# Patient Record
Sex: FEMALE | Race: WHITE | NOT HISPANIC OR LATINO | Employment: OTHER | URBAN - METROPOLITAN AREA
[De-identification: names, ages, dates, MRNs, and addresses within clinical notes are randomized per-mention and may not be internally consistent; named-entity substitution may affect disease eponyms.]

---

## 2017-01-31 ENCOUNTER — OFFICE VISIT (OUTPATIENT)
Dept: RHEUMATOLOGY | Facility: CLINIC | Age: 77
End: 2017-01-31
Payer: COMMERCIAL

## 2017-01-31 VITALS
SYSTOLIC BLOOD PRESSURE: 120 MMHG | HEIGHT: 62 IN | WEIGHT: 157.63 LBS | DIASTOLIC BLOOD PRESSURE: 80 MMHG | HEART RATE: 72 BPM | BODY MASS INDEX: 29.01 KG/M2

## 2017-01-31 DIAGNOSIS — M05.79 RHEUMATOID ARTHRITIS INVOLVING MULTIPLE SITES WITH POSITIVE RHEUMATOID FACTOR: Primary | ICD-10-CM

## 2017-01-31 DIAGNOSIS — J06.9 UPPER RESPIRATORY TRACT INFECTION, UNSPECIFIED TYPE: ICD-10-CM

## 2017-01-31 DIAGNOSIS — I42.9 CARDIOMYOPATHY, UNSPECIFIED TYPE: ICD-10-CM

## 2017-01-31 PROCEDURE — 1159F MED LIST DOCD IN RCRD: CPT | Mod: S$GLB,,, | Performed by: INTERNAL MEDICINE

## 2017-01-31 PROCEDURE — 99999 PR PBB SHADOW E&M-EST. PATIENT-LVL III: CPT | Mod: PBBFAC,,, | Performed by: INTERNAL MEDICINE

## 2017-01-31 PROCEDURE — 99214 OFFICE O/P EST MOD 30 MIN: CPT | Mod: S$GLB,,, | Performed by: INTERNAL MEDICINE

## 2017-01-31 PROCEDURE — 3074F SYST BP LT 130 MM HG: CPT | Mod: S$GLB,,, | Performed by: INTERNAL MEDICINE

## 2017-01-31 PROCEDURE — 3079F DIAST BP 80-89 MM HG: CPT | Mod: S$GLB,,, | Performed by: INTERNAL MEDICINE

## 2017-01-31 PROCEDURE — 1157F ADVNC CARE PLAN IN RCRD: CPT | Mod: S$GLB,,, | Performed by: INTERNAL MEDICINE

## 2017-01-31 PROCEDURE — 1126F AMNT PAIN NOTED NONE PRSNT: CPT | Mod: S$GLB,,, | Performed by: INTERNAL MEDICINE

## 2017-01-31 PROCEDURE — 1160F RVW MEDS BY RX/DR IN RCRD: CPT | Mod: S$GLB,,, | Performed by: INTERNAL MEDICINE

## 2017-01-31 RX ORDER — DICLOFENAC SODIUM 10 MG/G
2 GEL TOPICAL 2 TIMES DAILY PRN
Qty: 200 G | Refills: 3 | Status: SHIPPED | OUTPATIENT
Start: 2017-01-31 | End: 2022-04-11

## 2017-01-31 RX ORDER — GABAPENTIN 100 MG/1
CAPSULE ORAL
Refills: 1 | COMMUNITY
Start: 2016-12-27 | End: 2018-06-29

## 2017-01-31 RX ORDER — AZITHROMYCIN 250 MG/1
TABLET, FILM COATED ORAL
Qty: 6 TABLET | Refills: 0 | Status: SHIPPED | OUTPATIENT
Start: 2017-01-31 | End: 2017-02-05

## 2017-01-31 ASSESSMENT — ROUTINE ASSESSMENT OF PATIENT INDEX DATA (RAPID3)
MDHAQ FUNCTION SCORE: 1.1
TOTAL RAPID3 SCORE: 2.22
PATIENT GLOBAL ASSESSMENT SCORE: 1
PSYCHOLOGICAL DISTRESS SCORE: 1.1
PAIN SCORE: 2

## 2017-01-31 NOTE — MR AVS SNAPSHOT
[facility]  [address] [facility] [address] [place] [address]  Phone: [phone]  Fax: [phone]                  Juanis Mclaughlin   2017 2:00 PM   Office Visit    Description:  Female : 1940   Provider:  Christina Cruz DO   Department:  [facility]           Reason for Visit     Disease Management           Diagnoses this Visit        Comments    Rheumatoid arthritis involving multiple sites with positive rheumatoid factor    -  Primary     Upper respiratory tract infection, unspecified type         Cardiomyopathy, unspecified type                To Do List           Future Appointments        Provider Department Dept Phone    2017 1:00 PM TELEPHONE CHECK, [facility] [address] - Arrhythmia [phone]    2017 10:20 AM Mark Ribeiro MD [facility] - [phone]    3/3/2017 10:00 AM LAB, [place] [facility] [phone]    2017 10:00 AM LAB, [place] [facility] [phone]    2017 2:30 PM Christina Cruz DO [facility] [phone]      Goals (5 Years of Data)     None      Follow-Up and Disposition     Return in about 6 months (around 2017).       These Medications        Disp Refills Start End    azithromycin (Z-PABLO) 250 MG tablet 6 tablet 0 2017    Take 2 tablets by mouth on day 1; Take 1 tablet by mouth on days 2-5    Pharmacy: [facility]/pharmacy #[id number] - [place], [place] - [address] [facility]. AT [facility] Ph #: [phone]       diclofenac sodium (VOLTAREN) 1 % Gel 200 g 3 2017    Apply 2 g topically 2 (two) times daily as needed. - Topical    Pharmacy: [facility] - [place] - [address] AT Portal to [facility] Ph #: [phone]       etanercept (ENBREL SURECLICK) 50 mg/mL (0.98 mL) PnIj 4 Syringe 11 2017     INJECT THE CONTENTS OF ONE SURECLICK (50 MG) SUBCUTANEOUSLY Once per  week AS DIRECTED BY YOUR PHYSICIAN. SINGLE - USE DEVICE. KEEP REF    Pharmacy: [facility] - [place], [place] - [address] AT Portal to Reg[facility] #: [phone]         [facility]     [facility] Nurse Care Line - 24/7 Assistance  Registered nurses in the [facility] On Call Center provide clinical advisement, health education, appointment booking, and other advisory services.  Call for this free service at [phone].             Medications           Message regarding Medications     Verify the changes and/or additions to your medication regime listed below are the same as discussed with your clinician today.  If any of these changes or additions are incorrect, please notify your healthcare provider.        START taking these NEW medications        Refills    azithromycin (Z-PABLO) 250 MG tablet 0    Sig: Take 2 tablets by mouth on day 1; Take 1 tablet by mouth on days 2-5    Class: Normal    diclofenac sodium (VOLTAREN) 1 % Gel 3    Sig: Apply 2 g topically 2 (two) times daily as needed.    Class: Normal    Route: Topical           Verify that the below list of medications is an accurate representation of the medications you are currently taking.  If none reported, the list may be blank. If incorrect, please contact your healthcare provider. Carry this list with you in case of emergency.           Current Medications     aspirin (ECOTRIN) 81 MG EC tablet Take 81 mg by mouth once daily.    atorvastatin (LIPITOR) 20 MG tablet Take 1 tablet (20 mg total) by mouth every evening.    B-complex with vitamin C (Z-BEC OR EQUIV) tablet Take 1 tablet by mouth once daily.    calcium-vitamin D3 500 mg(1,250mg) -200 unit per tablet Take 1 tablet by mouth once daily.    cholecalciferol, vitamin D3, (VITAMIN D3) 5,000 unit Tab Take 5,000 Units by mouth once daily.    cycloSPORINE (RESTASIS) 0.05 % ophthalmic emulsion Place 1 drop into both eyes 2 (two) times daily.     "dorzolamide-timolol 2-0.5% (COSOPT) 2-0.5 % ophthalmic solution Place 1 drop into both eyes 2 (two) times daily.     DYMISTA 137-50 mcg/spray Spry nassal spray INHALE 1 SPRAY BY NASAL ROUTE 2 TIMES A DAY FOR 1 DAY    esomeprazole (NEXIUM) 20 MG capsule Take 20 mg by mouth before breakfast.     ESTRACE 0.01 % (0.1 mg/gram) vaginal cream PLACE 1 GRAM VAGINALLY ONCEDAILY    etanercept (ENBREL SURECLICK) 50 mg/mL (0.98 mL) PnIj INJECT THE CONTENTS OF ONE SURECLICK (50 MG) SUBCUTANEOUSLY Once per week AS DIRECTED BY YOUR PHYSICIAN. SINGLE - USE DEVICE. KEEP REF    fluticasone-vilanterol (BREO ELLIPTA) 100-25 mcg/dose diskus inhaler Inhale 1 puff into the lungs 2 (two) times daily. Use AM of pacemaker insertion    folic acid (FOLVITE) 1 MG tablet Take 1 tablet (1 mg total) by mouth once daily.    gabapentin (NEURONTIN) 100 MG capsule TAKE 1 CAPSULE BY ORAL ROUTE ONCE A DAY (AT BEDTIME)    hydrocodone-acetaminophen 7.5-325mg (NORCO) 7.5-325 mg per tablet Take 1 tablet by mouth every 6 (six) hours as needed for Pain.    magnesium 200 mg Tab Take 400 mg by mouth once daily. Magnesium 400mg with Chelated Zinc 15mg.    methotrexate 2.5 MG Tab Take 20 mg by mouth every 7 days.    potassium gluconate 595 (99) mg Tab Take 1 capsule by mouth once daily.    travoprost, benzalkonium, (TRAVATAN) 0.004 % ophthalmic solution Place 1 drop into both eyes every evening.    valsartan-hydrochlorothiazide (DIOVAN-HCT) 80-12.5 mg per tablet Take 1 tablet by mouth once daily.    azithromycin (Z-PABLO) 250 MG tablet Take 2 tablets by mouth on day 1; Take 1 tablet by mouth on days 2-5    diclofenac sodium (VOLTAREN) 1 % Gel Apply 2 g topically 2 (two) times daily as needed.           Clinical Reference Information           Vital Signs - Last Recorded  Most recent update: 1/31/2017  1:41 PM by Adelaide Titus LPN    BP Pulse Ht Wt BMI    120/80 72 5' 1.5" (1.562 m) 71.5 kg (157 lb 10.1 oz) 29.3 kg/m2      Blood Pressure          Most Recent " Value    BP  120/80      Allergies as of 1/31/2017     Ace Inhibitors      Immunizations Administered on Date of Encounter - 1/31/2017     None

## 2017-01-31 NOTE — PROGRESS NOTES
Subjective:          Chief Complaint: Juanis Mclaughlin is a 76 y.o. female who had concerns including Disease Management.    HPI:  Rheumatoid Arthritis  HPI 76y/o  female with h/o HTN, HLD was diagnosed with sero +  Rheumatoid arthritis in 1998. Off prednisone now. Has also received HCQ in the past no improvement.     Presently on MTX 5 weekly and Enbrel 50mg weekly with good control of her arthritis. No flare with reduction in MTX. No change in the MCV recent folate and B12 WNL, found to have severe CHERIE. 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    No ASE of stomatitis, LAE elevations, infections or injection site reaction. She was also diagnosed with secondary Sjogren's. Currently on restasis. Today, she is complaining of stiffness of hands and persistent cough when drinking and eating foods.   Early morning stiffness in the hands last for 1 hr.   She denies fever, weight loss, photosensitivity, rash, ulcer, raynaud's phenomenon, alopecia, dysphagia, diarrhea or blood in the stools.      Patient is noting cough for the past few months it's exacerbated with laughing following eating and with laughing.  She's had no fevers no antecedent upper respiratory infection no chills typically nonproductive. + hx of GERD. Did start Nexium no change. Hx of mild MR. No pedal edema. Was seen with Pulm now on Gabapentin 100mg see if this would slow the cough.  Doing well with Dr. Madison with Breo and Gabapentin to relax cough impulse.     REVIEW OF SYSTEMS:    Review of Systems   Constitutional: Negative for fever, malaise/fatigue and weight loss.   HENT: Negative for sore throat.    Eyes: Negative for double vision, photophobia and redness.   Respiratory: Positive for cough and sputum production. Negative for shortness of breath and wheezing.    Cardiovascular: Negative for chest pain, palpitations and orthopnea.   Gastrointestinal: Negative for abdominal pain, constipation and diarrhea.   Genitourinary: Negative for dysuria,  hematuria and urgency.   Musculoskeletal: Positive for joint pain. Negative for back pain and myalgias.   Skin: Negative for rash.   Neurological: Negative for dizziness, tingling, focal weakness and headaches.   Endo/Heme/Allergies: Does not bruise/bleed easily.   Psychiatric/Behavioral: Negative for depression, hallucinations and suicidal ideas.                Objective:            Past Medical History   Diagnosis Date    Arthritis      rheumatoid arthritis    Cardiomyopathy     DVT (deep venous thrombosis)      one time 5 years ago    GERD (gastroesophageal reflux disease)     Glaucoma      sees Dr. Gillette    Hyperlipidemia     Hypertension     Iron deficiency     Osteoporosis 7/9/2014    PONV (postoperative nausea and vomiting)     Rheumatoid arthritis involving multiple sites with positive rheumatoid factor 11/24/2013    Vitamin B12 deficiency 2/28/2015     Family History   Problem Relation Age of Onset    Heart disease Mother      CHF    Hypertension Mother     Heart disease Father     Stroke Father     Heart disease Brother     Stroke Brother     Heart disease Brother     Scleroderma Brother     Ovarian cancer Maternal Aunt     Breast cancer Neg Hx      Social History   Substance Use Topics    Smoking status: Never Smoker    Smokeless tobacco: None    Alcohol use 0.5 oz/week     1 Standard drinks or equivalent per week      Comment: occ social         Current Outpatient Prescriptions on File Prior to Visit   Medication Sig Dispense Refill    aspirin (ECOTRIN) 81 MG EC tablet Take 81 mg by mouth once daily.      atorvastatin (LIPITOR) 20 MG tablet Take 1 tablet (20 mg total) by mouth every evening. 90 tablet 3    B-complex with vitamin C (Z-BEC OR EQUIV) tablet Take 1 tablet by mouth once daily.      calcium-vitamin D3 500 mg(1,250mg) -200 unit per tablet Take 1 tablet by mouth once daily.      cholecalciferol, vitamin D3, (VITAMIN D3) 5,000 unit Tab Take 5,000 Units by mouth  once daily.      cycloSPORINE (RESTASIS) 0.05 % ophthalmic emulsion Place 1 drop into both eyes 2 (two) times daily.      dorzolamide-timolol 2-0.5% (COSOPT) 2-0.5 % ophthalmic solution Place 1 drop into both eyes 2 (two) times daily.       DYMISTA 137-50 mcg/spray Spry nassal spray INHALE 1 SPRAY BY NASAL ROUTE 2 TIMES A DAY FOR 1 DAY  4    esomeprazole (NEXIUM) 20 MG capsule Take 20 mg by mouth before breakfast.       ESTRACE 0.01 % (0.1 mg/gram) vaginal cream PLACE 1 GRAM VAGINALLY ONCEDAILY (Patient taking differently: PLACE 1 GRAM VAGINALLY ONCE WEEK) 127.5 g 2    etanercept (ENBREL SURECLICK) 50 mg/mL (0.98 mL) PnIj INJECT THE CONTENTS OF ONE SURECLICK (50 MG) SUBCUTANEOUSLY Once per week AS DIRECTED BY YOUR PHYSICIAN. SINGLE - USE DEVICE. KEEP REF 2 Syringe 11    fluticasone-vilanterol (BREO ELLIPTA) 100-25 mcg/dose diskus inhaler Inhale 1 puff into the lungs 2 (two) times daily. Use AM of pacemaker insertion      folic acid (FOLVITE) 1 MG tablet Take 1 tablet (1 mg total) by mouth once daily. 90 tablet 3    hydrocodone-acetaminophen 7.5-325mg (NORCO) 7.5-325 mg per tablet Take 1 tablet by mouth every 6 (six) hours as needed for Pain. 20 tablet 0    magnesium 200 mg Tab Take 400 mg by mouth once daily. Magnesium 400mg with Chelated Zinc 15mg.      methotrexate 2.5 MG Tab Take 20 mg by mouth every 7 days.      potassium gluconate 595 (99) mg Tab Take 1 capsule by mouth once daily.      travoprost, benzalkonium, (TRAVATAN) 0.004 % ophthalmic solution Place 1 drop into both eyes every evening.      valsartan-hydrochlorothiazide (DIOVAN-HCT) 80-12.5 mg per tablet Take 1 tablet by mouth once daily. (Patient taking differently: Take 0.5 tablets by mouth once daily. Per Dr. Valencia only take 1/2 tablet Wed, Thurs and Fri of this week) 90 tablet 3     No current facility-administered medications on file prior to visit.        Vitals:    01/31/17 1336   BP: 120/80   Pulse: 72       Physical Exam:    Physical  Exam   Constitutional: She appears well-developed and well-nourished.   HENT:   Nose: No septal deviation.   Mouth/Throat: Mucous membranes are normal. No oral lesions.   Eyes: Pupils are equal, round, and reactive to light. Right conjunctiva is not injected. Left conjunctiva is not injected.   Neck: No JVD present. No thyroid mass and no thyromegaly present.   Cardiovascular: Normal rate, regular rhythm and normal pulses.    No edema   Pulmonary/Chest: Effort normal. She has wheezes in the right middle field and the left middle field.   Abdominal: Soft. Normal appearance. There is no hepatosplenomegaly.   Musculoskeletal:        Right shoulder: She exhibits normal range of motion, no tenderness and no swelling.        Left shoulder: She exhibits normal range of motion, no tenderness and no swelling.        Right elbow: She exhibits normal range of motion and no swelling. No tenderness found.        Left elbow: She exhibits normal range of motion and no swelling. No tenderness found.        Right wrist: She exhibits decreased range of motion and tenderness. She exhibits no swelling.        Left wrist: She exhibits normal range of motion, no tenderness and no swelling.        Right hip: She exhibits normal range of motion, normal strength and no swelling.        Left hip: She exhibits normal range of motion, no tenderness and no swelling.        Right knee: She exhibits normal range of motion and no swelling. No tenderness found.        Left knee: She exhibits normal range of motion and no swelling. No tenderness found.        Right ankle: She exhibits normal range of motion and no swelling. No tenderness.        Left ankle: She exhibits normal range of motion and no swelling. No tenderness.        Cervical back: She exhibits decreased range of motion.        Right hand: She exhibits decreased range of motion and tenderness.        Left hand: She exhibits tenderness.   1+ synovitis MCP and PIP with heberden nodes all  DIP 2-5. Left wrist with tenderness and swelling about the TFCC. No nodules. No flexion contractures.     Lymphadenopathy:     She has no cervical adenopathy.     She has no axillary adenopathy.   Neurological: She has normal strength and normal reflexes.   Skin: Skin is dry and intact.   Psychiatric: She has a normal mood and affect.             Assessment:       Encounter Diagnoses   Name Primary?    Rheumatoid arthritis involving multiple sites with positive rheumatoid factor Yes    Upper respiratory tract infection, unspecified type     Cardiomyopathy, unspecified type           Plan:        Rheumatoid arthritis involving multiple sites with positive rheumatoid factor    Upper respiratory tract infection, unspecified type  -     azithromycin (Z-PABLO) 250 MG tablet; Take 2 tablets by mouth on day 1; Take 1 tablet by mouth on days 2-5  Dispense: 6 tablet; Refill: 0    Cardiomyopathy, unspecified type    Other orders  -     diclofenac sodium (VOLTAREN) 1 % Gel; Apply 2 g topically 2 (two) times daily as needed.  Dispense: 200 g; Refill: 3  -     etanercept (ENBREL SURECLICK) 50 mg/mL (0.98 mL) PnIj; INJECT THE CONTENTS OF ONE SURECLICK (50 MG) SUBCUTANEOUSLY Once per week AS DIRECTED BY YOUR PHYSICIAN. SINGLE - USE DEVICE. KEEP REF  Dispense: 4 Syringe; Refill: 11    URI start Z-pack  Continue Enbrel she is getting 1 sureclick at a time not sure why this is happening  voltaren gel PRN fingers/hands  Hold Enbrel for 2 weeks, skip MTX x 1 week    Return in about 6 months (around 7/31/2017).          30min consultation with greater than 50% spent in counseling, chart review and coordination of care. All questions answered.

## 2017-02-01 ENCOUNTER — CLINICAL SUPPORT (OUTPATIENT)
Dept: ELECTROPHYSIOLOGY | Facility: CLINIC | Age: 77
End: 2017-02-01
Payer: COMMERCIAL

## 2017-02-01 DIAGNOSIS — I42.9 CARDIOMYOPATHY: ICD-10-CM

## 2017-02-01 DIAGNOSIS — Z95.0 CARDIAC PACEMAKER IN SITU: Primary | ICD-10-CM

## 2017-02-01 DIAGNOSIS — I49.5 SSS (SICK SINUS SYNDROME): ICD-10-CM

## 2017-02-01 DIAGNOSIS — I34.0 MITRAL REGURGITATION: ICD-10-CM

## 2017-02-01 PROCEDURE — 93293 PM PHONE R-STRIP DEVICE EVAL: CPT | Mod: S$GLB,,, | Performed by: INTERNAL MEDICINE

## 2017-03-20 ENCOUNTER — TELEPHONE (OUTPATIENT)
Dept: RHEUMATOLOGY | Facility: CLINIC | Age: 77
End: 2017-03-20

## 2017-03-20 NOTE — TELEPHONE ENCOUNTER
Refill request.    Risedronate Sodium Tabs  35 mg  90 day supply     [facility] pharmacy [place].    Thank you.

## 2017-03-20 NOTE — TELEPHONE ENCOUNTER
----- Message from Misty Lon sent at 3/20/2017  9:38 AM CDT -----  Contact: patient  Patient calling in regards to requesting a refill for Risedronate Sodium Tab 35 mg, 90 day supply. Please advise.  Call back .  Thanks!  [facility] [place] - [address] AT Portal to [address]  [facility] [address]  Phone: [phone] Fax: [phone]

## 2017-03-21 RX ORDER — RISEDRONATE SODIUM 35 MG/1
35 TABLET, FILM COATED ORAL
Qty: 12 TABLET | Refills: 3 | Status: SHIPPED | OUTPATIENT
Start: 2017-03-21 | End: 2018-02-23 | Stop reason: SDUPTHER

## 2017-03-28 ENCOUNTER — LAB VISIT (OUTPATIENT)
Dept: LAB | Facility: HOSPITAL | Age: 77
End: 2017-03-28
Attending: INTERNAL MEDICINE
Payer: COMMERCIAL

## 2017-03-28 DIAGNOSIS — M06.9 RHEUMATOID ARTHRITIS OF HAND, UNSPECIFIED LATERALITY, UNSPECIFIED RHEUMATOID FACTOR PRESENCE: ICD-10-CM

## 2017-03-28 LAB
ALBUMIN SERPL BCP-MCNC: 3.7 G/DL
ALP SERPL-CCNC: 51 U/L
ALT SERPL W/O P-5'-P-CCNC: 14 U/L
ANION GAP SERPL CALC-SCNC: 7 MMOL/L
AST SERPL-CCNC: 33 U/L
BASOPHILS # BLD AUTO: 0.03 K/UL
BASOPHILS NFR BLD: 0.5 %
BILIRUB SERPL-MCNC: 0.8 MG/DL
BUN SERPL-MCNC: 21 MG/DL
CALCIUM SERPL-MCNC: 9.7 MG/DL
CHLORIDE SERPL-SCNC: 105 MMOL/L
CO2 SERPL-SCNC: 29 MMOL/L
CREAT SERPL-MCNC: 1 MG/DL
CRP SERPL-MCNC: 1.2 MG/L
DIFFERENTIAL METHOD: ABNORMAL
EOSINOPHIL # BLD AUTO: 0.2 K/UL
EOSINOPHIL NFR BLD: 2.6 %
ERYTHROCYTE [DISTWIDTH] IN BLOOD BY AUTOMATED COUNT: 14.2 %
ERYTHROCYTE [SEDIMENTATION RATE] IN BLOOD BY WESTERGREN METHOD: 6 MM/HR
EST. GFR  (AFRICAN AMERICAN): >60 ML/MIN/1.73 M^2
EST. GFR  (NON AFRICAN AMERICAN): 54.5 ML/MIN/1.73 M^2
GLUCOSE SERPL-MCNC: 130 MG/DL
HCT VFR BLD AUTO: 41.9 %
HGB BLD-MCNC: 14 G/DL
LYMPHOCYTES # BLD AUTO: 1.5 K/UL
LYMPHOCYTES NFR BLD: 25 %
MCH RBC QN AUTO: 34.3 PG
MCHC RBC AUTO-ENTMCNC: 33.4 %
MCV RBC AUTO: 103 FL
MONOCYTES # BLD AUTO: 0.6 K/UL
MONOCYTES NFR BLD: 10.4 %
NEUTROPHILS # BLD AUTO: 3.8 K/UL
NEUTROPHILS NFR BLD: 61.3 %
PLATELET # BLD AUTO: 216 K/UL
PMV BLD AUTO: 12.4 FL
POTASSIUM SERPL-SCNC: 4.4 MMOL/L
PROT SERPL-MCNC: 7.3 G/DL
RBC # BLD AUTO: 4.08 M/UL
SODIUM SERPL-SCNC: 141 MMOL/L
WBC # BLD AUTO: 6.16 K/UL

## 2017-03-28 PROCEDURE — 86140 C-REACTIVE PROTEIN: CPT

## 2017-03-28 PROCEDURE — 36415 COLL VENOUS BLD VENIPUNCTURE: CPT | Mod: PO

## 2017-03-28 PROCEDURE — 85651 RBC SED RATE NONAUTOMATED: CPT | Mod: PO

## 2017-03-28 PROCEDURE — 85025 COMPLETE CBC W/AUTO DIFF WBC: CPT

## 2017-03-28 PROCEDURE — 80053 COMPREHEN METABOLIC PANEL: CPT

## 2017-03-29 ENCOUNTER — OFFICE VISIT (OUTPATIENT)
Dept: OBSTETRICS AND GYNECOLOGY | Facility: CLINIC | Age: 77
End: 2017-03-29
Payer: COMMERCIAL

## 2017-03-29 VITALS
SYSTOLIC BLOOD PRESSURE: 142 MMHG | HEIGHT: 62 IN | DIASTOLIC BLOOD PRESSURE: 80 MMHG | BODY MASS INDEX: 29.13 KG/M2 | WEIGHT: 158.31 LBS

## 2017-03-29 DIAGNOSIS — Z01.419 ROUTINE GYNECOLOGICAL EXAMINATION: Primary | ICD-10-CM

## 2017-03-29 PROCEDURE — 3077F SYST BP >= 140 MM HG: CPT | Mod: S$GLB,,, | Performed by: SPECIALIST

## 2017-03-29 PROCEDURE — 99999 PR PBB SHADOW E&M-EST. PATIENT-LVL IV: CPT | Mod: PBBFAC,,, | Performed by: SPECIALIST

## 2017-03-29 PROCEDURE — 1160F RVW MEDS BY RX/DR IN RCRD: CPT | Mod: S$GLB,,, | Performed by: SPECIALIST

## 2017-03-29 PROCEDURE — 3079F DIAST BP 80-89 MM HG: CPT | Mod: S$GLB,,, | Performed by: SPECIALIST

## 2017-03-29 PROCEDURE — 1159F MED LIST DOCD IN RCRD: CPT | Mod: S$GLB,,, | Performed by: SPECIALIST

## 2017-03-29 PROCEDURE — 1157F ADVNC CARE PLAN IN RCRD: CPT | Mod: S$GLB,,, | Performed by: SPECIALIST

## 2017-03-29 PROCEDURE — 1126F AMNT PAIN NOTED NONE PRSNT: CPT | Mod: S$GLB,,, | Performed by: SPECIALIST

## 2017-03-29 PROCEDURE — 99397 PER PM REEVAL EST PAT 65+ YR: CPT | Mod: S$GLB,,, | Performed by: SPECIALIST

## 2017-03-29 NOTE — PROGRESS NOTES
75 yo WF presents for annual gyn evaluation. No overt gyn complaints   Mammo screening completed  Past Medical History:   Diagnosis Date    Arthritis     rheumatoid arthritis    Cardiomyopathy     DVT (deep venous thrombosis)     one time 5 years ago    GERD (gastroesophageal reflux disease)     Glaucoma     sees Dr. Gillette    Hyperlipidemia     Hypertension     Iron deficiency     Osteoporosis 7/9/2014    PONV (postoperative nausea and vomiting)     Rheumatoid arthritis involving multiple sites with positive rheumatoid factor 11/24/2013    Vitamin B12 deficiency 2/28/2015       Past Surgical History:   Procedure Laterality Date    BREAST SURGERY      6 biopsies (benign)    FOOT SURGERY      had neuorma and also required hardware    HYSTERECTOMY      SUJATA with BSO    INCONTINENCE SURGERY      KNEE ARTHROSCOPY W/ DEBRIDEMENT      pace maker  09/2016       Family History   Problem Relation Age of Onset    Heart disease Mother      CHF    Hypertension Mother     Heart disease Father     Stroke Father     Heart disease Brother     Stroke Brother     Heart disease Brother     Scleroderma Brother     Ovarian cancer Maternal Aunt     Breast cancer Neg Hx        Social History     Social History    Marital status:      Spouse name: N/A    Number of children: 3    Years of education: N/A     Social History Main Topics    Smoking status: Never Smoker    Smokeless tobacco: None    Alcohol use 0.5 oz/week     1 Standard drinks or equivalent per week      Comment: occ social    Drug use: No    Sexual activity: No     Other Topics Concern    None     Social History Narrative       Current Outpatient Prescriptions   Medication Sig Dispense Refill    aspirin (ECOTRIN) 81 MG EC tablet Take 81 mg by mouth once daily.      atorvastatin (LIPITOR) 20 MG tablet Take 1 tablet (20 mg total) by mouth every evening. 90 tablet 3    B-complex with vitamin C (Z-BEC OR EQUIV) tablet Take 1 tablet  by mouth once daily.      calcium-vitamin D3 500 mg(1,250mg) -200 unit per tablet Take 1 tablet by mouth once daily.      cycloSPORINE (RESTASIS) 0.05 % ophthalmic emulsion Place 1 drop into both eyes 2 (two) times daily.      diclofenac sodium (VOLTAREN) 1 % Gel Apply 2 g topically 2 (two) times daily as needed. 200 g 3    dorzolamide-timolol 2-0.5% (COSOPT) 2-0.5 % ophthalmic solution Place 1 drop into both eyes 2 (two) times daily.       DYMISTA 137-50 mcg/spray Spry nassal spray INHALE 1 SPRAY BY NASAL ROUTE 2 TIMES A DAY FOR 1 DAY  4    esomeprazole (NEXIUM) 20 MG capsule Take 20 mg by mouth before breakfast.       ESTRACE 0.01 % (0.1 mg/gram) vaginal cream PLACE 1 GRAM VAGINALLY ONCEDAILY (Patient taking differently: PLACE 1 GRAM VAGINALLY ONCE WEEK) 127.5 g 2    etanercept (ENBREL SURECLICK) 50 mg/mL (0.98 mL) PnIj INJECT THE CONTENTS OF ONE SURECLICK (50 MG) SUBCUTANEOUSLY Once per week AS DIRECTED BY YOUR PHYSICIAN. SINGLE - USE DEVICE. KEEP REF 4 Syringe 11    fluticasone-vilanterol (BREO ELLIPTA) 100-25 mcg/dose diskus inhaler Inhale 1 puff into the lungs 2 (two) times daily. Use AM of pacemaker insertion      folic acid (FOLVITE) 1 MG tablet Take 1 tablet (1 mg total) by mouth once daily. 90 tablet 3    gabapentin (NEURONTIN) 100 MG capsule TAKE 1 CAPSULE BY ORAL ROUTE ONCE A DAY (AT BEDTIME)  1    magnesium 200 mg Tab Take 400 mg by mouth once daily. Magnesium 400mg with Chelated Zinc 15mg.      methotrexate 2.5 MG Tab Take 20 mg by mouth every 7 days.      potassium gluconate 595 (99) mg Tab Take 1 capsule by mouth once daily.      risedronate (ACTONEL) 35 MG tablet Take 1 tablet (35 mg total) by mouth every 7 days. 12 tablet 3    travoprost, benzalkonium, (TRAVATAN) 0.004 % ophthalmic solution Place 1 drop into both eyes every evening.      valsartan-hydrochlorothiazide (DIOVAN-HCT) 80-12.5 mg per tablet Take 1 tablet by mouth once daily. (Patient taking differently: Take 0.5 tablets  by mouth once daily. Per Dr. Valencia only take 1/2 tablet Wed, Thurs and Fri of this week) 90 tablet 3    hydrocodone-acetaminophen 7.5-325mg (NORCO) 7.5-325 mg per tablet Take 1 tablet by mouth every 6 (six) hours as needed for Pain. 20 tablet 0     No current facility-administered medications for this visit.        Review of patient's allergies indicates:   Allergen Reactions    Ace inhibitors      cough       Review of System:   General: no chills, fever, night sweats, weight gain or weight loss  Psychological: no depression or suicidal ideation  Breasts: no new or changing breast lumps, nipple discharge or masses.  Respiratory: no cough, shortness of breath, or wheezing  Cardiovascular: no chest pain or dyspnea on exertion  Gastrointestinal: no abdominal pain, change in bowel habits, or black or bloody stools  Genito-Urinary: no incontinence, urinary frequency/urgency or vulvar/vaginal symptoms, pelvic pain or abnormal vaginal bleeding.  Musculoskeletal: no gait disturbance or muscular weakness    PE:   APPEARANCE: Well nourished, well developed, in no acute distress.  NECK: Neck symmetric without masses or thyromegaly.  NODES: No inguinal lymph node enlargement.  ABDOMEN: Soft. No tenderness or masses. No hepatosplenomegaly. No hernias.  BREASTS: Symmetrical, no skin changes or visible lesions. No palpable masses, nipple discharge or adenopathy bilaterally.  PELVIC: Normal external female genitalia without lesions. Normal hair distribution. Adequate perineal body, normal urethral meatus. Vagina moist and well rugated without lesions or discharge. No significant cystocele or rectocele. Uterus and cervix surgically absent. Bimanual exam revealed no masses, tenderness or abnormality.      Plan BSE monthly          Discussed daily calcium intake and weight bearing exercise   RTO 2 years/prn

## 2017-03-29 NOTE — MR AVS SNAPSHOT
[facility] - OB/GYN  [address]  [place] [address]  Phone: [phone]                  Juanis Mclaughlin   3/29/2017 8:40 AM   Office Visit    Description:  Female : 1940   Provider:  Mark Ribeiro MD   Department:  [facility] - [facility]N           Reason for Visit     Well Woman           Diagnoses this Visit        Comments    Routine gynecological examination    -  Primary            To Do List           Future Appointments        Provider Department Dept Phone    2017 1:00 PM TELEPHONE CHECK, [address] - Arrhythmia [phone]    2017 10:00 AM LAB, [place] [facility] [phone]    2017 2:30 PM Christina Cruz,  [place] - Rheumatology [phone]      Goals (5 Years of Data)     None      [facility]     [facility] Nurse Care Line -  Assistance  Registered nurses in the [facility] provide clinical advisement, health education, appointment booking, and other advisory services.  Call for this free service at [phone].             Medications           Message regarding Medications     Verify the changes and/or additions to your medication regime listed below are the same as discussed with your clinician today.  If any of these changes or additions are incorrect, please notify your healthcare provider.        STOP taking these medications     cholecalciferol, vitamin D3, (VITAMIN D3) 5,000 unit Tab Take 5,000 Units by mouth once daily.           Verify that the below list of medications is an accurate representation of the medications you are currently taking.  If none reported, the list may be blank. If incorrect, please contact your healthcare provider. Carry this list with you in case of emergency.           Current Medications     aspirin (ECOTRIN) 81 MG EC tablet Take 81 mg by mouth once daily.    atorvastatin (LIPITOR) 20 MG tablet Take 1 tablet (20 mg total) by mouth every evening.    B-complex  with vitamin C (Z-BEC OR EQUIV) tablet Take 1 tablet by mouth once daily.    calcium-vitamin D3 500 mg(1,250mg) -200 unit per tablet Take 1 tablet by mouth once daily.    cycloSPORINE (RESTASIS) 0.05 % ophthalmic emulsion Place 1 drop into both eyes 2 (two) times daily.    diclofenac sodium (VOLTAREN) 1 % Gel Apply 2 g topically 2 (two) times daily as needed.    dorzolamide-timolol 2-0.5% (COSOPT) 2-0.5 % ophthalmic solution Place 1 drop into both eyes 2 (two) times daily.     DYMISTA 137-50 mcg/spray Spry nassal spray INHALE 1 SPRAY BY NASAL ROUTE 2 TIMES A DAY FOR 1 DAY    esomeprazole (NEXIUM) 20 MG capsule Take 20 mg by mouth before breakfast.     ESTRACE 0.01 % (0.1 mg/gram) vaginal cream PLACE 1 GRAM VAGINALLY ONCEDAILY    etanercept (ENBREL SURECLICK) 50 mg/mL (0.98 mL) PnIj INJECT THE CONTENTS OF ONE SURECLICK (50 MG) SUBCUTANEOUSLY Once per week AS DIRECTED BY YOUR PHYSICIAN. SINGLE - USE DEVICE. KEEP REF    fluticasone-vilanterol (BREO ELLIPTA) 100-25 mcg/dose diskus inhaler Inhale 1 puff into the lungs 2 (two) times daily. Use AM of pacemaker insertion    folic acid (FOLVITE) 1 MG tablet Take 1 tablet (1 mg total) by mouth once daily.    gabapentin (NEURONTIN) 100 MG capsule TAKE 1 CAPSULE BY ORAL ROUTE ONCE A DAY (AT BEDTIME)    magnesium 200 mg Tab Take 400 mg by mouth once daily. Magnesium 400mg with Chelated Zinc 15mg.    methotrexate 2.5 MG Tab Take 20 mg by mouth every 7 days.    potassium gluconate 595 (99) mg Tab Take 1 capsule by mouth once daily.    risedronate (ACTONEL) 35 MG tablet Take 1 tablet (35 mg total) by mouth every 7 days.    travoprost, benzalkonium, (TRAVATAN) 0.004 % ophthalmic solution Place 1 drop into both eyes every evening.    valsartan-hydrochlorothiazide (DIOVAN-HCT) 80-12.5 mg per tablet Take 1 tablet by mouth once daily.    hydrocodone-acetaminophen 7.5-325mg (NORCO) 7.5-325 mg per tablet Take 1 tablet by mouth every 6 (six) hours as needed for Pain.           Clinical  "Reference Information           Your Vitals Were     BP Height Weight BMI       142/80 5' 1.5" (1.562 m) 71.8 kg (158 lb 4.6 oz) 29.42 kg/m2       Blood Pressure          Most Recent Value    BP  (!)  142/80      Allergies as of 3/29/2017     Ace Inhibitors      Immunizations Administered on Date of Encounter - 3/29/2017     None      Orders Placed During Today's Visit      Normal Orders This Visit    Liquid-based pap smear, screening       Language Assistance Services     ATTENTION: Language assistance services are available, free of charge. Please call [phone].      ATENCIÓN: Si habla español, tiene a graves disposición servicios gratuitos de asistencia lingüística. Llame al [phone].     BERNADETTE Ý: N?u b?n nói Ti?ng Vi?t, có các d?ch v? h? tr? ngôn ng? mi?n phí dành cho b?n. G?i s? [phone].         [facility] - OB/[facility] complies with applicable Federal civil rights laws and does not discriminate on the basis of race, color, national origin, age, disability, or sex.        "

## 2017-04-10 DIAGNOSIS — I10 ESSENTIAL HYPERTENSION: ICD-10-CM

## 2017-04-10 NOTE — TELEPHONE ENCOUNTER
Pt req refill on med. Advised will need an appt. Req morning appt. No appt avail until July. Declined to see np. Please advise if pt can be worked in for appt.

## 2017-04-10 NOTE — TELEPHONE ENCOUNTER
----- Message from Arben Bardales sent at 4/10/2017 11:05 AM CDT -----  Contact: Patient  Patient states that she needs a refill for the valsartan-hydrochlorothiazide (DIOVAN-HCT) 80-12.5 mg per tablets for the 3 month supply.  Can you please look in to this matter.  Please call [phone].  Thank you      [facility] - [place] - [address] AT [address] to [place] [address]  [facility] [place] [address]  Phone: [phone] Fax: [phone]

## 2017-04-11 NOTE — TELEPHONE ENCOUNTER
Please see if we can put pt. In on a Thursday at 2:30 ot at Friday at 11:30.  Notify me on which day, so I am prepared.  Please check with pt. And let us know what the correct directions are, as Dr. Valencia appeared to have changed this med for a week?

## 2017-04-13 RX ORDER — VALSARTAN AND HYDROCHLOROTHIAZIDE 80; 12.5 MG/1; MG/1
1 TABLET, FILM COATED ORAL DAILY
Qty: 90 TABLET | Refills: 0 | Status: SHIPPED | OUTPATIENT
Start: 2017-04-13 | End: 2017-07-27 | Stop reason: SDUPTHER

## 2017-04-17 NOTE — TELEPHONE ENCOUNTER
----- Message from Za Wild sent at 4/17/2017 11:20 AM CDT -----  Contact: Patient  Patient called regarding new Rx for (methoprexape) send to [facility] mandi mail order 90 days supply. Please call back at [phone] when script has been sent. Thanks,

## 2017-04-17 NOTE — TELEPHONE ENCOUNTER
----- Message from Za Wild sent at 4/17/2017 11:20 AM CDT -----  Contact: Patient  Patient called regarding new Rx for (methoprexape) send to [facility]i mail order 90 days supply. Please call back at [phone] when script has been sent. Thanks,

## 2017-04-17 NOTE — TELEPHONE ENCOUNTER
----- Message from John Rivas sent at 4/13/2017  2:22 PM CDT -----  Contact: [facility] Graham/ Lyubov  Need instructions for Rx. It is conflicting. Rx Diovan 80/12.5. Reference [id number] Call [phone] thank you!

## 2017-04-18 RX ORDER — METHOTREXATE 2.5 MG/1
20 TABLET ORAL
Qty: 32 TABLET | Refills: 3 | Status: SHIPPED | OUTPATIENT
Start: 2017-04-18 | End: 2017-07-27 | Stop reason: SDUPTHER

## 2017-04-21 ENCOUNTER — OFFICE VISIT (OUTPATIENT)
Dept: INTERNAL MEDICINE | Facility: CLINIC | Age: 77
End: 2017-04-21
Payer: COMMERCIAL

## 2017-04-21 VITALS
SYSTOLIC BLOOD PRESSURE: 129 MMHG | HEIGHT: 62 IN | HEART RATE: 59 BPM | BODY MASS INDEX: 29.13 KG/M2 | DIASTOLIC BLOOD PRESSURE: 84 MMHG | WEIGHT: 158.31 LBS

## 2017-04-21 DIAGNOSIS — R04.0 EPISTAXIS, RECURRENT: ICD-10-CM

## 2017-04-21 DIAGNOSIS — E78.5 HYPERLIPIDEMIA, UNSPECIFIED HYPERLIPIDEMIA TYPE: ICD-10-CM

## 2017-04-21 DIAGNOSIS — K21.9 GASTROESOPHAGEAL REFLUX DISEASE, ESOPHAGITIS PRESENCE NOT SPECIFIED: Primary | ICD-10-CM

## 2017-04-21 DIAGNOSIS — J34.89 LESION OF NOSE: ICD-10-CM

## 2017-04-21 PROCEDURE — 1160F RVW MEDS BY RX/DR IN RCRD: CPT | Mod: S$GLB,,, | Performed by: INTERNAL MEDICINE

## 2017-04-21 PROCEDURE — 99214 OFFICE O/P EST MOD 30 MIN: CPT | Mod: S$GLB,,, | Performed by: INTERNAL MEDICINE

## 2017-04-21 PROCEDURE — 1126F AMNT PAIN NOTED NONE PRSNT: CPT | Mod: S$GLB,,, | Performed by: INTERNAL MEDICINE

## 2017-04-21 PROCEDURE — 99999 PR PBB SHADOW E&M-EST. PATIENT-LVL IV: CPT | Mod: PBBFAC,,, | Performed by: INTERNAL MEDICINE

## 2017-04-21 PROCEDURE — 3079F DIAST BP 80-89 MM HG: CPT | Mod: S$GLB,,, | Performed by: INTERNAL MEDICINE

## 2017-04-21 PROCEDURE — 3074F SYST BP LT 130 MM HG: CPT | Mod: S$GLB,,, | Performed by: INTERNAL MEDICINE

## 2017-04-21 PROCEDURE — 1157F ADVNC CARE PLAN IN RCRD: CPT | Mod: 8P,S$GLB,, | Performed by: INTERNAL MEDICINE

## 2017-04-21 PROCEDURE — 1159F MED LIST DOCD IN RCRD: CPT | Mod: S$GLB,,, | Performed by: INTERNAL MEDICINE

## 2017-04-21 RX ORDER — PANTOPRAZOLE SODIUM 40 MG/1
40 TABLET, DELAYED RELEASE ORAL DAILY
Qty: 90 TABLET | Refills: 1 | Status: SHIPPED | OUTPATIENT
Start: 2017-04-21 | End: 2017-07-24 | Stop reason: SDUPTHER

## 2017-04-21 NOTE — MR AVS SNAPSHOT
[facility]  [address]  [place] [address]  Phone: [phone]  Fax: [phone]                  Juanis Mclaughlin   2017 11:20 AM   Office Visit    Description:  Female : 1940   Provider:  Bee Gutierrez MD   Department:  [facility] Medicine           Reason for Visit     Medication Refill           Diagnoses this Visit        Comments    Gastroesophageal reflux disease, esophagitis presence not specified    -  Primary     Hyperlipidemia, unspecified hyperlipidemia type         Lesion of nose         Epistaxis, recurrent                To Do List           Future Appointments        Provider Department Dept Phone    2017 8:35 AM LAB, [place] [facility] [phone]    2017 1:00 PM TELEPHONE CHECK, [address]EMAK[address] - Arrhythmia [phone]    2017 10:00 AM LAB, [place] [facility] [phone]    2017 2:30 PM Christina Cruz,  [facility] Rheumatology [phone]      Goals (5 Years of Data)     None       These Medications        Disp Refills Start End    pantoprazole (PROTONIX) 40 MG tablet 90 tablet 1 2017    Take 1 tablet (40 mg total) by mouth once daily. - Oral    [facility] [place] [place] - [address] AT Portal to [facility] #: [phone]         [facility]     [facility] Nurse Care [facility] -  Assistance  Unless otherwise directed by your provider, please contact [facility], our nurse care line that is available for  assistance.     Registered nurses in the [facility] provide: appointment scheduling, clinical advisement, health education, and other advisory services.  Call: [phone] (toll free)               Medications           Message regarding Medications     Verify the changes and/or additions to your medication regime listed below are the same as  discussed with your clinician today.  If any of these changes or additions are incorrect, please notify your healthcare provider.        START taking these NEW medications        Refills    pantoprazole (PROTONIX) 40 MG tablet 1    Sig: Take 1 tablet (40 mg total) by mouth once daily.    Class: Normal    Route: Oral      STOP taking these medications     esomeprazole (NEXIUM) 20 MG capsule Take 20 mg by mouth before breakfast.            Verify that the below list of medications is an accurate representation of the medications you are currently taking.  If none reported, the list may be blank. If incorrect, please contact your healthcare provider. Carry this list with you in case of emergency.           Current Medications     aspirin (ECOTRIN) 81 MG EC tablet Take 81 mg by mouth once daily.    atorvastatin (LIPITOR) 20 MG tablet Take 1 tablet (20 mg total) by mouth every evening.    B-complex with vitamin C (Z-BEC OR EQUIV) tablet Take 1 tablet by mouth once daily.    calcium-vitamin D3 500 mg(1,250mg) -200 unit per tablet Take 1 tablet by mouth once daily.    cycloSPORINE (RESTASIS) 0.05 % ophthalmic emulsion Place 1 drop into both eyes 2 (two) times daily.    diclofenac sodium (VOLTAREN) 1 % Gel Apply 2 g topically 2 (two) times daily as needed.    dorzolamide-timolol 2-0.5% (COSOPT) 2-0.5 % ophthalmic solution Place 1 drop into both eyes 2 (two) times daily.     DYMISTA 137-50 mcg/spray Spry nassal spray INHALE 1 SPRAY BY NASAL ROUTE 2 TIMES A DAY FOR 1 DAY    ESTRACE 0.01 % (0.1 mg/gram) vaginal cream PLACE 1 GRAM VAGINALLY ONCEDAILY    etanercept (ENBREL SURECLICK) 50 mg/mL (0.98 mL) PnIj INJECT THE CONTENTS OF ONE SURECLICK (50 MG) SUBCUTANEOUSLY Once per week AS DIRECTED BY YOUR PHYSICIAN. SINGLE - USE DEVICE. KEEP REF    fluticasone-vilanterol (BREO ELLIPTA) 100-25 mcg/dose diskus inhaler Inhale 1 puff into the lungs 2 (two) times daily. Use AM of pacemaker insertion    folic acid (FOLVITE) 1 MG tablet Take  "1 tablet (1 mg total) by mouth once daily.    gabapentin (NEURONTIN) 100 MG capsule TAKE 1 CAPSULE BY ORAL ROUTE ONCE A DAY (AT BEDTIME)    hydrocodone-acetaminophen 7.5-325mg (NORCO) 7.5-325 mg per tablet Take 1 tablet by mouth every 6 (six) hours as needed for Pain.    magnesium 200 mg Tab Take 400 mg by mouth once daily. Magnesium 400mg with Chelated Zinc 15mg.    methotrexate 2.5 MG Tab Take 8 tablets (20 mg total) by mouth every 7 days.    potassium gluconate 595 (99) mg Tab Take 1 capsule by mouth once daily.    risedronate (ACTONEL) 35 MG tablet Take 1 tablet (35 mg total) by mouth every 7 days.    travoprost, benzalkonium, (TRAVATAN) 0.004 % ophthalmic solution Place 1 drop into both eyes every evening.    valsartan-hydrochlorothiazide (DIOVAN-HCT) 80-12.5 mg per tablet Take 1 tablet by mouth once daily. Per Dr. Valencia only take 1/2 tablet Wed, Thurs and Fri of this week    pantoprazole (PROTONIX) 40 MG tablet Take 1 tablet (40 mg total) by mouth once daily.           Clinical Reference Information           Your Vitals Were     BP Pulse Height Weight BMI    129/84 59 5' 1.5" (1.562 m) 71.8 kg (158 lb 4.6 oz) 29.42 kg/m2      Blood Pressure          Most Recent Value    BP  129/84      Allergies as of 4/21/2017     Ace Inhibitors      Immunizations Administered on Date of Encounter - 4/21/2017     None      Orders Placed During Today's Visit      Normal Orders This Visit    Ambulatory referral to ENT     Ambulatory referral to Gastroenterology     Future Labs/Procedures Expected by Expires    Lipid panel  4/21/2017 4/22/2018      Language Assistance Services     ATTENTION: Language assistance services are available, free of charge. Please call [phone].      ATENCIÓN: Si habla español, tiene a graves disposición servicios gratuitos de asistencia lingüística. Llame al [phone].     CHÚ Ý: N?u b?n nói Ti?ng Vi?t, có các d?ch v? h? tr? ngôn ng? mi?n phí dành cho b?n. G?i s? [phone].         " [facility] complies with applicable Federal civil rights laws and does not discriminate on the basis of race, color, national origin, age, disability, or sex.

## 2017-04-21 NOTE — PROGRESS NOTES
"HISTORY OF PRESENT ILLNESS:  PtLittle is a 76 y.o. female presents for annual and monitoring of her HTN, hyperlipidemia, rheumatoid arthritis, diastolic dysfunction. She has seen Dr. Ribeiro of OB GYN.  She saw Dr. Cruz of rheumatology,    "Presently on MTX 5 weekly and Enbrel 50mg weekly with good control of her arthritis."    Should be seeing Dr. Valencia in about 3 months.  HTN is in good control.  She has been seeing Dr. Madison for a cough, thinks it is a lot of GERD.          ROS:  GENERAL: No fever, chills, fatigability or weight loss.  SKIN: No rashes, itching or changes in color or texture of skin.  HEAD: No headaches or recent head trauma.  EARS: Denies ear pain, discharge or vertigo.  NOSE: No loss of smell, no epistaxis or postnasal drip; has a sore in her nose;  MOUTH & THROAT: No hoarseness or change in voice. No excessive gum bleeding.  NODES: Denies swollen glands.  CHEST: Denies JOINER, cyanosis, wheezing, positive cough and no sputum production.  CARDIOVASCULAR: Denies chest pain, PND, orthopnea or reduced exercise tolerance.  ABDOMEN: Appetite fine. No weight loss. Denies constipation, diarrhea, abdominal pain, hematemesis or blood in stool; positive GERD/history of lap band;  URINARY: No flank pain, dysuria or hematuria.  PERIPHERAL VASCULAR: No claudication or cyanosis. No edema.  MUSCULOSKELETAL: No joint stiffness or swelling. Occ back pain.  NEUROLOGIC: Denies numbness    PE:   Vitals:   Vitals:    04/21/17 1123   BP: 129/84   Pulse: (!) 59     GENERAL: no acute distress, A&Ox3, comfortable.  Female with BMI of 29   HEENT: tympanic membranes clear, nasal mucosa pink, no pharyngeal erythema or exudate; nasal passage occluded;  NECK: supple, no cervical lymphadenopathy, no thyromegaly; no supraclavicular nodes;   CHEST:  clear to auscultation bilaterally, no crackles or wheeze; no increased work of breathing;  CARDIOVASCULAR: regular rate and rhythm, no rubs, murmurs or gallops.  ABDOMEN: normal bowel " sounds, soft non-tender, non-distended; no palpable organomegaly;   EXT: no clubbing, cyanosis or edema.     ASSESSMENT/PLAN:    Gastroesophageal reflux disease, esophagitis presence not specified  -     pantoprazole (PROTONIX) 40 MG tablet; Take 1 tablet (40 mg total) by mouth once daily.  Dispense: 90 tablet; Refill: 1  -     Ambulatory referral to Gastroenterology    Hyperlipidemia, unspecified hyperlipidemia type  -     Lipid panel; Future; Expected date: 4/21/17    Lesion of nose  -     Cancel: Ambulatory referral to ENT  -     Ambulatory referral to ENT    Epistaxis, recurrent  -     Cancel: Ambulatory referral to ENT  -     Ambulatory referral to ENT      Call if condition changes or worsens.

## 2017-04-22 PROBLEM — R04.0 EPISTAXIS, RECURRENT: Status: ACTIVE | Noted: 2017-04-22

## 2017-04-22 PROBLEM — J34.89 LESION OF NOSE: Status: ACTIVE | Noted: 2017-04-22

## 2017-04-22 PROBLEM — K21.9 GASTROESOPHAGEAL REFLUX DISEASE: Status: ACTIVE | Noted: 2017-04-22

## 2017-04-24 ENCOUNTER — LAB VISIT (OUTPATIENT)
Dept: LAB | Facility: HOSPITAL | Age: 77
End: 2017-04-24
Attending: INTERNAL MEDICINE
Payer: COMMERCIAL

## 2017-04-24 DIAGNOSIS — E78.5 HYPERLIPIDEMIA, UNSPECIFIED HYPERLIPIDEMIA TYPE: ICD-10-CM

## 2017-04-24 LAB
CHOLEST/HDLC SERPL: 3.1 {RATIO}
HDL/CHOLESTEROL RATIO: 32.6 %
HDLC SERPL-MCNC: 172 MG/DL
HDLC SERPL-MCNC: 56 MG/DL
LDLC SERPL CALC-MCNC: 96.4 MG/DL
NONHDLC SERPL-MCNC: 116 MG/DL
TRIGL SERPL-MCNC: 98 MG/DL

## 2017-04-24 PROCEDURE — 36415 COLL VENOUS BLD VENIPUNCTURE: CPT | Mod: PO

## 2017-04-24 PROCEDURE — 80061 LIPID PANEL: CPT

## 2017-05-04 ENCOUNTER — CLINICAL SUPPORT (OUTPATIENT)
Dept: ELECTROPHYSIOLOGY | Facility: CLINIC | Age: 77
End: 2017-05-04
Payer: COMMERCIAL

## 2017-05-04 DIAGNOSIS — I34.0 MITRAL REGURGITATION: ICD-10-CM

## 2017-05-04 DIAGNOSIS — I42.9 CARDIOMYOPATHY: ICD-10-CM

## 2017-05-04 DIAGNOSIS — I49.5 SSS (SICK SINUS SYNDROME): ICD-10-CM

## 2017-05-04 DIAGNOSIS — Z95.0 CARDIAC PACEMAKER IN SITU: ICD-10-CM

## 2017-05-04 PROCEDURE — 93293 PM PHONE R-STRIP DEVICE EVAL: CPT | Mod: S$GLB,,, | Performed by: INTERNAL MEDICINE

## 2017-05-16 RX ORDER — ESTRADIOL 0.1 MG/G
CREAM VAGINAL
Qty: 127.5 G | Refills: 2 | Status: SHIPPED | OUTPATIENT
Start: 2017-05-16 | End: 2019-01-03 | Stop reason: SDUPTHER

## 2017-05-17 NOTE — TELEPHONE ENCOUNTER
S/W pt & advised her that Dr Ribeiro sent her refill to [facility]; pt verbalizes understanding.

## 2017-05-17 NOTE — TELEPHONE ENCOUNTER
----- Message from Daniela Krishnamurthy sent at 5/16/2017  2:08 PM CDT -----  Patient requested refill on  Estrace Cream, call into [facility] [place]. Please call patient at  [phone] if you have any questions. Thank you.         [facility] [place] [place] - [address] AT [address] to [place] [address]  [place] [address]  Phone: [phone] Fax: [phone]

## 2017-05-30 DIAGNOSIS — E78.5 HYPERLIPIDEMIA, UNSPECIFIED HYPERLIPIDEMIA TYPE: ICD-10-CM

## 2017-05-30 RX ORDER — ATORVASTATIN CALCIUM 20 MG/1
20 TABLET, FILM COATED ORAL NIGHTLY
Qty: 90 TABLET | Refills: 3 | Status: SHIPPED | OUTPATIENT
Start: 2017-05-30 | End: 2018-07-27 | Stop reason: SDUPTHER

## 2017-05-30 NOTE — TELEPHONE ENCOUNTER
----- Message from Josie Barillas sent at 5/30/2017 10:26 AM CDT -----  Contact: pt [phone]  Patient [facility] at[facility]vastatin  [facility]

## 2017-06-02 ENCOUNTER — LAB VISIT (OUTPATIENT)
Dept: LAB | Facility: HOSPITAL | Age: 77
End: 2017-06-02
Attending: INTERNAL MEDICINE
Payer: COMMERCIAL

## 2017-06-02 DIAGNOSIS — M06.9 RHEUMATOID ARTHRITIS OF HAND, UNSPECIFIED LATERALITY, UNSPECIFIED RHEUMATOID FACTOR PRESENCE: ICD-10-CM

## 2017-06-02 LAB
ALBUMIN SERPL BCP-MCNC: 3.8 G/DL
ALP SERPL-CCNC: 50 U/L
ALT SERPL W/O P-5'-P-CCNC: 12 U/L
ANION GAP SERPL CALC-SCNC: 9 MMOL/L
AST SERPL-CCNC: 27 U/L
BASOPHILS # BLD AUTO: 0.03 K/UL
BASOPHILS NFR BLD: 0.4 %
BILIRUB SERPL-MCNC: 0.9 MG/DL
BUN SERPL-MCNC: 17 MG/DL
CALCIUM SERPL-MCNC: 10.1 MG/DL
CHLORIDE SERPL-SCNC: 105 MMOL/L
CO2 SERPL-SCNC: 26 MMOL/L
CREAT SERPL-MCNC: 1.1 MG/DL
CRP SERPL-MCNC: 1.2 MG/L
DIFFERENTIAL METHOD: ABNORMAL
EOSINOPHIL # BLD AUTO: 0.3 K/UL
EOSINOPHIL NFR BLD: 3.3 %
ERYTHROCYTE [DISTWIDTH] IN BLOOD BY AUTOMATED COUNT: 14.4 %
ERYTHROCYTE [SEDIMENTATION RATE] IN BLOOD BY WESTERGREN METHOD: 5 MM/HR
EST. GFR  (AFRICAN AMERICAN): 56 ML/MIN/1.73 M^2
EST. GFR  (NON AFRICAN AMERICAN): 48.5 ML/MIN/1.73 M^2
GLUCOSE SERPL-MCNC: 129 MG/DL
HCT VFR BLD AUTO: 42.9 %
HGB BLD-MCNC: 14.2 G/DL
LYMPHOCYTES # BLD AUTO: 1.1 K/UL
LYMPHOCYTES NFR BLD: 14.6 %
MCH RBC QN AUTO: 34.4 PG
MCHC RBC AUTO-ENTMCNC: 33.1 %
MCV RBC AUTO: 104 FL
MONOCYTES # BLD AUTO: 1.2 K/UL
MONOCYTES NFR BLD: 15.2 %
NEUTROPHILS # BLD AUTO: 5.1 K/UL
NEUTROPHILS NFR BLD: 66.4 %
PLATELET # BLD AUTO: 180 K/UL
PMV BLD AUTO: 12.1 FL
POTASSIUM SERPL-SCNC: 4 MMOL/L
PROT SERPL-MCNC: 7.2 G/DL
RBC # BLD AUTO: 4.13 M/UL
SODIUM SERPL-SCNC: 140 MMOL/L
WBC # BLD AUTO: 7.69 K/UL

## 2017-06-02 PROCEDURE — 80053 COMPREHEN METABOLIC PANEL: CPT

## 2017-06-02 PROCEDURE — 36415 COLL VENOUS BLD VENIPUNCTURE: CPT | Mod: PO

## 2017-06-02 PROCEDURE — 85025 COMPLETE CBC W/AUTO DIFF WBC: CPT

## 2017-06-02 PROCEDURE — 85651 RBC SED RATE NONAUTOMATED: CPT | Mod: PO

## 2017-06-02 PROCEDURE — 86140 C-REACTIVE PROTEIN: CPT

## 2017-07-24 DIAGNOSIS — I10 ESSENTIAL HYPERTENSION: ICD-10-CM

## 2017-07-24 DIAGNOSIS — K21.9 GASTROESOPHAGEAL REFLUX DISEASE, ESOPHAGITIS PRESENCE NOT SPECIFIED: ICD-10-CM

## 2017-07-24 NOTE — TELEPHONE ENCOUNTER
----- Message from Arben Bardales sent at 7/24/2017  2:42 PM CDT -----  Contact: [facility]  States that they need a prior-approval for the pantoprazole (PROTONIX) 40 MG tablets and to please call the prior-approval number at [phone].  Thank you            [facility] - [place], [place] - [address] [place] [address]  [address] [facility]  [address]  [facility] [address]  Phone: [phone] Fax: [phone]

## 2017-07-27 ENCOUNTER — OFFICE VISIT (OUTPATIENT)
Dept: RHEUMATOLOGY | Facility: CLINIC | Age: 77
End: 2017-07-27
Payer: COMMERCIAL

## 2017-07-27 VITALS
HEIGHT: 62 IN | BODY MASS INDEX: 30.2 KG/M2 | DIASTOLIC BLOOD PRESSURE: 90 MMHG | SYSTOLIC BLOOD PRESSURE: 128 MMHG | WEIGHT: 164.13 LBS | HEART RATE: 64 BPM

## 2017-07-27 DIAGNOSIS — E53.8 VITAMIN B12 DEFICIENCY: ICD-10-CM

## 2017-07-27 DIAGNOSIS — M05.79 RHEUMATOID ARTHRITIS INVOLVING MULTIPLE SITES WITH POSITIVE RHEUMATOID FACTOR: Primary | ICD-10-CM

## 2017-07-27 DIAGNOSIS — M06.9: ICD-10-CM

## 2017-07-27 DIAGNOSIS — Z79.60 LONG-TERM USE OF IMMUNOSUPPRESSANT MEDICATION: ICD-10-CM

## 2017-07-27 DIAGNOSIS — M81.0 AGE-RELATED OSTEOPOROSIS WITHOUT CURRENT PATHOLOGICAL FRACTURE: ICD-10-CM

## 2017-07-27 DIAGNOSIS — M19.041 DEGENERATIVE ARTHRITIS OF FINGER, RIGHT: ICD-10-CM

## 2017-07-27 DIAGNOSIS — K21.00 GASTROESOPHAGEAL REFLUX DISEASE WITH ESOPHAGITIS: ICD-10-CM

## 2017-07-27 PROCEDURE — 1125F AMNT PAIN NOTED PAIN PRSNT: CPT | Mod: S$GLB,,, | Performed by: INTERNAL MEDICINE

## 2017-07-27 PROCEDURE — 20600 DRAIN/INJ JOINT/BURSA W/O US: CPT | Mod: S$GLB,,, | Performed by: INTERNAL MEDICINE

## 2017-07-27 PROCEDURE — 99999 PR PBB SHADOW E&M-EST. PATIENT-LVL III: CPT | Mod: PBBFAC,,, | Performed by: INTERNAL MEDICINE

## 2017-07-27 PROCEDURE — 1159F MED LIST DOCD IN RCRD: CPT | Mod: S$GLB,,, | Performed by: INTERNAL MEDICINE

## 2017-07-27 PROCEDURE — 99214 OFFICE O/P EST MOD 30 MIN: CPT | Mod: 25,S$GLB,, | Performed by: INTERNAL MEDICINE

## 2017-07-27 RX ORDER — VALSARTAN AND HYDROCHLOROTHIAZIDE 80; 12.5 MG/1; MG/1
1 TABLET, FILM COATED ORAL DAILY
Qty: 90 TABLET | Refills: 0 | Status: SHIPPED | OUTPATIENT
Start: 2017-07-27 | End: 2018-01-12 | Stop reason: SDUPTHER

## 2017-07-27 RX ORDER — METHOTREXATE 2.5 MG/1
12.5 TABLET ORAL
Qty: 60 TABLET | Refills: 3 | Status: SHIPPED | OUTPATIENT
Start: 2017-07-27 | End: 2018-08-23 | Stop reason: SDUPTHER

## 2017-07-27 RX ORDER — PANTOPRAZOLE SODIUM 40 MG/1
40 TABLET, DELAYED RELEASE ORAL DAILY
Qty: 90 TABLET | Refills: 0 | Status: SHIPPED | OUTPATIENT
Start: 2017-07-27 | End: 2018-01-12 | Stop reason: SDUPTHER

## 2017-07-27 RX ORDER — FOLIC ACID 1 MG/1
2 TABLET ORAL DAILY
Qty: 180 TABLET | Refills: 3 | Status: SHIPPED | OUTPATIENT
Start: 2017-07-27 | End: 2018-08-06 | Stop reason: SDUPTHER

## 2017-07-27 ASSESSMENT — ROUTINE ASSESSMENT OF PATIENT INDEX DATA (RAPID3)
TOTAL RAPID3 SCORE: 5.44
PATIENT GLOBAL ASSESSMENT SCORE: 5
PAIN SCORE: 8
MDHAQ FUNCTION SCORE: 1
PSYCHOLOGICAL DISTRESS SCORE: 1.1

## 2017-07-27 NOTE — TELEPHONE ENCOUNTER
----- Message from Christine Salmeron sent at 7/27/2017  9:37 AM CDT -----  Contact: orlando munsonrtan-hydrochlorothiazide (DIOVAN-HCT) 80-12.5 mg per tablet  [facility]   Call back

## 2017-07-27 NOTE — PROCEDURES
Small Joint Aspiration/Injection  Date/Time: 7/27/2017 3:28 PM  Performed by: REBECCA CAVAZOS  Authorized by: REBECCA CAVAZOS     Consent Done?:  Yes (Verbal)  Indications:  Pain  Site marked: The procedure site was marked    Timeout: Prior to procedure the correct patient, procedure, and site was verified      Location:  Ring finger  Site:  R ring PIP  Prep: Patient was prepped and draped in usual sterile fashion    Needle gauge: 30ga 1/2 inch.  Approach:  Lateral  Patient tolerance:  Patient tolerated the procedure well with no immediate complications     Additional Comments: Discussed risks, benefits and side effects to CS injection of the shoulder including but not limited to infection, muscle or skin atrophy and ineffectiveness. Patient agreed to proceed with Procedure.

## 2017-07-27 NOTE — PROGRESS NOTES
Subjective:          Chief Complaint: Juanis Mclaughlin is a 76 y.o. female who had concerns including Disease Management.    HPI:  Rheumatoid Arthritis    HPI: 76y/o  female with h/o HTN, HLD was diagnosed with sero +  Rheumatoid arthritis in 1998. Off prednisone now. Has also received HCQ in the past no improvement.     Presently on MTX 5 weekly and Enbrel 50mg weekly with good control of her arthritis. No flare with reduction in MTX. No change in the MCV recent folate and B12 WNL, found to have severe CHERIE. 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      No ASE of stomatitis, LAE elevations, infections or injection site reaction. She was also diagnosed with secondary Sjogren's. Currently on restasis. Today, she is complaining of stiffness of hands and persistent cough when drinking and eating foods.   Early morning stiffness in the hands last for 1 hr.   She denies fever, weight loss, photosensitivity, rash, ulcer, raynaud's phenomenon, alopecia, dysphagia, diarrhea or blood in the stools.      Patient is noting cough for the past few months it's exacerbated with laughing following eating and with laughing.  She's had no fevers no antecedent upper respiratory infection no chills typically nonproductive. + hx of GERD. Did start Nexium no change. Hx of mild MR. No pedal edema. Was seen with Pulm now on Gabapentin 100mg see if this would slow the cough.  Doing well with Dr. Madison with Breo and Gabapentin to relax cough impulse.     REVIEW OF SYSTEMS:    Review of Systems   Constitutional: Negative for fever, malaise/fatigue and weight loss.   HENT: Negative for sore throat.    Eyes: Negative for double vision, photophobia and redness.   Respiratory: Positive for cough and sputum production. Negative for hemoptysis, shortness of breath and wheezing.    Cardiovascular: Negative for chest pain, palpitations and orthopnea.   Gastrointestinal: Negative for abdominal pain, constipation and diarrhea.   Genitourinary:  Negative for dysuria, hematuria and urgency.   Musculoskeletal: Positive for joint pain. Negative for back pain and myalgias.   Skin: Negative for rash.   Neurological: Negative for dizziness, tingling, focal weakness and headaches.   Endo/Heme/Allergies: Does not bruise/bleed easily.   Psychiatric/Behavioral: Negative for depression, hallucinations and suicidal ideas.                Objective:            Past Medical History:   Diagnosis Date    Arthritis     rheumatoid arthritis    Cardiomyopathy     DVT (deep venous thrombosis)     one time 5 years ago    GERD (gastroesophageal reflux disease)     Glaucoma     sees Dr. Gillette    Hyperlipidemia     Hypertension     Iron deficiency     Osteoporosis 7/9/2014    PONV (postoperative nausea and vomiting)     Rheumatoid arthritis involving multiple sites with positive rheumatoid factor 11/24/2013    Vitamin B12 deficiency 2/28/2015     Family History   Problem Relation Age of Onset    Heart disease Mother      CHF    Hypertension Mother     Heart disease Father     Stroke Father     Heart disease Brother     Stroke Brother     Heart disease Brother     Scleroderma Brother     Ovarian cancer Maternal Aunt     Breast cancer Neg Hx      Social History   Substance Use Topics    Smoking status: Never Smoker    Smokeless tobacco: Not on file    Alcohol use 0.5 oz/week     1 Standard drinks or equivalent per week      Comment: occ social         Current Outpatient Prescriptions on File Prior to Visit   Medication Sig Dispense Refill    aspirin (ECOTRIN) 81 MG EC tablet Take 81 mg by mouth once daily.      atorvastatin (LIPITOR) 20 MG tablet Take 1 tablet (20 mg total) by mouth every evening. 90 tablet 3    B-complex with vitamin C (Z-BEC OR EQUIV) tablet Take 1 tablet by mouth once daily.      calcium-vitamin D3 500 mg(1,250mg) -200 unit per tablet Take 1 tablet by mouth once daily.      cycloSPORINE (RESTASIS) 0.05 % ophthalmic emulsion Place  1 drop into both eyes 2 (two) times daily.      diclofenac sodium (VOLTAREN) 1 % Gel Apply 2 g topically 2 (two) times daily as needed. 200 g 3    dorzolamide-timolol 2-0.5% (COSOPT) 2-0.5 % ophthalmic solution Place 1 drop into both eyes 2 (two) times daily.       DYMISTA 137-50 mcg/spray Spry nassal spray INHALE 1 SPRAY BY NASAL ROUTE 2 TIMES A DAY FOR 1 DAY  4    estradiol (ESTRACE) 0.01 % (0.1 mg/gram) vaginal cream PLACE 1 GRAM VAGINALLY ONCEDAILY 127.5 g 2    etanercept (ENBREL SURECLICK) 50 mg/mL (0.98 mL) PnIj INJECT THE CONTENTS OF ONE SURECLICK (50 MG) SUBCUTANEOUSLY Once per week AS DIRECTED BY YOUR PHYSICIAN. SINGLE - USE DEVICE. KEEP REF 4 Syringe 11    fluticasone-vilanterol (BREO ELLIPTA) 100-25 mcg/dose diskus inhaler Inhale 1 puff into the lungs 2 (two) times daily. Use AM of pacemaker insertion      folic acid (FOLVITE) 1 MG tablet Take 1 tablet (1 mg total) by mouth once daily. 90 tablet 3    gabapentin (NEURONTIN) 100 MG capsule TAKE 1 CAPSULE BY ORAL ROUTE ONCE A DAY (AT BEDTIME)  1    hydrocodone-acetaminophen 7.5-325mg (NORCO) 7.5-325 mg per tablet Take 1 tablet by mouth every 6 (six) hours as needed for Pain. 20 tablet 0    magnesium 200 mg Tab Take 400 mg by mouth once daily. Magnesium 400mg with Chelated Zinc 15mg.      methotrexate 2.5 MG Tab Take 8 tablets (20 mg total) by mouth every 7 days. 32 tablet 3    pantoprazole (PROTONIX) 40 MG tablet Take 1 tablet (40 mg total) by mouth once daily. 90 tablet 1    potassium gluconate 595 (99) mg Tab Take 1 capsule by mouth once daily.      risedronate (ACTONEL) 35 MG tablet Take 1 tablet (35 mg total) by mouth every 7 days. 12 tablet 3    travoprost, benzalkonium, (TRAVATAN) 0.004 % ophthalmic solution Place 1 drop into both eyes every evening.      valsartan-hydrochlorothiazide (DIOVAN-HCT) 80-12.5 mg per tablet Take 1 tablet by mouth once daily. Per Dr. Valencia only take 1/2 tablet Wed, Thurs and Fri of this week 90 tablet 0     No  current facility-administered medications on file prior to visit.        Vitals:    07/27/17 1438   BP: (!) 128/90   Pulse: 64       Physical Exam:    Physical Exam   Constitutional: She appears well-developed and well-nourished.   HENT:   Nose: No septal deviation.   Mouth/Throat: Mucous membranes are normal. No oral lesions.   Eyes: Pupils are equal, round, and reactive to light. Right conjunctiva is not injected. Left conjunctiva is not injected.   Neck: No JVD present. No thyroid mass and no thyromegaly present.   Cardiovascular: Normal rate, regular rhythm and normal pulses.    No edema   Pulmonary/Chest: Effort normal. She has wheezes in the right middle field and the left middle field.   Abdominal: Soft. Normal appearance. There is no hepatosplenomegaly.   Musculoskeletal:        Right shoulder: She exhibits normal range of motion, no tenderness and no swelling.        Left shoulder: She exhibits normal range of motion, no tenderness and no swelling.        Right elbow: She exhibits normal range of motion and no swelling. No tenderness found.        Left elbow: She exhibits normal range of motion and no swelling. No tenderness found.        Right wrist: She exhibits decreased range of motion and tenderness. She exhibits no swelling.        Left wrist: She exhibits normal range of motion, no tenderness and no swelling.        Right hip: She exhibits normal range of motion, normal strength and no swelling.        Left hip: She exhibits normal range of motion, no tenderness and no swelling.        Right knee: She exhibits normal range of motion and no swelling. No tenderness found.        Left knee: She exhibits normal range of motion and no swelling. No tenderness found.        Right ankle: She exhibits normal range of motion and no swelling. No tenderness.        Left ankle: She exhibits normal range of motion and no swelling. No tenderness.        Cervical back: She exhibits decreased range of motion.         Right hand: She exhibits decreased range of motion and tenderness.        Left hand: She exhibits tenderness.   1+ synovitis MCP and PIP with heberden nodes all DIP 2-5. Left wrist with tenderness and swelling about the TFCC. No nodules. No flexion contractures.    Right 4th PIP ++TTP     Lymphadenopathy:     She has no cervical adenopathy.     She has no axillary adenopathy.   Neurological: She has normal strength and normal reflexes.   Skin: Skin is dry and intact.   Psychiatric: She has a normal mood and affect.             Assessment:       Encounter Diagnoses   Name Primary?    Vitamin B12 deficiency     Rheumatoid arthritis involving multiple sites with positive rheumatoid factor Yes    Age-related osteoporosis without current pathological fracture     Long-term use of immunosuppressant medication     Gastroesophageal reflux disease with esophagitis     Degenerative arthritis of finger, right     Rheumatoid arthritis of proximal interphalangeal (PIP) joint of finger           Plan:        Rheumatoid arthritis involving multiple sites with positive rheumatoid factor  -     CBC auto differential; Standing; Expected date: 07/27/2017  -     Comprehensive metabolic panel; Standing; Expected date: 07/27/2017  -     Sedimentation rate, manual; Standing; Expected date: 07/27/2017  -     C-reactive protein; Standing; Expected date: 07/27/2017    Vitamin B12 deficiency  -     folic acid (FOLVITE) 1 MG tablet; Take 2 tablets (2 mg total) by mouth once daily.  Dispense: 180 tablet; Refill: 3    Age-related osteoporosis without current pathological fracture    Long-term use of immunosuppressant medication  -     CBC auto differential; Standing; Expected date: 07/27/2017  -     Comprehensive metabolic panel; Standing; Expected date: 07/27/2017  -     Sedimentation rate, manual; Standing; Expected date: 07/27/2017  -     C-reactive protein; Standing; Expected date: 07/27/2017    Gastroesophageal reflux disease with  esophagitis    Degenerative arthritis of finger, right    Rheumatoid arthritis of proximal interphalangeal (PIP) joint of finger  -     Small Joint Aspiration/Injection    Other orders  -     etanercept (ENBREL SURECLICK) 50 mg/mL (0.98 mL) PnIj; INJECT THE CONTENTS OF ONE SURECLICK (50 MG) SUBCUTANEOUSLY Once per week AS DIRECTED BY YOUR PHYSICIAN. SINGLE - USE DEVICE. KEEP REF  Dispense: 12 Syringe; Refill: 4  -     methotrexate 2.5 MG Tab; Take 5 tablets (12.5 mg total) by mouth every 7 days.  Dispense: 60 tablet; Refill: 3      MTX is 5 tabs weekly 90 day supply  Increase folic acid 2mg dialy  Continue Enbrel needs 90 day  voltaren gel PRN fingers/hands    Injected right 4th PIP today.  Labs in 3 months.     Return in about 4 months (around 11/27/2017).          30min consultation with greater than 50% spent in counseling, chart review and coordination of care. All questions answered.

## 2017-07-31 ENCOUNTER — LAB VISIT (OUTPATIENT)
Dept: LAB | Facility: HOSPITAL | Age: 77
End: 2017-07-31
Attending: INTERNAL MEDICINE
Payer: COMMERCIAL

## 2017-07-31 DIAGNOSIS — I51.9 DIASTOLIC DYSFUNCTION, LEFT VENTRICLE: ICD-10-CM

## 2017-07-31 DIAGNOSIS — I10 ESSENTIAL HYPERTENSION: ICD-10-CM

## 2017-07-31 DIAGNOSIS — Z95.0 PACEMAKER: ICD-10-CM

## 2017-07-31 DIAGNOSIS — E78.2 MIXED HYPERLIPIDEMIA: ICD-10-CM

## 2017-07-31 DIAGNOSIS — I49.5 SSS (SICK SINUS SYNDROME): ICD-10-CM

## 2017-07-31 DIAGNOSIS — I34.0 NON-RHEUMATIC MITRAL REGURGITATION: ICD-10-CM

## 2017-07-31 DIAGNOSIS — R00.1 BRADYCARDIA: ICD-10-CM

## 2017-07-31 LAB
ALBUMIN SERPL BCP-MCNC: 3.7 G/DL
ALP SERPL-CCNC: 47 U/L
ALT SERPL W/O P-5'-P-CCNC: 11 U/L
ANION GAP SERPL CALC-SCNC: 9 MMOL/L
AST SERPL-CCNC: 25 U/L
BASOPHILS # BLD AUTO: 0.03 K/UL
BASOPHILS NFR BLD: 0.5 %
BILIRUB SERPL-MCNC: 1 MG/DL
BUN SERPL-MCNC: 15 MG/DL
CALCIUM SERPL-MCNC: 9.9 MG/DL
CHLORIDE SERPL-SCNC: 105 MMOL/L
CHOLEST/HDLC SERPL: 2.9 {RATIO}
CO2 SERPL-SCNC: 27 MMOL/L
CREAT SERPL-MCNC: 1.2 MG/DL
DIFFERENTIAL METHOD: ABNORMAL
EOSINOPHIL # BLD AUTO: 0.2 K/UL
EOSINOPHIL NFR BLD: 2.8 %
ERYTHROCYTE [DISTWIDTH] IN BLOOD BY AUTOMATED COUNT: 14.3 %
EST. GFR  (AFRICAN AMERICAN): 50 ML/MIN/1.73 M^2
EST. GFR  (NON AFRICAN AMERICAN): 43.4 ML/MIN/1.73 M^2
GLUCOSE SERPL-MCNC: 132 MG/DL
HCT VFR BLD AUTO: 41.7 %
HDL/CHOLESTEROL RATIO: 34.5 %
HDLC SERPL-MCNC: 165 MG/DL
HDLC SERPL-MCNC: 57 MG/DL
HGB BLD-MCNC: 13.8 G/DL
LDLC SERPL CALC-MCNC: 89.8 MG/DL
LYMPHOCYTES # BLD AUTO: 1.2 K/UL
LYMPHOCYTES NFR BLD: 18.8 %
MCH RBC QN AUTO: 34.3 PG
MCHC RBC AUTO-ENTMCNC: 33.1 G/DL
MCV RBC AUTO: 104 FL
MONOCYTES # BLD AUTO: 0.5 K/UL
MONOCYTES NFR BLD: 8.2 %
NEUTROPHILS # BLD AUTO: 4.5 K/UL
NEUTROPHILS NFR BLD: 69.5 %
NONHDLC SERPL-MCNC: 108 MG/DL
PLATELET # BLD AUTO: 212 K/UL
PMV BLD AUTO: 12.9 FL
POTASSIUM SERPL-SCNC: 4.4 MMOL/L
PROT SERPL-MCNC: 7.2 G/DL
RBC # BLD AUTO: 4.02 M/UL
SODIUM SERPL-SCNC: 141 MMOL/L
TRIGL SERPL-MCNC: 91 MG/DL
WBC # BLD AUTO: 6.45 K/UL

## 2017-07-31 PROCEDURE — 80061 LIPID PANEL: CPT

## 2017-07-31 PROCEDURE — 85025 COMPLETE CBC W/AUTO DIFF WBC: CPT

## 2017-07-31 PROCEDURE — 36415 COLL VENOUS BLD VENIPUNCTURE: CPT | Mod: PO

## 2017-07-31 PROCEDURE — 80053 COMPREHEN METABOLIC PANEL: CPT

## 2017-08-07 ENCOUNTER — CLINICAL SUPPORT (OUTPATIENT)
Dept: ELECTROPHYSIOLOGY | Facility: CLINIC | Age: 77
End: 2017-08-07
Payer: COMMERCIAL

## 2017-08-07 DIAGNOSIS — I49.5 SSS (SICK SINUS SYNDROME): ICD-10-CM

## 2017-08-07 DIAGNOSIS — I42.9 CARDIOMYOPATHY: ICD-10-CM

## 2017-08-07 DIAGNOSIS — Z95.0 CARDIAC PACEMAKER IN SITU: ICD-10-CM

## 2017-08-07 DIAGNOSIS — I34.0 MITRAL REGURGITATION: ICD-10-CM

## 2017-08-07 PROCEDURE — 93293 PM PHONE R-STRIP DEVICE EVAL: CPT | Mod: S$GLB,,, | Performed by: INTERNAL MEDICINE

## 2017-08-10 ENCOUNTER — OFFICE VISIT (OUTPATIENT)
Dept: CARDIOLOGY | Facility: CLINIC | Age: 77
End: 2017-08-10
Payer: COMMERCIAL

## 2017-08-10 VITALS
SYSTOLIC BLOOD PRESSURE: 155 MMHG | DIASTOLIC BLOOD PRESSURE: 86 MMHG | HEIGHT: 61 IN | HEART RATE: 60 BPM | BODY MASS INDEX: 30.51 KG/M2 | WEIGHT: 161.63 LBS

## 2017-08-10 DIAGNOSIS — Z95.0 PACEMAKER: Primary | ICD-10-CM

## 2017-08-10 DIAGNOSIS — Z82.49 FAMILY HISTORY OF CARDIOVASCULAR DISEASE: Chronic | ICD-10-CM

## 2017-08-10 DIAGNOSIS — I10 ESSENTIAL HYPERTENSION: ICD-10-CM

## 2017-08-10 DIAGNOSIS — I51.9 DIASTOLIC DYSFUNCTION, LEFT VENTRICLE: ICD-10-CM

## 2017-08-10 DIAGNOSIS — I25.5 ISCHEMIC CARDIOMYOPATHY: ICD-10-CM

## 2017-08-10 DIAGNOSIS — I34.0 NON-RHEUMATIC MITRAL REGURGITATION: ICD-10-CM

## 2017-08-10 DIAGNOSIS — E78.2 MIXED HYPERLIPIDEMIA: ICD-10-CM

## 2017-08-10 PROCEDURE — 99999 PR PBB SHADOW E&M-EST. PATIENT-LVL II: CPT | Mod: PBBFAC,,, | Performed by: INTERNAL MEDICINE

## 2017-08-10 PROCEDURE — 3077F SYST BP >= 140 MM HG: CPT | Mod: S$GLB,,, | Performed by: INTERNAL MEDICINE

## 2017-08-10 PROCEDURE — 3008F BODY MASS INDEX DOCD: CPT | Mod: S$GLB,,, | Performed by: INTERNAL MEDICINE

## 2017-08-10 PROCEDURE — 3079F DIAST BP 80-89 MM HG: CPT | Mod: S$GLB,,, | Performed by: INTERNAL MEDICINE

## 2017-08-10 PROCEDURE — 1126F AMNT PAIN NOTED NONE PRSNT: CPT | Mod: S$GLB,,, | Performed by: INTERNAL MEDICINE

## 2017-08-10 PROCEDURE — 1159F MED LIST DOCD IN RCRD: CPT | Mod: S$GLB,,, | Performed by: INTERNAL MEDICINE

## 2017-08-10 PROCEDURE — 99214 OFFICE O/P EST MOD 30 MIN: CPT | Mod: S$GLB,,, | Performed by: INTERNAL MEDICINE

## 2017-08-10 NOTE — PROGRESS NOTES
Subjective:    Patient ID:  Juanis Mclaughlin is a 77 y.o. female who presents for follow-up of Hypertension (10 month f/u - review labs ); Hyperlipidemia; and Mitral Regurgitation      HPI  Here for f/u diastolic dysfxn, MR/bradycardia/recent PPM implant. Very active no CP or SOB. Patient denies palpitations, syncope, presyncope, lightheadedness or dizziness.      Review of Systems   Constitution: Negative for malaise/fatigue.   Eyes: Negative for blurred vision.   Cardiovascular: Negative for chest pain, claudication, cyanosis, dyspnea on exertion, irregular heartbeat, leg swelling, near-syncope, orthopnea, palpitations, paroxysmal nocturnal dyspnea and syncope.   Respiratory: Negative for cough and shortness of breath.    Hematologic/Lymphatic: Does not bruise/bleed easily.   Musculoskeletal: Negative for back pain, falls, joint pain, muscle cramps, muscle weakness and myalgias.   Gastrointestinal: Negative for abdominal pain, change in bowel habit, nausea and vomiting.   Genitourinary: Negative for urgency.   Neurological: Negative for dizziness, focal weakness and light-headedness.        Objective:    Physical Exam   Constitutional: She is oriented to person, place, and time. She appears well-developed and well-nourished.   Neck: Normal range of motion. No JVD present.   Cardiovascular: Normal rate, regular rhythm, normal heart sounds and intact distal pulses.    Pulmonary/Chest: Effort normal and breath sounds normal.   Neurological: She is alert and oriented to person, place, and time.   Skin: Skin is warm and dry.   Psychiatric: She has a normal mood and affect.   Nursing note and vitals reviewed.            ..    Chemistry        Component Value Date/Time     07/31/2017 0815    K 4.4 07/31/2017 0815     07/31/2017 0815    CO2 27 07/31/2017 0815    BUN 15 07/31/2017 0815    CREATININE 1.2 07/31/2017 0815     (H) 07/31/2017 0815        Component Value Date/Time    CALCIUM 9.9 07/31/2017 0815     ALKPHOS 47 (L) 07/31/2017 0815    AST 25 07/31/2017 0815    ALT 11 07/31/2017 0815    BILITOT 1.0 07/31/2017 0815    ESTGFRAFRICA 50.0 (A) 07/31/2017 0815    EGFRNONAA 43.4 (A) 07/31/2017 0815            ..  Lab Results   Component Value Date    CHOL 165 07/31/2017    CHOL 172 04/24/2017    CHOL 167 02/26/2016     Lab Results   Component Value Date    HDL 57 07/31/2017    HDL 56 04/24/2017    HDL 53 02/26/2016     Lab Results   Component Value Date    LDLCALC 89.8 07/31/2017    LDLCALC 96.4 04/24/2017    LDLCALC 91.0 02/26/2016     Lab Results   Component Value Date    TRIG 91 07/31/2017    TRIG 98 04/24/2017    TRIG 115 02/26/2016     Lab Results   Component Value Date    CHOLHDL 34.5 07/31/2017    CHOLHDL 32.6 04/24/2017    CHOLHDL 31.7 02/26/2016     ..  Lab Results   Component Value Date    WBC 6.45 07/31/2017    HGB 13.8 07/31/2017    HCT 41.7 07/31/2017     (H) 07/31/2017     07/31/2017       Test(s) Reviewed  I have reviewed the following in detail:  [] Stress test   [] Angiography   [x] Echocardiogram   [x] Labs   [] Other:       Assessment:         ICD-10-CM ICD-9-CM   1. Pacemaker Z95.0 V45.01   2. Family history of cardiovascular disease Z82.49 V17.49   3. Non-rheumatic mitral regurgitation I34.0 424.0   4. Diastolic dysfunction, left ventricle I51.9 429.9   5. Ischemic cardiomyopathy I25.5 414.8   6. Mixed hyperlipidemia E78.2 272.2   7. Essential hypertension I10 401.9     Problem List Items Addressed This Visit     Diastolic dysfunction, left ventricle    Essential hypertension    Family history of cardiovascular disease (Chronic)    Hyperlipidemia    Mitral regurgitation    Overview     12/13 mild         Pacemaker - Primary    Overview     left chest PACEMAKER  [employer] [address] [id number]  Bonifacio Fuller  9/9/2016 - current     right atrium LEAD  [employer] [address] [id number]  Bonifacio Fuller  9/9/2016 - current     right  ventricle LEAD  [employer] [facility] [address] [facility] [address] [id number]  Bonifacio Fuller  9/9/2016 - current              Other Visit Diagnoses     Ischemic cardiomyopathy               Plan:           Return to clinic 1 year   Low level/low impact aerobic exercise 5x's/wk. Heart healthy diet and risk factor modification.    See labs and med orders.

## 2017-10-16 DIAGNOSIS — Z95.0 PACEMAKER: Primary | ICD-10-CM

## 2017-10-16 DIAGNOSIS — I49.5 SSS (SICK SINUS SYNDROME): ICD-10-CM

## 2017-10-27 ENCOUNTER — IMMUNIZATION (OUTPATIENT)
Dept: FAMILY MEDICINE | Facility: CLINIC | Age: 77
End: 2017-10-27
Payer: COMMERCIAL

## 2017-10-27 ENCOUNTER — LAB VISIT (OUTPATIENT)
Dept: LAB | Facility: HOSPITAL | Age: 77
End: 2017-10-27
Attending: INTERNAL MEDICINE
Payer: COMMERCIAL

## 2017-10-27 DIAGNOSIS — Z79.60 LONG-TERM USE OF IMMUNOSUPPRESSANT MEDICATION: ICD-10-CM

## 2017-10-27 DIAGNOSIS — M05.79 RHEUMATOID ARTHRITIS INVOLVING MULTIPLE SITES WITH POSITIVE RHEUMATOID FACTOR: ICD-10-CM

## 2017-10-27 LAB
ALBUMIN SERPL BCP-MCNC: 3.8 G/DL
ALP SERPL-CCNC: 57 U/L
ALT SERPL W/O P-5'-P-CCNC: 10 U/L
ANION GAP SERPL CALC-SCNC: 9 MMOL/L
AST SERPL-CCNC: 28 U/L
BASOPHILS # BLD AUTO: 0.06 K/UL
BASOPHILS NFR BLD: 0.8 %
BILIRUB SERPL-MCNC: 0.9 MG/DL
BUN SERPL-MCNC: 21 MG/DL
CALCIUM SERPL-MCNC: 10.2 MG/DL
CHLORIDE SERPL-SCNC: 104 MMOL/L
CO2 SERPL-SCNC: 27 MMOL/L
CREAT SERPL-MCNC: 1.2 MG/DL
CRP SERPL-MCNC: 1 MG/L
DIFFERENTIAL METHOD: ABNORMAL
EOSINOPHIL # BLD AUTO: 0.2 K/UL
EOSINOPHIL NFR BLD: 2.5 %
ERYTHROCYTE [DISTWIDTH] IN BLOOD BY AUTOMATED COUNT: 14.2 %
ERYTHROCYTE [SEDIMENTATION RATE] IN BLOOD BY WESTERGREN METHOD: 8 MM/HR
EST. GFR  (AFRICAN AMERICAN): 50 ML/MIN/1.73 M^2
EST. GFR  (NON AFRICAN AMERICAN): 43.4 ML/MIN/1.73 M^2
GLUCOSE SERPL-MCNC: 109 MG/DL
HCT VFR BLD AUTO: 42.2 %
HGB BLD-MCNC: 14 G/DL
IMM GRANULOCYTES # BLD AUTO: 0.01 K/UL
IMM GRANULOCYTES NFR BLD AUTO: 0.1 %
LYMPHOCYTES # BLD AUTO: 1.4 K/UL
LYMPHOCYTES NFR BLD: 18.5 %
MCH RBC QN AUTO: 33.7 PG
MCHC RBC AUTO-ENTMCNC: 33.2 G/DL
MCV RBC AUTO: 102 FL
MONOCYTES # BLD AUTO: 1.2 K/UL
MONOCYTES NFR BLD: 15.4 %
NEUTROPHILS # BLD AUTO: 4.7 K/UL
NEUTROPHILS NFR BLD: 62.7 %
NRBC BLD-RTO: 0 /100 WBC
PLATELET # BLD AUTO: 174 K/UL
PMV BLD AUTO: 12 FL
POTASSIUM SERPL-SCNC: 4.2 MMOL/L
PROT SERPL-MCNC: 7.5 G/DL
RBC # BLD AUTO: 4.15 M/UL
SODIUM SERPL-SCNC: 140 MMOL/L
WBC # BLD AUTO: 7.55 K/UL

## 2017-10-27 PROCEDURE — 90471 IMMUNIZATION ADMIN: CPT | Mod: S$GLB,,, | Performed by: INTERNAL MEDICINE

## 2017-10-27 PROCEDURE — 90662 IIV NO PRSV INCREASED AG IM: CPT | Mod: S$GLB,,, | Performed by: INTERNAL MEDICINE

## 2017-10-27 PROCEDURE — 36415 COLL VENOUS BLD VENIPUNCTURE: CPT | Mod: PO

## 2017-10-27 PROCEDURE — 80053 COMPREHEN METABOLIC PANEL: CPT

## 2017-10-27 PROCEDURE — 85651 RBC SED RATE NONAUTOMATED: CPT | Mod: PO

## 2017-10-27 PROCEDURE — 86140 C-REACTIVE PROTEIN: CPT

## 2017-10-27 PROCEDURE — 85025 COMPLETE CBC W/AUTO DIFF WBC: CPT

## 2017-11-08 ENCOUNTER — CLINICAL SUPPORT (OUTPATIENT)
Dept: CARDIOLOGY | Facility: CLINIC | Age: 77
End: 2017-11-08
Payer: COMMERCIAL

## 2017-11-08 DIAGNOSIS — Z95.0 PACEMAKER: ICD-10-CM

## 2017-11-08 DIAGNOSIS — I49.5 SSS (SICK SINUS SYNDROME): ICD-10-CM

## 2017-11-08 PROCEDURE — 93280 PM DEVICE PROGR EVAL DUAL: CPT | Mod: S$GLB,,, | Performed by: INTERNAL MEDICINE

## 2017-11-27 ENCOUNTER — OFFICE VISIT (OUTPATIENT)
Dept: RHEUMATOLOGY | Facility: CLINIC | Age: 77
End: 2017-11-27
Payer: COMMERCIAL

## 2017-11-27 VITALS
HEART RATE: 60 BPM | BODY MASS INDEX: 30.07 KG/M2 | WEIGHT: 163.38 LBS | DIASTOLIC BLOOD PRESSURE: 80 MMHG | HEIGHT: 62 IN | SYSTOLIC BLOOD PRESSURE: 130 MMHG

## 2017-11-27 DIAGNOSIS — Z79.60 LONG-TERM USE OF IMMUNOSUPPRESSANT MEDICATION: ICD-10-CM

## 2017-11-27 DIAGNOSIS — M05.79 RHEUMATOID ARTHRITIS INVOLVING MULTIPLE SITES WITH POSITIVE RHEUMATOID FACTOR: Primary | ICD-10-CM

## 2017-11-27 PROCEDURE — 99999 PR PBB SHADOW E&M-EST. PATIENT-LVL III: CPT | Mod: PBBFAC,,, | Performed by: INTERNAL MEDICINE

## 2017-11-27 PROCEDURE — 99214 OFFICE O/P EST MOD 30 MIN: CPT | Mod: S$GLB,,, | Performed by: INTERNAL MEDICINE

## 2017-11-27 NOTE — PROGRESS NOTES
Subjective:          Chief Complaint: Juanis Mclaughlin is a 77 y.o. female who had concerns including Disease Management.    HPI:  Rheumatoid Arthritis    HPI: 74y/o  female with h/o HTN, HLD was diagnosed with sero +  Rheumatoid arthritis in 1998. Off prednisone now. Has also received HCQ in the past no improvement.     Presently on MTX 5 weekly and Enbrel 50mg weekly with good control of her arthritis.   No flare with reduction in MTX. No change in the MCV recent folate and B12 WNL, found to have severe CHERIE. 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      No ASE of stomatitis, LAE elevations, infections or injection site reaction. She was also diagnosed with secondary Sjogren's. Currently on restasis. Today, she is complaining of stiffness of hands and persistent cough when drinking and eating foods.   Early morning stiffness in the hands last for 1 hr.   She denies fever, weight loss, photosensitivity, rash, ulcer, raynaud's phenomenon, alopecia, dysphagia, diarrhea or blood in the stools.      Patient is noting cough for the past few months it's exacerbated with laughing following eating and with laughing.  She's had no fevers no antecedent upper respiratory infection no chills typically nonproductive. + hx of GERD. Did start Nexium no change. Hx of mild MR. No pedal edema. Was seen with Pulm now on Gabapentin 100mg see if this would slow the cough.  Doing well with Dr. Madison with Breo and Gabapentin to relax cough impulse.     REVIEW OF SYSTEMS:    Review of Systems   Constitutional: Negative for fever, malaise/fatigue and weight loss.   HENT: Negative for sore throat.    Eyes: Negative for double vision, photophobia and redness.   Respiratory: Positive for cough and sputum production. Negative for hemoptysis, shortness of breath and wheezing.    Cardiovascular: Negative for chest pain, palpitations and orthopnea.   Gastrointestinal: Negative for abdominal pain, constipation and diarrhea.   Genitourinary:  Negative for dysuria, hematuria and urgency.   Musculoskeletal: Positive for joint pain. Negative for back pain and myalgias.   Skin: Negative for rash.   Neurological: Negative for dizziness, tingling, focal weakness and headaches.   Endo/Heme/Allergies: Does not bruise/bleed easily.   Psychiatric/Behavioral: Negative for depression, hallucinations and suicidal ideas.                Objective:            Past Medical History:   Diagnosis Date    Arthritis     rheumatoid arthritis    Cardiomyopathy     DVT (deep venous thrombosis)     one time 5 years ago    GERD (gastroesophageal reflux disease)     Glaucoma     sees Dr. Gillette    Hyperlipidemia     Hypertension     Iron deficiency     Osteoporosis 7/9/2014    PONV (postoperative nausea and vomiting)     Rheumatoid arthritis involving multiple sites with positive rheumatoid factor 11/24/2013    Vitamin B12 deficiency 2/28/2015     Family History   Problem Relation Age of Onset    Heart disease Mother      CHF    Hypertension Mother     Heart disease Father     Stroke Father     Heart disease Brother     Stroke Brother     Heart disease Brother     Scleroderma Brother     Ovarian cancer Maternal Aunt     Breast cancer Neg Hx      Social History   Substance Use Topics    Smoking status: Never Smoker    Smokeless tobacco: Never Used    Alcohol use 0.5 oz/week     1 Standard drinks or equivalent per week      Comment: occ social         Current Outpatient Prescriptions on File Prior to Visit   Medication Sig Dispense Refill    aspirin (ECOTRIN) 81 MG EC tablet Take 81 mg by mouth once daily.      atorvastatin (LIPITOR) 20 MG tablet Take 1 tablet (20 mg total) by mouth every evening. 90 tablet 3    B-complex with vitamin C (Z-BEC OR EQUIV) tablet Take 1 tablet by mouth once daily.      calcium-vitamin D3 500 mg(1,250mg) -200 unit per tablet Take 1 tablet by mouth once daily.      cycloSPORINE (RESTASIS) 0.05 % ophthalmic emulsion Place 1  drop into both eyes 2 (two) times daily.      diclofenac sodium (VOLTAREN) 1 % Gel Apply 2 g topically 2 (two) times daily as needed. 200 g 3    dorzolamide-timolol 2-0.5% (COSOPT) 2-0.5 % ophthalmic solution Place 1 drop into both eyes 2 (two) times daily.       DYMISTA 137-50 mcg/spray Spry nassal spray INHALE 1 SPRAY BY NASAL ROUTE 2 TIMES A DAY FOR 1 DAY  4    estradiol (ESTRACE) 0.01 % (0.1 mg/gram) vaginal cream PLACE 1 GRAM VAGINALLY ONCEDAILY 127.5 g 2    etanercept (ENBREL SURECLICK) 50 mg/mL (0.98 mL) PnIj INJECT THE CONTENTS OF ONE SURECLICK (50 MG) SUBCUTANEOUSLY Once per week AS DIRECTED BY YOUR PHYSICIAN. SINGLE - USE DEVICE. KEEP REF 12 Syringe 4    fluticasone-vilanterol (BREO ELLIPTA) 100-25 mcg/dose diskus inhaler Inhale 1 puff into the lungs 2 (two) times daily. Use AM of pacemaker insertion      folic acid (FOLVITE) 1 MG tablet Take 2 tablets (2 mg total) by mouth once daily. 180 tablet 3    gabapentin (NEURONTIN) 100 MG capsule TAKE 1 CAPSULE BY ORAL ROUTE ONCE A DAY (AT BEDTIME)  1    hydrocodone-acetaminophen 7.5-325mg (NORCO) 7.5-325 mg per tablet Take 1 tablet by mouth every 6 (six) hours as needed for Pain. 20 tablet 0    magnesium 200 mg Tab Take 400 mg by mouth once daily. Magnesium 400mg with Chelated Zinc 15mg.      methotrexate 2.5 MG Tab Take 5 tablets (12.5 mg total) by mouth every 7 days. 60 tablet 3    pantoprazole (PROTONIX) 40 MG tablet Take 1 tablet (40 mg total) by mouth once daily. 90 tablet 0    potassium gluconate 595 (99) mg Tab Take 1 capsule by mouth once daily.      ranitidine (ZANTAC) 150 MG tablet Take 150 mg by mouth nightly.      risedronate (ACTONEL) 35 MG tablet Take 1 tablet (35 mg total) by mouth every 7 days. 12 tablet 3    travoprost, benzalkonium, (TRAVATAN) 0.004 % ophthalmic solution Place 1 drop into both eyes every evening.      valsartan-hydrochlorothiazide (DIOVAN-HCT) 80-12.5 mg per tablet Take 1 tablet by mouth once daily. Per Dr. Valencia  only take 1/2 tablet Wed, Thurs and Fri of this week 90 tablet 0     No current facility-administered medications on file prior to visit.        Vitals:    11/27/17 0759   BP: 130/80   Pulse: 60       Physical Exam:    Physical Exam   Constitutional: She appears well-developed and well-nourished.   HENT:   Nose: No septal deviation.   Mouth/Throat: Mucous membranes are normal. No oral lesions.   Eyes: Pupils are equal, round, and reactive to light. Right conjunctiva is not injected. Left conjunctiva is not injected.   Neck: No JVD present. No thyroid mass and no thyromegaly present.   Cardiovascular: Normal rate, regular rhythm and normal pulses.    No edema   Pulmonary/Chest: Effort normal. She has wheezes in the right middle field and the left middle field.   Abdominal: Soft. Normal appearance. There is no hepatosplenomegaly.   Musculoskeletal:        Right shoulder: She exhibits normal range of motion, no tenderness and no swelling.        Left shoulder: She exhibits normal range of motion, no tenderness and no swelling.        Right elbow: She exhibits normal range of motion and no swelling. No tenderness found.        Left elbow: She exhibits normal range of motion and no swelling. No tenderness found.        Right wrist: She exhibits decreased range of motion and tenderness. She exhibits no swelling.        Left wrist: She exhibits normal range of motion, no tenderness and no swelling.        Right hip: She exhibits normal range of motion, normal strength and no swelling.        Left hip: She exhibits normal range of motion, no tenderness and no swelling.        Right knee: She exhibits normal range of motion and no swelling. No tenderness found.        Left knee: She exhibits normal range of motion and no swelling. No tenderness found.        Right ankle: She exhibits normal range of motion and no swelling. No tenderness.        Left ankle: She exhibits normal range of motion and no swelling. No tenderness.         Cervical back: She exhibits decreased range of motion.        Right hand: She exhibits decreased range of motion and tenderness.        Left hand: She exhibits tenderness.   1+ synovitis MCP and PIP with heberden nodes all DIP 2-5. Left wrist with tenderness and swelling about the TFCC. No nodules. No flexion contractures.    Right 4th PIP ++TTP     Lymphadenopathy:     She has no cervical adenopathy.     She has no axillary adenopathy.   Neurological: She has normal strength and normal reflexes.   Skin: Skin is dry and intact.   Psychiatric: She has a normal mood and affect.             Assessment:       Encounter Diagnoses   Name Primary?    Rheumatoid arthritis involving multiple sites with positive rheumatoid factor Yes    Long-term use of immunosuppressant medication           Plan:        Rheumatoid arthritis involving multiple sites with positive rheumatoid factor    Long-term use of immunosuppressant medication      MTX is 5 tabs weekly 90 day supply  Increase folic acid 2mg dialy  Continue Enbrel needs 90 day  voltaren gel PRN fingers/hands-using sparingly      Labs q 3 months.     Return in about 4 months (around 3/27/2018).          30min consultation with greater than 50% spent in counseling, chart review and coordination of care. All questions answered.

## 2017-11-28 ENCOUNTER — TELEPHONE (OUTPATIENT)
Dept: RHEUMATOLOGY | Facility: CLINIC | Age: 77
End: 2017-11-28

## 2018-01-12 ENCOUNTER — TELEPHONE (OUTPATIENT)
Dept: OBSTETRICS AND GYNECOLOGY | Facility: CLINIC | Age: 78
End: 2018-01-12

## 2018-01-12 ENCOUNTER — TELEPHONE (OUTPATIENT)
Dept: INTERNAL MEDICINE | Facility: CLINIC | Age: 78
End: 2018-01-12

## 2018-01-12 DIAGNOSIS — I10 ESSENTIAL HYPERTENSION: ICD-10-CM

## 2018-01-12 DIAGNOSIS — Z12.31 VISIT FOR SCREENING MAMMOGRAM: Primary | ICD-10-CM

## 2018-01-12 DIAGNOSIS — K21.9 GASTROESOPHAGEAL REFLUX DISEASE, ESOPHAGITIS PRESENCE NOT SPECIFIED: ICD-10-CM

## 2018-01-12 NOTE — TELEPHONE ENCOUNTER
----- Message from Ewelina Marcos sent at 1/12/2018 10:54 AM CST -----  Contact: pt  Pt calling states that she is getting message that  She needs to schedule and come in for her mammogram so needs that scheduled ,please.[phone] (home)

## 2018-01-12 NOTE — TELEPHONE ENCOUNTER
----- Message from Ewelina Marcos sent at 1/12/2018 10:51 AM CST -----  Contact: pt  Pt calling states refill on valsartan-hydrochlorothiazide (DIOVAN-HCT) 80-12.5 mg per tablet and pantoprazole (PROTONIX) 40 MG tablet.90 day supply..[phone] (home)       .  [facility] - [place] - [address] AT [address] [place] [address]  [facility] [address]  Phone: [phone] Fax: [phone]

## 2018-01-13 RX ORDER — VALSARTAN AND HYDROCHLOROTHIAZIDE 80; 12.5 MG/1; MG/1
1 TABLET, FILM COATED ORAL DAILY
Qty: 90 TABLET | Refills: 0 | Status: SHIPPED | OUTPATIENT
Start: 2018-01-13 | End: 2018-04-23 | Stop reason: SDUPTHER

## 2018-01-13 RX ORDER — PANTOPRAZOLE SODIUM 40 MG/1
40 TABLET, DELAYED RELEASE ORAL DAILY
Qty: 90 TABLET | Refills: 0 | Status: SHIPPED | OUTPATIENT
Start: 2018-01-13 | End: 2018-04-16 | Stop reason: SDUPTHER

## 2018-01-29 ENCOUNTER — LAB VISIT (OUTPATIENT)
Dept: LAB | Facility: HOSPITAL | Age: 78
End: 2018-01-29
Attending: INTERNAL MEDICINE
Payer: COMMERCIAL

## 2018-01-29 DIAGNOSIS — M05.79 RHEUMATOID ARTHRITIS INVOLVING MULTIPLE SITES WITH POSITIVE RHEUMATOID FACTOR: ICD-10-CM

## 2018-01-29 DIAGNOSIS — Z79.60 LONG-TERM USE OF IMMUNOSUPPRESSANT MEDICATION: ICD-10-CM

## 2018-01-29 LAB
ALBUMIN SERPL BCP-MCNC: 3.7 G/DL
ALP SERPL-CCNC: 52 U/L
ALT SERPL W/O P-5'-P-CCNC: 21 U/L
ANION GAP SERPL CALC-SCNC: 7 MMOL/L
AST SERPL-CCNC: 54 U/L
BASOPHILS # BLD AUTO: 0.04 K/UL
BASOPHILS NFR BLD: 0.6 %
BILIRUB SERPL-MCNC: 0.9 MG/DL
BUN SERPL-MCNC: 20 MG/DL
CALCIUM SERPL-MCNC: 9.8 MG/DL
CHLORIDE SERPL-SCNC: 104 MMOL/L
CO2 SERPL-SCNC: 29 MMOL/L
CREAT SERPL-MCNC: 1.2 MG/DL
CRP SERPL-MCNC: 1.5 MG/L
DIFFERENTIAL METHOD: ABNORMAL
EOSINOPHIL # BLD AUTO: 0.2 K/UL
EOSINOPHIL NFR BLD: 2.2 %
ERYTHROCYTE [DISTWIDTH] IN BLOOD BY AUTOMATED COUNT: 14.2 %
ERYTHROCYTE [SEDIMENTATION RATE] IN BLOOD BY WESTERGREN METHOD: 7 MM/HR
EST. GFR  (AFRICAN AMERICAN): 50 ML/MIN/1.73 M^2
EST. GFR  (NON AFRICAN AMERICAN): 43.4 ML/MIN/1.73 M^2
GLUCOSE SERPL-MCNC: 130 MG/DL
HCT VFR BLD AUTO: 42.9 %
HGB BLD-MCNC: 13.9 G/DL
IMM GRANULOCYTES # BLD AUTO: 0.02 K/UL
IMM GRANULOCYTES NFR BLD AUTO: 0.3 %
LYMPHOCYTES # BLD AUTO: 1.5 K/UL
LYMPHOCYTES NFR BLD: 21.1 %
MCH RBC QN AUTO: 34.2 PG
MCHC RBC AUTO-ENTMCNC: 32.4 G/DL
MCV RBC AUTO: 105 FL
MONOCYTES # BLD AUTO: 0.6 K/UL
MONOCYTES NFR BLD: 8 %
NEUTROPHILS # BLD AUTO: 4.7 K/UL
NEUTROPHILS NFR BLD: 67.8 %
NRBC BLD-RTO: 0 /100 WBC
PLATELET # BLD AUTO: 189 K/UL
PMV BLD AUTO: 13.3 FL
POTASSIUM SERPL-SCNC: 4 MMOL/L
PROT SERPL-MCNC: 7.3 G/DL
RBC # BLD AUTO: 4.07 M/UL
SODIUM SERPL-SCNC: 140 MMOL/L
WBC # BLD AUTO: 6.91 K/UL

## 2018-01-29 PROCEDURE — 36415 COLL VENOUS BLD VENIPUNCTURE: CPT | Mod: PO

## 2018-01-29 PROCEDURE — 85025 COMPLETE CBC W/AUTO DIFF WBC: CPT

## 2018-01-29 PROCEDURE — 86140 C-REACTIVE PROTEIN: CPT

## 2018-01-29 PROCEDURE — 80053 COMPREHEN METABOLIC PANEL: CPT

## 2018-01-29 PROCEDURE — 85651 RBC SED RATE NONAUTOMATED: CPT | Mod: PO

## 2018-02-23 NOTE — TELEPHONE ENCOUNTER
----- Message from John Rivas sent at 2/23/2018 10:33 AM CST -----  Requesting refill on Rx risedronate (ACTONEL) 35 MG tablet 90 days supply    [facility] - [place] - [address] AT [address] [place] [id number]  [facility] [address]  Phone: [phone] Fax: [phone]

## 2018-02-26 RX ORDER — RISEDRONATE SODIUM 35 MG/1
35 TABLET, FILM COATED ORAL
Qty: 12 TABLET | Refills: 3 | Status: SHIPPED | OUTPATIENT
Start: 2018-02-26 | End: 2019-01-09

## 2018-03-26 ENCOUNTER — CLINICAL SUPPORT (OUTPATIENT)
Dept: CARDIOLOGY | Facility: CLINIC | Age: 78
End: 2018-03-26
Attending: INTERNAL MEDICINE
Payer: COMMERCIAL

## 2018-03-26 DIAGNOSIS — I49.5 SSS (SICK SINUS SYNDROME): ICD-10-CM

## 2018-03-26 DIAGNOSIS — Z95.0 PACEMAKER: Primary | ICD-10-CM

## 2018-03-26 DIAGNOSIS — Z95.0 PACEMAKER: ICD-10-CM

## 2018-03-26 PROCEDURE — 93296 REM INTERROG EVL PM/IDS: CPT | Mod: S$GLB,,, | Performed by: INTERNAL MEDICINE

## 2018-03-26 PROCEDURE — 93294 REM INTERROG EVL PM/LDLS PM: CPT | Mod: S$GLB,,, | Performed by: INTERNAL MEDICINE

## 2018-03-28 PROBLEM — H40.9 GLAUCOMA: Status: ACTIVE | Noted: 2018-03-28

## 2018-03-29 ENCOUNTER — HOSPITAL ENCOUNTER (OUTPATIENT)
Dept: RADIOLOGY | Facility: HOSPITAL | Age: 78
Discharge: HOME | End: 2018-03-29
Attending: SPECIALIST
Payer: COMMERCIAL

## 2018-03-29 ENCOUNTER — OFFICE VISIT (OUTPATIENT)
Dept: OBSTETRICS AND GYNECOLOGY | Facility: CLINIC | Age: 78
End: 2018-03-29
Payer: COMMERCIAL

## 2018-03-29 VITALS
HEIGHT: 62 IN | SYSTOLIC BLOOD PRESSURE: 144 MMHG | BODY MASS INDEX: 30.34 KG/M2 | DIASTOLIC BLOOD PRESSURE: 80 MMHG | WEIGHT: 164.88 LBS

## 2018-03-29 VITALS — BODY MASS INDEX: 30.18 KG/M2 | HEIGHT: 62 IN | WEIGHT: 164 LBS

## 2018-03-29 DIAGNOSIS — Z12.31 VISIT FOR SCREENING MAMMOGRAM: ICD-10-CM

## 2018-03-29 DIAGNOSIS — Z12.31 VISIT FOR SCREENING MAMMOGRAM: Primary | ICD-10-CM

## 2018-03-29 DIAGNOSIS — Z91.89 GYN EXAM FOR HIGH-RISK MEDICARE PATIENT: ICD-10-CM

## 2018-03-29 PROCEDURE — 99999 PR PBB SHADOW E&M-EST. PATIENT-LVL IV: CPT | Mod: PBBFAC,,, | Performed by: SPECIALIST

## 2018-03-29 PROCEDURE — 77067 SCR MAMMO BI INCL CAD: CPT | Mod: TC,PN

## 2018-03-29 PROCEDURE — 99397 PER PM REEVAL EST PAT 65+ YR: CPT | Mod: S$GLB,,, | Performed by: SPECIALIST

## 2018-03-29 PROCEDURE — 77067 SCR MAMMO BI INCL CAD: CPT | Mod: 26,,, | Performed by: RADIOLOGY

## 2018-03-29 PROCEDURE — 77063 BREAST TOMOSYNTHESIS BI: CPT | Mod: 26,,, | Performed by: RADIOLOGY

## 2018-03-29 NOTE — PROGRESS NOTES
78 yo WF presents for annual gyn evaluation. No overt gyn complaints.  Past Medical History:   Diagnosis Date    Arthritis     rheumatoid arthritis    Cardiomyopathy     Diverticulosis     DVT (deep venous thrombosis)     one time 5 years ago    GERD (gastroesophageal reflux disease)     Glaucoma     sees Dr. Gillette    Hyperlipidemia     Hypertension     Iron deficiency     Osteoporosis 7/9/2014    PONV (postoperative nausea and vomiting)     Rheumatoid arthritis involving multiple sites with positive rheumatoid factor 11/24/2013    Vitamin B12 deficiency 2/28/2015       Past Surgical History:   Procedure Laterality Date    BREAST BIOPSY      COLONOSCOPY  2012    FOOT SURGERY      had neuorma and also required hardware    HYSTERECTOMY      SUJATA with BSO    INCONTINENCE SURGERY      KNEE ARTHROSCOPY W/ DEBRIDEMENT      pace maker  09/2016       Family History   Problem Relation Age of Onset    Heart disease Mother      CHF    Hypertension Mother     Heart disease Father     Stroke Father     Heart disease Brother     Stroke Brother     Heart disease Brother     Scleroderma Brother     Ovarian cancer Maternal Aunt     Breast cancer Neg Hx        Social History     Social History    Marital status:      Spouse name: N/A    Number of children: 3    Years of education: N/A     Social History Main Topics    Smoking status: Never Smoker    Smokeless tobacco: Never Used    Alcohol use 0.5 oz/week     1 Standard drinks or equivalent per week      Comment: occ social    Drug use: No    Sexual activity: No     Other Topics Concern    None     Social History Narrative    None       Current Outpatient Prescriptions   Medication Sig Dispense Refill    aspirin (ECOTRIN) 81 MG EC tablet Take 81 mg by mouth once daily.      atorvastatin (LIPITOR) 20 MG tablet Take 1 tablet (20 mg total) by mouth every evening. 90 tablet 3    B-complex with vitamin C (Z-BEC OR EQUIV) tablet Take 1  tablet by mouth once daily.      calcium-vitamin D3 500 mg(1,250mg) -200 unit per tablet Take 1 tablet by mouth once daily.      cycloSPORINE (RESTASIS) 0.05 % ophthalmic emulsion Place 1 drop into both eyes 2 (two) times daily.      dorzolamide-timolol 2-0.5% (COSOPT) 2-0.5 % ophthalmic solution Place 1 drop into both eyes 2 (two) times daily.       DYMISTA 137-50 mcg/spray Spry nassal spray INHALE 1 SPRAY BY NASAL ROUTE 2 TIMES A DAY FOR 1 DAY  4    estradiol (ESTRACE) 0.01 % (0.1 mg/gram) vaginal cream PLACE 1 GRAM VAGINALLY ONCEDAILY 127.5 g 2    etanercept (ENBREL SURECLICK) 50 mg/mL (0.98 mL) PnIj INJECT THE CONTENTS OF ONE SURECLICK (50 MG) SUBCUTANEOUSLY Once per week AS DIRECTED BY YOUR PHYSICIAN. SINGLE - USE DEVICE. KEEP REF 12 Syringe 4    fluticasone-vilanterol (BREO ELLIPTA) 100-25 mcg/dose diskus inhaler Inhale 1 puff into the lungs 2 (two) times daily. Use AM of pacemaker insertion      folic acid (FOLVITE) 1 MG tablet Take 2 tablets (2 mg total) by mouth once daily. 180 tablet 3    gabapentin (NEURONTIN) 100 MG capsule TAKE 1 CAPSULE BY ORAL ROUTE ONCE A DAY (AT BEDTIME)  1    hydrocodone-acetaminophen 7.5-325mg (NORCO) 7.5-325 mg per tablet Take 1 tablet by mouth every 6 (six) hours as needed for Pain. 20 tablet 0    hydrocortisone 2.5 % ointment MIX WITH AQUAPHOR OR VASELINE AND APPLY TO AFFECTED AREA as needed  1    magnesium 200 mg Tab Take 400 mg by mouth once daily. Magnesium 400mg with Chelated Zinc 15mg.      methotrexate 2.5 MG Tab Take 5 tablets (12.5 mg total) by mouth every 7 days. 60 tablet 3    pantoprazole (PROTONIX) 40 MG tablet Take 1 tablet (40 mg total) by mouth once daily. 90 tablet 0    potassium gluconate 595 (99) mg Tab Take 1 capsule by mouth once daily.      ranitidine (ZANTAC) 150 MG tablet Take 150 mg by mouth nightly.      risedronate (ACTONEL) 35 MG tablet Take 1 tablet (35 mg total) by mouth every 7 days. 12 tablet 3    travoprost, benzalkonium,  (TRAVATAN) 0.004 % ophthalmic solution Place 1 drop into both eyes every evening.      valsartan-hydrochlorothiazide (DIOVAN-HCT) 80-12.5 mg per tablet Take 1 tablet by mouth once daily. Per Dr. Valencia only take 1/2 tablet Wed, Thurs and Fri of this week 90 tablet 0    diclofenac sodium (VOLTAREN) 1 % Gel Apply 2 g topically 2 (two) times daily as needed. 200 g 3     No current facility-administered medications for this visit.        Review of patient's allergies indicates:   Allergen Reactions    Ace inhibitors      cough       Review of System:   General: no chills, fever, night sweats, weight gain or weight loss  Psychological: no depression or suicidal ideation  Breasts: no new or changing breast lumps, nipple discharge or masses.  Respiratory: no cough, shortness of breath, or wheezing  Cardiovascular: no chest pain or dyspnea on exertion  Gastrointestinal: no abdominal pain, change in bowel habits, or black or bloody stools  Genito-Urinary: no incontinence, urinary frequency/urgency or vulvar/vaginal symptoms, pelvic pain or abnormal vaginal bleeding.  Musculoskeletal: no gait disturbance or muscular weakness    PE:   APPEARANCE: Well nourished, well developed, in no acute distress.  NECK: Neck symmetric without masses or thyromegaly.  NODES: No inguinal lymph node enlargement.  ABDOMEN: Soft. No tenderness or masses. No hepatosplenomegaly. No hernias.  BREASTS: Symmetrical, no skin changes or visible lesions. No palpable masses, nipple discharge or adenopathy bilaterally.  PELVIC: Normal external female genitalia without lesions. Normal hair distribution. Adequate perineal body, normal urethral meatus. Vagina moist and well rugated without lesions or discharge. No significant cystocele or rectocele. Uterus and cervix surgically absent. Bimanual exam revealed no masses, tenderness or abnormality.    COUNSELING:  The patient was counseled today on osteoporosis prevention, calcium supplementation, and regular  weight bearing exercise. The patient was also counseled today on ACS PAP guidelines, with recommendations for yearly pelvic exams unless their uterus, cervix, and ovaries were removed for benign reasons; in that case, examinations every 3-5 years are recommended. The patient was also counseled regarding monthly breast self-examination, routine STD screening for at-risk populations, prophylactic immunizations for transmitted infections such as HPV, Pertussis, or Influenza as appropriate, and yearly mammograms when indicated by ACS guidelines. She was advised to see her primary care physician for all other health maintenance.   FOLLOW-UP with me for next routine visit.

## 2018-04-10 ENCOUNTER — LAB VISIT (OUTPATIENT)
Dept: LAB | Facility: HOSPITAL | Age: 78
End: 2018-04-10
Attending: INTERNAL MEDICINE
Payer: COMMERCIAL

## 2018-04-10 ENCOUNTER — OFFICE VISIT (OUTPATIENT)
Dept: RHEUMATOLOGY | Facility: CLINIC | Age: 78
End: 2018-04-10
Payer: COMMERCIAL

## 2018-04-10 VITALS
HEART RATE: 64 BPM | HEIGHT: 62 IN | WEIGHT: 164.56 LBS | BODY MASS INDEX: 30.28 KG/M2 | DIASTOLIC BLOOD PRESSURE: 80 MMHG | SYSTOLIC BLOOD PRESSURE: 110 MMHG

## 2018-04-10 DIAGNOSIS — M05.79 RHEUMATOID ARTHRITIS INVOLVING MULTIPLE SITES WITH POSITIVE RHEUMATOID FACTOR: Primary | ICD-10-CM

## 2018-04-10 DIAGNOSIS — M81.0 AGE-RELATED OSTEOPOROSIS WITHOUT CURRENT PATHOLOGICAL FRACTURE: ICD-10-CM

## 2018-04-10 DIAGNOSIS — Z95.0 PACEMAKER: ICD-10-CM

## 2018-04-10 DIAGNOSIS — Z79.60 LONG-TERM USE OF IMMUNOSUPPRESSANT MEDICATION: ICD-10-CM

## 2018-04-10 DIAGNOSIS — M17.12 PRIMARY OSTEOARTHRITIS OF LEFT KNEE: ICD-10-CM

## 2018-04-10 DIAGNOSIS — M05.79 RHEUMATOID ARTHRITIS INVOLVING MULTIPLE SITES WITH POSITIVE RHEUMATOID FACTOR: ICD-10-CM

## 2018-04-10 LAB
ALBUMIN SERPL BCP-MCNC: 4 G/DL
ALP SERPL-CCNC: 46 U/L
ALT SERPL W/O P-5'-P-CCNC: 15 U/L
ANION GAP SERPL CALC-SCNC: 9 MMOL/L
AST SERPL-CCNC: 35 U/L
BASOPHILS # BLD AUTO: 0.05 K/UL
BASOPHILS NFR BLD: 0.8 %
BILIRUB SERPL-MCNC: 1.3 MG/DL
BUN SERPL-MCNC: 21 MG/DL
CALCIUM SERPL-MCNC: 9.9 MG/DL
CHLORIDE SERPL-SCNC: 102 MMOL/L
CO2 SERPL-SCNC: 29 MMOL/L
CREAT SERPL-MCNC: 1.2 MG/DL
CRP SERPL-MCNC: 1.1 MG/L
DIFFERENTIAL METHOD: ABNORMAL
EOSINOPHIL # BLD AUTO: 0.2 K/UL
EOSINOPHIL NFR BLD: 2.5 %
ERYTHROCYTE [DISTWIDTH] IN BLOOD BY AUTOMATED COUNT: 14.1 %
ERYTHROCYTE [SEDIMENTATION RATE] IN BLOOD BY WESTERGREN METHOD: 9 MM/HR
EST. GFR  (AFRICAN AMERICAN): 50 ML/MIN/1.73 M^2
EST. GFR  (NON AFRICAN AMERICAN): 43.4 ML/MIN/1.73 M^2
GLUCOSE SERPL-MCNC: 132 MG/DL
HCT VFR BLD AUTO: 42.6 %
HGB BLD-MCNC: 13.4 G/DL
IMM GRANULOCYTES # BLD AUTO: 0.01 K/UL
IMM GRANULOCYTES NFR BLD AUTO: 0.2 %
LYMPHOCYTES # BLD AUTO: 1.3 K/UL
LYMPHOCYTES NFR BLD: 21.6 %
MCH RBC QN AUTO: 33.3 PG
MCHC RBC AUTO-ENTMCNC: 31.5 G/DL
MCV RBC AUTO: 106 FL
MONOCYTES # BLD AUTO: 0.6 K/UL
MONOCYTES NFR BLD: 9.5 %
NEUTROPHILS # BLD AUTO: 4 K/UL
NEUTROPHILS NFR BLD: 65.4 %
NRBC BLD-RTO: 0 /100 WBC
PLATELET # BLD AUTO: 190 K/UL
PMV BLD AUTO: 12.9 FL
POTASSIUM SERPL-SCNC: 4.7 MMOL/L
PROT SERPL-MCNC: 7.2 G/DL
RBC # BLD AUTO: 4.03 M/UL
SODIUM SERPL-SCNC: 140 MMOL/L
WBC # BLD AUTO: 6.11 K/UL

## 2018-04-10 PROCEDURE — 36415 COLL VENOUS BLD VENIPUNCTURE: CPT | Mod: PO

## 2018-04-10 PROCEDURE — 85025 COMPLETE CBC W/AUTO DIFF WBC: CPT

## 2018-04-10 PROCEDURE — 80053 COMPREHEN METABOLIC PANEL: CPT

## 2018-04-10 PROCEDURE — 3079F DIAST BP 80-89 MM HG: CPT | Mod: CPTII,S$GLB,, | Performed by: INTERNAL MEDICINE

## 2018-04-10 PROCEDURE — 99999 PR PBB SHADOW E&M-EST. PATIENT-LVL III: CPT | Mod: PBBFAC,,, | Performed by: INTERNAL MEDICINE

## 2018-04-10 PROCEDURE — 86140 C-REACTIVE PROTEIN: CPT

## 2018-04-10 PROCEDURE — 3074F SYST BP LT 130 MM HG: CPT | Mod: CPTII,S$GLB,, | Performed by: INTERNAL MEDICINE

## 2018-04-10 PROCEDURE — 20610 DRAIN/INJ JOINT/BURSA W/O US: CPT | Mod: LT,S$GLB,, | Performed by: INTERNAL MEDICINE

## 2018-04-10 PROCEDURE — 99214 OFFICE O/P EST MOD 30 MIN: CPT | Mod: 25,S$GLB,, | Performed by: INTERNAL MEDICINE

## 2018-04-10 PROCEDURE — 85651 RBC SED RATE NONAUTOMATED: CPT | Mod: PO

## 2018-04-10 RX ORDER — METHYLPREDNISOLONE ACETATE 40 MG/ML
40 INJECTION, SUSPENSION INTRA-ARTICULAR; INTRALESIONAL; INTRAMUSCULAR; SOFT TISSUE
Status: DISCONTINUED | OUTPATIENT
Start: 2018-04-10 | End: 2018-04-10 | Stop reason: HOSPADM

## 2018-04-10 RX ADMIN — METHYLPREDNISOLONE ACETATE 40 MG: 40 INJECTION, SUSPENSION INTRA-ARTICULAR; INTRALESIONAL; INTRAMUSCULAR; SOFT TISSUE at 09:04

## 2018-04-10 ASSESSMENT — ROUTINE ASSESSMENT OF PATIENT INDEX DATA (RAPID3)
MDHAQ FUNCTION SCORE: 1.1
PAIN SCORE: 4
PATIENT GLOBAL ASSESSMENT SCORE: 1.5
PSYCHOLOGICAL DISTRESS SCORE: 1.1
TOTAL RAPID3 SCORE: 3.05

## 2018-04-10 NOTE — PROGRESS NOTES
Subjective:          Chief Complaint: Juanis Mclaughlin is a 77 y.o. female who had concerns including Disease Management.    HPI:  Rheumatoid Arthritis    HPI: 76y/o  female with h/o HTN, HLD was diagnosed with sero +  Rheumatoid arthritis in 1998. Off prednisone now. Has also received HCQ in the past no improvement.     Presently on MTX 5 weekly and Enbrel 50mg weekly with good control of her arthritis.   1/2018 wanted to decrease MTX to 4 tabs, with slight transaminitis.    No flare with reduction in MTX. No change in the MCV recent folate and B12 WNL, found to have severe CHERIE. 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      No ASE of stomatitis,  infections or injection site reaction. She was also diagnosed with secondary Sjogren's. Currently on restasis. Today, she is complaining of stiffness of hands and persistent cough when drinking and eating foods.   Early morning stiffness in the hands last for 1 hr.   She denies fever, weight loss, photosensitivity, rash, ulcer, raynaud's phenomenon, alopecia, dysphagia, diarrhea or blood in the stools.      Patient is noting cough for the past few months it's exacerbated with laughing following eating and with laughing.  She's had no fevers no antecedent upper respiratory infection no chills typically nonproductive. + hx of GERD. Did start Nexium no change. Hx of mild MR. No pedal edema. Was seen with Pulm now on Gabapentin 100mg see if this would slow the cough.  Doing well with Dr. Madison with Breo and Gabapentin to relax cough impulse.     REVIEW OF SYSTEMS:    Review of Systems   Constitutional: Negative for fever, malaise/fatigue and weight loss.   HENT: Negative for sore throat.    Eyes: Negative for double vision, photophobia and redness.   Respiratory: Positive for cough and sputum production. Negative for hemoptysis, shortness of breath and wheezing.    Cardiovascular: Negative for chest pain, palpitations and orthopnea.   Gastrointestinal: Negative for abdominal  pain, constipation and diarrhea.   Genitourinary: Negative for dysuria, hematuria and urgency.   Musculoskeletal: Positive for joint pain. Negative for back pain and myalgias.   Skin: Negative for rash.   Neurological: Negative for dizziness, tingling, focal weakness and headaches.   Endo/Heme/Allergies: Does not bruise/bleed easily.   Psychiatric/Behavioral: Negative for depression, hallucinations and suicidal ideas.                Objective:            Past Medical History:   Diagnosis Date    Arthritis     rheumatoid arthritis    Cardiomyopathy     Diverticulosis     DVT (deep venous thrombosis)     one time 5 years ago    GERD (gastroesophageal reflux disease)     Glaucoma     sees Dr. Gillette    Hyperlipidemia     Hypertension     Iron deficiency     Osteoporosis 7/9/2014    PONV (postoperative nausea and vomiting)     Rheumatoid arthritis involving multiple sites with positive rheumatoid factor 11/24/2013    Vitamin B12 deficiency 2/28/2015     Family History   Problem Relation Age of Onset    Heart disease Mother      CHF    Hypertension Mother     Heart disease Father     Stroke Father     Heart disease Brother     Stroke Brother     Heart disease Brother     Scleroderma Brother     Ovarian cancer Maternal Aunt     Breast cancer Neg Hx      Social History   Substance Use Topics    Smoking status: Never Smoker    Smokeless tobacco: Never Used    Alcohol use 0.5 oz/week     1 Standard drinks or equivalent per week      Comment: occ social         Current Outpatient Prescriptions on File Prior to Visit   Medication Sig Dispense Refill    aspirin (ECOTRIN) 81 MG EC tablet Take 81 mg by mouth once daily.      atorvastatin (LIPITOR) 20 MG tablet Take 1 tablet (20 mg total) by mouth every evening. 90 tablet 3    B-complex with vitamin C (Z-BEC OR EQUIV) tablet Take 1 tablet by mouth once daily.      calcium-vitamin D3 500 mg(1,250mg) -200 unit per tablet Take 1 tablet by mouth once  daily.      cycloSPORINE (RESTASIS) 0.05 % ophthalmic emulsion Place 1 drop into both eyes 2 (two) times daily.      dorzolamide-timolol 2-0.5% (COSOPT) 2-0.5 % ophthalmic solution Place 1 drop into both eyes 2 (two) times daily.       DYMISTA 137-50 mcg/spray Spry nassal spray INHALE 1 SPRAY BY NASAL ROUTE 2 TIMES A DAY FOR 1 DAY  4    estradiol (ESTRACE) 0.01 % (0.1 mg/gram) vaginal cream PLACE 1 GRAM VAGINALLY ONCEDAILY 127.5 g 2    etanercept (ENBREL SURECLICK) 50 mg/mL (0.98 mL) PnIj INJECT THE CONTENTS OF ONE SURECLICK (50 MG) SUBCUTANEOUSLY Once per week AS DIRECTED BY YOUR PHYSICIAN. SINGLE - USE DEVICE. KEEP REF 12 Syringe 4    fluticasone-vilanterol (BREO ELLIPTA) 100-25 mcg/dose diskus inhaler Inhale 1 puff into the lungs 2 (two) times daily. Use AM of pacemaker insertion      folic acid (FOLVITE) 1 MG tablet Take 2 tablets (2 mg total) by mouth once daily. 180 tablet 3    gabapentin (NEURONTIN) 100 MG capsule TAKE 1 CAPSULE BY ORAL ROUTE ONCE A DAY (AT BEDTIME)  1    hydrocodone-acetaminophen 7.5-325mg (NORCO) 7.5-325 mg per tablet Take 1 tablet by mouth every 6 (six) hours as needed for Pain. 20 tablet 0    hydrocortisone 2.5 % ointment MIX WITH AQUAPHOR OR VASELINE AND APPLY TO AFFECTED AREA as needed  1    magnesium 200 mg Tab Take 400 mg by mouth once daily. Magnesium 400mg with Chelated Zinc 15mg.      methotrexate 2.5 MG Tab Take 5 tablets (12.5 mg total) by mouth every 7 days. 60 tablet 3    pantoprazole (PROTONIX) 40 MG tablet Take 1 tablet (40 mg total) by mouth once daily. 90 tablet 0    potassium gluconate 595 (99) mg Tab Take 1 capsule by mouth once daily.      ranitidine (ZANTAC) 150 MG tablet Take 150 mg by mouth nightly.      risedronate (ACTONEL) 35 MG tablet Take 1 tablet (35 mg total) by mouth every 7 days. 12 tablet 3    travoprost, benzalkonium, (TRAVATAN) 0.004 % ophthalmic solution Place 1 drop into both eyes every evening.      valsartan-hydrochlorothiazide  (DIOVAN-HCT) 80-12.5 mg per tablet Take 1 tablet by mouth once daily. Per Dr. Valencia only take 1/2 tablet Wed, Thurs and Fri of this week 90 tablet 0    diclofenac sodium (VOLTAREN) 1 % Gel Apply 2 g topically 2 (two) times daily as needed. 200 g 3     No current facility-administered medications on file prior to visit.        Vitals:    04/10/18 0830   BP: 110/80   Pulse: 64       Physical Exam:    Physical Exam   Constitutional: She appears well-developed and well-nourished.   HENT:   Nose: No septal deviation.   Mouth/Throat: Mucous membranes are normal. No oral lesions.   Eyes: Pupils are equal, round, and reactive to light. Right conjunctiva is not injected. Left conjunctiva is not injected.   Neck: No JVD present. No thyroid mass and no thyromegaly present.   Cardiovascular: Normal rate, regular rhythm and normal pulses.    No edema   Pulmonary/Chest: Effort normal. She has wheezes in the right middle field and the left middle field.   Abdominal: Soft. Normal appearance. There is no hepatosplenomegaly.   Musculoskeletal:        Right shoulder: She exhibits normal range of motion, no tenderness and no swelling.        Left shoulder: She exhibits normal range of motion, no tenderness and no swelling.        Right elbow: She exhibits normal range of motion and no swelling. No tenderness found.        Left elbow: She exhibits normal range of motion and no swelling. No tenderness found.        Right wrist: She exhibits decreased range of motion and tenderness. She exhibits no swelling.        Left wrist: She exhibits normal range of motion, no tenderness and no swelling.        Right hip: She exhibits normal range of motion, normal strength and no swelling.        Left hip: She exhibits normal range of motion, no tenderness and no swelling.        Right knee: She exhibits normal range of motion and no swelling. No tenderness found.        Left knee: She exhibits normal range of motion and no swelling. No tenderness  found.        Right ankle: She exhibits normal range of motion and no swelling. No tenderness.        Left ankle: She exhibits normal range of motion and no swelling. No tenderness.        Cervical back: She exhibits decreased range of motion.        Right hand: She exhibits decreased range of motion and tenderness.        Left hand: She exhibits tenderness.   1+ synovitis MCP and PIP with heberden nodes all DIP 2-5. Left wrist with tenderness and swelling about the TFCC. No nodules. No flexion contractures.    Right 4th PIP ++TTP     Lymphadenopathy:     She has no cervical adenopathy.     She has no axillary adenopathy.   Neurological: She has normal strength and normal reflexes.   Skin: Skin is dry and intact.   Psychiatric: She has a normal mood and affect.             Assessment:       Encounter Diagnoses   Name Primary?    Rheumatoid arthritis involving multiple sites with positive rheumatoid factor Yes    Pacemaker     Long-term use of immunosuppressant medication     Age-related osteoporosis without current pathological fracture     Primary osteoarthritis of left knee           Plan:        Rheumatoid arthritis involving multiple sites with positive rheumatoid factor  -     Comprehensive metabolic panel; Standing; Expected date: 04/10/2018  -     CBC auto differential; Standing; Expected date: 04/10/2018  -     Sedimentation rate, manual; Standing; Expected date: 04/10/2018  -     C-reactive protein; Standing; Expected date: 04/10/2018    Primary osteoarthritis of left knee  -     Comprehensive metabolic panel; Standing; Expected date: 04/10/2018  -     CBC auto differential; Standing; Expected date: 04/10/2018  -     Sedimentation rate, manual; Standing; Expected date: 04/10/2018  -     C-reactive protein; Standing; Expected date: 04/10/2018  -     Large Joint Aspiration/Injection    Long-term use of immunosuppressant medication    Age-related osteoporosis without current pathological  fracture    Pacemaker      MTX is 5 tabs weekly 90 day supply  continue folic acid 2mg dialy  Continue Enbrel needs 90 day  voltaren gel PRN fingers/hands-using sparingly  Injected left knee todaty discussed alternative non-surgical options available with PM and R.     Labs q 3 months. Labs today.     No Follow-up on file.          30min consultation with greater than 50% spent in counseling, chart review and coordination of care. All questions answered.

## 2018-04-10 NOTE — PROCEDURES
Large Joint Aspiration/Injection  Date/Time: 4/10/2018 9:45 AM  Performed by: REBECCA CAVAZOS  Authorized by: REBECCA CAVAZOS     Consent Done?:  Yes (Verbal)  Indications:  Pain  Procedure site marked: Yes    Timeout: Prior to procedure the correct patient, procedure, and site was verified      Location:  Knee  Site:  L knee  Prep: Patient was prepped and draped in usual sterile fashion    Ultrasonic Guidance for needle placement: No  Needle size:  25 G  Approach:  Anterolateral  Medications:  40 mg methylPREDNISolone acetate 40 mg/mL  Lab: Fluid sent for laboratory analysis    Patient tolerance:  Patient tolerated the procedure well with no immediate complications    Additional Comments: Patient informed of the risks, benefits, side effects of corticosteroid injections including but not limited to skin hypopigmentation, atrophy, ineffectiveness and infection.

## 2018-04-16 ENCOUNTER — TELEPHONE (OUTPATIENT)
Dept: RHEUMATOLOGY | Facility: CLINIC | Age: 78
End: 2018-04-16

## 2018-04-16 NOTE — TELEPHONE ENCOUNTER
These call patient after reviewing her labs that do think we need to drop the methotrexate to 4 tablets weekly.  Call if this is not working to control her arthritis   thank you   Dr. Cruz

## 2018-06-25 ENCOUNTER — CLINICAL SUPPORT (OUTPATIENT)
Dept: CARDIOLOGY | Facility: CLINIC | Age: 78
End: 2018-06-25
Attending: INTERNAL MEDICINE
Payer: COMMERCIAL

## 2018-06-25 DIAGNOSIS — I49.5 SSS (SICK SINUS SYNDROME): ICD-10-CM

## 2018-06-25 DIAGNOSIS — Z95.0 PACEMAKER: ICD-10-CM

## 2018-06-25 PROCEDURE — 93294 REM INTERROG EVL PM/LDLS PM: CPT | Mod: S$GLB,,, | Performed by: INTERNAL MEDICINE

## 2018-06-25 PROCEDURE — 93296 REM INTERROG EVL PM/IDS: CPT | Mod: S$GLB,,, | Performed by: INTERNAL MEDICINE

## 2018-06-27 DIAGNOSIS — Z95.0 PACEMAKER: Primary | ICD-10-CM

## 2018-06-27 DIAGNOSIS — I49.5 SSS (SICK SINUS SYNDROME): ICD-10-CM

## 2018-07-19 ENCOUNTER — LAB VISIT (OUTPATIENT)
Dept: LAB | Facility: HOSPITAL | Age: 78
End: 2018-07-19
Attending: INTERNAL MEDICINE
Payer: COMMERCIAL

## 2018-07-19 DIAGNOSIS — M05.79 RHEUMATOID ARTHRITIS INVOLVING MULTIPLE SITES WITH POSITIVE RHEUMATOID FACTOR: ICD-10-CM

## 2018-07-19 DIAGNOSIS — Z79.60 LONG-TERM USE OF IMMUNOSUPPRESSANT MEDICATION: ICD-10-CM

## 2018-07-19 LAB
ALBUMIN SERPL BCP-MCNC: 3.5 G/DL
ALP SERPL-CCNC: 41 U/L
ALT SERPL W/O P-5'-P-CCNC: 8 U/L
ANION GAP SERPL CALC-SCNC: 6 MMOL/L
AST SERPL-CCNC: 23 U/L
BASOPHILS # BLD AUTO: 0.06 K/UL
BASOPHILS NFR BLD: 1 %
BILIRUB SERPL-MCNC: 0.8 MG/DL
BUN SERPL-MCNC: 13 MG/DL
CALCIUM SERPL-MCNC: 10 MG/DL
CHLORIDE SERPL-SCNC: 104 MMOL/L
CO2 SERPL-SCNC: 31 MMOL/L
CREAT SERPL-MCNC: 1.2 MG/DL
CRP SERPL-MCNC: 2.9 MG/L
DIFFERENTIAL METHOD: ABNORMAL
EOSINOPHIL # BLD AUTO: 0.2 K/UL
EOSINOPHIL NFR BLD: 3.7 %
ERYTHROCYTE [DISTWIDTH] IN BLOOD BY AUTOMATED COUNT: 13.9 %
ERYTHROCYTE [SEDIMENTATION RATE] IN BLOOD BY WESTERGREN METHOD: 13 MM/HR
EST. GFR  (AFRICAN AMERICAN): 50 ML/MIN/1.73 M^2
EST. GFR  (NON AFRICAN AMERICAN): 43.4 ML/MIN/1.73 M^2
GLUCOSE SERPL-MCNC: 106 MG/DL
HCT VFR BLD AUTO: 41 %
HGB BLD-MCNC: 13.5 G/DL
IMM GRANULOCYTES # BLD AUTO: 0.02 K/UL
IMM GRANULOCYTES NFR BLD AUTO: 0.3 %
LYMPHOCYTES # BLD AUTO: 1.1 K/UL
LYMPHOCYTES NFR BLD: 19 %
MCH RBC QN AUTO: 34.4 PG
MCHC RBC AUTO-ENTMCNC: 32.9 G/DL
MCV RBC AUTO: 105 FL
MONOCYTES # BLD AUTO: 1.1 K/UL
MONOCYTES NFR BLD: 18.5 %
NEUTROPHILS # BLD AUTO: 3.4 K/UL
NEUTROPHILS NFR BLD: 57.5 %
NRBC BLD-RTO: 0 /100 WBC
PLATELET # BLD AUTO: 195 K/UL
PMV BLD AUTO: 11.7 FL
POTASSIUM SERPL-SCNC: 5 MMOL/L
PROT SERPL-MCNC: 6.7 G/DL
RBC # BLD AUTO: 3.92 M/UL
SODIUM SERPL-SCNC: 141 MMOL/L
WBC # BLD AUTO: 5.88 K/UL

## 2018-07-19 PROCEDURE — 85651 RBC SED RATE NONAUTOMATED: CPT | Mod: PO

## 2018-07-19 PROCEDURE — 85025 COMPLETE CBC W/AUTO DIFF WBC: CPT

## 2018-07-19 PROCEDURE — 36415 COLL VENOUS BLD VENIPUNCTURE: CPT | Mod: PO

## 2018-07-19 PROCEDURE — 80053 COMPREHEN METABOLIC PANEL: CPT

## 2018-07-19 PROCEDURE — 86140 C-REACTIVE PROTEIN: CPT

## 2018-08-06 DIAGNOSIS — E53.8 VITAMIN B12 DEFICIENCY: ICD-10-CM

## 2018-08-06 RX ORDER — FOLIC ACID 1 MG/1
2 TABLET ORAL DAILY
Qty: 180 TABLET | Refills: 3 | Status: SHIPPED | OUTPATIENT
Start: 2018-08-06 | End: 2018-08-23 | Stop reason: SDUPTHER

## 2018-08-06 NOTE — TELEPHONE ENCOUNTER
----- Message from Nicholas Chandler sent at 8/6/2018  9:22 AM CDT -----  Type:  RX Refill Request    Who Called:  Patient  Refill or New Rx:  Refill  RX Name and Strength:  folic acid (FOLVITE) 1 MG tablet   Preferred Pharmacy with phone number:    [facility] - [place], [place] - [address] AT [address] [place] [address]  [place] [address]  Phone: [phone] Fax: [phone]  Local or Mail Order:  Mail  Ordering Provider:  Same  Best Call Back Number:  [phone]

## 2018-08-23 ENCOUNTER — OFFICE VISIT (OUTPATIENT)
Dept: CARDIOLOGY | Facility: CLINIC | Age: 78
End: 2018-08-23
Payer: COMMERCIAL

## 2018-08-23 ENCOUNTER — OFFICE VISIT (OUTPATIENT)
Dept: RHEUMATOLOGY | Facility: CLINIC | Age: 78
End: 2018-08-23
Payer: COMMERCIAL

## 2018-08-23 VITALS
DIASTOLIC BLOOD PRESSURE: 80 MMHG | HEART RATE: 68 BPM | SYSTOLIC BLOOD PRESSURE: 137 MMHG | WEIGHT: 156.5 LBS | HEIGHT: 62 IN | BODY MASS INDEX: 28.8 KG/M2

## 2018-08-23 VITALS
SYSTOLIC BLOOD PRESSURE: 131 MMHG | BODY MASS INDEX: 28.8 KG/M2 | HEART RATE: 59 BPM | DIASTOLIC BLOOD PRESSURE: 77 MMHG | WEIGHT: 156.5 LBS | HEIGHT: 62 IN

## 2018-08-23 DIAGNOSIS — Z95.0 PACEMAKER: ICD-10-CM

## 2018-08-23 DIAGNOSIS — E53.8 VITAMIN B12 DEFICIENCY: ICD-10-CM

## 2018-08-23 DIAGNOSIS — M81.0 AGE-RELATED OSTEOPOROSIS WITHOUT CURRENT PATHOLOGICAL FRACTURE: ICD-10-CM

## 2018-08-23 DIAGNOSIS — I34.0 NON-RHEUMATIC MITRAL REGURGITATION: ICD-10-CM

## 2018-08-23 DIAGNOSIS — E78.2 MIXED HYPERLIPIDEMIA: ICD-10-CM

## 2018-08-23 DIAGNOSIS — M05.79 RHEUMATOID ARTHRITIS INVOLVING MULTIPLE SITES WITH POSITIVE RHEUMATOID FACTOR: Primary | ICD-10-CM

## 2018-08-23 DIAGNOSIS — I51.9 DIASTOLIC DYSFUNCTION, LEFT VENTRICLE: Primary | ICD-10-CM

## 2018-08-23 PROCEDURE — 99214 OFFICE O/P EST MOD 30 MIN: CPT | Mod: S$GLB,,, | Performed by: INTERNAL MEDICINE

## 2018-08-23 PROCEDURE — 3075F SYST BP GE 130 - 139MM HG: CPT | Mod: CPTII,S$GLB,, | Performed by: INTERNAL MEDICINE

## 2018-08-23 PROCEDURE — 99999 PR PBB SHADOW E&M-EST. PATIENT-LVL III: CPT | Mod: PBBFAC,,, | Performed by: INTERNAL MEDICINE

## 2018-08-23 PROCEDURE — 3079F DIAST BP 80-89 MM HG: CPT | Mod: CPTII,S$GLB,, | Performed by: INTERNAL MEDICINE

## 2018-08-23 PROCEDURE — 3078F DIAST BP <80 MM HG: CPT | Mod: CPTII,S$GLB,, | Performed by: INTERNAL MEDICINE

## 2018-08-23 RX ORDER — FOLIC ACID 1 MG/1
2 TABLET ORAL DAILY
Qty: 180 TABLET | Refills: 3 | Status: SHIPPED | OUTPATIENT
Start: 2018-08-23 | End: 2019-01-09 | Stop reason: SDUPTHER

## 2018-08-23 RX ORDER — METHOTREXATE 2.5 MG/1
10 TABLET ORAL
Qty: 48 TABLET | Refills: 3 | Status: SHIPPED | OUTPATIENT
Start: 2018-08-23 | End: 2019-01-09 | Stop reason: SDUPTHER

## 2018-08-23 ASSESSMENT — ROUTINE ASSESSMENT OF PATIENT INDEX DATA (RAPID3)
PAIN SCORE: 2
PATIENT GLOBAL ASSESSMENT SCORE: .5
TOTAL RAPID3 SCORE: 1.94
MDHAQ FUNCTION SCORE: 1
PSYCHOLOGICAL DISTRESS SCORE: 1.1

## 2018-08-23 NOTE — PROGRESS NOTES
AS Subjective:          Chief Complaint: Juanis Mclaughlin is a 78 y.o. female who had concerns including Disease Management.    HPI:  Rheumatoid Arthritis    HPI: 74y/o  female with h/o HTN, HLD was diagnosed with sero +  Rheumatoid arthritis in 1998. Off prednisone now. Has also received HCQ in the past no improvement.     Presently on MTX 5 weekly and Enbrel 50mg weekly with good control of her arthritis.   1/2018 wanted to decrease MTX to 4 tabs, with slight transaminitis.       No flare with reduction in MTX. No change in the MCV recent folate and B12 WNL, found to have severe CHERIE. 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      No ASE of stomatitis,  infections or injection site reaction. She was also diagnosed with secondary Sjogren's. Currently on restasis. Today, she is complaining of stiffness of hands and persistent cough when drinking and eating foods.   Early morning stiffness in the hands last for 1 hr.   She denies fever, weight loss, photosensitivity, rash, ulcer, raynaud's phenomenon, alopecia, dysphagia, diarrhea or blood in the stools.    + hx of GERD. Did start Nexium no change. Hx of mild MR. No pedal edema. Was seen with Pulm now on Gabapentin 100mg see if this would slow the cough.  Doing well with Dr. Madison with Breo and Gabapentin to relax cough impulse.     REVIEW OF SYSTEMS:    Review of Systems   Constitutional: Negative for fever, malaise/fatigue and weight loss.   HENT: Negative for sore throat.    Eyes: Negative for double vision, photophobia and redness.   Respiratory: Positive for cough and sputum production. Negative for hemoptysis, shortness of breath and wheezing.    Cardiovascular: Negative for chest pain, palpitations and orthopnea.   Gastrointestinal: Negative for abdominal pain, constipation and diarrhea.   Genitourinary: Negative for dysuria, hematuria and urgency.   Musculoskeletal: Positive for joint pain. Negative for back pain and myalgias.   Skin: Negative for rash.    Neurological: Negative for dizziness, tingling, focal weakness and headaches.   Endo/Heme/Allergies: Does not bruise/bleed easily.   Psychiatric/Behavioral: Negative for depression, hallucinations and suicidal ideas.                Objective:            Past Medical History:   Diagnosis Date    Arthritis     rheumatoid arthritis    Cardiomyopathy     Diverticulosis     DVT (deep venous thrombosis)     one time 5 years ago    GERD (gastroesophageal reflux disease)     Glaucoma     sees Dr. Gillette    Hyperlipidemia     Hypertension     Iron deficiency     Osteoporosis 7/9/2014    PONV (postoperative nausea and vomiting)     Rheumatoid arthritis involving multiple sites with positive rheumatoid factor 11/24/2013    Vitamin B12 deficiency 2/28/2015     Family History   Problem Relation Age of Onset    Heart disease Mother         CHF    Hypertension Mother     Heart disease Father     Stroke Father     Heart disease Brother     Stroke Brother     Heart disease Brother     Scleroderma Brother     Ovarian cancer Maternal Aunt     Breast cancer Neg Hx      Social History     Tobacco Use    Smoking status: Never Smoker    Smokeless tobacco: Never Used   Substance Use Topics    Alcohol use: Yes     Alcohol/week: 0.5 oz     Types: 1 Standard drinks or equivalent per week     Comment: occ social    Drug use: No         Current Outpatient Medications on File Prior to Visit   Medication Sig Dispense Refill    aspirin (ECOTRIN) 81 MG EC tablet Take 81 mg by mouth once daily.      atorvastatin (LIPITOR) 20 MG tablet Take 1 tablet (20 mg total) by mouth every evening. 90 tablet 1    B-complex with vitamin C (Z-BEC OR EQUIV) tablet Take 1 tablet by mouth once daily.      brimonidine-timolol (COMBIGAN) 0.2-0.5 % Drop Place 1 drop into both eyes 2 (two) times daily.      calcium-vitamin D3 500 mg(1,250mg) -200 unit per tablet Take 1 tablet by mouth once daily.      cycloSPORINE (RESTASIS) 0.05 %  ophthalmic emulsion Place 1 drop into both eyes 2 (two) times daily.      DYMISTA 137-50 mcg/spray Spry nassal spray INHALE 1 SPRAY BY NASAL ROUTE 2 TIMES A DAY as needed  4    estradiol (ESTRACE) 0.01 % (0.1 mg/gram) vaginal cream PLACE 1 GRAM VAGINALLY ONCEDAILY 127.5 g 2    etanercept (ENBREL SURECLICK) 50 mg/mL (0.98 mL) PnIj INJECT THE CONTENTS OF ONE SURECLICK (50 MG) SUBCUTANEOUSLY Once per week AS DIRECTED BY YOUR PHYSICIAN. SINGLE - USE DEVICE. KEEP REF 12 Syringe 4    fluticasone-vilanterol (BREO ELLIPTA) 100-25 mcg/dose diskus inhaler Inhale 1 puff into the lungs 2 (two) times daily. Use AM of pacemaker insertion      folic acid (FOLVITE) 1 MG tablet Take 2 tablets (2 mg total) by mouth once daily. 180 tablet 3    hydrocodone-acetaminophen 7.5-325mg (NORCO) 7.5-325 mg per tablet Take 1 tablet by mouth every 6 (six) hours as needed for Pain. 20 tablet 0    hydrocortisone 2.5 % ointment MIX WITH AQUAPHOR OR VASELINE AND APPLY TO AFFECTED AREA as needed  1    losartan-hydrochlorothiazide 50-12.5 mg (HYZAAR) 50-12.5 mg per tablet Take 1 tablet by mouth once daily. 90 tablet 1    magnesium 200 mg Tab Take 400 mg by mouth once daily. Magnesium 400mg with Chelated Zinc 15mg.      pantoprazole (PROTONIX) 40 MG tablet Take 1 tablet (40 mg total) by mouth once daily. 90 tablet 1    potassium gluconate 595 (99) mg Tab Take 1 capsule by mouth once daily.      ranitidine (ZANTAC) 150 MG tablet Take 150 mg by mouth nightly.      risedronate (ACTONEL) 35 MG tablet Take 1 tablet (35 mg total) by mouth every 7 days. 12 tablet 3    diclofenac sodium (VOLTAREN) 1 % Gel Apply 2 g topically 2 (two) times daily as needed. 200 g 3    methotrexate 2.5 MG Tab Take 5 tablets (12.5 mg total) by mouth every 7 days. (Patient taking differently: Take 12.5 mg by mouth every 7 days. Pt takes 4 tab every 7 days.) 60 tablet 3     No current facility-administered medications on file prior to visit.        Vitals:    08/23/18  0805   BP: 137/80   Pulse: 68       Physical Exam:    Physical Exam   Constitutional: She appears well-developed and well-nourished.   HENT:   Nose: No septal deviation.   Mouth/Throat: Mucous membranes are normal. No oral lesions.   Eyes: Pupils are equal, round, and reactive to light. Right conjunctiva is not injected. Left conjunctiva is not injected.   Neck: No JVD present. No thyroid mass and no thyromegaly present.   Cardiovascular: Normal rate, regular rhythm and normal pulses.   No edema   Pulmonary/Chest: Effort normal. She has wheezes in the right middle field and the left middle field.   Abdominal: Soft. Normal appearance. There is no hepatosplenomegaly.   Musculoskeletal:        Right shoulder: She exhibits normal range of motion, no tenderness and no swelling.        Left shoulder: She exhibits normal range of motion, no tenderness and no swelling.        Right elbow: She exhibits normal range of motion and no swelling. No tenderness found.        Left elbow: She exhibits normal range of motion and no swelling. No tenderness found.        Right wrist: She exhibits decreased range of motion and tenderness. She exhibits no swelling.        Left wrist: She exhibits normal range of motion, no tenderness and no swelling.        Right hip: She exhibits normal range of motion, normal strength and no swelling.        Left hip: She exhibits normal range of motion, no tenderness and no swelling.        Right knee: She exhibits normal range of motion and no swelling. No tenderness found.        Left knee: She exhibits normal range of motion and no swelling. No tenderness found.        Right ankle: She exhibits normal range of motion and no swelling. No tenderness.        Left ankle: She exhibits normal range of motion and no swelling. No tenderness.        Cervical back: She exhibits decreased range of motion.        Right hand: She exhibits decreased range of motion and tenderness.        Left hand: She exhibits  tenderness.   1+ synovitis MCP and PIP with heberden nodes all DIP 2-5. Left wrist with tenderness and swelling about the TFCC. No nodules. No flexion contractures.    Right 4th PIP ++TTP     Lymphadenopathy:     She has no cervical adenopathy.     She has no axillary adenopathy.   Neurological: She has normal strength and normal reflexes.   Skin: Skin is dry and intact.   Psychiatric: She has a normal mood and affect.             Assessment:       Encounter Diagnoses   Name Primary?    Rheumatoid arthritis involving multiple sites with positive rheumatoid factor Yes    Age-related osteoporosis without current pathological fracture     Vitamin B12 deficiency           Plan:        Rheumatoid arthritis involving multiple sites with positive rheumatoid factor    Age-related osteoporosis without current pathological fracture    Vitamin B12 deficiency  -     folic acid (FOLVITE) 1 MG tablet; Take 2 tablets (2 mg total) by mouth once daily.  Dispense: 180 tablet; Refill: 3    Other orders  -     methotrexate 2.5 MG Tab; Take 4 tablets (10 mg total) by mouth every 7 days.  Dispense: 48 tablet; Refill: 3      MTX is 4 tabs weekly 90 day supply  continue folic acid 2mg dialy  Continue Enbrel needs 90 day  voltaren gel PRN fingers/hands-using sparingly    Labs q 3 months. Labs today.     No Follow-up on file.          30min consultation with greater than 50% spent in counseling, chart review and coordination of care. All questions answered.

## 2018-08-23 NOTE — PROGRESS NOTES
Subjective:    Patient ID:  Juanis Mclaughlin is a 78 y.o. female who presents for follow-up of Hypertension (Annual f/u ); Hyperlipidemia; and Mitral Regurgitation      HPI  Here for f/u diastolic dysfxn, MR/bradycardia/recent PPM implant. Patients states is doing well no chest pain, SOB or change in exertional tolerence. Patient does not exercise but remains very active with out change in exertional tolerance or chest pain. Patient denies palpitations, syncope, presyncope, lightheadedness or dizziness.          Review of Systems   Constitution: Negative for malaise/fatigue.   Eyes: Negative for blurred vision.   Cardiovascular: Negative for chest pain, claudication, cyanosis, dyspnea on exertion, irregular heartbeat, leg swelling, near-syncope, orthopnea, palpitations, paroxysmal nocturnal dyspnea and syncope.   Respiratory: Negative for cough and shortness of breath.    Hematologic/Lymphatic: Does not bruise/bleed easily.   Musculoskeletal: Negative for back pain, falls, joint pain, muscle cramps, muscle weakness and myalgias.   Gastrointestinal: Negative for abdominal pain, change in bowel habit, nausea and vomiting.   Genitourinary: Negative for urgency.   Neurological: Negative for dizziness, focal weakness and light-headedness.        Objective:            Physical Exam   Constitutional: She is oriented to person, place, and time. She appears well-developed and well-nourished.   Neck: Normal range of motion. No JVD present.   Cardiovascular: Normal rate, regular rhythm, normal heart sounds and intact distal pulses.   Pulmonary/Chest: Effort normal and breath sounds normal.   Neurological: She is alert and oriented to person, place, and time.   Skin: Skin is warm and dry.   Psychiatric: She has a normal mood and affect.   Nursing note and vitals reviewed.      ..    Chemistry        Component Value Date/Time     07/19/2018 0920    K 5.0 07/19/2018 0920     07/19/2018 0920    CO2 31 (H) 07/19/2018  0920    BUN 13 07/19/2018 0920    CREATININE 1.2 07/19/2018 0920     07/19/2018 0920        Component Value Date/Time    CALCIUM 10.0 07/19/2018 0920    ALKPHOS 41 (L) 07/19/2018 0920    AST 23 07/19/2018 0920    ALT 8 (L) 07/19/2018 0920    BILITOT 0.8 07/19/2018 0920    ESTGFRAFRICA 50.0 (A) 07/19/2018 0920    EGFRNONAA 43.4 (A) 07/19/2018 0920            ..  Lab Results   Component Value Date    CHOL 164 06/29/2018    CHOL 165 07/31/2017    CHOL 172 04/24/2017     Lab Results   Component Value Date    HDL 58 06/29/2018    HDL 57 07/31/2017    HDL 56 04/24/2017     Lab Results   Component Value Date    LDLCALC 77.6 06/29/2018    LDLCALC 89.8 07/31/2017    LDLCALC 96.4 04/24/2017     Lab Results   Component Value Date    TRIG 142 06/29/2018    TRIG 91 07/31/2017    TRIG 98 04/24/2017     Lab Results   Component Value Date    CHOLHDL 35.4 06/29/2018    CHOLHDL 34.5 07/31/2017    CHOLHDL 32.6 04/24/2017     ..  Lab Results   Component Value Date    WBC 5.88 07/19/2018    HGB 13.5 07/19/2018    HCT 41.0 07/19/2018     (H) 07/19/2018     07/19/2018       Test(s) Reviewed  I have reviewed the following in detail:  [] Stress test   [] Angiography   [x] Echocardiogram   [x] Labs   [] Other:       Assessment:         ICD-10-CM ICD-9-CM   1. Diastolic dysfunction, left ventricle I51.9 429.9   2. Mixed hyperlipidemia E78.2 272.2   3. Non-rheumatic mitral regurgitation I34.0 424.0   4. Pacemaker Z95.0 V45.01     Problem List Items Addressed This Visit     Diastolic dysfunction, left ventricle - Primary    Hyperlipidemia    Mitral regurgitation    Overview     12/13 mild         Pacemaker    Overview     left chest PACEMAKER  [employer] [address] [id number]  Bonifacio Fuller  9/9/2016 - current     right atrium LEAD  [employer] [address] [id number]  Bonifacio Fuller  9/9/2016 - current     right ventricle LEAD  [employer] [address] [facility]  [id number]  Bonifacio Fuller  9/9/2016 - current                   Plan:           Return to clinic 1 year   Low level/low impact aerobic exercise 5x's/wk. Heart healthy diet and risk factor modification.    See labs and med orders.  Carotid US next year

## 2018-09-28 ENCOUNTER — TELEPHONE (OUTPATIENT)
Dept: RHEUMATOLOGY | Facility: CLINIC | Age: 78
End: 2018-09-28

## 2018-09-28 NOTE — TELEPHONE ENCOUNTER
----- Message from Nelly Bach sent at 9/28/2018  4:11 PM CDT -----  Contact:  Rosa/ [facility]  Rosa is requesting to speak with a nurse tp get a verbal for a quantity renewal on pt's medication(etanercept (ENBREL SURECLICK) 50 mg/mL (0.98 mL) PnIj)etanercept (ENBREL SURECLICK) 50 mg/mL (0.98 mL) PnIj).  Please call to advise  Call  Back   Thanks

## 2018-10-01 ENCOUNTER — CLINICAL SUPPORT (OUTPATIENT)
Dept: CARDIOLOGY | Facility: CLINIC | Age: 78
End: 2018-10-01
Attending: INTERNAL MEDICINE
Payer: COMMERCIAL

## 2018-10-01 DIAGNOSIS — Z95.0 PACEMAKER: ICD-10-CM

## 2018-10-01 DIAGNOSIS — I49.5 SSS (SICK SINUS SYNDROME): ICD-10-CM

## 2018-10-01 PROCEDURE — 93294 REM INTERROG EVL PM/LDLS PM: CPT | Mod: S$GLB,,, | Performed by: INTERNAL MEDICINE

## 2018-10-01 PROCEDURE — 93296 REM INTERROG EVL PM/IDS: CPT | Mod: S$GLB,,, | Performed by: INTERNAL MEDICINE

## 2018-10-04 LAB
AV DELAY - LONGEST: 340 MSEC
AV DELAY - SHORTEST: 250 MSEC
IMPEDANCE RA LEAD (NATIVE): 619 OHMS
IMPEDANCE RA LEAD: 604 OHMS
OHS CV DC PP MS1: 0.4 MS
OHS CV DC PP MS2: 0.4 MS
OHS CV DC PP V1: 2 V
OHS CV DC PP V2: 2 V
P/R-WAVE RA LEAD (NATIVE): 4.8 MV
PV DELAY - LONGEST: 340 MSEC
PV DELAY - SHORTEST: 250 MSEC

## 2018-10-10 LAB
AV DELAY - LONGEST: 340 MSEC
AV DELAY - SHORTEST: 250 MSEC
IMPEDANCE RA LEAD (NATIVE): 615 OHMS
IMPEDANCE RA LEAD: 604 OHMS
OHS CV DC PP MS1: 0.4 MS
OHS CV DC PP MS2: 0.4 MS
OHS CV DC PP V1: 2 V
OHS CV DC PP V2: 2 V
P/R-WAVE RA LEAD (NATIVE): 5.5 MV
P/R-WAVE RA LEAD: 2.2 MV
PV DELAY - LONGEST: 340 MSEC
PV DELAY - SHORTEST: 250 MSEC

## 2018-10-19 ENCOUNTER — LAB VISIT (OUTPATIENT)
Dept: LAB | Facility: HOSPITAL | Age: 78
End: 2018-10-19
Attending: INTERNAL MEDICINE
Payer: COMMERCIAL

## 2018-10-19 DIAGNOSIS — Z79.60 LONG-TERM USE OF IMMUNOSUPPRESSANT MEDICATION: ICD-10-CM

## 2018-10-19 DIAGNOSIS — M05.79 RHEUMATOID ARTHRITIS INVOLVING MULTIPLE SITES WITH POSITIVE RHEUMATOID FACTOR: ICD-10-CM

## 2018-10-19 LAB
ALBUMIN SERPL BCP-MCNC: 3.7 G/DL
ALP SERPL-CCNC: 32 U/L
ALT SERPL W/O P-5'-P-CCNC: 9 U/L
ANION GAP SERPL CALC-SCNC: 4 MMOL/L
AST SERPL-CCNC: 26 U/L
BASOPHILS # BLD AUTO: 0.05 K/UL
BASOPHILS NFR BLD: 0.8 %
BILIRUB SERPL-MCNC: 0.8 MG/DL
BUN SERPL-MCNC: 24 MG/DL
CALCIUM SERPL-MCNC: 10.1 MG/DL
CHLORIDE SERPL-SCNC: 105 MMOL/L
CO2 SERPL-SCNC: 30 MMOL/L
CREAT SERPL-MCNC: 1.2 MG/DL
CRP SERPL-MCNC: 3.4 MG/L
DIFFERENTIAL METHOD: ABNORMAL
EOSINOPHIL # BLD AUTO: 0.2 K/UL
EOSINOPHIL NFR BLD: 2.9 %
ERYTHROCYTE [DISTWIDTH] IN BLOOD BY AUTOMATED COUNT: 14 %
ERYTHROCYTE [SEDIMENTATION RATE] IN BLOOD BY WESTERGREN METHOD: 5 MM/HR
EST. GFR  (AFRICAN AMERICAN): 49.7 ML/MIN/1.73 M^2
EST. GFR  (NON AFRICAN AMERICAN): 43.1 ML/MIN/1.73 M^2
GLUCOSE SERPL-MCNC: 76 MG/DL
HCT VFR BLD AUTO: 40.8 %
HGB BLD-MCNC: 13.3 G/DL
IMM GRANULOCYTES # BLD AUTO: 0.02 K/UL
IMM GRANULOCYTES NFR BLD AUTO: 0.3 %
LYMPHOCYTES # BLD AUTO: 1.2 K/UL
LYMPHOCYTES NFR BLD: 18.8 %
MCH RBC QN AUTO: 34.5 PG
MCHC RBC AUTO-ENTMCNC: 32.6 G/DL
MCV RBC AUTO: 106 FL
MONOCYTES # BLD AUTO: 1.1 K/UL
MONOCYTES NFR BLD: 16.3 %
NEUTROPHILS # BLD AUTO: 4 K/UL
NEUTROPHILS NFR BLD: 60.9 %
NRBC BLD-RTO: 0 /100 WBC
PLATELET # BLD AUTO: 200 K/UL
PMV BLD AUTO: 14 FL
POTASSIUM SERPL-SCNC: 4.5 MMOL/L
PROT SERPL-MCNC: 7 G/DL
RBC # BLD AUTO: 3.85 M/UL
SODIUM SERPL-SCNC: 139 MMOL/L
WBC # BLD AUTO: 6.49 K/UL

## 2018-10-19 PROCEDURE — 36415 COLL VENOUS BLD VENIPUNCTURE: CPT | Mod: PO

## 2018-10-19 PROCEDURE — 80053 COMPREHEN METABOLIC PANEL: CPT

## 2018-10-19 PROCEDURE — 86140 C-REACTIVE PROTEIN: CPT

## 2018-10-19 PROCEDURE — 85025 COMPLETE CBC W/AUTO DIFF WBC: CPT

## 2018-10-19 PROCEDURE — 85651 RBC SED RATE NONAUTOMATED: CPT | Mod: PO

## 2019-01-03 ENCOUNTER — TELEPHONE (OUTPATIENT)
Dept: OBSTETRICS AND GYNECOLOGY | Facility: CLINIC | Age: 79
End: 2019-01-03

## 2019-01-03 RX ORDER — ESTRADIOL 0.1 MG/G
CREAM VAGINAL
Qty: 127.5 G | Refills: 2 | Status: SHIPPED | OUTPATIENT
Start: 2019-01-03 | End: 2020-07-15 | Stop reason: SDUPTHER

## 2019-01-03 NOTE — TELEPHONE ENCOUNTER
----- Message from Myrna Pierson sent at 1/3/2019 10:11 AM CST -----  Contact: Patient  Type:  RX Refill Request    Who Called:  Patient  Refill or New Rx:  Refill  RX Name and Strength:  estradiol (ESTRACE) 0.01 % (0.1 mg/gram) vaginal cream  How is the patient currently taking it? (ex. 1XDay):    Is this a 30 day or 90 day RX:  3 month supply  Preferred Pharmacy with phone number:    [facility] - [place], [place] - [address] AT [address] [facility]  [address]  [facility] [address]  Phone: [phone] Fax: [phone]  Local or Mail Order:  Local  Ordering Provider:  Gema Darling Call Back Number: [phone]  Additional Information:

## 2019-01-09 ENCOUNTER — HOSPITAL ENCOUNTER (OUTPATIENT)
Dept: RADIOLOGY | Facility: HOSPITAL | Age: 79
Discharge: HOME | End: 2019-01-09
Attending: INTERNAL MEDICINE
Payer: COMMERCIAL

## 2019-01-09 ENCOUNTER — OFFICE VISIT (OUTPATIENT)
Dept: RHEUMATOLOGY | Facility: CLINIC | Age: 79
End: 2019-01-09
Payer: COMMERCIAL

## 2019-01-09 ENCOUNTER — TELEPHONE (OUTPATIENT)
Dept: RHEUMATOLOGY | Facility: CLINIC | Age: 79
End: 2019-01-09

## 2019-01-09 ENCOUNTER — CLINICAL SUPPORT (OUTPATIENT)
Dept: CARDIOLOGY | Facility: CLINIC | Age: 79
End: 2019-01-09
Attending: INTERNAL MEDICINE
Payer: COMMERCIAL

## 2019-01-09 VITALS
HEART RATE: 60 BPM | WEIGHT: 158.5 LBS | SYSTOLIC BLOOD PRESSURE: 142 MMHG | HEIGHT: 61 IN | BODY MASS INDEX: 29.92 KG/M2 | DIASTOLIC BLOOD PRESSURE: 76 MMHG

## 2019-01-09 DIAGNOSIS — M81.0 AGE-RELATED OSTEOPOROSIS WITHOUT CURRENT PATHOLOGICAL FRACTURE: ICD-10-CM

## 2019-01-09 DIAGNOSIS — N18.30 CKD (CHRONIC KIDNEY DISEASE), STAGE III: ICD-10-CM

## 2019-01-09 DIAGNOSIS — Z95.0 PACEMAKER: ICD-10-CM

## 2019-01-09 DIAGNOSIS — I49.5 SSS (SICK SINUS SYNDROME): ICD-10-CM

## 2019-01-09 DIAGNOSIS — M47.812 SPONDYLOSIS OF CERVICAL REGION WITHOUT MYELOPATHY OR RADICULOPATHY: ICD-10-CM

## 2019-01-09 DIAGNOSIS — M05.79 RHEUMATOID ARTHRITIS INVOLVING MULTIPLE SITES WITH POSITIVE RHEUMATOID FACTOR: Primary | ICD-10-CM

## 2019-01-09 DIAGNOSIS — E53.8 VITAMIN B12 DEFICIENCY: ICD-10-CM

## 2019-01-09 DIAGNOSIS — K21.00 GASTROESOPHAGEAL REFLUX DISEASE WITH ESOPHAGITIS: ICD-10-CM

## 2019-01-09 DIAGNOSIS — N18.30 CKD (CHRONIC KIDNEY DISEASE) STAGE 3, GFR 30-59 ML/MIN: ICD-10-CM

## 2019-01-09 DIAGNOSIS — M54.81 OCCIPITAL NEURALGIA OF LEFT SIDE: ICD-10-CM

## 2019-01-09 PROCEDURE — 72052 XR CERVICAL SPINE 5 VIEW WITH FLEX AND EXT: ICD-10-PCS | Mod: 26,,, | Performed by: RADIOLOGY

## 2019-01-09 PROCEDURE — 3078F PR MOST RECENT DIASTOLIC BLOOD PRESSURE < 80 MM HG: ICD-10-PCS | Mod: CPTII,S$GLB,, | Performed by: INTERNAL MEDICINE

## 2019-01-09 PROCEDURE — 1101F PR PT FALLS ASSESS DOC 0-1 FALLS W/OUT INJ PAST YR: ICD-10-PCS | Mod: CPTII,S$GLB,, | Performed by: INTERNAL MEDICINE

## 2019-01-09 PROCEDURE — 3077F SYST BP >= 140 MM HG: CPT | Mod: CPTII,S$GLB,, | Performed by: INTERNAL MEDICINE

## 2019-01-09 PROCEDURE — 99214 OFFICE O/P EST MOD 30 MIN: CPT | Mod: S$GLB,,, | Performed by: INTERNAL MEDICINE

## 2019-01-09 PROCEDURE — 1101F PT FALLS ASSESS-DOCD LE1/YR: CPT | Mod: CPTII,S$GLB,, | Performed by: INTERNAL MEDICINE

## 2019-01-09 PROCEDURE — 3077F PR MOST RECENT SYSTOLIC BLOOD PRESSURE >= 140 MM HG: ICD-10-PCS | Mod: CPTII,S$GLB,, | Performed by: INTERNAL MEDICINE

## 2019-01-09 PROCEDURE — 99214 PR OFFICE/OUTPT VISIT, EST, LEVL IV, 30-39 MIN: ICD-10-PCS | Mod: S$GLB,,, | Performed by: INTERNAL MEDICINE

## 2019-01-09 PROCEDURE — 72052 X-RAY EXAM NECK SPINE 6/>VWS: CPT | Mod: TC,FY,PO

## 2019-01-09 PROCEDURE — 99999 PR PBB SHADOW E&M-EST. PATIENT-LVL III: CPT | Mod: PBBFAC,,, | Performed by: INTERNAL MEDICINE

## 2019-01-09 PROCEDURE — 99999 PR PBB SHADOW E&M-EST. PATIENT-LVL III: ICD-10-PCS | Mod: PBBFAC,,, | Performed by: INTERNAL MEDICINE

## 2019-01-09 PROCEDURE — 72052 X-RAY EXAM NECK SPINE 6/>VWS: CPT | Mod: 26,,, | Performed by: RADIOLOGY

## 2019-01-09 PROCEDURE — 93280 PM DEVICE PROGR EVAL DUAL: CPT | Mod: S$GLB,,, | Performed by: INTERNAL MEDICINE

## 2019-01-09 PROCEDURE — 3078F DIAST BP <80 MM HG: CPT | Mod: CPTII,S$GLB,, | Performed by: INTERNAL MEDICINE

## 2019-01-09 PROCEDURE — 93280 CARDIAC DEVICE CHECK - IN CLINIC: ICD-10-PCS | Mod: S$GLB,,, | Performed by: INTERNAL MEDICINE

## 2019-01-09 RX ORDER — FOLIC ACID 1 MG/1
2 TABLET ORAL DAILY
Qty: 180 TABLET | Refills: 3 | Status: SHIPPED | OUTPATIENT
Start: 2019-01-09 | End: 2020-01-31 | Stop reason: SDUPTHER

## 2019-01-09 RX ORDER — METHOTREXATE 2.5 MG/1
12.5 TABLET ORAL
Qty: 60 TABLET | Refills: 3 | Status: SHIPPED | OUTPATIENT
Start: 2019-01-09 | End: 2020-01-17 | Stop reason: SDUPTHER

## 2019-01-09 NOTE — TELEPHONE ENCOUNTER
----- Message from Yareli Latif sent at 1/9/2019 11:05 AM CST -----  Contact: [phone]  Patient is requesting a call back from the nurse in regards to getting neck injections.   Please call the patient upon request at phone number [phone].

## 2019-01-09 NOTE — PROGRESS NOTES
AS Subjective:          Chief Complaint: Juanis Mclaughlin is a 78 y.o. female who had concerns including Follow-up.    HPI:      Rheumatoid Arthritis   79y/o  female with h/o HTN, HLD was diagnosed with sero +  Rheumatoid arthritis in 1998. Off prednisone now. Has also received HCQ in the past no improvement.     Presently on MTX 4 weekly and Enbrel 50mg weekly    decreased MTX to 4 tabs (due to transaminitis)  But having flare in hands of arthritis. + stiffness > 1 hr.   Pain in the DIP and PIP joints.     PPM for bradycardia.     Past 12 months slow decline in renal function from prior year- on PO bisphos and ACEI/HCTZ can switch this to Prolia.     Cervical spasm left sided with headache present >3 months nearly constants. No numbness or tingling in hands/arms no weakness. Tried IR, stretching, heat little benefit.      No ASE of stomatitis,  infections or injection site reaction.   She was also diagnosed with secondary Sjogren's. Currently on restasis.    She denies fever, weight loss, photosensitivity, rash, ulcer, raynaud's phenomenon, alopecia, dysphagia, diarrhea or blood in the stools.    + hx of GERD. On PPI. Hx of mild MR. No pedal edema.     Doing well with Dr. Madison with Breo and Gabapentin to relax cough impulse.     REVIEW OF SYSTEMS:    Review of Systems   Constitutional: Negative for fever, malaise/fatigue and weight loss.   HENT: Negative for sore throat.    Eyes: Negative for double vision, photophobia and redness.   Respiratory: Positive for cough and sputum production. Negative for hemoptysis, shortness of breath and wheezing.    Cardiovascular: Negative for chest pain, palpitations and orthopnea.   Gastrointestinal: Negative for abdominal pain, constipation and diarrhea.   Genitourinary: Negative for dysuria, hematuria and urgency.   Musculoskeletal: Positive for joint pain. Negative for back pain and myalgias.   Skin: Negative for rash.   Neurological: Negative for dizziness,  tingling, focal weakness and headaches.   Endo/Heme/Allergies: Does not bruise/bleed easily.   Psychiatric/Behavioral: Negative for depression, hallucinations and suicidal ideas.                Objective:            Past Medical History:   Diagnosis Date    Arthritis     rheumatoid arthritis    Cardiomyopathy     Diverticulosis     DVT (deep venous thrombosis)     one time 5 years ago    GERD (gastroesophageal reflux disease)     Glaucoma     sees Dr. Gillette    Hyperlipidemia     Hypertension     Iron deficiency     Osteoporosis 7/9/2014    Pacemaker 10/26/2016    left chest PACEMAKER [employer] [address] [id number] Bonifacio Fuller 9/9/2016 - current   right atrium LEAD [employer] [address] [id number] Bonifacio Fuller. 9/9/2016 - current   right ventricle LEAD [employer] [address] [id number] Bonifacio Fuller 9/9/2016 - current      PONV (postoperative nausea and vomiting)     Rheumatoid arthritis involving multiple sites with positive rheumatoid factor 11/24/2013    Vitamin B12 deficiency 2/28/2015     Family History   Problem Relation Age of Onset    Heart disease Mother         CHF    Hypertension Mother     Heart disease Father     Stroke Father     Heart disease Brother     Stroke Brother     Heart disease Brother     Scleroderma Brother     Ovarian cancer Maternal Aunt     Breast cancer Neg Hx      Social History     Tobacco Use    Smoking status: Never Smoker    Smokeless tobacco: Never Used   Substance Use Topics    Alcohol use: Yes     Alcohol/week: 0.5 oz     Types: 1 Standard drinks or equivalent per week     Comment: occ social    Drug use: No         Current Outpatient Medications on File Prior to Visit   Medication Sig Dispense Refill    aspirin (ECOTRIN) 81 MG EC tablet Take 81 mg by mouth once daily.      atorvastatin (LIPITOR) 20 MG tablet Take 1 tablet (20 mg total) by mouth every evening. 90 tablet 1     B-complex with vitamin C (Z-BEC OR EQUIV) tablet Take 1 tablet by mouth once daily.      brimonidine-timolol (COMBIGAN) 0.2-0.5 % Drop Place 1 drop into both eyes 2 (two) times daily.      calcium-vitamin D3 500 mg(1,250mg) -200 unit per tablet Take 1 tablet by mouth once daily.      DIETARY SUPPLEMENT ORAL Take by mouth.      DYMISTA 137-50 mcg/spray Spry nassal spray INHALE 1 SPRAY BY NASAL ROUTE 2 TIMES A DAY as needed  4    estradiol (ESTRACE) 0.01 % (0.1 mg/gram) vaginal cream PLACE 1 GRAM VAGINALLY ONCEDAILY 127.5 g 2    etanercept (ENBREL SURECLICK) 50 mg/mL (0.98 mL) PnIj INJECT THE CONTENTS OF ONE SURECLICK (50 MG) SUBCUTANEOUSLY Once per week AS DIRECTED BY YOUR PHYSICIAN. SINGLE - USE DEVICE. KEEP REF 12 Syringe 4    folic acid (FOLVITE) 1 MG tablet Take 2 tablets (2 mg total) by mouth once daily. 180 tablet 3    losartan-hydrochlorothiazide 50-12.5 mg (HYZAAR) 50-12.5 mg per tablet Take 1 tablet by mouth once daily. 90 tablet 1    magnesium 200 mg Tab Take 400 mg by mouth once daily. Magnesium 400mg with Chelated Zinc 15mg.      methotrexate 2.5 MG Tab Take 4 tablets (10 mg total) by mouth every 7 days. 48 tablet 3    pantoprazole (PROTONIX) 40 MG tablet Take 1 tablet (40 mg total) by mouth once daily. 90 tablet 2    potassium gluconate 595 (99) mg Tab Take 1 capsule by mouth once daily.      ranitidine (ZANTAC) 150 MG tablet Take 150 mg by mouth nightly.      risedronate (ACTONEL) 35 MG tablet Take 1 tablet (35 mg total) by mouth every 7 days. 12 tablet 3    cycloSPORINE (RESTASIS) 0.05 % ophthalmic emulsion Place 1 drop into both eyes 2 (two) times daily.      diclofenac sodium (VOLTAREN) 1 % Gel Apply 2 g topically 2 (two) times daily as needed. 200 g 3    hydrocodone-acetaminophen 7.5-325mg (NORCO) 7.5-325 mg per tablet Take 1 tablet by mouth every 6 (six) hours as needed for Pain. 20 tablet 0    hydrocortisone 2.5 % ointment MIX WITH AQUAPHOR OR VASELINE AND APPLY TO AFFECTED  AREA as needed  1     No current facility-administered medications on file prior to visit.        Vitals:    01/09/19 0759   BP: (!) 142/76   Pulse: 60       Physical Exam:    Physical Exam   Constitutional: She appears well-developed and well-nourished.   HENT:   Head:       Nose: No septal deviation.   Mouth/Throat: Mucous membranes are normal. No oral lesions.   Eyes: Pupils are equal, round, and reactive to light. Right conjunctiva is not injected. Left conjunctiva is not injected.   Neck: No JVD present. No thyroid mass and no thyromegaly present.   Cardiovascular: Normal rate, regular rhythm and normal pulses.   No edema   Pulmonary/Chest: Effort normal. She has wheezes in the right middle field and the left middle field.   Abdominal: Soft. Normal appearance. There is no hepatosplenomegaly.   Musculoskeletal:        Right shoulder: She exhibits normal range of motion, no tenderness and no swelling.        Left shoulder: She exhibits normal range of motion, no tenderness and no swelling.        Right elbow: She exhibits normal range of motion and no swelling. No tenderness found.        Left elbow: She exhibits normal range of motion and no swelling. No tenderness found.        Right wrist: She exhibits decreased range of motion and tenderness. She exhibits no swelling.        Left wrist: She exhibits normal range of motion, no tenderness and no swelling.        Right hip: She exhibits normal range of motion, normal strength and no swelling.        Left hip: She exhibits normal range of motion, no tenderness and no swelling.        Right knee: She exhibits normal range of motion and no swelling. No tenderness found.        Left knee: She exhibits normal range of motion and no swelling. No tenderness found.        Right ankle: She exhibits normal range of motion and no swelling. No tenderness.        Left ankle: She exhibits normal range of motion and no swelling. No tenderness.        Cervical back: She  exhibits decreased range of motion.        Right hand: She exhibits decreased range of motion and tenderness.        Left hand: She exhibits tenderness.   1+ synovitis MCP and PIP with heberden nodes all DIP 2-5. Left wrist with tenderness and swelling about the TFCC. No nodules. No flexion contractures.      Cervical with left sided point tenderness and the occiput (nuchal ridge) lesser tenderness at the cervical paraspinals. Decreased ROM in cervical spine   Lymphadenopathy:     She has no cervical adenopathy.     She has no axillary adenopathy.   Neurological: She has normal strength and normal reflexes.   Skin: Skin is dry and intact.   Psychiatric: She has a normal mood and affect.             Assessment:       Encounter Diagnosis   Name Primary?    Vitamin B12 deficiency           Plan:        Rheumatoid arthritis involving multiple sites with positive rheumatoid factor    Vitamin B12 deficiency  -     folic acid (FOLVITE) 1 MG tablet; Take 2 tablets (2 mg total) by mouth once daily.  Dispense: 180 tablet; Refill: 3    Spondylosis of cervical region without myelopathy or radiculopathy  -     folic acid (FOLVITE) 1 MG tablet; Take 2 tablets (2 mg total) by mouth once daily.  Dispense: 180 tablet; Refill: 3  -     X-Ray Cervical Spine 5 View W Flex Extxt; Future; Expected date: 01/09/2019    Occipital neuralgia of left side  -     Ambulatory consult to Pain Clinic    Age-related osteoporosis without current pathological fracture  -     denosumab (PROLIA) injection 60 mg    Gastroesophageal reflux disease with esophagitis  -     denosumab (PROLIA) injection 60 mg    CKD (chronic kidney disease), stage III    CKD (chronic kidney disease) stage 3, GFR 30-59 ml/min  -     denosumab (PROLIA) injection 60 mg    Other orders  -     methotrexate 2.5 MG Tab; Take 5 tablets (12.5 mg total) by mouth every 7 days.  Dispense: 60 tablet; Refill: 3  -     Cancel: denosumab (PROLIA) injection 60 mg    RA:    -MTX iincrease  back to 5 tabs weekly- 90 day supply   -continue folic acid 2mg dialy   -Continue Enbrel needs 90 day   -voltaren gel PRN fingers/hands-using sparingly   -Labs q 3 months.   -DC Risedronate and start Prolia.     Neck Pain:    -update imaging today   -may benefit with TPI vs occipital block bulk of pain at the left nuchal ridge. Spoke with Dr. Blanco who was able to see her this Friday. (much appreciated)    Osteoporosis   DC actonel   -send auth to start Prolia.       Follow-up in about 4 months (around 5/9/2019).          30min consultation with greater than 50% spent in counseling, chart review and coordination of care. All questions answered.

## 2019-01-09 NOTE — PATIENT INSTRUCTIONS
Salon Pas lidocaine cream and patches  Voltaren  And if not better we can have Neurology inject it for Occipital block

## 2019-01-09 NOTE — TELEPHONE ENCOUNTER
Patient had to go to another appt this AM   Please set up f/u in 4 months   Labs for MTX every 3 months    Thanks   Dr. KILGOER

## 2019-01-11 ENCOUNTER — OFFICE VISIT (OUTPATIENT)
Dept: PAIN MEDICINE | Facility: CLINIC | Age: 79
End: 2019-01-11
Payer: COMMERCIAL

## 2019-01-11 VITALS
BODY MASS INDEX: 29.97 KG/M2 | SYSTOLIC BLOOD PRESSURE: 161 MMHG | HEART RATE: 60 BPM | HEIGHT: 61 IN | DIASTOLIC BLOOD PRESSURE: 79 MMHG | WEIGHT: 158.75 LBS

## 2019-01-11 DIAGNOSIS — M54.81 OCCIPITAL NEURALGIA OF LEFT SIDE: ICD-10-CM

## 2019-01-11 DIAGNOSIS — M47.812 SPONDYLOSIS OF CERVICAL REGION WITHOUT MYELOPATHY OR RADICULOPATHY: Primary | ICD-10-CM

## 2019-01-11 LAB
AV DELAY - LONGEST: 340 MSEC
AV DELAY - SHORTEST: 250 MSEC
IMPEDANCE RA LEAD (NATIVE): 600 OHMS
IMPEDANCE RA LEAD: 627 OHMS
P/R-WAVE RA LEAD (NATIVE): 5 MV
P/R-WAVE RA LEAD: 3.2 MV
PV DELAY - LONGEST: 340 MSEC
PV DELAY - SHORTEST: 250 MSEC
THRESHOLD MS RA LEAD (NATIVE): 0.4 MS
THRESHOLD MS RA LEAD: 0.4 MS
THRESHOLD V RA LEAD (NATIVE): 1 V
THRESHOLD V RA LEAD: 1 V

## 2019-01-11 PROCEDURE — 3077F SYST BP >= 140 MM HG: CPT | Mod: CPTII,S$GLB,, | Performed by: ANESTHESIOLOGY

## 2019-01-11 PROCEDURE — 3077F PR MOST RECENT SYSTOLIC BLOOD PRESSURE >= 140 MM HG: ICD-10-PCS | Mod: CPTII,S$GLB,, | Performed by: ANESTHESIOLOGY

## 2019-01-11 PROCEDURE — 64405 NJX AA&/STRD GR OCPL NRV: CPT | Mod: LT,S$GLB,, | Performed by: ANESTHESIOLOGY

## 2019-01-11 PROCEDURE — 99204 OFFICE O/P NEW MOD 45 MIN: CPT | Mod: 25,S$GLB,, | Performed by: ANESTHESIOLOGY

## 2019-01-11 PROCEDURE — 99999 PR PBB SHADOW E&M-EST. PATIENT-LVL III: CPT | Mod: PBBFAC,,, | Performed by: ANESTHESIOLOGY

## 2019-01-11 PROCEDURE — 1101F PR PT FALLS ASSESS DOC 0-1 FALLS W/OUT INJ PAST YR: ICD-10-PCS | Mod: CPTII,S$GLB,, | Performed by: ANESTHESIOLOGY

## 2019-01-11 PROCEDURE — 99204 PR OFFICE/OUTPT VISIT, NEW, LEVL IV, 45-59 MIN: ICD-10-PCS | Mod: 25,S$GLB,, | Performed by: ANESTHESIOLOGY

## 2019-01-11 PROCEDURE — 3078F PR MOST RECENT DIASTOLIC BLOOD PRESSURE < 80 MM HG: ICD-10-PCS | Mod: CPTII,S$GLB,, | Performed by: ANESTHESIOLOGY

## 2019-01-11 PROCEDURE — 3078F DIAST BP <80 MM HG: CPT | Mod: CPTII,S$GLB,, | Performed by: ANESTHESIOLOGY

## 2019-01-11 PROCEDURE — 1101F PT FALLS ASSESS-DOCD LE1/YR: CPT | Mod: CPTII,S$GLB,, | Performed by: ANESTHESIOLOGY

## 2019-01-11 PROCEDURE — 64405 PR NERVE BLOCK INJ, ANES/STEROID, OCCIPITAL: ICD-10-PCS | Mod: LT,S$GLB,, | Performed by: ANESTHESIOLOGY

## 2019-01-11 PROCEDURE — 99999 PR PBB SHADOW E&M-EST. PATIENT-LVL III: ICD-10-PCS | Mod: PBBFAC,,, | Performed by: ANESTHESIOLOGY

## 2019-01-11 RX ORDER — METHYLPREDNISOLONE ACETATE 40 MG/ML
40 INJECTION, SUSPENSION INTRA-ARTICULAR; INTRALESIONAL; INTRAMUSCULAR; SOFT TISSUE
Status: COMPLETED | OUTPATIENT
Start: 2019-01-11 | End: 2019-01-11

## 2019-01-11 RX ADMIN — METHYLPREDNISOLONE ACETATE 40 MG: 40 INJECTION, SUSPENSION INTRA-ARTICULAR; INTRALESIONAL; INTRAMUSCULAR; SOFT TISSUE at 01:01

## 2019-01-11 NOTE — LETTER
January 11, 2019      Christina Cruz, DO  [address] [facility] [address] [place] [address]           [facility]  [address] [facility] [address] [place] [address]  Phone: [phone]  Fax: [phone]          Patient: Juanis Mclaughlin   MR Number: [id number]   YOB: 1940   Date of Visit: 1/11/2019       Dear Dr. Christina Cruz:    Thank you for referring Juanis Mclaughlin to me for evaluation. Attached you will find relevant portions of my assessment and plan of care.    If you have questions, please do not hesitate to call me. I look forward to following Juanis Mclaughlin along with you.    Sincerely,    Wes Blanco MD    Enclosure  CC:  No Recipients    If you would like to receive this communication electronically, please contact [email or web address] or [phone] to request more information on [employer] access.    For providers and/or their staff who would like to refer a patient to [facility], please contact us through our one-stop-shop provider referral line, [facility], at [phone].    If you feel you have received this communication in error or would no longer like to receive these types of communications, please e-mail [email or web address]

## 2019-01-11 NOTE — PROGRESS NOTES
[facility] New Patient Evaluation    Referred by: Dr. Cruz  Reason for referral: neck pain    CC:   Chief Complaint   Patient presents with    Neck Pain     left side    Headache      Last 3 PDI Scores 1/11/2019   Pain Disability Index (PDI) 41       HPI:   Juanis Mclaughlin is a 78 y.o. female who complains of neck pain     Onset: about 3 months  Progression: since onset, pain is gradually worsening  Current Pain Score: 8/10  Typical Range: 5-8/10  Timing: constant  Quality: Sharp  Radiation: no  Associated numbness or weakness: no numbness, no weakness   Exacerbated by: neck extension  Allievated by: nothing  Is Pain Level Acceptable?: No    History:    Current Outpatient Medications:     aspirin (ECOTRIN) 81 MG EC tablet, Take 81 mg by mouth once daily., Disp: , Rfl:     atorvastatin (LIPITOR) 20 MG tablet, Take 1 tablet (20 mg total) by mouth every evening., Disp: 90 tablet, Rfl: 1    B-complex with vitamin C (Z-BEC OR EQUIV) tablet, Take 1 tablet by mouth once daily., Disp: , Rfl:     brimonidine-timolol (COMBIGAN) 0.2-0.5 % Drop, Place 1 drop into both eyes 2 (two) times daily., Disp: , Rfl:     calcium-vitamin D3 500 mg(1,250mg) -200 unit per tablet, Take 1 tablet by mouth once daily., Disp: , Rfl:     cycloSPORINE (RESTASIS) 0.05 % ophthalmic emulsion, Place 1 drop into both eyes 2 (two) times daily., Disp: , Rfl:     DIETARY SUPPLEMENT ORAL, Take by mouth., Disp: , Rfl:     DYMISTA 137-50 mcg/spray [place] nassal spray, INHALE 1 SPRAY BY NASAL ROUTE 2 TIMES A DAY as needed, Disp: , Rfl: 4    estradiol (ESTRACE) 0.01 % (0.1 mg/gram) vaginal cream, PLACE 1 GRAM VAGINALLY ONCEDAILY, Disp: 127.5 g, Rfl: 2    etanercept (ENBREL SURECLICK) 50 mg/mL (0.98 mL) PnIj, INJECT THE CONTENTS OF ONE SURECLICK (50 MG) SUBCUTANEOUSLY Once per week AS DIRECTED BY YOUR PHYSICIAN. SINGLE - USE DEVICE. KEEP REF, Disp: 12 Syringe, Rfl: 4    folic acid (FOLVITE) 1 MG tablet, Take 2 tablets (2 mg total) by  mouth once daily., Disp: 180 tablet, Rfl: 3    hydrocodone-acetaminophen 7.5-325mg (NORCO) 7.5-325 mg per tablet, Take 1 tablet by mouth every 6 (six) hours as needed for Pain., Disp: 20 tablet, Rfl: 0    hydrocortisone 2.5 % ointment, MIX WITH AQUAPHOR OR VASELINE AND APPLY TO AFFECTED AREA as needed, Disp: , Rfl: 1    losartan-hydrochlorothiazide 50-12.5 mg (HYZAAR) 50-12.5 mg per tablet, Take 1 tablet by mouth once daily., Disp: 90 tablet, Rfl: 1    magnesium 200 mg Tab, Take 400 mg by mouth once daily. Magnesium 400mg with Chelated Zinc 15mg., Disp: , Rfl:     methotrexate 2.5 MG Tab, Take 5 tablets (12.5 mg total) by mouth every 7 days., Disp: 60 tablet, Rfl: 3    pantoprazole (PROTONIX) 40 MG tablet, Take 1 tablet (40 mg total) by mouth once daily., Disp: 90 tablet, Rfl: 2    potassium gluconate 595 (99) mg Tab, Take 1 capsule by mouth once daily., Disp: , Rfl:     ranitidine (ZANTAC) 150 MG tablet, Take 150 mg by mouth nightly., Disp: , Rfl:     diclofenac sodium (VOLTAREN) 1 % Gel, Apply 2 g topically 2 (two) times daily as needed., Disp: 200 g, Rfl: 3    Past Medical History:   Diagnosis Date    Arthritis     rheumatoid arthritis    Cardiomyopathy     Diverticulosis     DVT (deep venous thrombosis)     one time 5 years ago    GERD (gastroesophageal reflux disease)     Glaucoma     sees Dr. Gillette    Hyperlipidemia     Hypertension     Iron deficiency     Osteoporosis 7/9/2014    Pacemaker 10/26/2016    left chest PACEMAKER [employer] [address] [employer] [facility]  [id number] Bonifacio Fuller 9/9/2016 - current   right atrium LEAD [employer] [address] [employer] [id number] Bonifacio Fuller 9/9/2016 - current   right ventricle LEAD [employer] [address] [employer] [id number] Bonifacio Fuller 9/9/2016 - current      PONV (postoperative nausea and vomiting)     Rheumatoid arthritis involving multiple sites with positive rheumatoid factor 11/24/2013    Vitamin B12  deficiency 2/28/2015       Past Surgical History:   Procedure Laterality Date    BREAST BIOPSY      COLONOSCOPY  2012    FOOT SURGERY      had neuorma and also required hardware    HYSTERECTOMY      SUJATA with BSO    INCONTINENCE SURGERY      INSERTION-PACEMAKER N/A 9/9/2016    Performed [facility]    KNEE ARTHROSCOPY W/ DEBRIDEMENT      pace maker  09/2016       Family History   Problem Relation Age of Onset    Heart disease Mother         CHF    Hypertension Mother     Heart disease Father     Stroke Father     Heart disease Brother     Stroke Brother     Heart disease Brother     Scleroderma Brother     Ovarian cancer Maternal Aunt     Breast cancer Neg Hx        Social History     Socioeconomic History    Marital status:      Spouse name: None    Number of children: 3    Years of education: None    Highest education level: None   Social Needs    Financial resource strain: None    Food insecurity - worry: None    Food insecurity - inability: None    Transportation needs - medical: None    Transportation needs - non-medical: None   Occupational History    None   Tobacco Use    Smoking status: Never Smoker    Smokeless tobacco: Never Used   Substance and Sexual Activity    Alcohol use: Yes     Alcohol/week: 0.5 oz     Types: 1 Standard drinks or equivalent per week     Comment: occ social    Drug use: No    Sexual activity: No     Birth control/protection: See Surgical Hx   Other Topics Concern    None   Social History Narrative    None       Review of patient's allergies indicates:   Allergen Reactions    Ace inhibitors      cough       Review of Systems:  General ROS: negative for - fever  Psychological ROS: negative for - irritability  Hematological and Lymphatic ROS: negative for - bleeding problems  Endocrine ROS: negative for - unexpected weight changes  Respiratory ROS: no cough, shortness of breath, or wheezing  Cardiovascular ROS: no  "chest pain or dyspnea on exertion  Gastrointestinal ROS: no abdominal pain, change in bowel habits, or black or bloody stools  Musculoskeletal ROS: negative for - gait disturbance or muscular weakness  Neurological ROS: negative for - bowel and bladder control changes or numbness/tingling  Dermatological ROS: negative for rash    Physical Exam:  Vitals:    01/11/19 1023   BP: (!) 161/79   Pulse: 60   Weight: 72 kg (158 lb 11.7 oz)   Height: 5' 1" (1.549 m)   PainSc:   8   PainLoc: Neck     Body mass index is 29.99 kg/m².     Gen: NAD  Psych: mood appropriate for given condition  CV: RRR  HEENT: anicteric   Respiratory: non labored  Abd: soft nt, nd  Skin: intact  Sensation: intact to lt touch bilaterally in c4-t1   Reflexes: 2+ b/l Bicep, tricep, BR and patella Topete negative  ROM: Cervical ROM full, shoulder, elbow and wrist ROM full, no scapular dysmotility   Tone:  Normal at elbow, wrist and shoulder   Inspection: no atrophy of bicep, FDI or APB noted, no scapular winging  Special tests: + axial facet loading on the left, Spurling's negative  Palpation: tender cervical paraspinals, levator scapula and trapezius non tender bicipital tendon    Motor:    Right Left   C4 Shoulder Abduction  5  5   C5 Elbow Flexion    5  5   C6 Wrist Extension  5  5   C7 Elbow Extension   5  5   C8/T1 Hand Intrinsics   5  5   C8 First Dorsal Interosseus  5  5   C8 Abductor Pollicus Brevis  5  5       Imaging:  Xray cervical spine 1/9/19  FINDINGS:  There is multilevel degenerative disc and facet disease.  There is no fracture.  There is trace retrolisthesis of C3 on C4.  There is trace anterolisthesis of C4 on C5, C5 on C6, C7 on T1.  This is unchanged with flexion or extension.  There is multilevel facet joint arthropathy.  There is mild disc space narrowing at the C5-6 level, moderate disc space narrowing at the C3-4 and C4-5 levels with marked disc space narrowing at the C6-7 level.  There is at least mild multilevel foraminal " stenosis.  The prevertebral soft tissues are normal.  The airway is patent.    Labs:  BMP  Lab Results   Component Value Date     10/19/2018    K 4.5 10/19/2018     10/19/2018    CO2 30 (H) 10/19/2018    BUN 24 (H) 10/19/2018    CREATININE 1.2 10/19/2018    CALCIUM 10.1 10/19/2018    ANIONGAP 4 (L) 10/19/2018    ESTGFRAFRICA 49.7 (A) 10/19/2018    EGFRNONAA 43.1 (A) 10/19/2018     Lab Results   Component Value Date    ALT 9 (L) 10/19/2018    AST 26 10/19/2018    ALKPHOS 32 (L) 10/19/2018    BILITOT 0.8 10/19/2018       Assessment:  Problem List Items Addressed This Visit        Neuro    Spondylosis of cervical region without myelopathy or radiculopathy - Primary       Orthopedic    Occipital neuralgia of left side          Treatment Plan:  78 y.o. year old female with PMH HTN, CKD, GERD, h/o bradycardia s/p pacemaker presents to the office today with left sided axial neck pain.  Today her pain is 8/10, constant, sharp, radiating to her left occiput.  Her pain is worse with neck extension and not relieved by anything.  She does not have any radicular pain or radiculopathy.  She does not currently take NSAIDs and recommend to continue to avoid them given Cr 1.2 and decreased GFR on labs 10/19/18.  Cervical xray 1/9/19 consistent with multilevel facet joint arthropathy.  Her left sided neck and occipital pain is likely a combination of pain from her cervical spondylosis and occipital neuralgia.  Will perform left greater and lesser occipital nerve block in the office today.  Procedure explained using an anatomical model.  Risks, benefits, alternatives explained to patient who verbalized understanding, including increased risk of infection, bleeding, and nerve damage.  Questions regarding the procedure, risks, expected outcome, and possible side effects were solicited and answered to the patient's satisfaction.  Juanis wishes to proceed with the injection.  Verbal and written consent were obtained in clinic  today.  I would like Mrs. Mclaughlin to see how well the occipital nerve block alleviates her pain and she should follow up with me in the office in 4-6 weeks.  In the future we can consider left cervical medial branch blocks if her pain is incompletely resolved by the occipital nerve block.    Procedures: left greater and lesser occipital nerve block  PT/OT/HEP: none at this time  Medications: avoid NSAIDs 2/2 CKD  Labs: Reviewed and medications are appropriately dosed for current hepatorenal function.  Imaging: No additional recommended at this time.    : Not applicable    Wes Blanco M.D.  Interventional Pain Medicine / Anesthesiology    Procedure Note    This is a pain clinic procedure note.    Procedure: Left Occipital Nerve Block with Steroid  Procedure Date: 01/11/2019  Subjective: Left Occipital Tenderness is present  Preoperative Diagnosis: Occipital Neuralgia  Post Procedure Diagnosis: Occipital Neuralgia  Complications: None  Anesthetic: Bupivacaine 0.5% 1 mL + lidocaine 1% 1 mL + Kenalog 40 mg 1 mL (divided evenly between greater and lessor occipital nerve)    Procedure details: The inion and left mastoid process were palpated.  Two points 1/3 and 2/3 of the way along the occipital ridge between these points were identified.  These points were associated with tenderness.  A 27 G needle connected the a syringe containing the injectate was advanced through the skin after cleaning with chloroprep. Contact with os was made.  Following heme-negative aspiration, 1.5 mL of the injectate was administered at each location incrementally without difficulty or pain.    Total injections: 2    Dispo: no complications, pt tolerated the procedure well.

## 2019-01-23 ENCOUNTER — TELEPHONE (OUTPATIENT)
Dept: RHEUMATOLOGY | Facility: CLINIC | Age: 79
End: 2019-01-23

## 2019-01-23 NOTE — TELEPHONE ENCOUNTER
I want to get prolia approved for her.  I put orders in for Prolia- will need to check with infusion on this   She can  stop actonel    Dr. KILGORE

## 2019-01-23 NOTE — TELEPHONE ENCOUNTER
----- Message from Shyla Larios sent at 1/23/2019  9:11 AM CST -----   Type:  RX Refill Request    Who Called:  pt  Refill   RX Name and Strength:   Risedronate  Sodium tab 35 mg  How is the patient currently taking it? (ex. 1XDay): 1  Time a week  Is this a 30 day or 90 day RX:  90  day  Preferred Pharmacy with phone number:    [facility] - [place] - [address] AT [address] [place] [address]  [facility] [address]  Phone: [phone] Fax: [phone]  [facility] Call Back Number:  [phone]  Additional Information:    Pt   Would  Like to  Be  Called about  Taking  Injection  instead

## 2019-01-23 NOTE — TELEPHONE ENCOUNTER
Pt was prescribed Actonel, but it has been discontinued. Pt states that at last appt starting prolia for her osteoporosis was discussed. Pt is wondering if she is going to star that or continue with the Actonel. If she is to continue the oral medication , she is requesting a refill be sent to her preferred pharmacy.

## 2019-01-24 NOTE — TELEPHONE ENCOUNTER
Spoke to the patient and explained that the infusion department will get Prolia approved and contact her with an appointment. She is aware to stop Actonel. RENE

## 2019-02-12 ENCOUNTER — LAB VISIT (OUTPATIENT)
Dept: LAB | Facility: HOSPITAL | Age: 79
End: 2019-02-12
Attending: INTERNAL MEDICINE
Payer: COMMERCIAL

## 2019-02-12 DIAGNOSIS — M17.12 PRIMARY OSTEOARTHRITIS OF LEFT KNEE: ICD-10-CM

## 2019-02-12 DIAGNOSIS — M05.79 RHEUMATOID ARTHRITIS INVOLVING MULTIPLE SITES WITH POSITIVE RHEUMATOID FACTOR: ICD-10-CM

## 2019-02-12 LAB
ALBUMIN SERPL BCP-MCNC: 3.8 G/DL
ALP SERPL-CCNC: 46 U/L
ALT SERPL W/O P-5'-P-CCNC: 11 U/L
ANION GAP SERPL CALC-SCNC: 8 MMOL/L
AST SERPL-CCNC: 30 U/L
BASOPHILS # BLD AUTO: 0.03 K/UL
BASOPHILS NFR BLD: 0.3 %
BILIRUB SERPL-MCNC: 0.8 MG/DL
BUN SERPL-MCNC: 17 MG/DL
CALCIUM SERPL-MCNC: 10.2 MG/DL
CHLORIDE SERPL-SCNC: 102 MMOL/L
CO2 SERPL-SCNC: 28 MMOL/L
CREAT SERPL-MCNC: 1.1 MG/DL
CRP SERPL-MCNC: 2.6 MG/L
DIFFERENTIAL METHOD: ABNORMAL
EOSINOPHIL # BLD AUTO: 0.1 K/UL
EOSINOPHIL NFR BLD: 0.6 %
ERYTHROCYTE [DISTWIDTH] IN BLOOD BY AUTOMATED COUNT: 13.6 %
ERYTHROCYTE [SEDIMENTATION RATE] IN BLOOD BY WESTERGREN METHOD: 7 MM/HR
EST. GFR  (AFRICAN AMERICAN): 55.2 ML/MIN/1.73 M^2
EST. GFR  (NON AFRICAN AMERICAN): 47.9 ML/MIN/1.73 M^2
GLUCOSE SERPL-MCNC: 131 MG/DL
HCT VFR BLD AUTO: 41.6 %
HGB BLD-MCNC: 13.9 G/DL
IMM GRANULOCYTES # BLD AUTO: 0.03 K/UL
IMM GRANULOCYTES NFR BLD AUTO: 0.3 %
LYMPHOCYTES # BLD AUTO: 1.1 K/UL
LYMPHOCYTES NFR BLD: 11.4 %
MCH RBC QN AUTO: 34.1 PG
MCHC RBC AUTO-ENTMCNC: 33.4 G/DL
MCV RBC AUTO: 102 FL
MONOCYTES # BLD AUTO: 0.6 K/UL
MONOCYTES NFR BLD: 6.3 %
NEUTROPHILS # BLD AUTO: 8 K/UL
NEUTROPHILS NFR BLD: 81.1 %
NRBC BLD-RTO: 0 /100 WBC
PLATELET # BLD AUTO: 185 K/UL
PMV BLD AUTO: 12.3 FL
POTASSIUM SERPL-SCNC: 4.2 MMOL/L
PROT SERPL-MCNC: 7.2 G/DL
RBC # BLD AUTO: 4.08 M/UL
SODIUM SERPL-SCNC: 138 MMOL/L
WBC # BLD AUTO: 9.86 K/UL

## 2019-02-12 PROCEDURE — 36415 COLL VENOUS BLD VENIPUNCTURE: CPT | Mod: PO

## 2019-02-12 PROCEDURE — 85651 RBC SED RATE NONAUTOMATED: CPT | Mod: PO

## 2019-02-12 PROCEDURE — 86140 C-REACTIVE PROTEIN: CPT

## 2019-02-12 PROCEDURE — 80053 COMPREHEN METABOLIC PANEL: CPT

## 2019-02-12 PROCEDURE — 85025 COMPLETE CBC W/AUTO DIFF WBC: CPT

## 2019-02-13 ENCOUNTER — INFUSION (OUTPATIENT)
Dept: INFUSION THERAPY | Facility: HOSPITAL | Age: 79
End: 2019-02-13
Attending: INTERNAL MEDICINE
Payer: COMMERCIAL

## 2019-02-13 DIAGNOSIS — M81.0 AGE-RELATED OSTEOPOROSIS WITHOUT CURRENT PATHOLOGICAL FRACTURE: Primary | ICD-10-CM

## 2019-02-13 PROCEDURE — 63600175 PHARM REV CODE 636 W HCPCS: Mod: JG,PN | Performed by: INTERNAL MEDICINE

## 2019-02-13 PROCEDURE — 96372 THER/PROPH/DIAG INJ SC/IM: CPT | Mod: PN

## 2019-02-13 RX ADMIN — DENOSUMAB 60 MG: 60 INJECTION SUBCUTANEOUS at 09:02

## 2019-04-15 ENCOUNTER — CLINICAL SUPPORT (OUTPATIENT)
Dept: CARDIOLOGY | Facility: CLINIC | Age: 79
End: 2019-04-15
Attending: INTERNAL MEDICINE
Payer: COMMERCIAL

## 2019-04-15 DIAGNOSIS — Z95.0 PACEMAKER: ICD-10-CM

## 2019-04-15 DIAGNOSIS — I49.5 SSS (SICK SINUS SYNDROME): ICD-10-CM

## 2019-04-22 NOTE — TELEPHONE ENCOUNTER
----- Message from Cora Michel sent at 4/22/2019  7:59 AM CDT -----  Contact: Juanis  Type: Needs Medical Advice    Who Called:  patient  Best Call Back Number: [phone]  Additional Information:  Calling as to request an approval letter be sent to [facility] on Rx Enbrel. Thanks!

## 2019-04-25 ENCOUNTER — HOSPITAL ENCOUNTER (OUTPATIENT)
Dept: RADIOLOGY | Facility: HOSPITAL | Age: 79
Discharge: HOME | End: 2019-04-25
Attending: SPECIALIST
Payer: COMMERCIAL

## 2019-04-25 ENCOUNTER — TELEPHONE (OUTPATIENT)
Dept: PHARMACY | Facility: CLINIC | Age: 79
End: 2019-04-25

## 2019-04-25 VITALS — BODY MASS INDEX: 28.89 KG/M2 | WEIGHT: 153 LBS | HEIGHT: 61 IN

## 2019-04-25 DIAGNOSIS — Z12.39 ENCOUNTER FOR SPECIAL SCREENING EXAMINATION FOR NEOPLASM OF BREAST: ICD-10-CM

## 2019-04-25 PROCEDURE — 77067 SCR MAMMO BI INCL CAD: CPT | Mod: TC,PN

## 2019-04-25 PROCEDURE — 77067 MAMMO DIGITAL SCREENING BILAT WITH TOMOSYNTHESIS_CAD: ICD-10-PCS | Mod: 26,,, | Performed by: RADIOLOGY

## 2019-04-25 PROCEDURE — 77063 MAMMO DIGITAL SCREENING BILAT WITH TOMOSYNTHESIS_CAD: ICD-10-PCS | Mod: 26,,, | Performed by: RADIOLOGY

## 2019-04-25 PROCEDURE — 77063 BREAST TOMOSYNTHESIS BI: CPT | Mod: 26,,, | Performed by: RADIOLOGY

## 2019-04-25 PROCEDURE — 77067 SCR MAMMO BI INCL CAD: CPT | Mod: 26,,, | Performed by: RADIOLOGY

## 2019-04-30 NOTE — TELEPHONE ENCOUNTER
DOCUMENTATION ONLY:  Prior authorization for Enbrel Sureclick 50mg/mL approved from 04/30/2019 to 10/26/2020.  Case ID# None      [facility]  [phone] (Phone)  [phone] (Fax)

## 2019-05-03 NOTE — TELEPHONE ENCOUNTER
"----- Message from Ravin Lagunas sent at 5/2/2019  3:08 PM CDT -----  Regarding: Edvin Cruz and staff,    We received the prescription for Enbrel.    FYI: Enbrel Sureclick 50mg/mL prior authorization has been approved through 10/26/2020. Patients insurance requires the patient to fill through [facility].    Please send prescription to [facility].      To complete this in EPIC  The original order MUST be discontinued and re-typed as a new prescription with the updated pharmacy listed.     I have added [facility] to the patients preferred pharmacies.       Uncheck the box that says "send to [facility]" AND Check box with [facility].    #please do not  click reorder -- as it will continue to route the rx to [facility] even if the pharmacy is changed.     Please opt the patient out of [facility] when the BPA is fired.     Please inform us if there is anything further we can do to assist.     Thank you,  Ravin"

## 2019-05-30 PROBLEM — D75.89 MACROCYTOSIS: Status: ACTIVE | Noted: 2019-05-30

## 2019-06-03 ENCOUNTER — OFFICE VISIT (OUTPATIENT)
Dept: RHEUMATOLOGY | Facility: CLINIC | Age: 79
End: 2019-06-03
Payer: COMMERCIAL

## 2019-06-03 VITALS
BODY MASS INDEX: 28.8 KG/M2 | HEART RATE: 60 BPM | WEIGHT: 152.56 LBS | SYSTOLIC BLOOD PRESSURE: 132 MMHG | HEIGHT: 61 IN | DIASTOLIC BLOOD PRESSURE: 80 MMHG

## 2019-06-03 DIAGNOSIS — Z79.60 LONG-TERM USE OF IMMUNOSUPPRESSANT MEDICATION: Primary | ICD-10-CM

## 2019-06-03 DIAGNOSIS — M05.79 RHEUMATOID ARTHRITIS INVOLVING MULTIPLE SITES WITH POSITIVE RHEUMATOID FACTOR: ICD-10-CM

## 2019-06-03 PROCEDURE — 99214 PR OFFICE/OUTPT VISIT, EST, LEVL IV, 30-39 MIN: ICD-10-PCS | Mod: S$GLB,,, | Performed by: INTERNAL MEDICINE

## 2019-06-03 PROCEDURE — 3079F DIAST BP 80-89 MM HG: CPT | Mod: CPTII,S$GLB,, | Performed by: INTERNAL MEDICINE

## 2019-06-03 PROCEDURE — 99999 PR PBB SHADOW E&M-EST. PATIENT-LVL III: ICD-10-PCS | Mod: PBBFAC,,, | Performed by: INTERNAL MEDICINE

## 2019-06-03 PROCEDURE — 3075F SYST BP GE 130 - 139MM HG: CPT | Mod: CPTII,S$GLB,, | Performed by: INTERNAL MEDICINE

## 2019-06-03 PROCEDURE — 1101F PR PT FALLS ASSESS DOC 0-1 FALLS W/OUT INJ PAST YR: ICD-10-PCS | Mod: CPTII,S$GLB,, | Performed by: INTERNAL MEDICINE

## 2019-06-03 PROCEDURE — 1101F PT FALLS ASSESS-DOCD LE1/YR: CPT | Mod: CPTII,S$GLB,, | Performed by: INTERNAL MEDICINE

## 2019-06-03 PROCEDURE — 99999 PR PBB SHADOW E&M-EST. PATIENT-LVL III: CPT | Mod: PBBFAC,,, | Performed by: INTERNAL MEDICINE

## 2019-06-03 PROCEDURE — 3079F PR MOST RECENT DIASTOLIC BLOOD PRESSURE 80-89 MM HG: ICD-10-PCS | Mod: CPTII,S$GLB,, | Performed by: INTERNAL MEDICINE

## 2019-06-03 PROCEDURE — 3075F PR MOST RECENT SYSTOLIC BLOOD PRESS GE 130-139MM HG: ICD-10-PCS | Mod: CPTII,S$GLB,, | Performed by: INTERNAL MEDICINE

## 2019-06-03 PROCEDURE — 99214 OFFICE O/P EST MOD 30 MIN: CPT | Mod: S$GLB,,, | Performed by: INTERNAL MEDICINE

## 2019-06-03 NOTE — PROGRESS NOTES
AS Subjective:          Chief Complaint: Juanis Mclaughlin is a 78 y.o. female who had concerns including Rheumatoid Arthritis (4 month).    HPI:      Rheumatoid Arthritis   77y/o  female with h/o HTN, HLD was diagnosed with sero +  Rheumatoid arthritis in 1998. Off prednisone now. Has also received HCQ in the past no improvement.     Presently on MTX 4 weekly and Enbrel 50mg weekly    decreased MTX to 4 tabs (due to transaminitis) but + joint pain , so back on MTX 5 tabs weekly.     But having flare in hands of arthritis. + stiffness > 1 hr.   Pain in the DIP and PIP joints.     PPM for bradycardia.     Past 12 months slow decline in renal function from prior year- on PO bisphos and ACEI/HCTZ can switch this to Prolia.     Cervical spasm left sided with headache present >3 months nearly constants. No numbness or tingling in hands/arms no weakness. Tried IR, stretching, heat little benefit. \  Did have trigger point injections brief relief. Using infrared therapy now. Holding off on PT>      No ASE of stomatitis,  infections or injection site reaction.   She was also diagnosed with secondary Sjogren's. Currently on restasis.    She denies fever, weight loss, photosensitivity, rash, ulcer, raynaud's phenomenon, alopecia, dysphagia, diarrhea or blood in the stools.    + hx of GERD. On PPI. Hx of mild MR. No pedal edema.     Doing well with Dr. Madison with Breo and Gabapentin to relax cough impulse.     REVIEW OF SYSTEMS:    Review of Systems   Constitutional: Negative for fever, malaise/fatigue and weight loss.   HENT: Negative for sore throat.    Eyes: Negative for double vision, photophobia and redness.   Respiratory: Positive for cough and sputum production. Negative for hemoptysis, shortness of breath and wheezing.    Cardiovascular: Negative for chest pain, palpitations and orthopnea.   Gastrointestinal: Negative for abdominal pain, constipation and diarrhea.   Genitourinary: Negative for dysuria,  hematuria and urgency.   Musculoskeletal: Positive for joint pain. Negative for back pain and myalgias.   Skin: Negative for rash.   Neurological: Negative for dizziness, tingling, focal weakness and headaches.   Endo/Heme/Allergies: Does not bruise/bleed easily.   Psychiatric/Behavioral: Negative for depression, hallucinations and suicidal ideas.                Objective:            Past Medical History:   Diagnosis Date    Arthritis     rheumatoid arthritis    Cardiomyopathy     Diverticulosis     DVT (deep venous thrombosis)     one time 5 years ago    GERD (gastroesophageal reflux disease)     Glaucoma     sees Dr. Gillette    Hyperlipidemia     Hypertension     Iron deficiency     Osteoporosis 7/9/2014    Pacemaker 10/26/2016    left chest PACEMAKER [employer] [address] [facility]  S/N:[id number] Bonifacio Fuller 9/9/2016 - current   right atrium LEAD [employer] [address] [facility] S/N:[id number] Bonifacio Fuller. 9/9/2016 - current   right ventricle LEAD [employer] [address] [facility] S/[id number] Bonifacio Fuller 9/9/2016 - current      PONV (postoperative nausea and vomiting)     Rheumatoid arthritis involving multiple sites with positive rheumatoid factor 11/24/2013    Vitamin B12 deficiency 2/28/2015     Family History   Problem Relation Age of Onset    Heart disease Mother         CHF    Hypertension Mother     Heart disease Father     Stroke Father     Heart disease Brother     Stroke Brother     Heart disease Brother     Scleroderma Brother     Ovarian cancer Maternal Aunt 34    Breast cancer Neg Hx      Social History     Tobacco Use    Smoking status: Never Smoker    Smokeless tobacco: Never Used   Substance Use Topics    Alcohol use: Yes     Alcohol/week: 0.5 oz     Types: 1 Standard drinks or equivalent per week     Comment: occ social    Drug use: No         Current Outpatient Medications on File Prior to Visit   Medication Sig Dispense Refill     aspirin (ECOTRIN) 81 MG EC tablet Take 81 mg by mouth once daily.      atorvastatin (LIPITOR) 20 MG tablet Take 1 tablet (20 mg total) by mouth every evening. 90 tablet 1    B-complex with vitamin C (Z-BEC OR EQUIV) tablet Take 1 tablet by mouth once daily.      brimonidine-timolol (COMBIGAN) 0.2-0.5 % Drop Place 1 drop into both eyes 2 (two) times daily.      calcium-vitamin D3 500 mg(1,250mg) -200 unit per tablet Take 1 tablet by mouth once daily.      diclofenac sodium (VOLTAREN) 1 % Gel Apply 2 g topically 2 (two) times daily as needed. 200 g 3    DIETARY SUPPLEMENT ORAL Take by mouth.      DYMISTA 137-50 mcg/spray Spry nassal spray INHALE 1 SPRAY BY NASAL ROUTE 2 TIMES A DAY as needed  4    estradiol (ESTRACE) 0.01 % (0.1 mg/gram) vaginal cream PLACE 1 GRAM VAGINALLY ONCEDAILY 127.5 g 2    etanercept (ENBREL SURECLICK) 50 mg/mL (0.98 mL) PnIj Inject 50 mg into the skin once a week. 4 Syringe 11    folic acid (FOLVITE) 1 MG tablet Take 2 tablets (2 mg total) by mouth once daily. 180 tablet 3    hydrocortisone 2.5 % ointment MIX WITH AQUAPHOR OR VASELINE AND APPLY TO AFFECTED AREA as needed  1    losartan-hydrochlorothiazide 50-12.5 mg (HYZAAR) 50-12.5 mg per tablet Take 1 tablet by mouth once daily. 90 tablet 1    magnesium 200 mg Tab Take 400 mg by mouth once daily. Magnesium 400mg with Chelated Zinc 15mg.      methotrexate 2.5 MG Tab Take 5 tablets (12.5 mg total) by mouth every 7 days. 60 tablet 3    pantoprazole (PROTONIX) 40 MG tablet Take 1 tablet (40 mg total) by mouth once daily. 90 tablet 2    potassium gluconate 595 (99) mg Tab Take 1 capsule by mouth once daily.      ranitidine (ZANTAC) 150 MG tablet Take 150 mg by mouth nightly.      traMADol (ULTRAM) 50 mg tablet Take 50 mg by mouth every 6 (six) hours as needed for Pain.       No current facility-administered medications on file prior to visit.        Vitals:    06/03/19 1041   BP: 132/80   Pulse: 60       Physical  Exam:    Physical Exam   Constitutional: She appears well-developed and well-nourished.   HENT:   Head:       Nose: No septal deviation.   Mouth/Throat: Mucous membranes are normal. No oral lesions.   Eyes: Pupils are equal, round, and reactive to light. Right conjunctiva is not injected. Left conjunctiva is not injected.   Neck: No JVD present. No thyroid mass and no thyromegaly present.   Cardiovascular: Normal rate, regular rhythm and normal pulses.   No edema   Pulmonary/Chest: Effort normal. She has wheezes in the right middle field and the left middle field.   Abdominal: Soft. Normal appearance. There is no hepatosplenomegaly.   Musculoskeletal:        Right shoulder: She exhibits normal range of motion, no tenderness and no swelling.        Left shoulder: She exhibits normal range of motion, no tenderness and no swelling.        Right elbow: She exhibits normal range of motion and no swelling. No tenderness found.        Left elbow: She exhibits normal range of motion and no swelling. No tenderness found.        Right wrist: She exhibits decreased range of motion and tenderness. She exhibits no swelling.        Left wrist: She exhibits normal range of motion, no tenderness and no swelling.        Right hip: She exhibits normal range of motion, normal strength and no swelling.        Left hip: She exhibits normal range of motion, no tenderness and no swelling.        Right knee: She exhibits normal range of motion and no swelling. No tenderness found.        Left knee: She exhibits normal range of motion and no swelling. No tenderness found.        Right ankle: She exhibits normal range of motion and no swelling. No tenderness.        Left ankle: She exhibits normal range of motion and no swelling. No tenderness.        Cervical back: She exhibits decreased range of motion.        Right hand: She exhibits decreased range of motion and tenderness.        Left hand: She exhibits tenderness.   1+ synovitis MCP  and PIP with heberden nodes all DIP 2-5. Left wrist with tenderness and swelling about the TFCC. No nodules. No flexion contractures.      Cervical with left sided point tenderness and the occiput (nuchal ridge) lesser tenderness at the cervical paraspinals. Decreased ROM in cervical spine   Lymphadenopathy:     She has no cervical adenopathy.     She has no axillary adenopathy.   Neurological: She has normal strength and normal reflexes.   Skin: Skin is dry and intact.   Psychiatric: She has a normal mood and affect.             Assessment:       Encounter Diagnoses   Name Primary?    Long-term use of immunosuppressant medication Yes    Rheumatoid arthritis involving multiple sites with positive rheumatoid factor           Plan:        Long-term use of immunosuppressant medication  -     Hepatitis B surface antibody; Future; Expected date: 06/03/2019  -     Hepatitis B core antibody, total; Future; Expected date: 06/03/2019  -     Quantiferon Gold TB; Future; Expected date: 06/03/2019  -     CBC auto differential; Standing; Expected date: 06/03/2019  -     Comprehensive metabolic panel; Standing; Expected date: 06/03/2019  -     Sedimentation rate; Standing; Expected date: 06/03/2019  -     C-reactive protein; Standing; Expected date: 06/03/2019    Rheumatoid arthritis involving multiple sites with positive rheumatoid factor  -     Hepatitis B surface antibody; Future; Expected date: 06/03/2019  -     Hepatitis B core antibody, total; Future; Expected date: 06/03/2019  -     Quantiferon Gold TB; Future; Expected date: 06/03/2019  -     CBC auto differential; Standing; Expected date: 06/03/2019  -     Comprehensive metabolic panel; Standing; Expected date: 06/03/2019  -     Sedimentation rate; Standing; Expected date: 06/03/2019  -     C-reactive protein; Standing; Expected date: 06/03/2019    RA:    -MTX iincrease back to 5 tabs weekly- 90 day supply   -continue folic acid 2mg dialy   -Continue Enbrel needs 90  day   -voltaren gel PRN fingers/hands-using sparingly   -Labs q 3 months.    Neck Pain:    -update imaging today   -may benefit with TPI vs occipital block bulk of pain at the left nuchal ridge. Spoke with Dr. Blanco who was able to see her this Friday. (much appreciated)    Osteoporosis   DC actonel   -send auth to start Prolia.      -DC Risedronate and start Prolia. 2/2019 no ASE.    No follow-ups on file.          30min consultation with greater than 50% spent in counseling, chart review and coordination of care. All questions answered.

## 2019-06-07 ENCOUNTER — LAB VISIT (OUTPATIENT)
Dept: LAB | Facility: HOSPITAL | Age: 79
End: 2019-06-07
Attending: INTERNAL MEDICINE
Payer: COMMERCIAL

## 2019-06-07 DIAGNOSIS — M05.79 RHEUMATOID ARTHRITIS INVOLVING MULTIPLE SITES WITH POSITIVE RHEUMATOID FACTOR: ICD-10-CM

## 2019-06-07 DIAGNOSIS — Z79.60 LONG-TERM USE OF IMMUNOSUPPRESSANT MEDICATION: ICD-10-CM

## 2019-06-07 LAB
ALBUMIN SERPL BCP-MCNC: 4.2 G/DL (ref 3.5–5.2)
ALP SERPL-CCNC: 31 U/L (ref 55–135)
ALT SERPL W/O P-5'-P-CCNC: 9 U/L (ref 10–44)
ANION GAP SERPL CALC-SCNC: 8 MMOL/L (ref 8–16)
AST SERPL-CCNC: 31 U/L (ref 10–40)
BASOPHILS # BLD AUTO: 0.07 K/UL (ref 0–0.2)
BASOPHILS NFR BLD: 1 % (ref 0–1.9)
BILIRUB SERPL-MCNC: 0.8 MG/DL (ref 0.1–1)
BUN SERPL-MCNC: 16 MG/DL (ref 8–23)
CALCIUM SERPL-MCNC: 10.1 MG/DL (ref 8.7–10.5)
CHLORIDE SERPL-SCNC: 99 MMOL/L (ref 95–110)
CO2 SERPL-SCNC: 27 MMOL/L (ref 23–29)
CREAT SERPL-MCNC: 1.2 MG/DL (ref 0.5–1.4)
CRP SERPL-MCNC: 0.8 MG/L (ref 0–8.2)
DIFFERENTIAL METHOD: ABNORMAL
EOSINOPHIL # BLD AUTO: 0.2 K/UL (ref 0–0.5)
EOSINOPHIL NFR BLD: 3.3 % (ref 0–8)
ERYTHROCYTE [DISTWIDTH] IN BLOOD BY AUTOMATED COUNT: 13.5 % (ref 11.5–14.5)
ERYTHROCYTE [SEDIMENTATION RATE] IN BLOOD BY WESTERGREN METHOD: 5 MM/HR (ref 0–20)
EST. GFR  (AFRICAN AMERICAN): 49.7 ML/MIN/1.73 M^2
EST. GFR  (NON AFRICAN AMERICAN): 43.1 ML/MIN/1.73 M^2
GLUCOSE SERPL-MCNC: 129 MG/DL (ref 70–110)
HCT VFR BLD AUTO: 43.3 % (ref 37–48.5)
HGB BLD-MCNC: 13.9 G/DL (ref 12–16)
IMM GRANULOCYTES # BLD AUTO: 0.02 K/UL (ref 0–0.04)
IMM GRANULOCYTES NFR BLD AUTO: 0.3 % (ref 0–0.5)
LYMPHOCYTES # BLD AUTO: 1.7 K/UL (ref 1–4.8)
LYMPHOCYTES NFR BLD: 23 % (ref 18–48)
MCH RBC QN AUTO: 34 PG (ref 27–31)
MCHC RBC AUTO-ENTMCNC: 32.1 G/DL (ref 32–36)
MCV RBC AUTO: 106 FL (ref 82–98)
MONOCYTES # BLD AUTO: 0.9 K/UL (ref 0.3–1)
MONOCYTES NFR BLD: 12.9 % (ref 4–15)
NEUTROPHILS # BLD AUTO: 4.3 K/UL (ref 1.8–7.7)
NEUTROPHILS NFR BLD: 59.5 % (ref 38–73)
NRBC BLD-RTO: 0 /100 WBC
PLATELET # BLD AUTO: 203 K/UL (ref 150–350)
PMV BLD AUTO: 13.4 FL (ref 9.2–12.9)
POTASSIUM SERPL-SCNC: 4.1 MMOL/L (ref 3.5–5.1)
PROT SERPL-MCNC: 7.2 G/DL (ref 6–8.4)
RBC # BLD AUTO: 4.09 M/UL (ref 4–5.4)
SODIUM SERPL-SCNC: 134 MMOL/L (ref 136–145)
WBC # BLD AUTO: 7.27 K/UL (ref 3.9–12.7)

## 2019-06-07 PROCEDURE — 86704 HEP B CORE ANTIBODY TOTAL: CPT

## 2019-06-07 PROCEDURE — 86140 C-REACTIVE PROTEIN: CPT

## 2019-06-07 PROCEDURE — 85025 COMPLETE CBC W/AUTO DIFF WBC: CPT

## 2019-06-07 PROCEDURE — 80053 COMPREHEN METABOLIC PANEL: CPT

## 2019-06-07 PROCEDURE — 86706 HEP B SURFACE ANTIBODY: CPT

## 2019-06-07 PROCEDURE — 85651 RBC SED RATE NONAUTOMATED: CPT | Mod: PO

## 2019-06-10 LAB
HBV CORE AB SERPL QL IA: NEGATIVE
HBV SURFACE AB SER-ACNC: NEGATIVE M[IU]/ML

## 2019-07-15 ENCOUNTER — OFFICE VISIT (OUTPATIENT)
Dept: OBSTETRICS AND GYNECOLOGY | Facility: CLINIC | Age: 79
End: 2019-07-15
Payer: COMMERCIAL

## 2019-07-15 ENCOUNTER — CLINICAL SUPPORT (OUTPATIENT)
Dept: CARDIOLOGY | Facility: CLINIC | Age: 79
End: 2019-07-15
Attending: INTERNAL MEDICINE
Payer: COMMERCIAL

## 2019-07-15 VITALS
WEIGHT: 153.69 LBS | HEIGHT: 61 IN | BODY MASS INDEX: 29.02 KG/M2 | SYSTOLIC BLOOD PRESSURE: 124 MMHG | DIASTOLIC BLOOD PRESSURE: 80 MMHG

## 2019-07-15 DIAGNOSIS — Z12.31 VISIT FOR SCREENING MAMMOGRAM: ICD-10-CM

## 2019-07-15 DIAGNOSIS — I49.5 SSS (SICK SINUS SYNDROME): ICD-10-CM

## 2019-07-15 DIAGNOSIS — Z91.89 GYN EXAM FOR HIGH-RISK MEDICARE PATIENT: Primary | ICD-10-CM

## 2019-07-15 DIAGNOSIS — Z95.0 PACEMAKER: ICD-10-CM

## 2019-07-15 PROCEDURE — 99999 PR PBB SHADOW E&M-EST. PATIENT-LVL III: ICD-10-PCS | Mod: PBBFAC,,, | Performed by: SPECIALIST

## 2019-07-15 PROCEDURE — 99397 PER PM REEVAL EST PAT 65+ YR: CPT | Mod: S$GLB,,, | Performed by: SPECIALIST

## 2019-07-15 PROCEDURE — 99999 PR PBB SHADOW E&M-EST. PATIENT-LVL III: CPT | Mod: PBBFAC,,, | Performed by: SPECIALIST

## 2019-07-15 PROCEDURE — 99397 PR PREVENTIVE VISIT,EST,65 & OVER: ICD-10-PCS | Mod: S$GLB,,, | Performed by: SPECIALIST

## 2019-07-15 RX ORDER — ALPHA1-PROTEINASE INHIBITOR (HUMAN) 1000 MG/20ML
INJECTION, SOLUTION INTRAVENOUS
COMMUNITY
End: 2021-12-07

## 2019-07-15 NOTE — PROGRESS NOTES
79 yo WF presents for annual gyn evaluation. Completed screening mammogram. No overt gyn complaints.  Past Medical History:   Diagnosis Date    Arthritis     rheumatoid arthritis    Cardiomyopathy     Diverticulosis     DVT (deep venous thrombosis)     one time 5 years ago    GERD (gastroesophageal reflux disease)     Glaucoma     sees Dr. Gillette    Hyperlipidemia     Hypertension     Iron deficiency     Osteoporosis 7/9/2014    Pacemaker 10/26/2016    left chest PACEMAKER [employer] [address] [id number] Bonifacio Fuller 9/9/2016 - current   right atrium LEAD [employer] [address] [facility] S/[id number] Bonifacio Fuller 9/9/2016 - current   right ventricle LEAD [employer] [address] [facility] S/N:[id number] Bonifacio Fuller 9/9/2016 - current      PONV (postoperative nausea and vomiting)     Rheumatoid arthritis involving multiple sites with positive rheumatoid factor 11/24/2013    Vitamin B12 deficiency 2/28/2015       Past Surgical History:   Procedure Laterality Date    BREAST BIOPSY      COLONOSCOPY  2012    FOOT SURGERY      had neuorma and also required hardware    HYSTERECTOMY      SUJATA with BSO    INCONTINENCE SURGERY      INSERTION-PACEMAKER N/A 9/9/2016    Per[facility] CATH    KNEE ARTHROSCOPY W/ DEBRIDEMENT      pace maker  09/2016       Family History   Problem Relation Age of Onset    Heart disease Mother         CHF    Hypertension Mother     Heart disease Father     Stroke Father     Heart disease Brother     Stroke Brother     Heart disease Brother     Scleroderma Brother     Ovarian cancer Maternal Aunt 34    Breast cancer Neg Hx        Social History     Socioeconomic History    Marital status:      Spouse name: Not on file    Number of children: 3    Years of education: Not on file    Highest education level: Not on file   Occupational History    Not on file   Social Needs     Financial resource strain: Not on file    Food insecurity:     Worry: Not on file     Inability: Not on file    Transportation needs:     Medical: Not on file     Non-medical: Not on file   Tobacco Use    Smoking status: Never Smoker    Smokeless tobacco: Never Used   Substance and Sexual Activity    Alcohol use: Yes     Alcohol/week: 0.5 oz     Types: 1 Standard drinks or equivalent per week     Comment: occ social    Drug use: No    Sexual activity: Never     Birth control/protection: See Surgical Hx   Lifestyle    Physical activity:     Days per week: Not on file     Minutes per session: Not on file    Stress: Not on file   Relationships    Social connections:     Talks on phone: Not on file     Gets together: Not on file     Attends [employer] service: Not on file     Active member of club or organization: Not on file     Attends meetings of clubs or organizations: Not on file     Relationship status: Not on file   Other Topics Concern    Not on file   Social History Narrative    Not on file       Current Outpatient Medications   Medication Sig Dispense Refill    alpha-1 proteinase inhib.,hum, (PROLASTIN-C) 1,000 mg (+/-)/20 mL Soln Inject into the vein.      aspirin (ECOTRIN) 81 MG EC tablet Take 81 mg by mouth once daily.      atorvastatin (LIPITOR) 20 MG tablet Take 1 tablet (20 mg total) by mouth every evening. 90 tablet 1    B-complex with vitamin C (Z-BEC OR EQUIV) tablet Take 1 tablet by mouth once daily.      brimonidine-timolol (COMBIGAN) 0.2-0.5 % Drop Place 1 drop into both eyes 2 (two) times daily.      calcium-vitamin D3 500 mg(1,250mg) -200 unit per tablet Take 1 tablet by mouth once daily.      DIETARY SUPPLEMENT ORAL Take by mouth.      estradiol (ESTRACE) 0.01 % (0.1 mg/gram) vaginal cream PLACE 1 GRAM VAGINALLY ONCEDAILY 127.5 g 2    etanercept (ENBREL SURECLICK) 50 mg/mL (0.98 mL) PnIj Inject 50 mg into the skin once a week. 4 Syringe 11    folic acid (FOLVITE) 1 MG tablet  Take 2 tablets (2 mg total) by mouth once daily. 180 tablet 3    losartan-hydrochlorothiazide 50-12.5 mg (HYZAAR) 50-12.5 mg per tablet Take 1 tablet by mouth once daily. 90 tablet 1    magnesium 200 mg Tab Take 400 mg by mouth once daily. Magnesium 400mg with Chelated Zinc 15mg.      methotrexate 2.5 MG Tab Take 5 tablets (12.5 mg total) by mouth every 7 days. 60 tablet 3    pantoprazole (PROTONIX) 40 MG tablet Take 1 tablet (40 mg total) by mouth once daily. 90 tablet 2    potassium gluconate 595 (99) mg Tab Take 1 capsule by mouth once daily.      traMADol (ULTRAM) 50 mg tablet Take 50 mg by mouth every 6 (six) hours as needed for Pain.      diclofenac sodium (VOLTAREN) 1 % Gel Apply 2 g topically 2 (two) times daily as needed. 200 g 3    DYMISTA 137-50 mcg/spray Spry nassal spray INHALE 1 SPRAY BY NASAL ROUTE 2 TIMES A DAY as needed  4    hydrocortisone 2.5 % ointment MIX WITH AQUAPHOR OR VASELINE AND APPLY TO AFFECTED AREA as needed  1    ranitidine (ZANTAC) 150 MG tablet Take 150 mg by mouth nightly.       No current facility-administered medications for this visit.        Review of patient's allergies indicates:   Allergen Reactions    Ace inhibitors Other (See Comments)     cough       Review of System:   General: no chills, fever, night sweats, weight gain or weight loss  Psychological: no depression or suicidal ideation  Breasts: no new or changing breast lumps, nipple discharge or masses.  Respiratory: no cough, shortness of breath, or wheezing  Cardiovascular: no chest pain or dyspnea on exertion  Gastrointestinal: no abdominal pain, change in bowel habits, or black or bloody stools  Genito-Urinary: no incontinence, urinary frequency/urgency or vulvar/vaginal symptoms, pelvic pain or abnormal vaginal bleeding.  Musculoskeletal: no gait disturbance or muscular weakness    PE:   APPEARANCE: Well nourished, well developed, in no acute distress.  NECK: Neck symmetric without masses or  thyromegaly.  NODES: No inguinal lymph node enlargement.  ABDOMEN: Soft. No tenderness or masses. No hepatosplenomegaly. No hernias.  BREASTS: Symmetrical, no skin changes or visible lesions. No palpable masses, nipple discharge or adenopathy bilaterally.  PELVIC: Normal external female genitalia without lesions. Normal hair distribution. Adequate perineal body, normal urethral meatus. Vagina moist and well rugated without lesions or discharge. No significant cystocele or rectocele. Uterus and cervix surgically absent. Bimanual exam revealed no masses, tenderness or abnormality.     COUNSELING:  The patient was counseled today on osteoporosis prevention, calcium supplementation, and regular weight bearing exercise. The patient was also counseled today on ACS PAP guidelines, with recommendations for yearly pelvic exams unless their uterus, cervix, and ovaries were removed for benign reasons; in that case, examinations every 3-5 years are recommended. The patient was also counseled regarding monthly breast self-examination, routine STD screening for at-risk populations, prophylactic immunizations for transmitted infections such as HPV, Pertussis, or Influenza as appropriate, and yearly mammograms when indicated by ACS guidelines. She was advised to see her primary care physician for all other health maintenance.   FOLLOW-UP with me for next routine visit.

## 2019-08-05 DIAGNOSIS — Z95.0 PACEMAKER: Primary | ICD-10-CM

## 2019-08-05 DIAGNOSIS — I49.5 SSS (SICK SINUS SYNDROME): ICD-10-CM

## 2019-08-13 ENCOUNTER — INFUSION (OUTPATIENT)
Dept: INFUSION THERAPY | Facility: HOSPITAL | Age: 79
End: 2019-08-13
Attending: INTERNAL MEDICINE
Payer: COMMERCIAL

## 2019-08-13 DIAGNOSIS — M81.0 AGE-RELATED OSTEOPOROSIS WITHOUT CURRENT PATHOLOGICAL FRACTURE: Primary | ICD-10-CM

## 2019-08-13 PROCEDURE — 96372 THER/PROPH/DIAG INJ SC/IM: CPT | Mod: PN

## 2019-08-13 PROCEDURE — 63600175 PHARM REV CODE 636 W HCPCS: Mod: JG,PN | Performed by: INTERNAL MEDICINE

## 2019-08-13 RX ADMIN — DENOSUMAB 60 MG: 60 INJECTION SUBCUTANEOUS at 11:08

## 2019-08-16 ENCOUNTER — CLINICAL SUPPORT (OUTPATIENT)
Dept: CARDIOLOGY | Facility: CLINIC | Age: 79
End: 2019-08-16
Attending: INTERNAL MEDICINE
Payer: COMMERCIAL

## 2019-08-16 ENCOUNTER — LAB VISIT (OUTPATIENT)
Dept: LAB | Facility: HOSPITAL | Age: 79
End: 2019-08-16
Attending: INTERNAL MEDICINE
Payer: COMMERCIAL

## 2019-08-16 DIAGNOSIS — I34.0 NON-RHEUMATIC MITRAL REGURGITATION: ICD-10-CM

## 2019-08-16 DIAGNOSIS — Z95.0 PACEMAKER: ICD-10-CM

## 2019-08-16 DIAGNOSIS — E78.2 MIXED HYPERLIPIDEMIA: ICD-10-CM

## 2019-08-16 DIAGNOSIS — I51.9 DIASTOLIC DYSFUNCTION, LEFT VENTRICLE: ICD-10-CM

## 2019-08-16 LAB
ALBUMIN SERPL BCP-MCNC: 3.9 G/DL (ref 3.5–5.2)
ALP SERPL-CCNC: 43 U/L (ref 55–135)
ALT SERPL W/O P-5'-P-CCNC: 8 U/L (ref 10–44)
ANION GAP SERPL CALC-SCNC: 9 MMOL/L (ref 8–16)
AST SERPL-CCNC: 24 U/L (ref 10–40)
BASOPHILS # BLD AUTO: 0.08 K/UL (ref 0–0.2)
BASOPHILS NFR BLD: 0.9 % (ref 0–1.9)
BILIRUB SERPL-MCNC: 0.8 MG/DL (ref 0.1–1)
BUN SERPL-MCNC: 18 MG/DL (ref 8–23)
CALCIUM SERPL-MCNC: 10.1 MG/DL (ref 8.7–10.5)
CHLORIDE SERPL-SCNC: 101 MMOL/L (ref 95–110)
CHOLEST SERPL-MCNC: 158 MG/DL (ref 120–199)
CHOLEST/HDLC SERPL: 2.8 {RATIO} (ref 2–5)
CO2 SERPL-SCNC: 29 MMOL/L (ref 23–29)
CREAT SERPL-MCNC: 1.2 MG/DL (ref 0.5–1.4)
DIFFERENTIAL METHOD: ABNORMAL
EOSINOPHIL # BLD AUTO: 0.3 K/UL (ref 0–0.5)
EOSINOPHIL NFR BLD: 3.7 % (ref 0–8)
ERYTHROCYTE [DISTWIDTH] IN BLOOD BY AUTOMATED COUNT: 14.8 % (ref 11.5–14.5)
EST. GFR  (AFRICAN AMERICAN): 49.3 ML/MIN/1.73 M^2
EST. GFR  (NON AFRICAN AMERICAN): 42.8 ML/MIN/1.73 M^2
GLUCOSE SERPL-MCNC: 148 MG/DL (ref 70–110)
HCT VFR BLD AUTO: 42.8 % (ref 37–48.5)
HDLC SERPL-MCNC: 56 MG/DL (ref 40–75)
HDLC SERPL: 35.4 % (ref 20–50)
HGB BLD-MCNC: 13.8 G/DL (ref 12–16)
IMM GRANULOCYTES # BLD AUTO: 0.03 K/UL (ref 0–0.04)
IMM GRANULOCYTES NFR BLD AUTO: 0.3 % (ref 0–0.5)
LDLC SERPL CALC-MCNC: 80.4 MG/DL (ref 63–159)
LEFT ARM DIASTOLIC BLOOD PRESSURE: 70 MMHG
LEFT ARM SYSTOLIC BLOOD PRESSURE: 120 MMHG
LEFT CBA DIAS: 20 CM/S
LEFT CBA SYS: 65 CM/S
LEFT CCA DIST DIAS: 13 CM/S
LEFT CCA DIST SYS: 57 CM/S
LEFT CCA MID DIAS: 16 CM/S
LEFT CCA MID SYS: 68 CM/S
LEFT CCA PROX DIAS: 17 CM/S
LEFT CCA PROX SYS: 98 CM/S
LEFT ECA DIAS: 5 CM/S
LEFT ECA SYS: 40 CM/S
LEFT ICA DIST DIAS: 25 CM/S
LEFT ICA DIST SYS: 91 CM/S
LEFT ICA MID DIAS: 29 CM/S
LEFT ICA MID SYS: 85 CM/S
LEFT ICA PROX DIAS: 12 CM/S
LEFT ICA PROX SYS: 50 CM/S
LEFT VERTEBRAL DIAS: 11 CM/S
LEFT VERTEBRAL SYS: 36 CM/S
LYMPHOCYTES # BLD AUTO: 1.7 K/UL (ref 1–4.8)
LYMPHOCYTES NFR BLD: 19.2 % (ref 18–48)
MCH RBC QN AUTO: 33.7 PG (ref 27–31)
MCHC RBC AUTO-ENTMCNC: 32.2 G/DL (ref 32–36)
MCV RBC AUTO: 105 FL (ref 82–98)
MONOCYTES # BLD AUTO: 1.1 K/UL (ref 0.3–1)
MONOCYTES NFR BLD: 12.5 % (ref 4–15)
NEUTROPHILS # BLD AUTO: 5.7 K/UL (ref 1.8–7.7)
NEUTROPHILS NFR BLD: 63.4 % (ref 38–73)
NONHDLC SERPL-MCNC: 102 MG/DL
NRBC BLD-RTO: 0 /100 WBC
OHS CV CAROTID RIGHT ICA EDV HIGHEST: 33
OHS CV CAROTID ULTRASOUND LEFT ICA/CCA RATIO: 0.93
OHS CV CAROTID ULTRASOUND RIGHT ICA/CCA RATIO: 1.05
OHS CV PV CAROTID LEFT HIGHEST CCA: 98
OHS CV PV CAROTID LEFT HIGHEST ICA: 91
OHS CV PV CAROTID RIGHT HIGHEST CCA: 86
OHS CV PV CAROTID RIGHT HIGHEST ICA: 90
OHS CV US CAROTID LEFT HIGHEST EDV: 29
PLATELET # BLD AUTO: 200 K/UL (ref 150–350)
PMV BLD AUTO: 13 FL (ref 9.2–12.9)
POTASSIUM SERPL-SCNC: 4.3 MMOL/L (ref 3.5–5.1)
PROT SERPL-MCNC: 7.1 G/DL (ref 6–8.4)
RBC # BLD AUTO: 4.09 M/UL (ref 4–5.4)
RIGHT ARM DIASTOLIC BLOOD PRESSURE: 70 MMHG
RIGHT ARM SYSTOLIC BLOOD PRESSURE: 120 MMHG
RIGHT CBA DIAS: 17 CM/S
RIGHT CBA SYS: 66 CM/S
RIGHT CCA DIST DIAS: 21 CM/S
RIGHT CCA DIST SYS: 79 CM/S
RIGHT CCA MID DIAS: 22 CM/S
RIGHT CCA MID SYS: 86 CM/S
RIGHT CCA PROX DIAS: 20 CM/S
RIGHT CCA PROX SYS: 80 CM/S
RIGHT ECA DIAS: 4 CM/S
RIGHT ECA SYS: 61 CM/S
RIGHT ICA DIST DIAS: 29 CM/S
RIGHT ICA DIST SYS: 78 CM/S
RIGHT ICA MID DIAS: 33 CM/S
RIGHT ICA MID SYS: 90 CM/S
RIGHT ICA PROX DIAS: 15 CM/S
RIGHT ICA PROX SYS: 65 CM/S
RIGHT VERTEBRAL DIAS: 18 CM/S
RIGHT VERTEBRAL SYS: 52 CM/S
SODIUM SERPL-SCNC: 139 MMOL/L (ref 136–145)
TRIGL SERPL-MCNC: 108 MG/DL (ref 30–150)
WBC # BLD AUTO: 8.96 K/UL (ref 3.9–12.7)

## 2019-08-16 PROCEDURE — 85025 COMPLETE CBC W/AUTO DIFF WBC: CPT

## 2019-08-16 PROCEDURE — 93880 CV US DOPPLER CAROTID: ICD-10-PCS | Mod: S$GLB,,, | Performed by: INTERNAL MEDICINE

## 2019-08-16 PROCEDURE — 80053 COMPREHEN METABOLIC PANEL: CPT

## 2019-08-16 PROCEDURE — 36415 COLL VENOUS BLD VENIPUNCTURE: CPT | Mod: PO

## 2019-08-16 PROCEDURE — 80061 LIPID PANEL: CPT

## 2019-08-16 PROCEDURE — 93880 EXTRACRANIAL BILAT STUDY: CPT | Mod: S$GLB,,, | Performed by: INTERNAL MEDICINE

## 2019-08-23 ENCOUNTER — OFFICE VISIT (OUTPATIENT)
Dept: CARDIOLOGY | Facility: CLINIC | Age: 79
End: 2019-08-23
Payer: COMMERCIAL

## 2019-08-23 VITALS
HEIGHT: 61 IN | WEIGHT: 153 LBS | DIASTOLIC BLOOD PRESSURE: 83 MMHG | HEART RATE: 53 BPM | SYSTOLIC BLOOD PRESSURE: 134 MMHG | BODY MASS INDEX: 28.89 KG/M2

## 2019-08-23 DIAGNOSIS — I34.0 NON-RHEUMATIC MITRAL REGURGITATION: ICD-10-CM

## 2019-08-23 DIAGNOSIS — Z95.0 PACEMAKER: Primary | ICD-10-CM

## 2019-08-23 DIAGNOSIS — I51.9 DIASTOLIC DYSFUNCTION, LEFT VENTRICLE: ICD-10-CM

## 2019-08-23 DIAGNOSIS — E78.2 MIXED HYPERLIPIDEMIA: ICD-10-CM

## 2019-08-23 DIAGNOSIS — I10 ESSENTIAL HYPERTENSION: ICD-10-CM

## 2019-08-23 PROCEDURE — 3079F PR MOST RECENT DIASTOLIC BLOOD PRESSURE 80-89 MM HG: ICD-10-PCS | Mod: CPTII,S$GLB,, | Performed by: INTERNAL MEDICINE

## 2019-08-23 PROCEDURE — 99214 OFFICE O/P EST MOD 30 MIN: CPT | Mod: S$GLB,,, | Performed by: INTERNAL MEDICINE

## 2019-08-23 PROCEDURE — 3075F SYST BP GE 130 - 139MM HG: CPT | Mod: CPTII,S$GLB,, | Performed by: INTERNAL MEDICINE

## 2019-08-23 PROCEDURE — 99999 PR PBB SHADOW E&M-EST. PATIENT-LVL III: ICD-10-PCS | Mod: PBBFAC,,, | Performed by: INTERNAL MEDICINE

## 2019-08-23 PROCEDURE — 3075F PR MOST RECENT SYSTOLIC BLOOD PRESS GE 130-139MM HG: ICD-10-PCS | Mod: CPTII,S$GLB,, | Performed by: INTERNAL MEDICINE

## 2019-08-23 PROCEDURE — 1101F PT FALLS ASSESS-DOCD LE1/YR: CPT | Mod: CPTII,S$GLB,, | Performed by: INTERNAL MEDICINE

## 2019-08-23 PROCEDURE — 3079F DIAST BP 80-89 MM HG: CPT | Mod: CPTII,S$GLB,, | Performed by: INTERNAL MEDICINE

## 2019-08-23 PROCEDURE — 99214 PR OFFICE/OUTPT VISIT, EST, LEVL IV, 30-39 MIN: ICD-10-PCS | Mod: S$GLB,,, | Performed by: INTERNAL MEDICINE

## 2019-08-23 PROCEDURE — 1101F PR PT FALLS ASSESS DOC 0-1 FALLS W/OUT INJ PAST YR: ICD-10-PCS | Mod: CPTII,S$GLB,, | Performed by: INTERNAL MEDICINE

## 2019-08-23 PROCEDURE — 99999 PR PBB SHADOW E&M-EST. PATIENT-LVL III: CPT | Mod: PBBFAC,,, | Performed by: INTERNAL MEDICINE

## 2019-08-23 RX ORDER — LOSARTAN POTASSIUM AND HYDROCHLOROTHIAZIDE 12.5; 5 MG/1; MG/1
TABLET ORAL
Qty: 90 TABLET | Refills: 5 | Status: SHIPPED | OUTPATIENT
Start: 2019-08-23 | End: 2020-03-09 | Stop reason: RX

## 2019-08-23 RX ORDER — METOPROLOL SUCCINATE 50 MG/1
50 TABLET, EXTENDED RELEASE ORAL DAILY
Qty: 90 TABLET | Refills: 6 | Status: SHIPPED | OUTPATIENT
Start: 2019-08-23 | End: 2020-10-19 | Stop reason: SDUPTHER

## 2019-08-23 RX ORDER — METOPROLOL SUCCINATE 25 MG/1
25 TABLET, EXTENDED RELEASE ORAL DAILY
Qty: 90 TABLET | Refills: 6 | Status: SHIPPED | OUTPATIENT
Start: 2019-08-23 | End: 2019-08-23 | Stop reason: SDUPTHER

## 2019-08-23 NOTE — PROGRESS NOTES
Subjective:    Patient ID:  Juanis Mclaughlin is a 79 y.o. female who presents for follow-up of SSS f/u      HPI  Here for f/u diastolic dysfxn, MR/PPM implant. Patients states is doing well no chest pain, SOB or change in exertional tolerence    Review of Systems   Constitution: Negative for malaise/fatigue.   Eyes: Negative for blurred vision.   Cardiovascular: Negative for chest pain, claudication, cyanosis, dyspnea on exertion, irregular heartbeat, leg swelling, near-syncope, orthopnea, palpitations, paroxysmal nocturnal dyspnea and syncope.   Respiratory: Negative for cough and shortness of breath.    Hematologic/Lymphatic: Does not bruise/bleed easily.   Musculoskeletal: Negative for back pain, falls, joint pain, muscle cramps, muscle weakness and myalgias.   Gastrointestinal: Negative for abdominal pain, change in bowel habit, nausea and vomiting.   Genitourinary: Negative for urgency.   Neurological: Negative for dizziness, focal weakness and light-headedness.       Past Medical History:   Diagnosis Date    Arthritis     rheumatoid arthritis    Cardiomyopathy     CKD (chronic kidney disease) stage 3, GFR 30-59 ml/min 1/9/2019    Diverticulosis     DVT (deep venous thrombosis)     one time 5 years ago    Essential hypertension 11/24/2013    GERD (gastroesophageal reflux disease)     Glaucoma     sees Dr. Gillette    Hyperlipidemia     Hypertension     Iron deficiency     Iron deficiency anemia 10/21/2016     IMO LOAD Q4    Long-term use of immunosuppressant medication 7/27/2015    Mitral regurgitation 12/20/2013 12/13 mild     Osteoporosis 7/9/2014    Pacemaker 10/26/2016    left chest [employer]AKER [employer] [address] [facility]  S/N:[id number] Bonifacio Fuller 9/9/2016 - current   right atrium LEAD [employer] [address] [id number] Bonifacio Fuller 9/9/2016 - current   right ventricle LEAD [employer] [address] [id number] Bonifacio Fuller  9/9/2016 - current      PONV (postoperative nausea and vomiting)     Rheumatoid arthritis involving multiple sites with positive rheumatoid factor 11/24/2013    Vitamin B12 deficiency 2/28/2015          Objective:     Vitals:    08/23/19 1118   BP: 134/83   Pulse: (!) 53        Physical Exam   Constitutional: She is oriented to person, place, and time. She appears well-developed and well-nourished.   Neck: Normal range of motion. No JVD present.   Cardiovascular: Normal rate, regular rhythm, normal heart sounds and intact distal pulses.   Pulmonary/Chest: Effort normal and breath sounds normal.   Neurological: She is alert and oriented to person, place, and time.   Skin: Skin is warm and dry.   Psychiatric: She has a normal mood and affect.   Nursing note and vitals reviewed.            ..    Chemistry        Component Value Date/Time     08/16/2019 0800    K 4.3 08/16/2019 0800     08/16/2019 0800    CO2 29 08/16/2019 0800    BUN 18 08/16/2019 0800    CREATININE 1.2 08/16/2019 0800     (H) 08/16/2019 0800        Component Value Date/Time    CALCIUM 10.1 08/16/2019 0800    ALKPHOS 43 (L) 08/16/2019 0800    AST 24 08/16/2019 0800    ALT 8 (L) 08/16/2019 0800    BILITOT 0.8 08/16/2019 0800    ESTGFRAFRICA 49.3 (A) 08/16/2019 0800    EGFRNONAA 42.8 (A) 08/16/2019 0800            ..  Lab Results   Component Value Date    CHOL 158 08/16/2019    CHOL 164 06/29/2018    CHOL 165 07/31/2017     Lab Results   Component Value Date    HDL 56 08/16/2019    HDL 58 06/29/2018    HDL 57 07/31/2017     Lab Results   Component Value Date    LDLCALC 80.4 08/16/2019    LDLCALC 77.6 06/29/2018    LDLCALC 89.8 07/31/2017     Lab Results   Component Value Date    TRIG 108 08/16/2019    TRIG 142 06/29/2018    TRIG 91 07/31/2017     Lab Results   Component Value Date    CHOLHDL 35.4 08/16/2019    CHOLHDL 35.4 06/29/2018    CHOLHDL 34.5 07/31/2017     ..  Lab Results   Component Value Date    WBC 8.96 08/16/2019    HGB  13.8 08/16/2019    HCT 42.8 08/16/2019     (H) 08/16/2019     08/16/2019       Test(s) Reviewed  I have reviewed the following in detail:  [] Stress test   [] Angiography   [x] Echocardiogram   [x] Labs   [x] Other:       Assessment:         ICD-10-CM ICD-9-CM   1. Pacemaker Z95.0 V45.01   2. Non-rheumatic mitral regurgitation I34.0 424.0   3. Mixed hyperlipidemia E78.2 272.2   4. Essential hypertension I10 401.9   5. Diastolic dysfunction, left ventricle I51.9 429.9     Problem List Items Addressed This Visit     Pacemaker - Primary    Overview     left chest PACEMAKER  [employer] [address] S/N:[id number]  Bonifacio Fuller A.  9/9/2016 - current     right atrium LEAD  [employer] [address] [id number]/[id number]  lAina Bonifacio A.  9/9/2016 - current     right ventricle LEAD  [employer] [address] S/N:[id number]  Alina Bonifacio A.  9/9/2016 - current            Mitral regurgitation    Overview     12/13 mild         RESOLVED: Hyperlipidemia    Essential hypertension    Diastolic dysfunction, left ventricle           Plan:           Return to clinic 1 year   Low level/low impact aerobic exercise 5x's/wk. Heart healthy diet and risk factor modification.    See labs and med orders.  Decrease ARB add BB due to short runs of AF on PPM ( < 1 min)    Portions of this note may have been created with voice recognition software.  Grammatical, syntax and spelling errors may be inevitable.

## 2019-08-27 ENCOUNTER — TELEPHONE (OUTPATIENT)
Dept: CARDIOLOGY | Facility: CLINIC | Age: 79
End: 2019-08-27

## 2019-08-27 NOTE — TELEPHONE ENCOUNTER
----- Message from Candis Antoine sent at 8/27/2019  4:41 PM CDT -----  Contact: pt  Type: Needs Medical Advice    Who Called:  Pt   Best Call Back Number: [phone]  Additional Information: pt req a call back regarding medication metoprolol succinate (TOPROL-XL) 50 MG 24 hr tablet . Pt stated that she received a 25 mg and a 50 mg and wanted to know what one she should take Please Advise ---Thank you

## 2019-10-29 ENCOUNTER — OFFICE VISIT (OUTPATIENT)
Dept: RHEUMATOLOGY | Facility: CLINIC | Age: 79
End: 2019-10-29
Payer: COMMERCIAL

## 2019-10-29 VITALS
BODY MASS INDEX: 28.34 KG/M2 | SYSTOLIC BLOOD PRESSURE: 132 MMHG | HEART RATE: 59 BPM | DIASTOLIC BLOOD PRESSURE: 75 MMHG | HEIGHT: 62 IN | WEIGHT: 154 LBS

## 2019-10-29 DIAGNOSIS — R05.8 NOCTURNAL COUGH WITH WHEEZE: ICD-10-CM

## 2019-10-29 DIAGNOSIS — R06.2 NOCTURNAL COUGH WITH WHEEZE: ICD-10-CM

## 2019-10-29 DIAGNOSIS — Z79.60 LONG-TERM USE OF IMMUNOSUPPRESSANT MEDICATION: ICD-10-CM

## 2019-10-29 DIAGNOSIS — J40 BRONCHITIS: ICD-10-CM

## 2019-10-29 DIAGNOSIS — M05.79 RHEUMATOID ARTHRITIS INVOLVING MULTIPLE SITES WITH POSITIVE RHEUMATOID FACTOR: Primary | ICD-10-CM

## 2019-10-29 PROCEDURE — 99999 PR PBB SHADOW E&M-EST. PATIENT-LVL III: ICD-10-PCS | Mod: PBBFAC,,, | Performed by: INTERNAL MEDICINE

## 2019-10-29 PROCEDURE — 1101F PR PT FALLS ASSESS DOC 0-1 FALLS W/OUT INJ PAST YR: ICD-10-PCS | Mod: CPTII,S$GLB,, | Performed by: INTERNAL MEDICINE

## 2019-10-29 PROCEDURE — 99999 PR PBB SHADOW E&M-EST. PATIENT-LVL III: CPT | Mod: PBBFAC,,, | Performed by: INTERNAL MEDICINE

## 2019-10-29 PROCEDURE — 3075F SYST BP GE 130 - 139MM HG: CPT | Mod: CPTII,S$GLB,, | Performed by: INTERNAL MEDICINE

## 2019-10-29 PROCEDURE — 99214 PR OFFICE/OUTPT VISIT, EST, LEVL IV, 30-39 MIN: ICD-10-PCS | Mod: S$GLB,,, | Performed by: INTERNAL MEDICINE

## 2019-10-29 PROCEDURE — 3078F PR MOST RECENT DIASTOLIC BLOOD PRESSURE < 80 MM HG: ICD-10-PCS | Mod: CPTII,S$GLB,, | Performed by: INTERNAL MEDICINE

## 2019-10-29 PROCEDURE — 3075F PR MOST RECENT SYSTOLIC BLOOD PRESS GE 130-139MM HG: ICD-10-PCS | Mod: CPTII,S$GLB,, | Performed by: INTERNAL MEDICINE

## 2019-10-29 PROCEDURE — 99214 OFFICE O/P EST MOD 30 MIN: CPT | Mod: S$GLB,,, | Performed by: INTERNAL MEDICINE

## 2019-10-29 PROCEDURE — 1101F PT FALLS ASSESS-DOCD LE1/YR: CPT | Mod: CPTII,S$GLB,, | Performed by: INTERNAL MEDICINE

## 2019-10-29 PROCEDURE — 3078F DIAST BP <80 MM HG: CPT | Mod: CPTII,S$GLB,, | Performed by: INTERNAL MEDICINE

## 2019-10-29 RX ORDER — AZITHROMYCIN 250 MG/1
TABLET, FILM COATED ORAL
Qty: 6 TABLET | Refills: 0 | Status: SHIPPED | OUTPATIENT
Start: 2019-10-29 | End: 2019-10-29 | Stop reason: SDUPTHER

## 2019-10-29 RX ORDER — AZITHROMYCIN 250 MG/1
TABLET, FILM COATED ORAL
Qty: 6 TABLET | Refills: 0 | Status: SHIPPED | OUTPATIENT
Start: 2019-10-29 | End: 2020-01-03

## 2019-10-29 RX ORDER — ALBUTEROL SULFATE 90 UG/1
2 AEROSOL, METERED RESPIRATORY (INHALATION) EVERY 6 HOURS PRN
Qty: 1 INHALER | Refills: 0 | Status: SHIPPED | OUTPATIENT
Start: 2019-10-29 | End: 2020-05-04

## 2019-10-29 RX ORDER — ALBUTEROL SULFATE 90 UG/1
2 AEROSOL, METERED RESPIRATORY (INHALATION) EVERY 6 HOURS PRN
Qty: 1 INHALER | Refills: 0 | Status: SHIPPED | OUTPATIENT
Start: 2019-10-29 | End: 2019-10-29 | Stop reason: SDUPTHER

## 2019-10-29 ASSESSMENT — ROUTINE ASSESSMENT OF PATIENT INDEX DATA (RAPID3)
TOTAL RAPID3 SCORE: 3.78
PSYCHOLOGICAL DISTRESS SCORE: 2.2
MDHAQ FUNCTION SCORE: .7
PATIENT GLOBAL ASSESSMENT SCORE: 2
PAIN SCORE: 7

## 2019-10-29 NOTE — PROGRESS NOTES
AS Subjective:          Chief Complaint: Juanis Mclaughlin is a 79 y.o. female who had concerns including Follow-up (4 month).    HPI:      Rheumatoid Arthritis   79y/o  female with h/o HTN, HLD was diagnosed with sero +  Rheumatoid arthritis in 1998. Off prednisone now. Has also received HCQ in the past no improvement.     Presently on MTX 4 weekly and Enbrel 50mg weekly    decreased MTX to 4 tabs (due to transaminitis) but + joint pain , so back on MTX 5 tabs weekly.     But having flare in hands of arthritis. + stiffness > 1 hr.   Pain in the DIP and PIP joints.    No ASE of stomatitis,  infections or injection site reaction.   She was also diagnosed with secondary Sjogren's. Currently on restasis.  PPM for bradycardia.     Past 12 months slow decline in renal function from prior year- on PO bisphos and ACEI/HCTZ can switch this to Prolia.  Started Prolia 02/29/2019 she had another injection as of 07/20/2019 tolerated well no adverse side effects renal function stable.    Cervical spasm left sided with headache present >3 months nearly constants. No numbness or tingling in hands/arms no weakness. Tried IR, stretching, heat little benefit. \  Did have trigger point injections brief relief. Using infrared therapy now.  She is now going to PT    She denies fever, weight loss, photosensitivity, rash, ulcer, raynaud's phenomenon, alopecia, dysphagia, diarrhea or blood in the stools.    + hx of GERD. On PPI. Hx of mild MR. No pedal edema.     Doing well with Dr. Madison with Breo and Gabapentin to relax cough impulse.  As of this visit 02/29/2019 patient was having upper respiratory tract infection x3 weeks seem to be feeling better within 10 days ago returned with a more productive cough postnasal drip rhinitis    REVIEW OF SYSTEMS:    Review of Systems   Constitutional: Negative for fever, malaise/fatigue and weight loss.   HENT: Negative for sore throat.    Eyes: Negative for double vision, photophobia and  redness.   Respiratory: Positive for cough and sputum production. Negative for hemoptysis, shortness of breath and wheezing.    Cardiovascular: Negative for chest pain, palpitations and orthopnea.   Gastrointestinal: Negative for abdominal pain, constipation and diarrhea.   Genitourinary: Negative for dysuria, hematuria and urgency.   Musculoskeletal: Positive for joint pain. Negative for back pain and myalgias.   Skin: Negative for rash.   Neurological: Negative for dizziness, tingling, focal weakness and headaches.   Endo/Heme/Allergies: Does not bruise/bleed easily.   Psychiatric/Behavioral: Negative for depression, hallucinations and suicidal ideas.                Objective:            Past Medical History:   Diagnosis Date    Arthritis     rheumatoid arthritis    Cardiomyopathy     CKD (chronic kidney disease) stage 3, GFR 30-59 ml/min 1/9/2019    Diverticulosis     DVT (deep venous thrombosis)     one time 5 years ago    Essential hypertension 11/24/2013    GERD (gastroesophageal reflux disease)     Glaucoma     sees Dr. Gillette    Hyperlipidemia     Hypertension     Iron deficiency     Iron deficiency anemia 10/21/2016     IMO LOAD Q4    Long-term use of immunosuppressant medication 7/27/2015    Mitral regurgitation 12/20/2013 12/13 mild     Osteoporosis 7/9/2014    Pacemaker 10/26/2016    left chest [employer]AKER [employer] [address] [employer]  [id number] Bonifacio Fuller 9/9/2016 - current   right atrium LEAD [employer] [address] [employer] [id number] Bonifacio Fuller 9/9/2016 - current   right ventricle LEAD [employer] [address] [employer] [id number] Bonifacio Fuller 9/9/2016 - current      PONV (postoperative nausea and vomiting)     Rheumatoid arthritis involving multiple sites with positive rheumatoid factor 11/24/2013    Vitamin B12 deficiency 2/28/2015     Family History   Problem Relation Age of Onset    Heart disease Mother         CHF     Hypertension Mother     Heart disease Father     Stroke Father     Heart disease Brother     Stroke Brother     Heart disease Brother     Scleroderma Brother     Ovarian cancer Maternal Aunt 34    Breast cancer Neg Hx      Social History     Tobacco Use    Smoking status: Never Smoker    Smokeless tobacco: Never Used   Substance Use Topics    Alcohol use: Yes     Alcohol/week: 0.8 standard drinks     Types: 1 Standard drinks or equivalent per week     Comment: occ social    Drug use: No         Current Outpatient Medications on File Prior to Visit   Medication Sig Dispense Refill    alpha-1 proteinase inhib.,hum, (PROLASTIN-C) 1,000 mg (+/-)/20 mL Soln Inject into the vein.      aspirin (ECOTRIN) 81 MG EC tablet Take 81 mg by mouth once daily.      atorvastatin (LIPITOR) 20 MG tablet Take 1 tablet (20 mg total) by mouth every evening. 90 tablet 1    B-complex with vitamin C (Z-BEC OR EQUIV) tablet Take 1 tablet by mouth once daily.      brimonidine-timolol (COMBIGAN) 0.2-0.5 % Drop Place 1 drop into both eyes 2 (two) times daily.      calcium-vitamin D3 500 mg(1,250mg) -200 unit per tablet Take 1 tablet by mouth once daily.      diclofenac sodium (VOLTAREN) 1 % Gel Apply 2 g topically 2 (two) times daily as needed. 200 g 3    DIETARY SUPPLEMENT ORAL Take by mouth.      dorzolamide-timolol, PF, (COSOPT, PF,) 2-0.5 % Dpet ophthalmic solution Cosopt (PF) 2 %-0.5 % eye drops in a dropperette   INSTILL 1 DROP INTO AFFECTED EYE(S) BY OPHTHALMIC ROUTE 2 TIMES PER DAY      DYMISTA 137-50 mcg/spray Spry nassal spray INHALE 1 SPRAY BY NASAL ROUTE 2 TIMES A DAY as needed  4    estradiol (ESTRACE) 0.01 % (0.1 mg/gram) vaginal cream PLACE 1 GRAM VAGINALLY ONCEDAILY 127.5 g 2    etanercept (ENBREL SURECLICK) 50 mg/mL (0.98 mL) PnIj Inject 50 mg into the skin once a week. 4 Syringe 11    folic acid (FOLVITE) 1 MG tablet Take 2 tablets (2 mg total) by mouth once daily. 180 tablet 3    hydrocortisone 2.5 %  ointment MIX WITH AQUAPHOR OR VASELINE AND APPLY TO AFFECTED AREA as needed  1    losartan-hydrochlorothiazide 50-12.5 mg (HYZAAR) 50-12.5 mg per tablet 1/2 tab am 90 tablet 5    magnesium 200 mg Tab Take 400 mg by mouth once daily. Magnesium 400mg with Chelated Zinc 15mg.      methotrexate 2.5 MG Tab Take 5 tablets (12.5 mg total) by mouth every 7 days. 60 tablet 3    metoprolol succinate (TOPROL-XL) 50 MG 24 hr tablet Take 1 tablet (50 mg total) by mouth once daily. 90 tablet 6    pantoprazole (PROTONIX) 40 MG tablet Take 1 tablet (40 mg total) by mouth once daily. 90 tablet 2    potassium gluconate 595 (99) mg Tab Take 1 capsule by mouth once daily.      ranitidine (ZANTAC) 150 MG tablet Take 150 mg by mouth nightly.      traMADol (ULTRAM) 50 mg tablet Take 50 mg by mouth every 6 (six) hours as needed for Pain.      travoprost (TRAVATAN Z) 0.004 % ophthalmic solution Travatan Z 0.004 % eye drops   INSTILL 1 DROP INTO AFFECTED EYE(S) BY OPHTHALMIC ROUTE ONCE DAILY INTHE EVENING       No current facility-administered medications on file prior to visit.        Vitals:    10/29/19 1319   BP: 132/75   Pulse: (!) 59       Physical Exam:    Physical Exam   Constitutional: She appears well-developed and well-nourished.   HENT:   Head:       Nose: No septal deviation.   Mouth/Throat: Mucous membranes are normal. No oral lesions.   Eyes: Pupils are equal, round, and reactive to light. Right conjunctiva is not injected. Left conjunctiva is not injected.   Neck: No JVD present. No thyroid mass and no thyromegaly present.   Cardiovascular: Normal rate, regular rhythm and normal pulses.   No edema   Pulmonary/Chest: Effort normal. She has wheezes in the right middle field and the left middle field.   Abdominal: Soft. Normal appearance. There is no hepatosplenomegaly.   Musculoskeletal:        Right shoulder: She exhibits normal range of motion, no tenderness and no swelling.        Left shoulder: She exhibits normal  range of motion, no tenderness and no swelling.        Right elbow: She exhibits normal range of motion and no swelling. No tenderness found.        Left elbow: She exhibits normal range of motion and no swelling. No tenderness found.        Right wrist: She exhibits decreased range of motion and tenderness. She exhibits no swelling.        Left wrist: She exhibits normal range of motion, no tenderness and no swelling.        Right hip: She exhibits normal range of motion, normal strength and no swelling.        Left hip: She exhibits normal range of motion, no tenderness and no swelling.        Right knee: She exhibits normal range of motion and no swelling. No tenderness found.        Left knee: She exhibits normal range of motion and no swelling. No tenderness found.        Right ankle: She exhibits normal range of motion and no swelling. No tenderness.        Left ankle: She exhibits normal range of motion and no swelling. No tenderness.        Cervical back: She exhibits decreased range of motion.        Right hand: She exhibits decreased range of motion and tenderness.        Left hand: She exhibits tenderness.   1+ synovitis MCP and PIP with heberden nodes all DIP 2-5. Left wrist with tenderness and swelling about the TFCC. No nodules. No flexion contractures.      Cervical with left sided point tenderness and the occiput (nuchal ridge) lesser tenderness at the cervical paraspinals. Decreased ROM in cervical spine   Lymphadenopathy:     She has no cervical adenopathy.     She has no axillary adenopathy.   Neurological: She has normal strength and normal reflexes.   Skin: Skin is dry and intact.   Psychiatric: She has a normal mood and affect.             Assessment:       Encounter Diagnoses   Name Primary?    Rheumatoid arthritis involving multiple sites with positive rheumatoid factor Yes    Long-term use of immunosuppressant medication     Bronchitis     Nocturnal cough with wheeze           Plan:         Rheumatoid arthritis involving multiple sites with positive rheumatoid factor  -     CBC auto differential; Standing; Expected date: 10/29/2019  -     Comprehensive metabolic panel; Standing; Expected date: 10/29/2019  -     Sedimentation rate; Standing; Expected date: 10/29/2019  -     C-reactive protein; Standing; Expected date: 10/29/2019    Long-term use of immunosuppressant medication  -     CBC auto differential; Standing; Expected date: 10/29/2019  -     Comprehensive metabolic panel; Standing; Expected date: 10/29/2019  -     Sedimentation rate; Standing; Expected date: 10/29/2019  -     C-reactive protein; Standing; Expected date: 10/29/2019    Bronchitis  -     Discontinue: azithromycin (Z-PABLO) 250 MG tablet; Take 2 tablets by mouth on day 1; Take 1 tablet by mouth on days 2-5  Dispense: 6 tablet; Refill: 0  -     Discontinue: albuterol (PROVENTIL/VENTOLIN HFA) 90 mcg/actuation inhaler; Inhale 2 puffs into the lungs every 6 (six) hours as needed for Wheezing. Rescue  Dispense: 1 Inhaler; Refill: 0  -     albuterol (PROVENTIL/VENTOLIN HFA) 90 mcg/actuation inhaler; Inhale 2 puffs into the lungs every 6 (six) hours as needed for Wheezing. Rescue  Dispense: 1 Inhaler; Refill: 0  -     azithromycin (Z-PABLO) 250 MG tablet; Take 2 tablets by mouth on day 1; Take 1 tablet by mouth on days 2-5  Dispense: 6 tablet; Refill: 0    Nocturnal cough with wheeze  -     Discontinue: albuterol (PROVENTIL/VENTOLIN HFA) 90 mcg/actuation inhaler; Inhale 2 puffs into the lungs every 6 (six) hours as needed for Wheezing. Rescue  Dispense: 1 Inhaler; Refill: 0  -     albuterol (PROVENTIL/VENTOLIN HFA) 90 mcg/actuation inhaler; Inhale 2 puffs into the lungs every 6 (six) hours as needed for Wheezing. Rescue  Dispense: 1 Inhaler; Refill: 0           RA:    -MTX increase back to 5 tabs weekly- 90 day supply   -continue folic acid 2mg dialy   -Continue Enbrel needs 90 day   -voltaren gel PRN fingers/hands-using sparingly   -Labs q  3 months.    Neck Pain:    -perisistent   -still working with laser   -now with PT>     Osteoporosis   DC actonel   -send auth to start Prolia.      -DC Risedronate and start Prolia. 2/2019 no ASE.    Bronchitis 3 weeks immunosuppressed- I recommend the start of as SHAYY Timbo also gave her an albuterol inhaler on for some tightness in her chest very scant rhonchi.  Continue her methotrexate but hold the Enbrel next week  Try Dimetapp (hold sudafed. ) perhaps the guaifenesin is promoting cough.     No follow-ups on file.          30min consultation with greater than 50% spent in counseling, chart review and coordination of care. All questions answered.

## 2019-11-04 ENCOUNTER — LAB VISIT (OUTPATIENT)
Dept: LAB | Facility: HOSPITAL | Age: 79
End: 2019-11-04
Attending: INTERNAL MEDICINE
Payer: COMMERCIAL

## 2019-11-04 DIAGNOSIS — Z79.60 LONG-TERM USE OF IMMUNOSUPPRESSANT MEDICATION: ICD-10-CM

## 2019-11-04 DIAGNOSIS — M05.79 RHEUMATOID ARTHRITIS INVOLVING MULTIPLE SITES WITH POSITIVE RHEUMATOID FACTOR: ICD-10-CM

## 2019-11-04 LAB
ALBUMIN SERPL BCP-MCNC: 3.7 G/DL (ref 3.5–5.2)
ALP SERPL-CCNC: 45 U/L (ref 55–135)
ALT SERPL W/O P-5'-P-CCNC: 6 U/L (ref 10–44)
ANION GAP SERPL CALC-SCNC: 7 MMOL/L (ref 8–16)
AST SERPL-CCNC: 22 U/L (ref 10–40)
BASOPHILS # BLD AUTO: 0.07 K/UL (ref 0–0.2)
BASOPHILS NFR BLD: 0.6 % (ref 0–1.9)
BILIRUB SERPL-MCNC: 0.9 MG/DL (ref 0.1–1)
BUN SERPL-MCNC: 16 MG/DL (ref 8–23)
CALCIUM SERPL-MCNC: 9.5 MG/DL (ref 8.7–10.5)
CHLORIDE SERPL-SCNC: 102 MMOL/L (ref 95–110)
CO2 SERPL-SCNC: 28 MMOL/L (ref 23–29)
CREAT SERPL-MCNC: 1.1 MG/DL (ref 0.5–1.4)
CRP SERPL-MCNC: 13.1 MG/L (ref 0–8.2)
DIFFERENTIAL METHOD: ABNORMAL
EOSINOPHIL # BLD AUTO: 0.2 K/UL (ref 0–0.5)
EOSINOPHIL NFR BLD: 2.2 % (ref 0–8)
ERYTHROCYTE [DISTWIDTH] IN BLOOD BY AUTOMATED COUNT: 13.3 % (ref 11.5–14.5)
ERYTHROCYTE [SEDIMENTATION RATE] IN BLOOD BY WESTERGREN METHOD: 34 MM/HR (ref 0–20)
EST. GFR  (AFRICAN AMERICAN): 54.8 ML/MIN/1.73 M^2
EST. GFR  (NON AFRICAN AMERICAN): 47.5 ML/MIN/1.73 M^2
GLUCOSE SERPL-MCNC: 127 MG/DL (ref 70–110)
HCT VFR BLD AUTO: 42.7 % (ref 37–48.5)
HGB BLD-MCNC: 14 G/DL (ref 12–16)
IMM GRANULOCYTES # BLD AUTO: 0.03 K/UL (ref 0–0.04)
IMM GRANULOCYTES NFR BLD AUTO: 0.3 % (ref 0–0.5)
LYMPHOCYTES # BLD AUTO: 1.8 K/UL (ref 1–4.8)
LYMPHOCYTES NFR BLD: 16.6 % (ref 18–48)
MCH RBC QN AUTO: 35.2 PG (ref 27–31)
MCHC RBC AUTO-ENTMCNC: 32.8 G/DL (ref 32–36)
MCV RBC AUTO: 107 FL (ref 82–98)
MONOCYTES # BLD AUTO: 1.2 K/UL (ref 0.3–1)
MONOCYTES NFR BLD: 11.4 % (ref 4–15)
NEUTROPHILS # BLD AUTO: 7.5 K/UL (ref 1.8–7.7)
NEUTROPHILS NFR BLD: 68.9 % (ref 38–73)
NRBC BLD-RTO: 0 /100 WBC
PLATELET # BLD AUTO: 240 K/UL (ref 150–350)
PMV BLD AUTO: 12.9 FL (ref 9.2–12.9)
POTASSIUM SERPL-SCNC: 4.6 MMOL/L (ref 3.5–5.1)
PROT SERPL-MCNC: 7.4 G/DL (ref 6–8.4)
RBC # BLD AUTO: 3.98 M/UL (ref 4–5.4)
SODIUM SERPL-SCNC: 137 MMOL/L (ref 136–145)
WBC # BLD AUTO: 10.89 K/UL (ref 3.9–12.7)

## 2019-11-04 PROCEDURE — 86140 C-REACTIVE PROTEIN: CPT

## 2019-11-04 PROCEDURE — 85025 COMPLETE CBC W/AUTO DIFF WBC: CPT

## 2019-11-04 PROCEDURE — 80053 COMPREHEN METABOLIC PANEL: CPT

## 2019-11-04 PROCEDURE — 85651 RBC SED RATE NONAUTOMATED: CPT | Mod: PO

## 2019-11-04 PROCEDURE — 36415 COLL VENOUS BLD VENIPUNCTURE: CPT | Mod: PO

## 2019-12-10 ENCOUNTER — CLINICAL SUPPORT (OUTPATIENT)
Dept: CARDIOLOGY | Facility: CLINIC | Age: 79
End: 2019-12-10
Payer: COMMERCIAL

## 2019-12-10 PROCEDURE — 93294 CARDIAC DEVICE CHECK - REMOTE: ICD-10-PCS | Mod: ,,, | Performed by: INTERNAL MEDICINE

## 2019-12-10 PROCEDURE — 93296 REM INTERROG EVL PM/IDS: CPT | Mod: PBBFAC,PO | Performed by: INTERNAL MEDICINE

## 2019-12-10 PROCEDURE — 93294 REM INTERROG EVL PM/LDLS PM: CPT | Mod: ,,, | Performed by: INTERNAL MEDICINE

## 2019-12-23 ENCOUNTER — TELEPHONE (OUTPATIENT)
Dept: RHEUMATOLOGY | Facility: CLINIC | Age: 79
End: 2019-12-23

## 2019-12-23 NOTE — TELEPHONE ENCOUNTER
Spoke to Jarrett KELLY In the PA department and he states it is too early to initiate a PA. He stated the earliest we could initiate was 12/29/19. Stated he would fax over the forms and we are to fax them back once signed.    Contacted pt and notified her of this and she verbalized understanding.

## 2019-12-23 NOTE — TELEPHONE ENCOUNTER
----- Message from Digna Lara sent at 12/23/2019 11:14 AM CST -----  Contact: self   Patient need atif authorization on prolia please call back at [phone]    Case number [id number]

## 2020-01-03 ENCOUNTER — TELEPHONE (OUTPATIENT)
Dept: RHEUMATOLOGY | Facility: CLINIC | Age: 80
End: 2020-01-03

## 2020-01-03 NOTE — TELEPHONE ENCOUNTER
----- Message from Garrett Ellis sent at 1/3/2020  9:38 AM CST -----  Contact: Pt  Pt called and needs Dr. Cruz to called her insurance company for pre-authorization for Prolia    Pt can be reached at [phone]    Insurance company # [phone]

## 2020-01-17 NOTE — TELEPHONE ENCOUNTER
----- Message from Cora Green sent at 1/17/2020  8:29 AM CST -----  Type:  RX Refill Request    Who Called:  patient  Refill or New Rx:  Refill   RX Name and Strength:  Methotrexate   How is the patient currently taking it? (ex. 1XDay):  See record  Is this a 30 day or 90 day RX:  90 day  Preferred Pharmacy with phone number:    [facility] - [place], [place] - [address] AT [address] [facility]  [address]  [facility] [address]  Phone: [phone] Fax: [phone]  Local or Mail Order:  Mail order  Ordering Provider:  Dr Christina Darling Call Back Number:  [phone]  Additional Information:  Please call patient to advise when sent

## 2020-01-20 RX ORDER — METHOTREXATE 2.5 MG/1
12.5 TABLET ORAL
Qty: 60 TABLET | Refills: 3 | Status: SHIPPED | OUTPATIENT
Start: 2020-01-20 | End: 2020-03-04 | Stop reason: SDUPTHER

## 2020-01-29 ENCOUNTER — LAB VISIT (OUTPATIENT)
Dept: LAB | Facility: HOSPITAL | Age: 80
End: 2020-01-29
Attending: INTERNAL MEDICINE
Payer: COMMERCIAL

## 2020-01-29 DIAGNOSIS — Z79.60 LONG-TERM USE OF IMMUNOSUPPRESSANT MEDICATION: ICD-10-CM

## 2020-01-29 DIAGNOSIS — M05.79 RHEUMATOID ARTHRITIS INVOLVING MULTIPLE SITES WITH POSITIVE RHEUMATOID FACTOR: ICD-10-CM

## 2020-01-29 LAB
ALBUMIN SERPL BCP-MCNC: 3.9 G/DL (ref 3.5–5.2)
ALP SERPL-CCNC: 38 U/L (ref 55–135)
ALT SERPL W/O P-5'-P-CCNC: 10 U/L (ref 10–44)
ANION GAP SERPL CALC-SCNC: 7 MMOL/L (ref 8–16)
AST SERPL-CCNC: 28 U/L (ref 10–40)
BASOPHILS # BLD AUTO: 0.06 K/UL (ref 0–0.2)
BASOPHILS NFR BLD: 0.8 % (ref 0–1.9)
BILIRUB SERPL-MCNC: 0.7 MG/DL (ref 0.1–1)
BUN SERPL-MCNC: 18 MG/DL (ref 8–23)
CALCIUM SERPL-MCNC: 9.8 MG/DL (ref 8.7–10.5)
CHLORIDE SERPL-SCNC: 103 MMOL/L (ref 95–110)
CO2 SERPL-SCNC: 29 MMOL/L (ref 23–29)
CREAT SERPL-MCNC: 1.2 MG/DL (ref 0.5–1.4)
CRP SERPL-MCNC: 0.6 MG/L (ref 0–8.2)
DIFFERENTIAL METHOD: ABNORMAL
EOSINOPHIL # BLD AUTO: 0.3 K/UL (ref 0–0.5)
EOSINOPHIL NFR BLD: 3.5 % (ref 0–8)
ERYTHROCYTE [DISTWIDTH] IN BLOOD BY AUTOMATED COUNT: 13.7 % (ref 11.5–14.5)
ERYTHROCYTE [SEDIMENTATION RATE] IN BLOOD BY WESTERGREN METHOD: 6 MM/HR (ref 0–20)
EST. GFR  (AFRICAN AMERICAN): 49.3 ML/MIN/1.73 M^2
EST. GFR  (NON AFRICAN AMERICAN): 42.8 ML/MIN/1.73 M^2
GLUCOSE SERPL-MCNC: 123 MG/DL (ref 70–110)
HCT VFR BLD AUTO: 45.1 % (ref 37–48.5)
HGB BLD-MCNC: 14.1 G/DL (ref 12–16)
IMM GRANULOCYTES # BLD AUTO: 0.01 K/UL (ref 0–0.04)
IMM GRANULOCYTES NFR BLD AUTO: 0.1 % (ref 0–0.5)
LYMPHOCYTES # BLD AUTO: 1.5 K/UL (ref 1–4.8)
LYMPHOCYTES NFR BLD: 19.9 % (ref 18–48)
MCH RBC QN AUTO: 33.8 PG (ref 27–31)
MCHC RBC AUTO-ENTMCNC: 31.3 G/DL (ref 32–36)
MCV RBC AUTO: 108 FL (ref 82–98)
MONOCYTES # BLD AUTO: 1.1 K/UL (ref 0.3–1)
MONOCYTES NFR BLD: 14.1 % (ref 4–15)
NEUTROPHILS # BLD AUTO: 4.7 K/UL (ref 1.8–7.7)
NEUTROPHILS NFR BLD: 61.6 % (ref 38–73)
NRBC BLD-RTO: 0 /100 WBC
PLATELET # BLD AUTO: 185 K/UL (ref 150–350)
PMV BLD AUTO: 13.3 FL (ref 9.2–12.9)
POTASSIUM SERPL-SCNC: 4.7 MMOL/L (ref 3.5–5.1)
PROT SERPL-MCNC: 7.2 G/DL (ref 6–8.4)
RBC # BLD AUTO: 4.17 M/UL (ref 4–5.4)
SODIUM SERPL-SCNC: 139 MMOL/L (ref 136–145)
WBC # BLD AUTO: 7.65 K/UL (ref 3.9–12.7)

## 2020-01-29 PROCEDURE — 86140 C-REACTIVE PROTEIN: CPT

## 2020-01-29 PROCEDURE — 85025 COMPLETE CBC W/AUTO DIFF WBC: CPT

## 2020-01-29 PROCEDURE — 36415 COLL VENOUS BLD VENIPUNCTURE: CPT | Mod: PO

## 2020-01-29 PROCEDURE — 85651 RBC SED RATE NONAUTOMATED: CPT | Mod: PO

## 2020-01-29 PROCEDURE — 80053 COMPREHEN METABOLIC PANEL: CPT

## 2020-01-31 DIAGNOSIS — M47.812 SPONDYLOSIS OF CERVICAL REGION WITHOUT MYELOPATHY OR RADICULOPATHY: ICD-10-CM

## 2020-01-31 DIAGNOSIS — E53.8 VITAMIN B12 DEFICIENCY: ICD-10-CM

## 2020-02-04 RX ORDER — FOLIC ACID 1 MG/1
2 TABLET ORAL DAILY
Qty: 180 TABLET | Refills: 3 | Status: SHIPPED | OUTPATIENT
Start: 2020-02-04 | End: 2020-03-04 | Stop reason: SDUPTHER

## 2020-02-13 ENCOUNTER — DOCUMENTATION ONLY (OUTPATIENT)
Dept: INFUSION THERAPY | Facility: HOSPITAL | Age: 80
End: 2020-02-13

## 2020-02-13 ENCOUNTER — INFUSION (OUTPATIENT)
Dept: INFUSION THERAPY | Facility: HOSPITAL | Age: 80
End: 2020-02-13
Attending: INTERNAL MEDICINE
Payer: COMMERCIAL

## 2020-02-13 VITALS
DIASTOLIC BLOOD PRESSURE: 75 MMHG | HEART RATE: 62 BPM | TEMPERATURE: 98 F | SYSTOLIC BLOOD PRESSURE: 115 MMHG | RESPIRATION RATE: 16 BRPM

## 2020-02-13 DIAGNOSIS — M81.0 AGE-RELATED OSTEOPOROSIS WITHOUT CURRENT PATHOLOGICAL FRACTURE: Primary | ICD-10-CM

## 2020-02-13 PROCEDURE — 63600175 PHARM REV CODE 636 W HCPCS: Mod: JG,PN | Performed by: INTERNAL MEDICINE

## 2020-02-13 PROCEDURE — 96372 THER/PROPH/DIAG INJ SC/IM: CPT | Mod: PN

## 2020-02-13 RX ADMIN — DENOSUMAB 60 MG: 60 INJECTION SUBCUTANEOUS at 11:02

## 2020-02-13 NOTE — PROGRESS NOTES
[2020 11:44 AM]    Abhijeet Bryson,  we have Juanis Mclaughlin [id number] here for prolia, the signature for Dr. Cruz  this AM is she available to sign?   pt has current lab work and CA level is WNL thanks           [2020 11:47 AM]  Adelaide Titus:    I will give verbal and get Anthony to sign later please  she is with patients     [2020 11:47 AM]    thanks

## 2020-02-18 ENCOUNTER — CLINICAL SUPPORT (OUTPATIENT)
Dept: CARDIOLOGY | Facility: CLINIC | Age: 80
End: 2020-02-18
Attending: INTERNAL MEDICINE
Payer: COMMERCIAL

## 2020-02-18 DIAGNOSIS — Z95.0 PACEMAKER: ICD-10-CM

## 2020-02-18 DIAGNOSIS — I49.5 SSS (SICK SINUS SYNDROME): ICD-10-CM

## 2020-03-04 ENCOUNTER — OFFICE VISIT (OUTPATIENT)
Dept: RHEUMATOLOGY | Facility: CLINIC | Age: 80
End: 2020-03-04
Payer: COMMERCIAL

## 2020-03-04 VITALS
BODY MASS INDEX: 28.88 KG/M2 | WEIGHT: 156.94 LBS | RESPIRATION RATE: 20 BRPM | DIASTOLIC BLOOD PRESSURE: 63 MMHG | SYSTOLIC BLOOD PRESSURE: 126 MMHG | HEART RATE: 59 BPM | HEIGHT: 62 IN

## 2020-03-04 DIAGNOSIS — Z79.60 LONG-TERM USE OF IMMUNOSUPPRESSANT MEDICATION: ICD-10-CM

## 2020-03-04 DIAGNOSIS — M47.812 SPONDYLOSIS OF CERVICAL REGION WITHOUT MYELOPATHY OR RADICULOPATHY: ICD-10-CM

## 2020-03-04 DIAGNOSIS — M05.79 RHEUMATOID ARTHRITIS INVOLVING MULTIPLE SITES WITH POSITIVE RHEUMATOID FACTOR: Primary | ICD-10-CM

## 2020-03-04 DIAGNOSIS — E53.8 VITAMIN B12 DEFICIENCY: ICD-10-CM

## 2020-03-04 PROCEDURE — 3074F SYST BP LT 130 MM HG: CPT | Mod: CPTII,S$GLB,, | Performed by: INTERNAL MEDICINE

## 2020-03-04 PROCEDURE — 99999 PR PBB SHADOW E&M-EST. PATIENT-LVL III: ICD-10-PCS | Mod: PBBFAC,,, | Performed by: INTERNAL MEDICINE

## 2020-03-04 PROCEDURE — 99214 PR OFFICE/OUTPT VISIT, EST, LEVL IV, 30-39 MIN: ICD-10-PCS | Mod: S$GLB,,, | Performed by: INTERNAL MEDICINE

## 2020-03-04 PROCEDURE — 99214 OFFICE O/P EST MOD 30 MIN: CPT | Mod: S$GLB,,, | Performed by: INTERNAL MEDICINE

## 2020-03-04 PROCEDURE — 3078F DIAST BP <80 MM HG: CPT | Mod: CPTII,S$GLB,, | Performed by: INTERNAL MEDICINE

## 2020-03-04 PROCEDURE — 1125F AMNT PAIN NOTED PAIN PRSNT: CPT | Mod: S$GLB,,, | Performed by: INTERNAL MEDICINE

## 2020-03-04 PROCEDURE — 1125F PR PAIN SEVERITY QUANTIFIED, PAIN PRESENT: ICD-10-PCS | Mod: S$GLB,,, | Performed by: INTERNAL MEDICINE

## 2020-03-04 PROCEDURE — 1101F PR PT FALLS ASSESS DOC 0-1 FALLS W/OUT INJ PAST YR: ICD-10-PCS | Mod: CPTII,S$GLB,, | Performed by: INTERNAL MEDICINE

## 2020-03-04 PROCEDURE — 1159F PR MEDICATION LIST DOCUMENTED IN MEDICAL RECORD: ICD-10-PCS | Mod: S$GLB,,, | Performed by: INTERNAL MEDICINE

## 2020-03-04 PROCEDURE — 1159F MED LIST DOCD IN RCRD: CPT | Mod: S$GLB,,, | Performed by: INTERNAL MEDICINE

## 2020-03-04 PROCEDURE — 1101F PT FALLS ASSESS-DOCD LE1/YR: CPT | Mod: CPTII,S$GLB,, | Performed by: INTERNAL MEDICINE

## 2020-03-04 PROCEDURE — 99999 PR PBB SHADOW E&M-EST. PATIENT-LVL III: CPT | Mod: PBBFAC,,, | Performed by: INTERNAL MEDICINE

## 2020-03-04 PROCEDURE — 3074F PR MOST RECENT SYSTOLIC BLOOD PRESSURE < 130 MM HG: ICD-10-PCS | Mod: CPTII,S$GLB,, | Performed by: INTERNAL MEDICINE

## 2020-03-04 PROCEDURE — 3078F PR MOST RECENT DIASTOLIC BLOOD PRESSURE < 80 MM HG: ICD-10-PCS | Mod: CPTII,S$GLB,, | Performed by: INTERNAL MEDICINE

## 2020-03-04 RX ORDER — FOLIC ACID 1 MG/1
2 TABLET ORAL DAILY
Qty: 180 TABLET | Refills: 3 | Status: SHIPPED | OUTPATIENT
Start: 2020-03-04 | End: 2021-05-03 | Stop reason: SDUPTHER

## 2020-03-04 RX ORDER — METHOTREXATE 2.5 MG/1
12.5 TABLET ORAL
Qty: 60 TABLET | Refills: 3 | Status: SHIPPED | OUTPATIENT
Start: 2020-03-04 | End: 2020-08-04 | Stop reason: SDUPTHER

## 2020-03-04 ASSESSMENT — ROUTINE ASSESSMENT OF PATIENT INDEX DATA (RAPID3)
TOTAL RAPID3 SCORE: 3.22
PSYCHOLOGICAL DISTRESS SCORE: 1.1
PATIENT GLOBAL ASSESSMENT SCORE: 3
MDHAQ FUNCTION SCORE: .8
PAIN SCORE: 4

## 2020-03-04 NOTE — PROGRESS NOTES
AS Subjective:          Chief Complaint: Juanis Mclaughlin is a 79 y.o. female who had concerns including Disease Management.    HPI:      Rheumatoid Arthritis   79y/o  female with h/o HTN, HLD was diagnosed with sero +  Rheumatoid arthritis in 1998. Off prednisone now. Has also received HCQ in the past no improvement.     Presently on MTX 4 weekly and Enbrel 50mg weekly    decreased MTX to 4 tabs (due to transaminitis) but + joint pain , so back on MTX 5 tabs weekly.     But having flare in hands of arthritis. + stiffness > 1 hr.   Pain in the DIP and PIP joints.    No ASE of stomatitis,  infections or injection site reaction.   She was also diagnosed with secondary Sjogren's. Currently on restasis.  PPM for bradycardia.     Past 12 months slow decline in renal function from prior year- on PO bisphos and ACEI/HCTZ can switch this to Prolia.  Started Prolia 02/29/2019 she had another injection as of 07/20/2019 tolerated well no adverse side effects renal function stable.    Cervical spasm left sided with headache present >3 months nearly constants. No numbness or tingling in hands/arms no weakness. Tried IR, stretching, heat little benefit. \  Did have trigger point injections brief relief. Using infrared therapy now.  She is now going to PT    She denies fever, weight loss, photosensitivity, rash, ulcer, raynaud's phenomenon, alopecia, dysphagia, diarrhea or blood in the stools.    + hx of GERD. On PPI. Hx of mild MR. No pedal edema.     Doing well with Dr. Madison with Breo and Gabapentin to relax cough impulse.  As of this visit 02/29/2019 patient was having upper respiratory tract infection x3 weeks seem to be feeling better within 10 days ago returned with a more productive cough postnasal drip rhinitis    REVIEW OF SYSTEMS:    Review of Systems   Constitutional: Negative for fever, malaise/fatigue and weight loss.   HENT: Negative for sore throat.    Eyes: Negative for double vision, photophobia and  redness.   Respiratory: Positive for cough and sputum production. Negative for hemoptysis, shortness of breath and wheezing.    Cardiovascular: Negative for chest pain, palpitations and orthopnea.   Gastrointestinal: Negative for abdominal pain, constipation and diarrhea.   Genitourinary: Negative for dysuria, hematuria and urgency.   Musculoskeletal: Positive for joint pain. Negative for back pain and myalgias.   Skin: Negative for rash.   Neurological: Negative for dizziness, tingling, focal weakness and headaches.   Endo/Heme/Allergies: Does not bruise/bleed easily.   Psychiatric/Behavioral: Negative for depression, hallucinations and suicidal ideas.                Objective:            Past Medical History:   Diagnosis Date    Arthritis     rheumatoid arthritis    Cardiomyopathy     CKD (chronic kidney disease) stage 3, GFR 30-59 ml/min 1/9/2019    Diverticulosis     DVT (deep venous thrombosis)     one time 5 years ago    Essential hypertension 11/24/2013    GERD (gastroesophageal reflux disease)     Glaucoma     sees Dr. Gillette    Hyperlipidemia     Hypertension     Iron deficiency     Iron deficiency anemia 10/21/2016     IMO LOAD Q4    Long-term use of immunosuppressant medication 7/27/2015    Mitral regurgitation 12/20/2013 12/13 mild     Osteoporosis 7/9/2014    Pacemaker 10/26/2016    left chest [employer]AKER [employer] [address] [facility]  [id number] Bonifacio Fuller 9/9/2016 - current   right atrium LEAD [employer] [address] [employer] [facility] [id number] Bonifacio Fuller 9/9/2016 - current   right ventricle LEAD [employer] [address] [employer] [facility] [id number] Bonifacio Fuller 9/9/2016 - current      PONV (postoperative nausea and vomiting)     Rheumatoid arthritis involving multiple sites with positive rheumatoid factor 11/24/2013    Vitamin B12 deficiency 2/28/2015     Family History   Problem Relation Age of Onset    Heart disease Mother         CHF     Hypertension Mother     Heart disease Father     Stroke Father     Heart disease Brother     Stroke Brother     Heart disease Brother     Scleroderma Brother     Ovarian cancer Maternal Aunt 34    Breast cancer Neg Hx      Social History     Tobacco Use    Smoking status: Never Smoker    Smokeless tobacco: Never Used   Substance Use Topics    Alcohol use: Yes     Alcohol/week: 0.8 standard drinks     Types: 1 Standard drinks or equivalent per week     Comment: occ social    Drug use: No         Current Outpatient Medications on File Prior to Visit   Medication Sig Dispense Refill    albuterol (PROVENTIL/VENTOLIN HFA) 90 mcg/actuation inhaler Inhale 2 puffs into the lungs every 6 (six) hours as needed for Wheezing. Rescue 1 Inhaler 0    alpha-1 proteinase inhib.,hum, (PROLASTIN-C) 1,000 mg (+/-)/20 mL Soln Inject into the vein.      amoxicillin (AMOXIL) 500 MG Tab Take 1 tablet (500 mg total) by mouth every 12 (twelve) hours. for 10 days 20 tablet 0    aspirin (ECOTRIN) 81 MG EC tablet Take 81 mg by mouth once daily.      atorvastatin (LIPITOR) 20 MG tablet Take 1 tablet (20 mg total) by mouth every evening. 90 tablet 1    B-complex with vitamin C (Z-BEC OR EQUIV) tablet Take 1 tablet by mouth once daily.      brimonidine-timolol (COMBIGAN) 0.2-0.5 % Drop Place 1 drop into both eyes 2 (two) times daily.      calcium-vitamin D3 500 mg(1,250mg) -200 unit per tablet Take 1 tablet by mouth once daily.      diclofenac sodium (VOLTAREN) 1 % Gel Apply 2 g topically 2 (two) times daily as needed. 200 g 3    DIETARY SUPPLEMENT ORAL Take by mouth.      dorzolamide-timolol, PF, (COSOPT, PF,) 2-0.5 % Dpet ophthalmic solution Cosopt (PF) 2 %-0.5 % eye drops in a dropperette   INSTILL 1 DROP INTO AFFECTED EYE(S) BY OPHTHALMIC ROUTE 2 TIMES PER DAY      estradiol (ESTRACE) 0.01 % (0.1 mg/gram) vaginal cream PLACE 1 GRAM VAGINALLY ONCEDAILY 127.5 g 2    etanercept (ENBREL SURECLICK) 50 mg/mL (0.98 mL) PnIj  Inject 50 mg into the skin once a week. 4 Syringe 11    folic acid (FOLVITE) 1 MG tablet Take 2 tablets (2 mg total) by mouth once daily. 180 tablet 3    hydrocortisone 2.5 % ointment MIX WITH AQUAPHOR OR VASELINE AND APPLY TO AFFECTED AREA as needed  1    losartan-hydrochlorothiazide 50-12.5 mg (HYZAAR) 50-12.5 mg per tablet 1/2 tab am 90 tablet 5    magnesium 200 mg Tab Take 400 mg by mouth once daily. Magnesium 400mg with Chelated Zinc 15mg.      methotrexate 2.5 MG Tab Take 5 tablets (12.5 mg total) by mouth every 7 days. 60 tablet 3    metoprolol succinate (TOPROL-XL) 50 MG 24 hr tablet Take 1 tablet (50 mg total) by mouth once daily. 90 tablet 6    pantoprazole (PROTONIX) 40 MG tablet Take 1 tablet (40 mg total) by mouth once daily. 90 tablet 2    potassium gluconate 595 (99) mg Tab Take 1 capsule by mouth once daily.      traMADol (ULTRAM) 50 mg tablet Take 50 mg by mouth every 6 (six) hours as needed for Pain.      travoprost (TRAVATAN Z) 0.004 % ophthalmic solution Travatan Z 0.004 % eye drops   INSTILL 1 DROP INTO AFFECTED EYE(S) BY OPHTHALMIC ROUTE ONCE DAILY INTHE EVENING      XIIDRA 5 % Dpet Place 1 drop into both eyes 2 (two) times daily.       DYMISTA 137-50 mcg/spray Spry nassal spray INHALE 1 SPRAY BY NASAL ROUTE 2 TIMES A DAY as needed  4     No current facility-administered medications on file prior to visit.        Vitals:    03/04/20 1418   BP: 126/63   Pulse: (!) 59   Resp: 20       Physical Exam:    Physical Exam   Constitutional: She appears well-developed and well-nourished.   HENT:   Nose: No septal deviation.   Mouth/Throat: Mucous membranes are normal. No oral lesions.   Eyes: Pupils are equal, round, and reactive to light. Right conjunctiva is not injected. Left conjunctiva is not injected.   Neck: No JVD present. No thyroid mass and no thyromegaly present.   Cardiovascular: Normal rate, regular rhythm and normal pulses.   No edema   Pulmonary/Chest: Effort normal. She has  wheezes in the right middle field and the left middle field.   Abdominal: Soft. Normal appearance. There is no hepatosplenomegaly.   Musculoskeletal:        Right shoulder: She exhibits normal range of motion, no tenderness and no swelling.        Left shoulder: She exhibits normal range of motion, no tenderness and no swelling.        Right elbow: She exhibits normal range of motion and no swelling. No tenderness found.        Left elbow: She exhibits normal range of motion and no swelling. No tenderness found.        Right wrist: She exhibits decreased range of motion and tenderness. She exhibits no swelling.        Left wrist: She exhibits normal range of motion, no tenderness and no swelling.        Right hip: She exhibits normal range of motion, normal strength and no swelling.        Left hip: She exhibits normal range of motion, no tenderness and no swelling.        Right knee: She exhibits normal range of motion and no swelling. No tenderness found.        Left knee: She exhibits normal range of motion and no swelling. No tenderness found.        Right ankle: She exhibits normal range of motion and no swelling. No tenderness.        Left ankle: She exhibits normal range of motion and no swelling. No tenderness.        Cervical back: She exhibits decreased range of motion.        Right hand: She exhibits decreased range of motion and tenderness.        Left hand: She exhibits tenderness.   1+ synovitis MCP and PIP with heberden nodes all DIP 2-5. Left wrist with tenderness and swelling about the TFCC. No nodules. No flexion contractures.      Cervical with left sided point tenderness and the occiput (nuchal ridge) lesser tenderness at the cervical paraspinals. Decreased ROM in cervical spine   Lymphadenopathy:     She has no cervical adenopathy.     She has no axillary adenopathy.   Neurological: She has normal strength and normal reflexes.   Skin: Skin is dry and intact.   Psychiatric: She has a normal mood  and affect.             Assessment:       Encounter Diagnoses   Name Primary?    Rheumatoid arthritis involving multiple sites with positive rheumatoid factor Yes    Long-term use of immunosuppressant medication     Vitamin B12 deficiency     Spondylosis of cervical region without myelopathy or radiculopathy           Plan:        Rheumatoid arthritis involving multiple sites with positive rheumatoid factor  -     CBC auto differential; Standing; Expected date: 03/04/2020  -     Comprehensive metabolic panel; Standing; Expected date: 03/04/2020  -     Sedimentation rate; Standing; Expected date: 03/04/2020  -     C-reactive protein; Standing; Expected date: 03/04/2020    Long-term use of immunosuppressant medication  -     CBC auto differential; Standing; Expected date: 03/04/2020  -     Comprehensive metabolic panel; Standing; Expected date: 03/04/2020  -     Sedimentation rate; Standing; Expected date: 03/04/2020  -     C-reactive protein; Standing; Expected date: 03/04/2020    Vitamin B12 deficiency  -     folic acid (FOLVITE) 1 MG tablet; Take 2 tablets (2 mg total) by mouth once daily.  Dispense: 180 tablet; Refill: 3    Spondylosis of cervical region without myelopathy or radiculopathy  -     folic acid (FOLVITE) 1 MG tablet; Take 2 tablets (2 mg total) by mouth once daily.  Dispense: 180 tablet; Refill: 3    Other orders  -     methotrexate 2.5 MG Tab; Take 5 tablets (12.5 mg total) by mouth every 7 days.  Dispense: 60 tablet; Refill: 3     RA:    -MTX increase back to 5 tabs weekly- 90 day supply   -continue folic acid 2mg dialy   -Continue Enbrel needs 90 day   -voltaren gel PRN fingers/hands-using sparingly   -Labs q 3 months.    Neck Pain:    -perisistent   -still working with laser   -now with PT>     Osteoporosis   DC actonel   -doing great on Prolia.      -DC Risedronate and start Prolia. 2/2019 no ASE.      Follow up in about 4 months (around 7/4/2020).          30min consultation with ander  than 50% spent in counseling, chart review and coordination of care. All questions answered.

## 2020-03-09 ENCOUNTER — TELEPHONE (OUTPATIENT)
Dept: CARDIOLOGY | Facility: CLINIC | Age: 80
End: 2020-03-09

## 2020-03-09 DIAGNOSIS — I10 ESSENTIAL HYPERTENSION: Primary | ICD-10-CM

## 2020-03-10 NOTE — TELEPHONE ENCOUNTER
----- Message from Bethany Jennings sent at 3/10/2020 11:35 AM CDT -----  Contact: Katie with [facility] - Meds on BACKORDER###  Type: Needs Medical Advice  Who Called:  Katie with [facility]   Best Call Back Number: [phone] REF#: [id number]  Additional Information: Katie need advisement due to a patient's meds are on back order. Please call back and advise.

## 2020-03-11 RX ORDER — VALSARTAN AND HYDROCHLOROTHIAZIDE 160; 12.5 MG/1; MG/1
0.5 TABLET, FILM COATED ORAL DAILY
Qty: 45 TABLET | Refills: 4 | Status: SHIPPED | OUTPATIENT
Start: 2020-03-11 | End: 2020-03-24

## 2020-03-16 ENCOUNTER — TELEPHONE (OUTPATIENT)
Dept: CARDIOLOGY | Facility: CLINIC | Age: 80
End: 2020-03-16

## 2020-03-16 NOTE — TELEPHONE ENCOUNTER
----- Message from Lidia Malone sent at 3/16/2020 11:26 AM CDT -----  Contact: Melina with [facility]  Type:  Pharmacy Calling to Clarify an RX    Name of Caller:  Melina   Pharmacy Name:  [facility]  Prescription Name:  Valsartan 160-12.5 mg  What do they need to clarify?:  Allergy alert  Best Call Back Number:  [phone] ref #[id number]  Additional Information:  Melina needs to confirm ok to fill. Order will be on hold until a response is received. Please call Melina. Thanks!

## 2020-03-24 ENCOUNTER — TELEPHONE (OUTPATIENT)
Dept: CARDIOLOGY | Facility: CLINIC | Age: 80
End: 2020-03-24

## 2020-03-24 RX ORDER — CANDESARTAN 16 MG/1
16 TABLET ORAL DAILY
Qty: 90 TABLET | Refills: 3 | Status: SHIPPED | OUTPATIENT
Start: 2020-03-24 | End: 2020-08-18 | Stop reason: SDUPTHER

## 2020-03-24 NOTE — TELEPHONE ENCOUNTER
----- Message from Josie Barillas sent at 3/24/2020 11:50 AM CDT -----  Contact: pt [phone]  Patient called she received  A letter from [facility] pharmacy they will no have the Lorsartin 50 mg  they are asking if you will switch the medication for the patient.    Call back [phone]  Patient states the pharmacy  Has sent a request to your office as well.

## 2020-04-14 DIAGNOSIS — M05.79 RHEUMATOID ARTHRITIS INVOLVING MULTIPLE SITES WITH POSITIVE RHEUMATOID FACTOR: Primary | ICD-10-CM

## 2020-04-14 NOTE — TELEPHONE ENCOUNTER
Informed patient that [facility] received prescription for Enbrel and prior authorization is required.  [facility] will be back in touch once insurance determination is received.   - Resolved, continue to monitor.   - Given 500 cc NS x 1 today   - Landrum discontinued, will monitor urine output

## 2020-04-29 ENCOUNTER — LAB VISIT (OUTPATIENT)
Dept: LAB | Facility: HOSPITAL | Age: 80
End: 2020-04-29
Attending: INTERNAL MEDICINE
Payer: COMMERCIAL

## 2020-04-29 DIAGNOSIS — M05.79 RHEUMATOID ARTHRITIS INVOLVING MULTIPLE SITES WITH POSITIVE RHEUMATOID FACTOR: ICD-10-CM

## 2020-04-29 DIAGNOSIS — Z79.60 LONG-TERM USE OF IMMUNOSUPPRESSANT MEDICATION: ICD-10-CM

## 2020-04-29 LAB
ALBUMIN SERPL BCP-MCNC: 4 G/DL (ref 3.5–5.2)
ALP SERPL-CCNC: 40 U/L (ref 55–135)
ALT SERPL W/O P-5'-P-CCNC: 8 U/L (ref 10–44)
ANION GAP SERPL CALC-SCNC: 8 MMOL/L (ref 8–16)
AST SERPL-CCNC: 26 U/L (ref 10–40)
BASOPHILS # BLD AUTO: 0.07 K/UL (ref 0–0.2)
BASOPHILS NFR BLD: 0.8 % (ref 0–1.9)
BILIRUB SERPL-MCNC: 0.7 MG/DL (ref 0.1–1)
BUN SERPL-MCNC: 13 MG/DL (ref 8–23)
CALCIUM SERPL-MCNC: 10.5 MG/DL (ref 8.7–10.5)
CHLORIDE SERPL-SCNC: 102 MMOL/L (ref 95–110)
CO2 SERPL-SCNC: 30 MMOL/L (ref 23–29)
CREAT SERPL-MCNC: 1.2 MG/DL (ref 0.5–1.4)
CRP SERPL-MCNC: 0.7 MG/L (ref 0–8.2)
DIFFERENTIAL METHOD: ABNORMAL
EOSINOPHIL # BLD AUTO: 0.3 K/UL (ref 0–0.5)
EOSINOPHIL NFR BLD: 3.8 % (ref 0–8)
ERYTHROCYTE [DISTWIDTH] IN BLOOD BY AUTOMATED COUNT: 13.7 % (ref 11.5–14.5)
ERYTHROCYTE [SEDIMENTATION RATE] IN BLOOD BY WESTERGREN METHOD: 8 MM/HR (ref 0–20)
EST. GFR  (AFRICAN AMERICAN): 49.3 ML/MIN/1.73 M^2
EST. GFR  (NON AFRICAN AMERICAN): 42.8 ML/MIN/1.73 M^2
GLUCOSE SERPL-MCNC: 123 MG/DL (ref 70–110)
HCT VFR BLD AUTO: 44.1 % (ref 37–48.5)
HGB BLD-MCNC: 14.1 G/DL (ref 12–16)
IMM GRANULOCYTES # BLD AUTO: 0.02 K/UL (ref 0–0.04)
IMM GRANULOCYTES NFR BLD AUTO: 0.2 % (ref 0–0.5)
LYMPHOCYTES # BLD AUTO: 1.8 K/UL (ref 1–4.8)
LYMPHOCYTES NFR BLD: 20.5 % (ref 18–48)
MCH RBC QN AUTO: 33.7 PG (ref 27–31)
MCHC RBC AUTO-ENTMCNC: 32 G/DL (ref 32–36)
MCV RBC AUTO: 106 FL (ref 82–98)
MONOCYTES # BLD AUTO: 1 K/UL (ref 0.3–1)
MONOCYTES NFR BLD: 11.8 % (ref 4–15)
NEUTROPHILS # BLD AUTO: 5.4 K/UL (ref 1.8–7.7)
NEUTROPHILS NFR BLD: 62.9 % (ref 38–73)
NRBC BLD-RTO: 0 /100 WBC
PLATELET # BLD AUTO: 217 K/UL (ref 150–350)
PMV BLD AUTO: 13.1 FL (ref 9.2–12.9)
POTASSIUM SERPL-SCNC: 4.7 MMOL/L (ref 3.5–5.1)
PROT SERPL-MCNC: 7.5 G/DL (ref 6–8.4)
RBC # BLD AUTO: 4.18 M/UL (ref 4–5.4)
SODIUM SERPL-SCNC: 140 MMOL/L (ref 136–145)
WBC # BLD AUTO: 8.62 K/UL (ref 3.9–12.7)

## 2020-04-29 PROCEDURE — 36415 COLL VENOUS BLD VENIPUNCTURE: CPT | Mod: PO

## 2020-04-29 PROCEDURE — 85025 COMPLETE CBC W/AUTO DIFF WBC: CPT

## 2020-04-29 PROCEDURE — 86140 C-REACTIVE PROTEIN: CPT

## 2020-04-29 PROCEDURE — 85651 RBC SED RATE NONAUTOMATED: CPT | Mod: PO

## 2020-04-29 PROCEDURE — 80053 COMPREHEN METABOLIC PANEL: CPT

## 2020-06-07 ENCOUNTER — CLINICAL SUPPORT (OUTPATIENT)
Dept: CARDIOLOGY | Facility: CLINIC | Age: 80
End: 2020-06-07
Payer: COMMERCIAL

## 2020-06-07 DIAGNOSIS — Z95.0 PRESENCE OF CARDIAC PACEMAKER: ICD-10-CM

## 2020-06-07 PROCEDURE — 93296 REM INTERROG EVL PM/IDS: CPT | Mod: PBBFAC,PO | Performed by: INTERNAL MEDICINE

## 2020-06-07 PROCEDURE — 93294 CARDIAC DEVICE CHECK - REMOTE: ICD-10-PCS | Mod: ,,, | Performed by: INTERNAL MEDICINE

## 2020-06-07 PROCEDURE — 93294 REM INTERROG EVL PM/LDLS PM: CPT | Mod: ,,, | Performed by: INTERNAL MEDICINE

## 2020-07-15 ENCOUNTER — OFFICE VISIT (OUTPATIENT)
Dept: OBSTETRICS AND GYNECOLOGY | Facility: CLINIC | Age: 80
End: 2020-07-15
Payer: COMMERCIAL

## 2020-07-15 VITALS
DIASTOLIC BLOOD PRESSURE: 92 MMHG | HEIGHT: 62 IN | SYSTOLIC BLOOD PRESSURE: 158 MMHG | BODY MASS INDEX: 27.92 KG/M2 | WEIGHT: 151.69 LBS

## 2020-07-15 DIAGNOSIS — Z91.89 GYN EXAM FOR HIGH-RISK MEDICARE PATIENT: Primary | ICD-10-CM

## 2020-07-15 DIAGNOSIS — Z79.890 HORMONE REPLACEMENT THERAPY (HRT): ICD-10-CM

## 2020-07-15 PROCEDURE — 99999 PR PBB SHADOW E&M-EST. PATIENT-LVL IV: ICD-10-PCS | Mod: PBBFAC,,, | Performed by: SPECIALIST

## 2020-07-15 PROCEDURE — 99999 PR PBB SHADOW E&M-EST. PATIENT-LVL IV: CPT | Mod: PBBFAC,,, | Performed by: SPECIALIST

## 2020-07-15 PROCEDURE — 99397 PR PREVENTIVE VISIT,EST,65 & OVER: ICD-10-PCS | Mod: S$GLB,,, | Performed by: SPECIALIST

## 2020-07-15 PROCEDURE — 99397 PER PM REEVAL EST PAT 65+ YR: CPT | Mod: S$GLB,,, | Performed by: SPECIALIST

## 2020-07-15 RX ORDER — ESTRADIOL 0.1 MG/G
CREAM VAGINAL
Qty: 127.5 G | Refills: 2 | Status: SHIPPED | OUTPATIENT
Start: 2020-07-15 | End: 2022-12-19

## 2020-07-15 NOTE — PROGRESS NOTES
80 yo WF presents for annual gyn assessment and continued ERT management  Past Medical History:   Diagnosis Date    Arthritis     rheumatoid arthritis    Cardiomyopathy     CKD (chronic kidney disease) stage 3, GFR 30-59 ml/min 1/9/2019    Diverticulosis     DVT (deep venous thrombosis)     one time 5 years ago    Essential hypertension 11/24/2013    GERD (gastroesophageal reflux disease)     Glaucoma     sees Dr. Gillette    Hyperlipidemia     Hypertension     Iron deficiency     Iron deficiency anemia 10/21/2016     IMO LOAD Q4    Long-term use of immunosuppressant medication 7/27/2015    Mitral regurgitation 12/20/2013 12/13 mild     Osteoporosis 7/9/2014    Pacemaker 10/26/2016    left chest [employer] [address] [id number] Bonifacio Fuller 9/9/2016 - current   right atrium LEAD [employer] [address] [employer] [facility] [id number] Bonifacio Fuller 9/9/2016 - current   right ventricle LEAD [employer] [address] [employer] [facility] [id number] Bonifacio Fuller 9/9/2016 - current      PONV (postoperative nausea and vomiting)     Rheumatoid arthritis involving multiple sites with positive rheumatoid factor 11/24/2013    Vitamin B12 deficiency 2/28/2015       Past Surgical History:   Procedure Laterality Date    BREAST CYST EXCISION Bilateral     several cysts removed    COLONOSCOPY  2012    FOOT SURGERY      had neuorma and also required hardware    HYSTERECTOMY      SUJATA with BSO    INCONTINENCE SURGERY      KNEE ARTHROSCOPY W/ DEBRIDEMENT      OOPHORECTOMY  2012    pace maker  09/2016       Family History   Problem Relation Age of Onset    Heart disease Mother         CHF    Hypertension Mother     Heart disease Father     Stroke Father     Heart disease Brother     Stroke Brother     Heart disease Brother     Scleroderma Brother     Ovarian cancer Maternal Aunt 34    Breast cancer Neg Hx        Social History     Socioeconomic History     Marital status:      Spouse name: Not on file    Number of children: 3    Years of education: Not on file    Highest education level: Not on file   Occupational History    Not on file   Social Needs    Financial resource strain: Not on file    Food insecurity     Worry: Not on file     Inability: Not on file    Transportation needs     Medical: Not on file     Non-medical: Not on file   Tobacco Use    Smoking status: Never Smoker    Smokeless tobacco: Never Used   Substance and Sexual Activity    Alcohol use: Yes     Alcohol/week: 0.8 standard drinks     Types: 1 Standard drinks or equivalent per week     Comment: occ social    Drug use: No    Sexual activity: Never     Birth control/protection: See Surgical Hx   Lifestyle    Physical activity     Days per week: Not on file     Minutes per session: Not on file    Stress: Not on file   Relationships    Social connections     Talks on phone: Not on file     Gets together: Not on file     Attends Zoroastrianism service: Not on file     Active member of club or organization: Not on file     Attends meetings of clubs or organizations: Not on file     Relationship status: Not on file   Other Topics Concern    Not on file   Social History Narrative    Not on file       Current Outpatient Medications   Medication Sig Dispense Refill    alpha-1 proteinase inhib.,hum, (PROLASTIN-C) 1,000 mg (+/-)/20 mL Soln Inject into the vein.      aspirin (ECOTRIN) 81 MG EC tablet Take 81 mg by mouth once daily.      atorvastatin (LIPITOR) 20 MG tablet Take 1 tablet (20 mg total) by mouth every evening. 90 tablet 1    B-complex with vitamin C (Z-BEC OR EQUIV) tablet Take 1 tablet by mouth once daily.      brimonidine-timolol (COMBIGAN) 0.2-0.5 % Drop Place 1 drop into both eyes 2 (two) times daily.      calcium-vitamin D3 500 mg(1,250mg) -200 unit per tablet Take 1 tablet by mouth once daily.      candesartan (ATACAND) 16 MG tablet Take 1 tablet (16 mg total) by  mouth once daily. 90 tablet 3    diclofenac sodium (VOLTAREN) 1 % Gel Apply 2 g topically 2 (two) times daily as needed. 200 g 3    DIETARY SUPPLEMENT ORAL Take by mouth.      dorzolamide-timolol, PF, (COSOPT, PF,) 2-0.5 % Dpet ophthalmic solution Cosopt (PF) 2 %-0.5 % eye drops in a dropperette   INSTILL 1 DROP INTO AFFECTED EYE(S) BY OPHTHALMIC ROUTE 2 TIMES PER DAY      DYMISTA 137-50 mcg/spray Spry nassal spray INHALE 1 SPRAY BY NASAL ROUTE 2 TIMES A DAY as needed  4    entanercept (ENBREL) 50 mg/mL injection Inject 1 mL (50 mg total) into the skin once a week. 4 Syringe 11    estradiol (ESTRACE) 0.01 % (0.1 mg/gram) vaginal cream PLACE 1 GRAM VAGINALLY ONCEDAILY (Patient taking differently: every 7 days. PLACE 1 GRAM VAGINALLY ONCEDAILY) 127.5 g 2    folic acid (FOLVITE) 1 MG tablet Take 2 tablets (2 mg total) by mouth once daily. 180 tablet 3    hydrocortisone 2.5 % ointment MIX WITH AQUAPHOR OR VASELINE AND APPLY TO AFFECTED AREA as needed  1    magnesium 200 mg Tab Take 400 mg by mouth once daily. Magnesium 400mg with Chelated Zinc 15mg.      methotrexate 2.5 MG Tab Take 5 tablets (12.5 mg total) by mouth every 7 days. 60 tablet 3    metoprolol succinate (TOPROL-XL) 50 MG 24 hr tablet Take 1 tablet (50 mg total) by mouth once daily. 90 tablet 6    pantoprazole (PROTONIX) 40 MG tablet Take 1 tablet (40 mg total) by mouth once daily. (Patient taking differently: Take 40 mg by mouth 2 (two) times daily. ) 90 tablet 2    potassium gluconate 595 (99) mg Tab Take 1 capsule by mouth once daily.      travoprost (TRAVATAN Z) 0.004 % ophthalmic solution Travatan Z 0.004 % eye drops   INSTILL 1 DROP INTO AFFECTED EYE(S) BY OPHTHALMIC ROUTE ONCE DAILY INTHE EVENING      XIIDRA 5 % Dpet Place 1 drop into both eyes 2 (two) times daily.        No current facility-administered medications for this visit.        Review of patient's allergies indicates:   Allergen Reactions    Ace inhibitors Other (See  Comments)     cough       Review of System:   General: no chills, fever, night sweats, weight gain or weight loss  Psychological: no depression or suicidal ideation  Breasts: no new or changing breast lumps, nipple discharge or masses.  Respiratory: no cough, shortness of breath, or wheezing  Cardiovascular: no chest pain or dyspnea on exertion  Gastrointestinal: no abdominal pain, change in bowel habits, or black or bloody stools  Genito-Urinary: no incontinence, urinary frequency/urgency or vulvar/vaginal symptoms, pelvic pain or abnormal vaginal bleeding.  Musculoskeletal: no gait disturbance or muscular weakness    PE:   APPEARANCE: Well nourished, well developed, in no acute distress.  NECK: Neck symmetric without masses or thyromegaly.  NODES: No inguinal lymph node enlargement.  ABDOMEN: Soft. No tenderness or masses. No hepatosplenomegaly. No hernias.  BREASTS: Symmetrical, no skin changes or visible lesions. No palpable masses, nipple discharge or adenopathy bilaterally.  PELVIC: Normal external female genitalia without lesions. Normal hair distribution. Adequate perineal body, normal urethral meatus. Vagina moist and well rugated without lesions or discharge. No significant cystocele or rectocele. Uterus and cervix surgically absent. Bimanual exam revealed no masses, tenderness or abnormality.      COUNSELING:  Discussed risks and benefits of ERT.  Will refill and continue.  The patient was counseled today on osteoporosis prevention, calcium supplementation, and regular weight bearing exercise. The patient was also counseled today on ACS PAP guidelines, with recommendations for screening PAP smear age 21-65 every 3-5 yearsunless their uterus, cervix, and ovaries were removed for benign reasons. Screening with HPV testing ages 30-65 every 5 years as an alternative. The patient was also counseled regarding monthly breast self-examination, routine STD screening for at-risk populations, prophylactic  immunizations for transmitted infections such as HPV, Pertussis, or Influenza as appropriate, and yearly mammograms when indicated by ACS guidelines. She was advised to see her primary care physician for all other health maintenance.   FOLLOW-UP with me for next routine visit.

## 2020-07-21 PROBLEM — J98.01 BRONCHOSPASM: Status: ACTIVE | Noted: 2020-07-21

## 2020-07-21 PROBLEM — K22.2 ESOPHAGEAL STRICTURE: Status: ACTIVE | Noted: 2020-07-21

## 2020-08-04 ENCOUNTER — OFFICE VISIT (OUTPATIENT)
Dept: RHEUMATOLOGY | Facility: CLINIC | Age: 80
End: 2020-08-04
Payer: COMMERCIAL

## 2020-08-04 VITALS
BODY MASS INDEX: 28.79 KG/M2 | DIASTOLIC BLOOD PRESSURE: 82 MMHG | HEART RATE: 60 BPM | HEIGHT: 62 IN | SYSTOLIC BLOOD PRESSURE: 155 MMHG | WEIGHT: 156.44 LBS

## 2020-08-04 DIAGNOSIS — M05.79 RHEUMATOID ARTHRITIS INVOLVING MULTIPLE SITES WITH POSITIVE RHEUMATOID FACTOR: Primary | ICD-10-CM

## 2020-08-04 DIAGNOSIS — D84.9 IMMUNOCOMPROMISED: ICD-10-CM

## 2020-08-04 DIAGNOSIS — M47.812 CERVICAL SPONDYLOSIS: ICD-10-CM

## 2020-08-04 PROCEDURE — 1125F AMNT PAIN NOTED PAIN PRSNT: CPT | Mod: S$GLB,,, | Performed by: INTERNAL MEDICINE

## 2020-08-04 PROCEDURE — 99999 PR PBB SHADOW E&M-EST. PATIENT-LVL V: ICD-10-PCS | Mod: PBBFAC,,, | Performed by: INTERNAL MEDICINE

## 2020-08-04 PROCEDURE — 1159F PR MEDICATION LIST DOCUMENTED IN MEDICAL RECORD: ICD-10-PCS | Mod: S$GLB,,, | Performed by: INTERNAL MEDICINE

## 2020-08-04 PROCEDURE — 1159F MED LIST DOCD IN RCRD: CPT | Mod: S$GLB,,, | Performed by: INTERNAL MEDICINE

## 2020-08-04 PROCEDURE — 1101F PR PT FALLS ASSESS DOC 0-1 FALLS W/OUT INJ PAST YR: ICD-10-PCS | Mod: CPTII,S$GLB,, | Performed by: INTERNAL MEDICINE

## 2020-08-04 PROCEDURE — 1125F PR PAIN SEVERITY QUANTIFIED, PAIN PRESENT: ICD-10-PCS | Mod: S$GLB,,, | Performed by: INTERNAL MEDICINE

## 2020-08-04 PROCEDURE — 99214 PR OFFICE/OUTPT VISIT, EST, LEVL IV, 30-39 MIN: ICD-10-PCS | Mod: S$GLB,,, | Performed by: INTERNAL MEDICINE

## 2020-08-04 PROCEDURE — 1101F PT FALLS ASSESS-DOCD LE1/YR: CPT | Mod: CPTII,S$GLB,, | Performed by: INTERNAL MEDICINE

## 2020-08-04 PROCEDURE — 99999 PR PBB SHADOW E&M-EST. PATIENT-LVL V: CPT | Mod: PBBFAC,,, | Performed by: INTERNAL MEDICINE

## 2020-08-04 PROCEDURE — 3079F PR MOST RECENT DIASTOLIC BLOOD PRESSURE 80-89 MM HG: ICD-10-PCS | Mod: CPTII,S$GLB,, | Performed by: INTERNAL MEDICINE

## 2020-08-04 PROCEDURE — 3079F DIAST BP 80-89 MM HG: CPT | Mod: CPTII,S$GLB,, | Performed by: INTERNAL MEDICINE

## 2020-08-04 PROCEDURE — 3077F PR MOST RECENT SYSTOLIC BLOOD PRESSURE >= 140 MM HG: ICD-10-PCS | Mod: CPTII,S$GLB,, | Performed by: INTERNAL MEDICINE

## 2020-08-04 PROCEDURE — 3077F SYST BP >= 140 MM HG: CPT | Mod: CPTII,S$GLB,, | Performed by: INTERNAL MEDICINE

## 2020-08-04 PROCEDURE — 99214 OFFICE O/P EST MOD 30 MIN: CPT | Mod: S$GLB,,, | Performed by: INTERNAL MEDICINE

## 2020-08-04 RX ORDER — METHOTREXATE 2.5 MG/1
12.5 TABLET ORAL
Qty: 60 TABLET | Refills: 3 | Status: SHIPPED | OUTPATIENT
Start: 2020-08-04 | End: 2021-03-30 | Stop reason: SDUPTHER

## 2020-08-04 RX ORDER — HYDROXYCHLOROQUINE SULFATE 200 MG/1
200 TABLET, FILM COATED ORAL DAILY
Qty: 60 TABLET | Refills: 6 | Status: SHIPPED | OUTPATIENT
Start: 2020-08-04 | End: 2021-03-05 | Stop reason: SDUPTHER

## 2020-08-04 ASSESSMENT — ROUTINE ASSESSMENT OF PATIENT INDEX DATA (RAPID3)
PSYCHOLOGICAL DISTRESS SCORE: 0
PAIN SCORE: 5.5
TOTAL RAPID3 SCORE: 3.94
PATIENT GLOBAL ASSESSMENT SCORE: 3
MDHAQ FUNCTION SCORE: 1
FATIGUE SCORE: 1.1

## 2020-08-04 NOTE — PATIENT INSTRUCTIONS
Keep holding Enbrel if no flare no need to restart.   Filled Hydroxychloroquine 200mg once daily.  Keep Methotrexate at 5 tabs weekly.

## 2020-08-04 NOTE — PROGRESS NOTES
AS Subjective:          Chief Complaint: Juanis Mclaughlin is a 80 y.o. female who had concerns including Disease Management.    HPI:      Rheumatoid Arthritis   79y/o  female with h/o HTN, HLD was diagnosed with sero +  Rheumatoid arthritis in 1998. Off prednisone now.   Has also received HCQ in the past no improvement. -       Enbrel 50mg weekly on hold since March.    decreased MTX to 4 tabs (due to transaminitis) but + joint pain , so back on MTX 5 tabs weekly.   Started HCQ 200mg once daily and feels no worse. Joints are stable       Pain in the DIP and PIP joints.    No ASE of stomatitis,  infections or injection site reaction.   She was also diagnosed with secondary Sjogren's. Currently on restasis.  PPM for bradycardia.     Past 12 months slow decline in renal function from prior year- on PO bisphos and ACEI/HCTZ can switch this to Prolia.  Started Prolia 02/29/2019 she had another injection as of 07/20/2019 tolerated well no adverse side effects renal function stable.    Cervical spasm left sided with headache present >3 months nearly constants. No numbness or tingling in hands/arms no weakness. Tried IR, stretching, heat little benefit. \  Did have trigger point injections brief relief. Using infrared therapy now.  She is now going to PT    She denies fever, weight loss, photosensitivity, rash, ulcer, raynaud's phenomenon, alopecia, dysphagia, diarrhea or blood in the stools.    + hx of GERD. On PPI. Hx of mild MR. No pedal edema.     Doing well with Dr. Madison with Breo and Gabapentin to relax cough impulse.  As of this visit 02/29/2019 patient was having upper respiratory tract infection x3 weeks seem to be feeling better within 10 days ago returned with a more productive cough postnasal drip rhinitis    REVIEW OF SYSTEMS:    Review of Systems   Constitutional: Negative for fever, malaise/fatigue and weight loss.   HENT: Negative for sore throat.    Eyes: Negative for double vision, photophobia  and redness.   Respiratory: Positive for cough and sputum production. Negative for hemoptysis, shortness of breath and wheezing.    Cardiovascular: Negative for chest pain, palpitations and orthopnea.   Gastrointestinal: Negative for abdominal pain, constipation and diarrhea.   Genitourinary: Negative for dysuria, hematuria and urgency.   Musculoskeletal: Positive for joint pain. Negative for back pain and myalgias.   Skin: Negative for rash.   Neurological: Negative for dizziness, tingling, focal weakness and headaches.   Endo/Heme/Allergies: Does not bruise/bleed easily.   Psychiatric/Behavioral: Negative for depression, hallucinations and suicidal ideas.                Objective:            Past Medical History:   Diagnosis Date    Arthritis     rheumatoid arthritis    Cardiomyopathy     CKD (chronic kidney disease) stage 3, GFR 30-59 ml/min 1/9/2019    Diverticulosis     DVT (deep venous thrombosis)     one time 5 years ago    Essential hypertension 11/24/2013    GERD (gastroesophageal reflux disease)     Glaucoma     sees Dr. Gillette    Hyperlipidemia     Hypertension     Iron deficiency     Iron deficiency anemia 10/21/2016     IMO LOAD Q4    Long-term use of immunosuppressant medication 7/27/2015    Mitral regurgitation 12/20/2013 12/13 mild     Osteoporosis 7/9/2014    Pacemaker 10/26/2016    left chest PACEMAKER [employer] [address] [employer]  [id number] Bonifacio Fuller 9/9/2016 - current   right atrium LEAD [employer] [address] [employer] [id number] Bonifacio Fuller 9/9/2016 - current   right ventricle LEAD [employer] [address] [employer] [id number] Bonifacio Fuller 9/9/2016 - current      PONV (postoperative nausea and vomiting)     Rheumatoid arthritis involving multiple sites with positive rheumatoid factor 11/24/2013    Vitamin B12 deficiency 2/28/2015     Family History   Problem Relation Age of Onset    Heart disease Mother         CHF     Hypertension Mother     Heart disease Father     Stroke Father     Heart disease Brother     Stroke Brother     Heart disease Brother     Scleroderma Brother     Ovarian cancer Maternal Aunt 34    Breast cancer Neg Hx      Social History     Tobacco Use    Smoking status: Never Smoker    Smokeless tobacco: Never Used   Substance Use Topics    Alcohol use: Yes     Alcohol/week: 0.8 standard drinks     Types: 1 Standard drinks or equivalent per week     Comment: occ social    Drug use: No         Current Outpatient Medications on File Prior to Visit   Medication Sig Dispense Refill    alpha-1 proteinase inhib.,hum, (PROLASTIN-C) 1,000 mg (+/-)/20 mL Soln Inject into the vein.      aspirin (ECOTRIN) 81 MG EC tablet Take 81 mg by mouth once daily.      atorvastatin (LIPITOR) 20 MG tablet Take 1 tablet (20 mg total) by mouth every evening. 90 tablet 1    B-complex with vitamin C (Z-BEC OR EQUIV) tablet Take 1 tablet by mouth once daily.      brimonidine-timolol (COMBIGAN) 0.2-0.5 % Drop Place 1 drop into both eyes 2 (two) times daily.      calcium-vitamin D3 500 mg(1,250mg) -200 unit per tablet Take 1 tablet by mouth once daily.      candesartan (ATACAND) 16 MG tablet Take 1 tablet (16 mg total) by mouth once daily. 90 tablet 3    DIETARY SUPPLEMENT ORAL Take by mouth.      dorzolamide-timolol, PF, (COSOPT, PF,) 2-0.5 % Dpet ophthalmic solution Cosopt (PF) 2 %-0.5 % eye drops in a dropperette   INSTILL 1 DROP INTO AFFECTED EYE(S) BY OPHTHALMIC ROUTE 2 TIMES PER DAY      entanercept (ENBREL) 50 mg/mL injection Inject 1 mL (50 mg total) into the skin once a week. 4 Syringe 11    estradioL (ESTRACE) 0.01 % (0.1 mg/gram) vaginal cream PLACE 1 GRAM VAGINALLY ONCEDAILY 127.5 g 2    folic acid (FOLVITE) 1 MG tablet Take 2 tablets (2 mg total) by mouth once daily. 180 tablet 3    magnesium 200 mg Tab Take 400 mg by mouth once daily. Magnesium 400mg with Chelated Zinc 15mg.      methotrexate 2.5 MG Tab  Take 5 tablets (12.5 mg total) by mouth every 7 days. 60 tablet 3    metoprolol succinate (TOPROL-XL) 50 MG 24 hr tablet Take 1 tablet (50 mg total) by mouth once daily. 90 tablet 6    pantoprazole (PROTONIX) 40 MG tablet Take 1 tablet (40 mg total) by mouth once daily. (Patient taking differently: Take 40 mg by mouth 2 (two) times daily. ) 90 tablet 2    potassium gluconate 595 (99) mg Tab Take 1 capsule by mouth once daily.      travoprost (TRAVATAN Z) 0.004 % ophthalmic solution Travatan Z 0.004 % eye drops   INSTILL 1 DROP INTO AFFECTED EYE(S) BY OPHTHALMIC ROUTE ONCE DAILY INTHE EVENING      XIIDRA 5 % Dpet Place 1 drop into both eyes 2 (two) times daily.       diclofenac sodium (VOLTAREN) 1 % Gel Apply 2 g topically 2 (two) times daily as needed. (Patient not taking: Reported on 8/4/2020) 200 g 3    DYMISTA 137-50 mcg/spray Spry nassal spray INHALE 1 SPRAY BY NASAL ROUTE 2 TIMES A DAY as needed  4    hydrocortisone 2.5 % ointment MIX WITH AQUAPHOR OR VASELINE AND APPLY TO AFFECTED AREA as needed  1     No current facility-administered medications on file prior to visit.        Vitals:    08/04/20 0839   BP: (!) 155/82   Pulse: 60       Physical Exam:    Physical Exam  Constitutional:       Appearance: Normal appearance. She is well-developed.   HENT:      Nose: No septal deviation.      Mouth/Throat:      Mouth: No oral lesions.   Eyes:      Conjunctiva/sclera:      Right eye: Right conjunctiva is not injected.      Left eye: Left conjunctiva is not injected.      Pupils: Pupils are equal, round, and reactive to light.   Neck:      Thyroid: No thyroid mass or thyromegaly.      Vascular: No JVD.   Cardiovascular:      Rate and Rhythm: Normal rate and regular rhythm.      Pulses: Normal pulses.      Comments: No edema  Pulmonary:      Effort: Pulmonary effort is normal.      Breath sounds: Examination of the right-middle field reveals wheezing. Examination of the left-middle field reveals wheezing.  Wheezing present.   Abdominal:      Palpations: Abdomen is soft.   Musculoskeletal:      Right shoulder: She exhibits normal range of motion, no tenderness and no swelling.      Left shoulder: She exhibits normal range of motion, no tenderness and no swelling.      Right elbow: She exhibits normal range of motion and no swelling. No tenderness found.      Left elbow: She exhibits normal range of motion and no swelling. No tenderness found.      Right wrist: She exhibits decreased range of motion and tenderness. She exhibits no swelling.      Left wrist: She exhibits normal range of motion, no tenderness and no swelling.      Right hip: She exhibits normal range of motion, normal strength and no swelling.      Left hip: She exhibits normal range of motion, no tenderness and no swelling.      Right knee: She exhibits normal range of motion and no swelling. No tenderness found.      Left knee: She exhibits normal range of motion and no swelling. No tenderness found.      Right ankle: She exhibits normal range of motion and no swelling. No tenderness.      Left ankle: She exhibits normal range of motion and no swelling. No tenderness.      Cervical back: She exhibits decreased range of motion.      Right hand: She exhibits decreased range of motion and tenderness.      Left hand: She exhibits tenderness.      Comments: 1+ synovitis MCP and PIP with heberden nodes all DIP 2-5. Left wrist with tenderness and swelling about the TFCC. No nodules. No flexion contractures.      Cervical with left sided point tenderness and the occiput (nuchal ridge) lesser tenderness at the cervical paraspinals. Decreased ROM in cervical spine   Lymphadenopathy:      Cervical: No cervical adenopathy.   Skin:     General: Skin is dry.   Neurological:      Deep Tendon Reflexes: Reflexes are normal and symmetric.               Assessment:       Encounter Diagnoses   Name Primary?    Rheumatoid arthritis involving multiple sites with positive  rheumatoid factor Yes    Immunocompromised     Cervical spondylosis           Plan:        Rheumatoid arthritis involving multiple sites with positive rheumatoid factor  -     methotrexate 2.5 MG Tab; Take 5 tablets (12.5 mg total) by mouth every 7 days.  Dispense: 60 tablet; Refill: 3  -     hydrOXYchloroQUINE (PLAQUENIL) 200 mg tablet; Take 1 tablet (200 mg total) by mouth once daily.  Dispense: 60 tablet; Refill: 6    Immunocompromised    Cervical spondylosis  -     Ambulatory referral/consult to Pain Clinic; Future; Expected date: 08/11/2020     RA:    -MTX increase back to 5 tabs weekly- 90 day supply   -continue folic acid 2mg dialy   -Continue Enbrel needs 90 day   -voltaren gel PRN fingers/hands-using sparingly   -Labs q 3 months.    Neck Pain:    -perisistent will send for interventional pain.    -now with PT>     Osteoporosis   DC actonel   -doing great on Prolia.            Follow up in about 4 months (around 12/4/2020).          30min consultation with greater than 50% spent in counseling, chart review and coordination of care. All questions answered.

## 2020-08-10 ENCOUNTER — LAB VISIT (OUTPATIENT)
Dept: LAB | Facility: HOSPITAL | Age: 80
End: 2020-08-10
Attending: INTERNAL MEDICINE
Payer: COMMERCIAL

## 2020-08-10 ENCOUNTER — CLINICAL SUPPORT (OUTPATIENT)
Dept: CARDIOLOGY | Facility: CLINIC | Age: 80
End: 2020-08-10
Attending: INTERNAL MEDICINE
Payer: COMMERCIAL

## 2020-08-10 VITALS — HEIGHT: 62 IN | BODY MASS INDEX: 28.89 KG/M2 | WEIGHT: 157 LBS

## 2020-08-10 DIAGNOSIS — I34.0 NON-RHEUMATIC MITRAL REGURGITATION: ICD-10-CM

## 2020-08-10 DIAGNOSIS — E78.2 MIXED HYPERLIPIDEMIA: ICD-10-CM

## 2020-08-10 DIAGNOSIS — M05.79 RHEUMATOID ARTHRITIS INVOLVING MULTIPLE SITES WITH POSITIVE RHEUMATOID FACTOR: ICD-10-CM

## 2020-08-10 DIAGNOSIS — Z95.0 PACEMAKER: ICD-10-CM

## 2020-08-10 DIAGNOSIS — I10 ESSENTIAL HYPERTENSION: ICD-10-CM

## 2020-08-10 DIAGNOSIS — Z79.60 LONG-TERM USE OF IMMUNOSUPPRESSANT MEDICATION: ICD-10-CM

## 2020-08-10 LAB
ALBUMIN SERPL BCP-MCNC: 3.8 G/DL (ref 3.5–5.2)
ALP SERPL-CCNC: 37 U/L (ref 55–135)
ALT SERPL W/O P-5'-P-CCNC: 12 U/L (ref 10–44)
ANION GAP SERPL CALC-SCNC: 5 MMOL/L (ref 8–16)
AST SERPL-CCNC: 32 U/L (ref 10–40)
BASOPHILS # BLD AUTO: 0.05 K/UL (ref 0–0.2)
BASOPHILS NFR BLD: 0.8 % (ref 0–1.9)
BILIRUB SERPL-MCNC: 0.9 MG/DL (ref 0.1–1)
BUN SERPL-MCNC: 14 MG/DL (ref 8–23)
CALCIUM SERPL-MCNC: 9 MG/DL (ref 8.7–10.5)
CHLORIDE SERPL-SCNC: 106 MMOL/L (ref 95–110)
CHOLEST SERPL-MCNC: 140 MG/DL (ref 120–199)
CHOLEST/HDLC SERPL: 2.5 {RATIO} (ref 2–5)
CO2 SERPL-SCNC: 27 MMOL/L (ref 23–29)
CREAT SERPL-MCNC: 1.1 MG/DL (ref 0.5–1.4)
CRP SERPL-MCNC: 0.6 MG/L (ref 0–8.2)
DIFFERENTIAL METHOD: ABNORMAL
EOSINOPHIL # BLD AUTO: 0.2 K/UL (ref 0–0.5)
EOSINOPHIL NFR BLD: 3.5 % (ref 0–8)
ERYTHROCYTE [DISTWIDTH] IN BLOOD BY AUTOMATED COUNT: 13.8 % (ref 11.5–14.5)
ERYTHROCYTE [SEDIMENTATION RATE] IN BLOOD BY WESTERGREN METHOD: 9 MM/HR (ref 0–20)
EST. GFR  (AFRICAN AMERICAN): 54.4 ML/MIN/1.73 M^2
EST. GFR  (NON AFRICAN AMERICAN): 47.2 ML/MIN/1.73 M^2
GLUCOSE SERPL-MCNC: 148 MG/DL (ref 70–110)
HCT VFR BLD AUTO: 41.7 % (ref 37–48.5)
HDLC SERPL-MCNC: 56 MG/DL (ref 40–75)
HDLC SERPL: 40 % (ref 20–50)
HGB BLD-MCNC: 12.8 G/DL (ref 12–16)
IMM GRANULOCYTES # BLD AUTO: 0 K/UL (ref 0–0.04)
IMM GRANULOCYTES NFR BLD AUTO: 0 % (ref 0–0.5)
LDLC SERPL CALC-MCNC: 65.8 MG/DL (ref 63–159)
LYMPHOCYTES # BLD AUTO: 1.1 K/UL (ref 1–4.8)
LYMPHOCYTES NFR BLD: 18.3 % (ref 18–48)
MCH RBC QN AUTO: 33.1 PG (ref 27–31)
MCHC RBC AUTO-ENTMCNC: 30.7 G/DL (ref 32–36)
MCV RBC AUTO: 108 FL (ref 82–98)
MONOCYTES # BLD AUTO: 0.5 K/UL (ref 0.3–1)
MONOCYTES NFR BLD: 7.4 % (ref 4–15)
NEUTROPHILS # BLD AUTO: 4.4 K/UL (ref 1.8–7.7)
NEUTROPHILS NFR BLD: 70 % (ref 38–73)
NONHDLC SERPL-MCNC: 84 MG/DL
NRBC BLD-RTO: 0 /100 WBC
PLATELET # BLD AUTO: 115 K/UL (ref 150–350)
PMV BLD AUTO: 13.1 FL (ref 9.2–12.9)
POTASSIUM SERPL-SCNC: 4.6 MMOL/L (ref 3.5–5.1)
PROT SERPL-MCNC: 6.6 G/DL (ref 6–8.4)
RBC # BLD AUTO: 3.87 M/UL (ref 4–5.4)
SODIUM SERPL-SCNC: 138 MMOL/L (ref 136–145)
TRIGL SERPL-MCNC: 91 MG/DL (ref 30–150)
WBC # BLD AUTO: 6.23 K/UL (ref 3.9–12.7)

## 2020-08-10 PROCEDURE — 86140 C-REACTIVE PROTEIN: CPT

## 2020-08-10 PROCEDURE — 93306 TTE W/DOPPLER COMPLETE: CPT | Mod: S$GLB,,, | Performed by: INTERNAL MEDICINE

## 2020-08-10 PROCEDURE — 93306 TRANSTHORACIC ECHO (TTE) COMPLETE (CUPID ONLY): ICD-10-PCS | Mod: S$GLB,,, | Performed by: INTERNAL MEDICINE

## 2020-08-10 PROCEDURE — 36415 COLL VENOUS BLD VENIPUNCTURE: CPT | Mod: PO

## 2020-08-10 PROCEDURE — 85651 RBC SED RATE NONAUTOMATED: CPT | Mod: PO

## 2020-08-10 PROCEDURE — 80061 LIPID PANEL: CPT

## 2020-08-10 PROCEDURE — 99999 PR PBB SHADOW E&M-EST. PATIENT-LVL I: ICD-10-PCS | Mod: PBBFAC,,,

## 2020-08-10 PROCEDURE — 80053 COMPREHEN METABOLIC PANEL: CPT

## 2020-08-10 PROCEDURE — 99999 PR PBB SHADOW E&M-EST. PATIENT-LVL I: CPT | Mod: PBBFAC,,,

## 2020-08-10 PROCEDURE — 85025 COMPLETE CBC W/AUTO DIFF WBC: CPT

## 2020-08-11 LAB
ASCENDING AORTA: 3.89 CM
AV INDEX (PROSTH): 0.66
AV MEAN GRADIENT: 6 MMHG
AV PEAK GRADIENT: 11 MMHG
AV VALVE AREA: 2 CM2
AV VELOCITY RATIO: 0.69
BSA FOR ECHO PROCEDURE: 1.76 M2
CV ECHO LV RWT: 0.36 CM
DOP CALC AO PEAK VEL: 1.66 M/S
DOP CALC AO VTI: 41.28 CM
DOP CALC LVOT AREA: 3 CM2
DOP CALC LVOT DIAMETER: 1.97 CM
DOP CALC LVOT PEAK VEL: 1.14 M/S
DOP CALC LVOT STROKE VOLUME: 82.68 CM3
DOP CALCLVOT PEAK VEL VTI: 27.14 CM
E WAVE DECELERATION TIME: 145.73 MSEC
E/A RATIO: 0.89
E/E' RATIO: 12.4 M/S
ECHO LV POSTERIOR WALL: 0.95 CM (ref 0.6–1.1)
FRACTIONAL SHORTENING: 39 % (ref 28–44)
INTERVENTRICULAR SEPTUM: 0.9 CM (ref 0.6–1.1)
LA MAJOR: 4.59 CM
LA MINOR: 4.53 CM
LA WIDTH: 4.64 CM
LEFT ATRIUM SIZE: 4.11 CM
LEFT ATRIUM VOLUME INDEX: 43.1 ML/M2
LEFT ATRIUM VOLUME: 73.91 CM3
LEFT INTERNAL DIMENSION IN SYSTOLE: 3.27 CM (ref 2.1–4)
LEFT VENTRICLE DIASTOLIC VOLUME INDEX: 80.48 ML/M2
LEFT VENTRICLE DIASTOLIC VOLUME: 137.98 ML
LEFT VENTRICLE MASS INDEX: 107 G/M2
LEFT VENTRICLE SYSTOLIC VOLUME INDEX: 25.3 ML/M2
LEFT VENTRICLE SYSTOLIC VOLUME: 43.3 ML
LEFT VENTRICULAR INTERNAL DIMENSION IN DIASTOLE: 5.34 CM (ref 3.5–6)
LEFT VENTRICULAR MASS: 183.16 G
LV LATERAL E/E' RATIO: 9.3 M/S
LV SEPTAL E/E' RATIO: 18.6 M/S
MV PEAK A VEL: 1.04 M/S
MV PEAK E VEL: 0.93 M/S
MV STENOSIS PRESSURE HALF TIME: 42.26 MS
MV VALVE AREA P 1/2 METHOD: 5.21 CM2
PISA MRMAX VEL: 0.06 M/S
PISA TR MAX VEL: 2.72 M/S
PULM VEIN S/D RATIO: 1.04
PV PEAK D VEL: 0.53 M/S
PV PEAK S VEL: 0.55 M/S
RA MAJOR: 3.92 CM
RA PRESSURE: 3 MMHG
RA WIDTH: 3.09 CM
RIGHT VENTRICULAR END-DIASTOLIC DIMENSION: 2.81 CM
SINUS: 3.41 CM
STJ: 3.56 CM
TDI LATERAL: 0.1 M/S
TDI SEPTAL: 0.05 M/S
TDI: 0.08 M/S
TR MAX PG: 30 MMHG
TRICUSPID ANNULAR PLANE SYSTOLIC EXCURSION: 2.08 CM
TV REST PULMONARY ARTERY PRESSURE: 33 MMHG

## 2020-08-12 ENCOUNTER — INFUSION (OUTPATIENT)
Dept: INFUSION THERAPY | Facility: HOSPITAL | Age: 80
End: 2020-08-12
Attending: INTERNAL MEDICINE
Payer: COMMERCIAL

## 2020-08-12 VITALS
DIASTOLIC BLOOD PRESSURE: 80 MMHG | SYSTOLIC BLOOD PRESSURE: 178 MMHG | TEMPERATURE: 97 F | HEART RATE: 59 BPM | RESPIRATION RATE: 16 BRPM

## 2020-08-12 DIAGNOSIS — M81.0 AGE-RELATED OSTEOPOROSIS WITHOUT CURRENT PATHOLOGICAL FRACTURE: Primary | ICD-10-CM

## 2020-08-12 PROCEDURE — 96372 THER/PROPH/DIAG INJ SC/IM: CPT | Mod: PN

## 2020-08-12 PROCEDURE — 63600175 PHARM REV CODE 636 W HCPCS: Mod: JG,PN | Performed by: INTERNAL MEDICINE

## 2020-08-12 RX ADMIN — DENOSUMAB 60 MG: 60 INJECTION SUBCUTANEOUS at 12:08

## 2020-08-12 NOTE — PLAN OF CARE
Problem: Adult Inpatient Plan of Care  Goal: Plan of Care Review  Outcome: Ongoing, Progressing  Flowsheets (Taken 8/12/2020 1210)  Plan of Care Reviewed With: patient   Patient tolerated Prolia injection well, d/c in no acute distress.

## 2020-08-18 ENCOUNTER — OFFICE VISIT (OUTPATIENT)
Dept: CARDIOLOGY | Facility: CLINIC | Age: 80
End: 2020-08-18
Payer: COMMERCIAL

## 2020-08-18 VITALS
SYSTOLIC BLOOD PRESSURE: 135 MMHG | WEIGHT: 158.06 LBS | HEIGHT: 61 IN | HEART RATE: 59 BPM | BODY MASS INDEX: 29.84 KG/M2 | DIASTOLIC BLOOD PRESSURE: 80 MMHG

## 2020-08-18 DIAGNOSIS — I51.9 DIASTOLIC DYSFUNCTION, LEFT VENTRICLE: ICD-10-CM

## 2020-08-18 DIAGNOSIS — I10 ESSENTIAL HYPERTENSION: Primary | ICD-10-CM

## 2020-08-18 DIAGNOSIS — Z95.0 PACEMAKER: Primary | ICD-10-CM

## 2020-08-18 DIAGNOSIS — Z82.49 FAMILY HISTORY OF CARDIOVASCULAR DISEASE: Chronic | ICD-10-CM

## 2020-08-18 DIAGNOSIS — I34.0 MITRAL VALVE INSUFFICIENCY, UNSPECIFIED ETIOLOGY: ICD-10-CM

## 2020-08-18 DIAGNOSIS — I10 ESSENTIAL HYPERTENSION: ICD-10-CM

## 2020-08-18 DIAGNOSIS — N18.30 CKD (CHRONIC KIDNEY DISEASE) STAGE 3, GFR 30-59 ML/MIN: ICD-10-CM

## 2020-08-18 PROCEDURE — 3079F PR MOST RECENT DIASTOLIC BLOOD PRESSURE 80-89 MM HG: ICD-10-PCS | Mod: CPTII,S$GLB,, | Performed by: INTERNAL MEDICINE

## 2020-08-18 PROCEDURE — 1159F PR MEDICATION LIST DOCUMENTED IN MEDICAL RECORD: ICD-10-PCS | Mod: S$GLB,,, | Performed by: INTERNAL MEDICINE

## 2020-08-18 PROCEDURE — 1126F AMNT PAIN NOTED NONE PRSNT: CPT | Mod: S$GLB,,, | Performed by: INTERNAL MEDICINE

## 2020-08-18 PROCEDURE — 3075F SYST BP GE 130 - 139MM HG: CPT | Mod: CPTII,S$GLB,, | Performed by: INTERNAL MEDICINE

## 2020-08-18 PROCEDURE — 1126F PR PAIN SEVERITY QUANTIFIED, NO PAIN PRESENT: ICD-10-PCS | Mod: S$GLB,,, | Performed by: INTERNAL MEDICINE

## 2020-08-18 PROCEDURE — 99214 PR OFFICE/OUTPT VISIT, EST, LEVL IV, 30-39 MIN: ICD-10-PCS | Mod: S$GLB,,, | Performed by: INTERNAL MEDICINE

## 2020-08-18 PROCEDURE — 3079F DIAST BP 80-89 MM HG: CPT | Mod: CPTII,S$GLB,, | Performed by: INTERNAL MEDICINE

## 2020-08-18 PROCEDURE — 1101F PR PT FALLS ASSESS DOC 0-1 FALLS W/OUT INJ PAST YR: ICD-10-PCS | Mod: CPTII,S$GLB,, | Performed by: INTERNAL MEDICINE

## 2020-08-18 PROCEDURE — 99999 PR PBB SHADOW E&M-EST. PATIENT-LVL IV: CPT | Mod: PBBFAC,,, | Performed by: INTERNAL MEDICINE

## 2020-08-18 PROCEDURE — 3075F PR MOST RECENT SYSTOLIC BLOOD PRESS GE 130-139MM HG: ICD-10-PCS | Mod: CPTII,S$GLB,, | Performed by: INTERNAL MEDICINE

## 2020-08-18 PROCEDURE — 1159F MED LIST DOCD IN RCRD: CPT | Mod: S$GLB,,, | Performed by: INTERNAL MEDICINE

## 2020-08-18 PROCEDURE — 99214 OFFICE O/P EST MOD 30 MIN: CPT | Mod: S$GLB,,, | Performed by: INTERNAL MEDICINE

## 2020-08-18 PROCEDURE — 99999 PR PBB SHADOW E&M-EST. PATIENT-LVL IV: ICD-10-PCS | Mod: PBBFAC,,, | Performed by: INTERNAL MEDICINE

## 2020-08-18 PROCEDURE — 1101F PT FALLS ASSESS-DOCD LE1/YR: CPT | Mod: CPTII,S$GLB,, | Performed by: INTERNAL MEDICINE

## 2020-08-18 RX ORDER — CANDESARTAN 16 MG/1
16 TABLET ORAL DAILY
Qty: 90 TABLET | Refills: 3 | Status: SHIPPED | OUTPATIENT
Start: 2020-08-18 | End: 2021-09-08

## 2020-08-18 NOTE — PROGRESS NOTES
Subjective:    Patient ID:  Juanis Mclaughlin is a 80 y.o. female who presents for follow-up of Hypertension f/u, Results, and Medication Refill      HPI  Here for f/u diastolic dysfxn, MR/PPM implant. C/o increasing LE edema but has had increase salt intake.     Review of Systems   Constitution: Negative for malaise/fatigue.   Eyes: Negative for blurred vision.   Cardiovascular: Negative for chest pain, claudication, cyanosis, dyspnea on exertion, irregular heartbeat, leg swelling, near-syncope, orthopnea, palpitations, paroxysmal nocturnal dyspnea and syncope.   Respiratory: Negative for cough and shortness of breath.    Hematologic/Lymphatic: Does not bruise/bleed easily.   Musculoskeletal: Negative for back pain, falls, joint pain, muscle cramps, muscle weakness and myalgias.   Gastrointestinal: Negative for abdominal pain, change in bowel habit, nausea and vomiting.   Genitourinary: Negative for urgency.   Neurological: Negative for dizziness, focal weakness and light-headedness.       Past Medical History:   Diagnosis Date    Arthritis     rheumatoid arthritis    Cardiomyopathy     CKD (chronic kidney disease) stage 3, GFR 30-59 ml/min 1/9/2019    Diverticulosis     DVT (deep venous thrombosis)     one time 5 years ago    Essential hypertension 11/24/2013    GERD (gastroesophageal reflux disease)     Glaucoma     sees Dr. Gillette    Hyperlipidemia     Hypertension     Iron deficiency     Iron deficiency anemia 10/21/2016     IMO LOAD Q4    Long-term use of immunosuppressant medication 7/27/2015    Mitral regurgitation 12/20/2013 12/13 mild     Osteoporosis 7/9/2014    Pacemaker 10/26/2016    left chest [employer] [address] [employer]  [id number] Bonifacio Fuller 9/9/2016 - current   right atrium LEAD [employer] [address] [employer] [id number] Bonifacio Fuller 9/9/2016 - current   right ventricle LEAD [employer] [address] [employer] S/[id number] Alina  Bonifacio LEONARDOLittle 9/9/2016 - current      PONV (postoperative nausea and vomiting)     Rheumatoid arthritis involving multiple sites with positive rheumatoid factor 11/24/2013    Vitamin B12 deficiency 2/28/2015     Patient Active Problem List   Diagnosis    Rheumatoid arthritis involving multiple sites with positive rheumatoid factor    Essential hypertension    Diastolic dysfunction, left ventricle    Mitral regurgitation    Family history of cardiovascular disease    Age-related osteoporosis without current pathological fracture    DJD (degenerative joint disease)    Vitamin B12 deficiency    Long-term use of immunosuppressant medication    Gastroesophageal reflux disease with esophagitis    Osteopenia    Near syncope    Normocytic anemia    SSS (sick sinus syndrome)    Iron deficiency anemia    Pacemaker    Lesion of nose    Gastroesophageal reflux disease    Epistaxis, recurrent    Glaucoma    CKD (chronic kidney disease) stage 3, GFR 30-59 ml/min    Spondylosis of cervical region without myelopathy or radiculopathy    Occipital neuralgia of left side    Macrocytosis    Esophageal stricture    Bronchospasm        Objective:     Vitals:    08/18/20 1106   BP: (!) 159/80   Pulse: (!) 59        Physical Exam   Constitutional: She is oriented to person, place, and time. She appears well-developed and well-nourished.   HENT:   Head: Normocephalic.   Eyes: Conjunctivae are normal.   Neck: Normal range of motion. Neck supple. No JVD present.   Cardiovascular: Normal rate, regular rhythm, normal heart sounds and intact distal pulses.   Pulses:       Carotid pulses are 2+ on the right side and 2+ on the left side.       Radial pulses are 2+ on the right side and 2+ on the left side.        Dorsalis pedis pulses are 2+ on the right side and 2+ on the left side.        Posterior tibial pulses are 2+ on the right side and 2+ on the left side.   Pacer site clean and dry, well healed.      Pulmonary/Chest: Effort normal and breath sounds normal.   Abdominal: Soft. Bowel sounds are normal.   Musculoskeletal:         General: No tenderness or edema.   Neurological: She is alert and oriented to person, place, and time. Gait normal.   Skin: Skin is warm, dry and intact. No cyanosis. Nails show no clubbing.   Psychiatric: She has a normal mood and affect. Her speech is normal and behavior is normal. Thought content normal.   Nursing note and vitals reviewed.            ..    Chemistry        Component Value Date/Time     08/10/2020 0804    K 4.6 08/10/2020 0804     08/10/2020 0804    CO2 27 08/10/2020 0804    BUN 14 08/10/2020 0804    CREATININE 1.1 08/10/2020 0804     (H) 08/10/2020 0804        Component Value Date/Time    CALCIUM 9.0 08/10/2020 0804    ALKPHOS 37 (L) 08/10/2020 0804    AST 32 08/10/2020 0804    ALT 12 08/10/2020 0804    BILITOT 0.9 08/10/2020 0804    ESTGFRAFRICA 54.4 (A) 08/10/2020 0804    EGFRNONAA 47.2 (A) 08/10/2020 0804            ..  Lab Results   Component Value Date    CHOL 140 08/10/2020    CHOL 158 08/16/2019    CHOL 164 06/29/2018     Lab Results   Component Value Date    HDL 56 08/10/2020    HDL 56 08/16/2019    HDL 58 06/29/2018     Lab Results   Component Value Date    LDLCALC 65.8 08/10/2020    LDLCALC 80.4 08/16/2019    LDLCALC 77.6 06/29/2018     Lab Results   Component Value Date    TRIG 91 08/10/2020    TRIG 108 08/16/2019    TRIG 142 06/29/2018     Lab Results   Component Value Date    CHOLHDL 40.0 08/10/2020    CHOLHDL 35.4 08/16/2019    CHOLHDL 35.4 06/29/2018     ..  Lab Results   Component Value Date    WBC 6.23 08/10/2020    HGB 12.8 08/10/2020    HCT 41.7 08/10/2020     (H) 08/10/2020     (L) 08/10/2020       Test(s) Reviewed  I have reviewed the following in detail:  [] Stress test   [] Angiography   [x] Echocardiogram   [x] Labs   [x] Other:       Assessment:         ICD-10-CM ICD-9-CM   1. Pacemaker  Z95.0 V45.01   2. CKD  (chronic kidney disease) stage 3, GFR 30-59 ml/min  N18.3 585.3   3. Mitral valve insufficiency, unspecified etiology  I34.0 424.0   4. Diastolic dysfunction, left ventricle  I51.9 429.9   5. Essential hypertension  I10 401.9   6. Family history of cardiovascular disease  Z82.49 V17.49          Plan:           Return to clinic 1 year   Low level/low impact aerobic exercise 5x's/wk. Heart healthy diet and risk factor modification.    See labs and med orders.  Strict BP control, decrease NaCl intake.     Portions of this note may have been created with voice recognition software.  Grammatical, syntax and spelling errors may be inevitable.

## 2020-09-05 ENCOUNTER — CLINICAL SUPPORT (OUTPATIENT)
Dept: CARDIOLOGY | Facility: CLINIC | Age: 80
End: 2020-09-05
Payer: COMMERCIAL

## 2020-09-05 DIAGNOSIS — I50.42 CHRONIC COMBINED SYSTOLIC (CONGESTIVE) AND DIASTOLIC (CONGESTIVE) HEART FAILURE: ICD-10-CM

## 2020-09-05 DIAGNOSIS — Z95.0 PRESENCE OF CARDIAC PACEMAKER: ICD-10-CM

## 2020-09-05 PROCEDURE — 93296 REM INTERROG EVL PM/IDS: CPT | Mod: S$GLB,,, | Performed by: INTERNAL MEDICINE

## 2020-09-05 PROCEDURE — 93294 CARDIAC DEVICE CHECK - REMOTE: ICD-10-PCS | Mod: S$GLB,,, | Performed by: INTERNAL MEDICINE

## 2020-09-05 PROCEDURE — 93294 REM INTERROG EVL PM/LDLS PM: CPT | Mod: S$GLB,,, | Performed by: INTERNAL MEDICINE

## 2020-09-05 PROCEDURE — 93296 CARDIAC DEVICE CHECK - REMOTE: ICD-10-PCS | Mod: S$GLB,,, | Performed by: INTERNAL MEDICINE

## 2020-09-09 ENCOUNTER — CLINICAL SUPPORT (OUTPATIENT)
Dept: CARDIOLOGY | Facility: CLINIC | Age: 80
End: 2020-09-09
Attending: INTERNAL MEDICINE
Payer: COMMERCIAL

## 2020-09-09 ENCOUNTER — LAB VISIT (OUTPATIENT)
Dept: LAB | Facility: HOSPITAL | Age: 80
End: 2020-09-09
Attending: INTERNAL MEDICINE
Payer: COMMERCIAL

## 2020-09-09 DIAGNOSIS — I10 ESSENTIAL HYPERTENSION: ICD-10-CM

## 2020-09-09 DIAGNOSIS — I51.9 DIASTOLIC DYSFUNCTION, LEFT VENTRICLE: ICD-10-CM

## 2020-09-09 DIAGNOSIS — I34.0 MITRAL VALVE INSUFFICIENCY, UNSPECIFIED ETIOLOGY: ICD-10-CM

## 2020-09-09 DIAGNOSIS — N18.30 CKD (CHRONIC KIDNEY DISEASE) STAGE 3, GFR 30-59 ML/MIN: ICD-10-CM

## 2020-09-09 DIAGNOSIS — Z95.0 PACEMAKER: ICD-10-CM

## 2020-09-09 LAB
ALBUMIN SERPL BCP-MCNC: 3.9 G/DL (ref 3.5–5.2)
ALP SERPL-CCNC: 38 U/L (ref 55–135)
ALT SERPL W/O P-5'-P-CCNC: 9 U/L (ref 10–44)
ANION GAP SERPL CALC-SCNC: 5 MMOL/L (ref 8–16)
AST SERPL-CCNC: 29 U/L (ref 10–40)
BASOPHILS # BLD AUTO: 0.06 K/UL (ref 0–0.2)
BASOPHILS NFR BLD: 0.8 % (ref 0–1.9)
BILIRUB SERPL-MCNC: 0.7 MG/DL (ref 0.1–1)
BUN SERPL-MCNC: 15 MG/DL (ref 8–23)
CALCIUM SERPL-MCNC: 8.8 MG/DL (ref 8.7–10.5)
CHLORIDE SERPL-SCNC: 110 MMOL/L (ref 95–110)
CHOLEST SERPL-MCNC: 145 MG/DL (ref 120–199)
CHOLEST/HDLC SERPL: 2.4 {RATIO} (ref 2–5)
CO2 SERPL-SCNC: 26 MMOL/L (ref 23–29)
CREAT SERPL-MCNC: 1.1 MG/DL (ref 0.5–1.4)
DIFFERENTIAL METHOD: ABNORMAL
EOSINOPHIL # BLD AUTO: 0.2 K/UL (ref 0–0.5)
EOSINOPHIL NFR BLD: 3 % (ref 0–8)
ERYTHROCYTE [DISTWIDTH] IN BLOOD BY AUTOMATED COUNT: 14 % (ref 11.5–14.5)
EST. GFR  (AFRICAN AMERICAN): 54.4 ML/MIN/1.73 M^2
EST. GFR  (NON AFRICAN AMERICAN): 47.2 ML/MIN/1.73 M^2
GLUCOSE SERPL-MCNC: 137 MG/DL (ref 70–110)
HCT VFR BLD AUTO: 41.8 % (ref 37–48.5)
HDLC SERPL-MCNC: 60 MG/DL (ref 40–75)
HDLC SERPL: 41.4 % (ref 20–50)
HGB BLD-MCNC: 13 G/DL (ref 12–16)
IMM GRANULOCYTES # BLD AUTO: 0.01 K/UL (ref 0–0.04)
IMM GRANULOCYTES NFR BLD AUTO: 0.1 % (ref 0–0.5)
LDLC SERPL CALC-MCNC: 68.8 MG/DL (ref 63–159)
LYMPHOCYTES # BLD AUTO: 1.3 K/UL (ref 1–4.8)
LYMPHOCYTES NFR BLD: 18 % (ref 18–48)
MCH RBC QN AUTO: 33.1 PG (ref 27–31)
MCHC RBC AUTO-ENTMCNC: 31.1 G/DL (ref 32–36)
MCV RBC AUTO: 106 FL (ref 82–98)
MONOCYTES # BLD AUTO: 0.8 K/UL (ref 0.3–1)
MONOCYTES NFR BLD: 10.1 % (ref 4–15)
NEUTROPHILS # BLD AUTO: 5 K/UL (ref 1.8–7.7)
NEUTROPHILS NFR BLD: 68 % (ref 38–73)
NONHDLC SERPL-MCNC: 85 MG/DL
NRBC BLD-RTO: 0 /100 WBC
PLATELET # BLD AUTO: 197 K/UL (ref 150–350)
PMV BLD AUTO: 13.8 FL (ref 9.2–12.9)
POTASSIUM SERPL-SCNC: 4.5 MMOL/L (ref 3.5–5.1)
PROT SERPL-MCNC: 6.8 G/DL (ref 6–8.4)
RBC # BLD AUTO: 3.93 M/UL (ref 4–5.4)
SODIUM SERPL-SCNC: 141 MMOL/L (ref 136–145)
TRIGL SERPL-MCNC: 81 MG/DL (ref 30–150)
WBC # BLD AUTO: 7.4 K/UL (ref 3.9–12.7)

## 2020-09-09 PROCEDURE — 80053 COMPREHEN METABOLIC PANEL: CPT

## 2020-09-09 PROCEDURE — 93880 CV US DOPPLER CAROTID (CUPID ONLY): ICD-10-PCS | Mod: S$GLB,,, | Performed by: INTERNAL MEDICINE

## 2020-09-09 PROCEDURE — 80061 LIPID PANEL: CPT

## 2020-09-09 PROCEDURE — 93880 EXTRACRANIAL BILAT STUDY: CPT | Mod: S$GLB,,, | Performed by: INTERNAL MEDICINE

## 2020-09-09 PROCEDURE — 85025 COMPLETE CBC W/AUTO DIFF WBC: CPT

## 2020-09-09 PROCEDURE — 36415 COLL VENOUS BLD VENIPUNCTURE: CPT | Mod: PO

## 2020-09-14 LAB
LEFT ARM DIASTOLIC BLOOD PRESSURE: 75 MMHG
LEFT ARM SYSTOLIC BLOOD PRESSURE: 130 MMHG
LEFT CBA DIAS: 11 CM/S
LEFT CBA SYS: 42 CM/S
LEFT CCA DIST DIAS: 11 CM/S
LEFT CCA DIST SYS: 48 CM/S
LEFT CCA MID DIAS: 8 CM/S
LEFT CCA MID SYS: 50 CM/S
LEFT CCA PROX DIAS: 1 CM/S
LEFT CCA PROX SYS: 62 CM/S
LEFT ECA DIAS: 6 CM/S
LEFT ECA SYS: 39 CM/S
LEFT ICA DIST DIAS: 24 CM/S
LEFT ICA DIST SYS: 75 CM/S
LEFT ICA MID DIAS: 8 CM/S
LEFT ICA MID SYS: 50 CM/S
LEFT ICA PROX DIAS: 14 CM/S
LEFT ICA PROX SYS: 48 CM/S
LEFT VERTEBRAL DIAS: 10 CM/S
LEFT VERTEBRAL SYS: 38 CM/S
OHS CV CAROTID RIGHT ICA EDV HIGHEST: 23
OHS CV CAROTID ULTRASOUND LEFT ICA/CCA RATIO: 1.56
OHS CV CAROTID ULTRASOUND RIGHT ICA/CCA RATIO: 1.4
OHS CV PV CAROTID LEFT HIGHEST CCA: 62
OHS CV PV CAROTID LEFT HIGHEST ICA: 75
OHS CV PV CAROTID RIGHT HIGHEST CCA: 63
OHS CV PV CAROTID RIGHT HIGHEST ICA: 88
OHS CV US CAROTID LEFT HIGHEST EDV: 24
RIGHT ARM DIASTOLIC BLOOD PRESSURE: 80 MMHG
RIGHT ARM SYSTOLIC BLOOD PRESSURE: 130 MMHG
RIGHT CBA DIAS: 12 CM/S
RIGHT CBA SYS: 48 CM/S
RIGHT CCA DIST DIAS: 15 CM/S
RIGHT CCA DIST SYS: 63 CM/S
RIGHT CCA MID DIAS: 16 CM/S
RIGHT CCA MID SYS: 62 CM/S
RIGHT CCA PROX DIAS: 14 CM/S
RIGHT CCA PROX SYS: 59 CM/S
RIGHT ECA DIAS: 4 CM/S
RIGHT ECA SYS: 45 CM/S
RIGHT ICA DIST DIAS: 23 CM/S
RIGHT ICA DIST SYS: 88 CM/S
RIGHT ICA MID DIAS: 22 CM/S
RIGHT ICA MID SYS: 79 CM/S
RIGHT ICA PROX DIAS: 3 CM/S
RIGHT ICA PROX SYS: 46 CM/S
RIGHT VERTEBRAL DIAS: 17 CM/S
RIGHT VERTEBRAL SYS: 56 CM/S

## 2020-09-16 ENCOUNTER — OFFICE VISIT (OUTPATIENT)
Dept: PAIN MEDICINE | Facility: CLINIC | Age: 80
End: 2020-09-16
Payer: COMMERCIAL

## 2020-09-16 VITALS
WEIGHT: 164.56 LBS | BODY MASS INDEX: 31.07 KG/M2 | OXYGEN SATURATION: 95 % | HEART RATE: 61 BPM | RESPIRATION RATE: 20 BRPM | HEIGHT: 61 IN | TEMPERATURE: 98 F | SYSTOLIC BLOOD PRESSURE: 165 MMHG | DIASTOLIC BLOOD PRESSURE: 80 MMHG

## 2020-09-16 DIAGNOSIS — M47.812 CERVICAL SPONDYLOSIS: ICD-10-CM

## 2020-09-16 DIAGNOSIS — M54.12 RADICULOPATHY, CERVICAL REGION: ICD-10-CM

## 2020-09-16 DIAGNOSIS — Z79.02 ANTIPLATELET OR ANTITHROMBOTIC LONG-TERM USE: Primary | ICD-10-CM

## 2020-09-16 PROCEDURE — 1159F PR MEDICATION LIST DOCUMENTED IN MEDICAL RECORD: ICD-10-PCS | Mod: S$GLB,,, | Performed by: ANESTHESIOLOGY

## 2020-09-16 PROCEDURE — 1101F PR PT FALLS ASSESS DOC 0-1 FALLS W/OUT INJ PAST YR: ICD-10-PCS | Mod: CPTII,S$GLB,, | Performed by: ANESTHESIOLOGY

## 2020-09-16 PROCEDURE — 3077F SYST BP >= 140 MM HG: CPT | Mod: CPTII,S$GLB,, | Performed by: ANESTHESIOLOGY

## 2020-09-16 PROCEDURE — 1125F PR PAIN SEVERITY QUANTIFIED, PAIN PRESENT: ICD-10-PCS | Mod: S$GLB,,, | Performed by: ANESTHESIOLOGY

## 2020-09-16 PROCEDURE — 1125F AMNT PAIN NOTED PAIN PRSNT: CPT | Mod: S$GLB,,, | Performed by: ANESTHESIOLOGY

## 2020-09-16 PROCEDURE — 3077F PR MOST RECENT SYSTOLIC BLOOD PRESSURE >= 140 MM HG: ICD-10-PCS | Mod: CPTII,S$GLB,, | Performed by: ANESTHESIOLOGY

## 2020-09-16 PROCEDURE — 1159F MED LIST DOCD IN RCRD: CPT | Mod: S$GLB,,, | Performed by: ANESTHESIOLOGY

## 2020-09-16 PROCEDURE — 1101F PT FALLS ASSESS-DOCD LE1/YR: CPT | Mod: CPTII,S$GLB,, | Performed by: ANESTHESIOLOGY

## 2020-09-16 PROCEDURE — 3079F DIAST BP 80-89 MM HG: CPT | Mod: CPTII,S$GLB,, | Performed by: ANESTHESIOLOGY

## 2020-09-16 PROCEDURE — 99214 OFFICE O/P EST MOD 30 MIN: CPT | Mod: S$GLB,,, | Performed by: ANESTHESIOLOGY

## 2020-09-16 PROCEDURE — 3079F PR MOST RECENT DIASTOLIC BLOOD PRESSURE 80-89 MM HG: ICD-10-PCS | Mod: CPTII,S$GLB,, | Performed by: ANESTHESIOLOGY

## 2020-09-16 PROCEDURE — 99214 PR OFFICE/OUTPT VISIT, EST, LEVL IV, 30-39 MIN: ICD-10-PCS | Mod: S$GLB,,, | Performed by: ANESTHESIOLOGY

## 2020-09-16 PROCEDURE — 99999 PR PBB SHADOW E&M-EST. PATIENT-LVL V: ICD-10-PCS | Mod: PBBFAC,,, | Performed by: ANESTHESIOLOGY

## 2020-09-16 PROCEDURE — 99999 PR PBB SHADOW E&M-EST. PATIENT-LVL V: CPT | Mod: PBBFAC,,, | Performed by: ANESTHESIOLOGY

## 2020-09-16 NOTE — LETTER
September 18, 2020      Christina Cruz, DO  [address] [facility] [address] [place] [address]           [facility]  [address] [place] [address]  Phone: [phone]  Fax: [phone]          Patient: Juanis Mclaughlin   MR Number: [id number]   YOB: 1940   Date of Visit: 9/16/2020       Dear Dr. Christina Cruz:    Thank you for referring Juanis Mclaughlin to me for evaluation. Attached you will find relevant portions of my assessment and plan of care.    If you have questions, please do not hesitate to call me. I look forward to following Juanis Mclaughlin along with you.    Sincerely,    Bharat Avalos MD    Enclosure  CC:  No Recipients    If you would like to receive this communication electronically, please contact [email or web address] or [phone] to request more information on [employer] access.    For providers and/or their staff who would like to refer a patient to [facility], please contact us through our one-stop-shop provider referral line, [facility], at [phone].    If you feel you have received this communication in error or would no longer like to receive these types of communications, please e-mail [email or web address]

## 2020-09-16 NOTE — H&P (VIEW-ONLY)
This note was completed with dictation software and grammatical errors may exist.    CC:  Neck pain    HPI:  The patient is an 80-year-old woman with a history of rheumatoid arthritis, cardiomyopathy with pacemaker, osteoporosis, chronic renal insufficiency who presents in referral from Dr. Cruz for neck pain.  She had previously seen my colleague, Dr. Blanco, this is the 1st time I have seen her.  She reports having neck pain for the last several years, denies any specific trauma that may have caused this.  She does report having issues with her neck on and off over the years but nothing severe until the last 2 years.  The pain is located in the left upper cervical region and radiates up into the occipital region at times but also radiates into the left trapezius, has occasional shooting pain radiating into her upper arm on the left side.  She denies any symptoms on the right side.  She denies any weakness, numbness or tingling in her arm.  She does have balance issues.  States that she has headaches on a constant basis.  She gets some relief with heat and also states that it relieves to push on her neck.    Pain intervention history:  She underwent a left greater and lesser occipital nerve block on 01/11/2019 by Dr. Blanco.    Antineuropathics:  NSAIDs:  She has been taking Tylenol but this does not seem to help.  Physical therapy:  She has done multiple rounds of physical therapy, also did some laser treatments, her daughter has a machine.  Antidepressants:  Muscle relaxers:  Opioids:  Antiplatelets/Anticoagulants:  81 ASA        ROS:    Lab Results   Component Value Date    HGBA1C 6.0 (H) 10/01/2019       Lab Results   Component Value Date    WBC 7.40 09/09/2020    HGB 13.0 09/09/2020    HCT 41.8 09/09/2020     (H) 09/09/2020     09/09/2020             Past Medical History:   Diagnosis Date    Arthritis     rheumatoid arthritis    Cardiomyopathy     CKD (chronic kidney disease) stage 3, GFR 30-59  ml/min 1/9/2019    Diverticulosis     DVT (deep venous thrombosis)     one time 5 years ago    Essential hypertension 11/24/2013    GERD (gastroesophageal reflux disease)     Glaucoma     sees Dr. Gillette    Hyperlipidemia     Hypertension     Iron deficiency     Iron deficiency anemia 10/21/2016     IMO LOAD Q4    Long-term use of immunosuppressant medication 7/27/2015    Mitral regurgitation 12/20/2013 12/13 mild     Osteoporosis 7/9/2014    Pacemaker 10/26/2016    left chest PACEMAKER [employer] [address] S/[id number] Bonifacio Fuller 9/9/2016 - current   right atrium LEAD [employer] [address] S/[id number] Bonifacio Fuller 9/9/2016 - current   right ventricle LEAD [employer] [address] S/[id number] Bonifacio Fuller 9/9/2016 - current      PONV (postoperative nausea and vomiting)     Rheumatoid arthritis involving multiple sites with positive rheumatoid factor 11/24/2013    Vitamin B12 deficiency 2/28/2015       Past Surgical History:   Procedure Laterality Date    BREAST CYST EXCISION Bilateral     several cysts removed    COLONOSCOPY  2012    FOOT SURGERY      had neuorma and also required hardware    HYSTERECTOMY      SUJATA with BSO    INCONTINENCE SURGERY      KNEE ARTHROSCOPY W/ DEBRIDEMENT      OOPHORECTOMY  2012    pace maker  09/2016       Social History     Socioeconomic History    Marital status:      Spouse name: Not on file    Number of children: 3    Years of education: Not on file    Highest education level: Not on file   Occupational History    Not on file   Social Needs    Financial resource strain: Not on file    Food insecurity     Worry: Not on file     Inability: Not on file    Transportation needs     Medical: Not on file     Non-medical: Not on file   Tobacco Use    Smoking status: Never Smoker    Smokeless tobacco: Never Used   Substance and Sexual Activity    Alcohol use: Yes      "Alcohol/week: 0.8 standard drinks     Types: 1 Standard drinks or equivalent per week     Comment: occ social    Drug use: No    Sexual activity: Never     Birth control/protection: See Surgical Hx   Lifestyle    Physical activity     Days per week: Not on file     Minutes per session: Not on file    Stress: Not on file   Relationships    Social connections     Talks on phone: Not on file     Gets together: Not on file     Attends Denominational service: Not on file     Active member of club or organization: Not on file     Attends meetings of clubs or organizations: Not on file     Relationship status: Not on file   Other Topics Concern    Not on file   Social History Narrative    Not on file         Medications/Allergies: See med card    Vitals:    09/16/20 0928   BP: (!) 165/80   Pulse: 61   Resp: 20   Temp: 97.6 °F (36.4 °C)   TempSrc: Oral   SpO2: 95%   Weight: 74.6 kg (164 lb 9.2 oz)   Height: 5' 1" (1.549 m)   PainSc:   6   PainLoc: Neck         Physical exam:  Gen: A and O x3, pleasant, well-groomed  Skin: No rashes or obvious lesions  HEENT: PERRLA, no obvious deformities on ears or in canals.Trachea midline.  CVS: Regular rate and rhythm, normal palpable pulses.  Resp: Clear to auscultation bilaterally, no wheezes or rales.  Abdomen: Soft, NT/ND.  Musculoskeletal: Able to heel walk, toe walk. No antalgic gait.     Neuro:  Upper extremities: 5/5 strength bilaterally   Reflexes: Brachioradialis 2+, Bicep 2+, Tricep 2+.   Sensory: Intact and symmetrical to light touch and pinprick in C2-T1 dermatomes bilaterally.    Cervical Spine:  Cervical spine:  Of motion is severely reduced with flexion and right lateral rotation with increased pain in the left neck with right lateral rotation and flexion, moderately reduced with left lateral rotation with slight increased pain, extension is moderately reduced with slight increased pain in the left neck.  Spurling's maneuver causes left neck pain  Myofascial exam: " Tenderness to palpation across left trapezius    Imagin19 Xray C-spine:  There is multilevel degenerative disc and facet disease.  There is no fracture.  There is trace retrolisthesis of C3 on C4.  There is trace anterolisthesis of C4 on C5, C5 on C6, C7 on T1.  This is unchanged with flexion or extension.  There is multilevel facet joint arthropathy.  There is mild disc space narrowing at the C5-6 level, moderate disc space narrowing at the C3-4 and C4-5 levels with marked disc space narrowing at the C6-7 level.  There is at least mild multilevel foraminal stenosis.  The prevertebral soft tissues are normal.  The airway is patent.    Assessment:   The patient is an 80-year-old woman with a history of rheumatoid arthritis, cardiomyopathy with pacemaker, osteoporosis, chronic renal insufficiency who presents in referral from Dr. Cruz for neck pain.   1. Antiplatelet or antithrombotic long-term use     2. Cervical spondylosis  Ambulatory referral/consult to Pain Clinic    MRI Cervical Spine Without Contrast   3. Radiculopathy, cervical region  MRI Cervical Spine Without Contrast         Plan:  1.  We discussed that she definitely has spondylosis in her cervical spine but I would like to see if she has any canal stenosis that could be contributing to her pain as well so I will get an MRI.  Her pacemaker is compatible with MRI, this will be done at [facility].  Once I have seen the results I will contact her as to possibly setting up interventional procedures.

## 2020-09-16 NOTE — PROGRESS NOTES
This note was completed with dictation software and grammatical errors may exist.    CC:  Neck pain    HPI:  The patient is an 80-year-old woman with a history of rheumatoid arthritis, cardiomyopathy with pacemaker, osteoporosis, chronic renal insufficiency who presents in referral from Dr. Cruz for neck pain.  She had previously seen my colleague, Dr. Blanco, this is the 1st time I have seen her.  She reports having neck pain for the last several years, denies any specific trauma that may have caused this.  She does report having issues with her neck on and off over the years but nothing severe until the last 2 years.  The pain is located in the left upper cervical region and radiates up into the occipital region at times but also radiates into the left trapezius, has occasional shooting pain radiating into her upper arm on the left side.  She denies any symptoms on the right side.  She denies any weakness, numbness or tingling in her arm.  She does have balance issues.  States that she has headaches on a constant basis.  She gets some relief with heat and also states that it relieves to push on her neck.    Pain intervention history:  She underwent a left greater and lesser occipital nerve block on 01/11/2019 by Dr. Blanco.    Antineuropathics:  NSAIDs:  She has been taking Tylenol but this does not seem to help.  Physical therapy:  She has done multiple rounds of physical therapy, also did some laser treatments, her daughter has a machine.  Antidepressants:  Muscle relaxers:  Opioids:  Antiplatelets/Anticoagulants:  81 ASA        ROS:    Lab Results   Component Value Date    HGBA1C 6.0 (H) 10/01/2019       Lab Results   Component Value Date    WBC 7.40 09/09/2020    HGB 13.0 09/09/2020    HCT 41.8 09/09/2020     (H) 09/09/2020     09/09/2020             Past Medical History:   Diagnosis Date    Arthritis     rheumatoid arthritis    Cardiomyopathy     CKD (chronic kidney disease) stage 3, GFR 30-59  ml/min 1/9/2019    Diverticulosis     DVT (deep venous thrombosis)     one time 5 years ago    Essential hypertension 11/24/2013    GERD (gastroesophageal reflux disease)     Glaucoma     sees Dr. Gillette    Hyperlipidemia     Hypertension     Iron deficiency     Iron deficiency anemia 10/21/2016     IMO LOAD Q4    Long-term use of immunosuppressant medication 7/27/2015    Mitral regurgitation 12/20/2013 12/13 mild     Osteoporosis 7/9/2014    Pacemaker 10/26/2016    left chest [employer] [address] [id number] Bonifacio Fuller 9/9/2016 - current   right atrium LEAD [employer] [address] S/[id number] Bonifacio Fuller 9/9/2016 - current   right ventricle LEAD [employer] [address] S/[id number] Bonifacio Fuller 9/9/2016 - current      PONV (postoperative nausea and vomiting)     Rheumatoid arthritis involving multiple sites with positive rheumatoid factor 11/24/2013    Vitamin B12 deficiency 2/28/2015       Past Surgical History:   Procedure Laterality Date    BREAST CYST EXCISION Bilateral     several cysts removed    COLONOSCOPY  2012    FOOT SURGERY      had neuorma and also required hardware    HYSTERECTOMY      SUJATA with BSO    INCONTINENCE SURGERY      KNEE ARTHROSCOPY W/ DEBRIDEMENT      OOPHORECTOMY  2012    pace maker  09/2016       Social History     Socioeconomic History    Marital status:      Spouse name: Not on file    Number of children: 3    Years of education: Not on file    Highest education level: Not on file   Occupational History    Not on file   Social Needs    Financial resource strain: Not on file    Food insecurity     Worry: Not on file     Inability: Not on file    Transportation needs     Medical: Not on file     Non-medical: Not on file   Tobacco Use    Smoking status: Never Smoker    Smokeless tobacco: Never Used   Substance and Sexual Activity    Alcohol use: Yes      "Alcohol/week: 0.8 standard drinks     Types: 1 Standard drinks or equivalent per week     Comment: occ social    Drug use: No    Sexual activity: Never     Birth control/protection: See Surgical Hx   Lifestyle    Physical activity     Days per week: Not on file     Minutes per session: Not on file    Stress: Not on file   Relationships    Social connections     Talks on phone: Not on file     Gets together: Not on file     Attends [employer] service: Not on file     Active member of club or organization: Not on file     Attends meetings of clubs or organizations: Not on file     Relationship status: Not on file   Other Topics Concern    Not on file   Social History Narrative    Not on file         Medications/Allergies: See med card    Vitals:    09/16/20 0928   BP: (!) 165/80   Pulse: 61   Resp: 20   Temp: 97.6 °F (36.4 °C)   TempSrc: Oral   SpO2: 95%   Weight: 74.6 kg (164 lb 9.2 oz)   Height: 5' 1" (1.549 m)   PainSc:   6   PainLoc: Neck         Physical exam:  Gen: A and O x3, pleasant, well-groomed  Skin: No rashes or obvious lesions  HEENT: PERRLA, no obvious deformities on ears or in canals.Trachea midline.  CVS: Regular rate and rhythm, normal palpable pulses.  Resp: Clear to auscultation bilaterally, no wheezes or rales.  Abdomen: Soft, NT/ND.  Musculoskeletal: Able to heel walk, toe walk. No antalgic gait.     Neuro:  Upper extremities: 5/5 strength bilaterally   Reflexes: Brachioradialis 2+, Bicep 2+, Tricep 2+.   Sensory: Intact and symmetrical to light touch and pinprick in C2-T1 dermatomes bilaterally.    Cervical Spine:  Cervical spine:  Of motion is severely reduced with flexion and right lateral rotation with increased pain in the left neck with right lateral rotation and flexion, moderately reduced with left lateral rotation with slight increased pain, extension is moderately reduced with slight increased pain in the left neck.  Spurling's maneuver causes left neck pain  Myofascial exam: " Tenderness to palpation across left trapezius    Imagin19 Xray C-spine:  There is multilevel degenerative disc and facet disease.  There is no fracture.  There is trace retrolisthesis of C3 on C4.  There is trace anterolisthesis of C4 on C5, C5 on C6, C7 on T1.  This is unchanged with flexion or extension.  There is multilevel facet joint arthropathy.  There is mild disc space narrowing at the C5-6 level, moderate disc space narrowing at the C3-4 and C4-5 levels with marked disc space narrowing at the C6-7 level.  There is at least mild multilevel foraminal stenosis.  The prevertebral soft tissues are normal.  The airway is patent.    Assessment:   The patient is an 80-year-old woman with a history of rheumatoid arthritis, cardiomyopathy with pacemaker, osteoporosis, chronic renal insufficiency who presents in referral from Dr. Cruz for neck pain.   1. Antiplatelet or antithrombotic long-term use     2. Cervical spondylosis  Ambulatory referral/consult to Pain Clinic    MRI Cervical Spine Without Contrast   3. Radiculopathy, cervical region  MRI Cervical Spine Without Contrast         Plan:  1.  We discussed that she definitely has spondylosis in her cervical spine but I would like to see if she has any canal stenosis that could be contributing to her pain as well so I will get an MRI.  Her pacemaker is compatible with MRI, this will be done at [facility].  Once I have seen the results I will contact her as to possibly setting up interventional procedures.

## 2020-09-25 ENCOUNTER — TELEPHONE (OUTPATIENT)
Dept: PAIN MEDICINE | Facility: CLINIC | Age: 80
End: 2020-09-25

## 2020-09-25 DIAGNOSIS — M50.30 DDD (DEGENERATIVE DISC DISEASE), CERVICAL: Primary | ICD-10-CM

## 2020-09-25 DIAGNOSIS — M47.812 CERVICAL SPONDYLOSIS: ICD-10-CM

## 2020-09-29 ENCOUNTER — TELEPHONE (OUTPATIENT)
Dept: PAIN MEDICINE | Facility: CLINIC | Age: 80
End: 2020-09-29

## 2020-09-29 ENCOUNTER — HOSPITAL ENCOUNTER (OUTPATIENT)
Dept: RADIOLOGY | Facility: HOSPITAL | Age: 80
Discharge: HOME | End: 2020-09-29
Attending: ANESTHESIOLOGY
Payer: COMMERCIAL

## 2020-09-29 DIAGNOSIS — M50.30 DDD (DEGENERATIVE DISC DISEASE), CERVICAL: ICD-10-CM

## 2020-09-29 DIAGNOSIS — M47.812 CERVICAL SPONDYLOSIS: ICD-10-CM

## 2020-09-29 PROCEDURE — 72125 CT NECK SPINE W/O DYE: CPT | Mod: TC,PO

## 2020-09-29 PROCEDURE — 72125 CT CERVICAL SPINE WITHOUT CONTRAST: ICD-10-PCS | Mod: 26,,, | Performed by: RADIOLOGY

## 2020-09-29 PROCEDURE — 72125 CT NECK SPINE W/O DYE: CPT | Mod: 26,,, | Performed by: RADIOLOGY

## 2020-10-01 ENCOUNTER — TELEPHONE (OUTPATIENT)
Dept: PAIN MEDICINE | Facility: CLINIC | Age: 80
End: 2020-10-01

## 2020-10-01 DIAGNOSIS — Z13.9 SCREENING PROCEDURE: ICD-10-CM

## 2020-10-01 DIAGNOSIS — M54.12 CERVICAL RADICULOPATHY: Primary | ICD-10-CM

## 2020-10-01 RX ORDER — SODIUM CHLORIDE, SODIUM LACTATE, POTASSIUM CHLORIDE, CALCIUM CHLORIDE 600; 310; 30; 20 MG/100ML; MG/100ML; MG/100ML; MG/100ML
INJECTION, SOLUTION INTRAVENOUS CONTINUOUS
Status: CANCELLED | OUTPATIENT
Start: 2020-10-13

## 2020-10-01 RX ORDER — INFLUENZA A VIRUS A/MICHIGAN/45/2015 X-275 (H1N1) ANTIGEN (FORMALDEHYDE INACTIVATED), INFLUENZA A VIRUS A/SINGAPORE/INFIMH-16-0019/2016 IVR-186 (H3N2) ANTIGEN (FORMALDEHYDE INACTIVATED), INFLUENZA B VIRUS B/PHUKET/3073/2013 ANTIGEN (FORMALDEHYDE INACTIVATED), AND INFLUENZA B VIRUS B/MARYLAND/15/2016 BX-69A ANTIGEN (FORMALDEHYDE INACTIVATED) 60; 60; 60; 60 UG/.7ML; UG/.7ML; UG/.7ML; UG/.7ML
INJECTION, SUSPENSION INTRAMUSCULAR
COMMUNITY
Start: 2020-09-17 | End: 2021-03-08 | Stop reason: ALTCHOICE

## 2020-10-06 ENCOUNTER — TELEPHONE (OUTPATIENT)
Dept: PAIN MEDICINE | Facility: CLINIC | Age: 80
End: 2020-10-06

## 2020-10-06 NOTE — TELEPHONE ENCOUNTER
----- Message from Nan Kinney sent at 10/6/2020  9:49 AM CDT -----  Contact: pt at [phone]  Type: Needs Medical Advice  Who Called: Juanis  Phong Call Back Number: [phone]  Additional Information:Patient is calling to speak with a nurse in regards to what she was told to stop taking today before her procedure on 10/13.Please call back and advise.

## 2020-10-11 ENCOUNTER — LAB VISIT (OUTPATIENT)
Dept: FAMILY MEDICINE | Facility: CLINIC | Age: 80
End: 2020-10-11
Payer: COMMERCIAL

## 2020-10-11 DIAGNOSIS — Z13.9 SCREENING PROCEDURE: ICD-10-CM

## 2020-10-11 PROCEDURE — U0003 INFECTIOUS AGENT DETECTION BY NUCLEIC ACID (DNA OR RNA); SEVERE ACUTE RESPIRATORY SYNDROME CORONAVIRUS 2 (SARS-COV-2) (CORONAVIRUS DISEASE [COVID-19]), AMPLIFIED PROBE TECHNIQUE, MAKING USE OF HIGH THROUGHPUT TECHNOLOGIES AS DESCRIBED BY CMS-2020-01-R: HCPCS

## 2020-10-12 LAB — SARS-COV-2 RNA RESP QL NAA+PROBE: NOT DETECTED

## 2020-10-13 ENCOUNTER — HOSPITAL ENCOUNTER (OUTPATIENT)
Dept: RADIOLOGY | Facility: HOSPITAL | Age: 80
Discharge: HOME | End: 2020-10-13
Attending: ANESTHESIOLOGY | Admitting: ANESTHESIOLOGY
Payer: COMMERCIAL

## 2020-10-13 ENCOUNTER — HOSPITAL ENCOUNTER (OUTPATIENT)
Facility: HOSPITAL | Age: 80
Discharge: HOME | End: 2020-10-13
Attending: ANESTHESIOLOGY | Admitting: ANESTHESIOLOGY
Payer: COMMERCIAL

## 2020-10-13 DIAGNOSIS — M54.12 CERVICAL RADICULOPATHY: Primary | ICD-10-CM

## 2020-10-13 DIAGNOSIS — M47.812 SPONDYLOSIS OF CERVICAL REGION WITHOUT MYELOPATHY OR RADICULOPATHY: ICD-10-CM

## 2020-10-13 PROCEDURE — 76000 FLUOROSCOPY <1 HR PHYS/QHP: CPT | Mod: TC,PO

## 2020-10-13 PROCEDURE — 99152 PR MOD CONSCIOUS SEDATION, SAME PHYS, 5+ YRS, FIRST 15 MIN: ICD-10-PCS | Mod: ,,, | Performed by: ANESTHESIOLOGY

## 2020-10-13 PROCEDURE — 62321 PR INJ CERV/THORAC, W/GUIDANCE: ICD-10-PCS | Mod: ,,, | Performed by: ANESTHESIOLOGY

## 2020-10-13 PROCEDURE — 62321 NJX INTERLAMINAR CRV/THRC: CPT | Mod: PO | Performed by: ANESTHESIOLOGY

## 2020-10-13 PROCEDURE — 62321 NJX INTERLAMINAR CRV/THRC: CPT | Mod: ,,, | Performed by: ANESTHESIOLOGY

## 2020-10-13 PROCEDURE — 25000003 PHARM REV CODE 250: Mod: PO | Performed by: ANESTHESIOLOGY

## 2020-10-13 PROCEDURE — 99152 MOD SED SAME PHYS/QHP 5/>YRS: CPT | Mod: ,,, | Performed by: ANESTHESIOLOGY

## 2020-10-13 PROCEDURE — 63600175 PHARM REV CODE 636 W HCPCS: Mod: PO | Performed by: ANESTHESIOLOGY

## 2020-10-13 PROCEDURE — 25500020 PHARM REV CODE 255: Mod: PO | Performed by: ANESTHESIOLOGY

## 2020-10-13 RX ORDER — METHYLPREDNISOLONE ACETATE 80 MG/ML
INJECTION, SUSPENSION INTRA-ARTICULAR; INTRALESIONAL; INTRAMUSCULAR; SOFT TISSUE
Status: DISCONTINUED | OUTPATIENT
Start: 2020-10-13 | End: 2020-10-13 | Stop reason: HOSPADM

## 2020-10-13 RX ORDER — LIDOCAINE HYDROCHLORIDE 10 MG/ML
INJECTION, SOLUTION EPIDURAL; INFILTRATION; INTRACAUDAL; PERINEURAL
Status: DISCONTINUED | OUTPATIENT
Start: 2020-10-13 | End: 2020-10-13 | Stop reason: HOSPADM

## 2020-10-13 RX ORDER — SODIUM CHLORIDE, SODIUM LACTATE, POTASSIUM CHLORIDE, CALCIUM CHLORIDE 600; 310; 30; 20 MG/100ML; MG/100ML; MG/100ML; MG/100ML
INJECTION, SOLUTION INTRAVENOUS CONTINUOUS
Status: DISCONTINUED | OUTPATIENT
Start: 2020-10-13 | End: 2020-10-13 | Stop reason: HOSPADM

## 2020-10-13 RX ORDER — MIDAZOLAM HYDROCHLORIDE 2 MG/2ML
INJECTION, SOLUTION INTRAMUSCULAR; INTRAVENOUS
Status: DISCONTINUED | OUTPATIENT
Start: 2020-10-13 | End: 2020-10-13 | Stop reason: HOSPADM

## 2020-10-13 RX ADMIN — SODIUM CHLORIDE, SODIUM LACTATE, POTASSIUM CHLORIDE, AND CALCIUM CHLORIDE: .6; .31; .03; .02 INJECTION, SOLUTION INTRAVENOUS at 02:10

## 2020-10-13 NOTE — DISCHARGE SUMMARY
[facility]  Discharge Note  Short Stay    [facility]  Discharge Note  Short Stay    Admit Date: 10/13/2020    Discharge Date: 10/13/2020    Attending Physician: Bharat Avalos MD     Discharge Provider: Bharat Avalos    Diagnoses:  Active Hospital Problems    Diagnosis  POA    *Cervical radiculopathy [M54.12]  Yes      Resolved Hospital Problems   No resolved problems to display.       Discharged Condition: good    Final Diagnoses: Cervical radiculopathy [M54.12]    Disposition: Home or Self Care    Hospital Course: no complications, uneventful    Outcome of Hospitalization, Treatment, Procedure, or Surgery:  Patient was admitted for outpatient procedure. The patient underwent procedure without complications and are discharged home    Follow up/Patient Instructions:  Follow up as scheduled in Pain Management clinic in 3-4 weeks/Patient has received instructions and follow up date and time    Medications:  Continue previous medications    Discharge Procedure Orders   Call MD for:  temperature >100.4     Call MD for:  severe uncontrolled pain     Call MD for:  redness, tenderness, or signs of infection (pain, swelling, redness, odor or green/yellow discharge around incision site)     Call MD for:  severe persistent headache     No dressing needed         Discharge Procedure Orders (must include Diet, Follow-up, Activity):   Discharge Procedure Orders (must include Diet, Follow-up, Activity)   Call MD for:  temperature >100.4     Call MD for:  severe uncontrolled pain     Call MD for:  redness, tenderness, or signs of infection (pain, swelling, redness, odor or green/yellow discharge around incision site)     Call MD for:  severe persistent headache     No dressing needed

## 2020-10-13 NOTE — OP NOTE
PROCEDURE DATE: 10/13/2020    Procedure: C7-T1 cervical interlaminar epidural steroid injection under utilizing fluoroscopy.    Diagnosis: Cervical Radiculopathy    POSTOP DIAGNOSIS: SAME    Physician: Bharat Avalos MD    Medications injected:  Methylprednisone 80mg followed by a slow injection of 4 mL sterile, preservative-free normal saline.    Local anesthetic used: Lidocaine 1%, 4 ml.    Sedation Medications: 2mg versed    Complications:  none    Estimated blood loss: none    Technique:  A time-out was taken to identify patient and procedure prior to starting the procedure.  With the patient laying in a prone position with the neck in a mid-flexed forward position, the area was prepped and draped in the usual sterile fashion using ChloraPrep and a fenestrated drape.  The area was determined under AP fluoroscopic guidance.  Local anesthetic was given using a 25-gauge 1.5 inch needle by raising a wheal and then infiltrating ventrally.  A 3.5 inch 20-gauge Touhy needle was introduced under fluoroscopic guidance to meet the lamina of C7.  The needle was then hinged under the lamina then advanced using loss of resistance technique.  Once the tip of the needle was in the desired position, the contrast dye Omnipaque was injected to determine placement and no uptake.  The steroid was then injected slowly followed by a slow injection of 4 mL of the sterile preservative-free normal saline.  The patient tolerated the procedure well.    The patient was monitored after the procedure and was given post-procedure and discharge instructions to follow at home. The patient was discharged in a stable condition.    Event Time In   In Facility 1404   In Pre-Procedure 1416   Physician Available    Anesthesia Available    Pre-Op: Bedside Procedure Start    Pre-Op: Bedside Procedure Stop    Pre-Procedure Complete 1443   Out of Pre-Procedure    Anesthesia Start    Anesthesia Start Data Collection    Setup Start    Setup Complete     In Room 1510   Prep Start    Procedure Prep Complete    Procedure Start 1517   Procedure Closing    Emergence    Procedure Finish 1519   Sedation Start 1509   Scope In    Extent Reached    Scope Out    Sedation End 1522   Out of Room 1522   Cleanup Start    Cleanup Complete    Cosmetic Start    Cosmetic Stop    Pain Mgmt In Room    Pain Mgmt Out Room    In Recovery    Anesthesia Finish    Bedside Procedure Start    Bedside Procedure Stop    Recovery Care Complete    Out of Recovery    To Phase II    In Phase II    Pain Mgmt Recovery Start    Pain Mgmt Recovery Stop    Obs Rec Start    Obs Rec Stop    Phase II Care Complete    Out of Phase II    Procedural Care Complete    Discharge    Pain Follow Up Needed    Pain Follow Up Complete      Moderate sedation was achieved with midazolam 2mg.  Continuous monitoring of EKG, blood pressure and pulse oximetry was provided by a registered nurse during the entire course of the procedure under my supervision and recorded in the patient's medical record.   Total time for sedation was 13 minutes.

## 2020-10-13 NOTE — DISCHARGE INSTRUCTIONS
Home Care Instructions    Apply ice pack to injection site for 20 minute periods for the first 24 hours for soreness/discomfort at injection site  DO NOT USE HEAT FOR 24 HOURS  Keep site clean and dry for 24 hours. If Band-Aid present remove when desired. May shower tomorrow. Pat dry.  Do not drive until tomorrow  Take care when walking after a cervical injection    STEROIDS  Make take 10-14 days for full affects  Avoid strenuous exercises for 2 days      Resume home medications as prescribes today  Resume Aspirin, Plavix or Coumadin the day after the procedure unless otherwise intructed    SEE IMMEDIATE MEDICAL HELP FOR:  Severe increase in your usual pain or appearance of new pain  Prolonged or increasing weakness or numbness in the legs or arms  Drainage, redness, active bleeding, or increased swelling at the injection site  Temperature over 100.0 degrees F.  Headache that increases when your head is upright and decrease when you lie flat    CALL 911 OR GO DIRECTLY TO EMERGENCY DEPARTMENT FOR:  Shortness of breath, chest pain, or problems breathing

## 2020-10-13 NOTE — INTERVAL H&P NOTE
The patient has been examined and the H&P has been reviewed:    I concur with the findings and no changes have occurred since H&P was written.    Anesthesia/Surgery risks, benefits and alternative options discussed and understood by patient/family.      ASA 3, mallampati 2      Active Hospital Problems    Diagnosis  POA    Cervical radiculopathy [M54.12]  Yes      Resolved Hospital Problems   No resolved problems to display.

## 2020-10-14 VITALS
TEMPERATURE: 98 F | SYSTOLIC BLOOD PRESSURE: 166 MMHG | OXYGEN SATURATION: 97 % | DIASTOLIC BLOOD PRESSURE: 74 MMHG | HEART RATE: 61 BPM | RESPIRATION RATE: 18 BRPM

## 2020-10-19 RX ORDER — METOPROLOL SUCCINATE 50 MG/1
50 TABLET, EXTENDED RELEASE ORAL DAILY
Qty: 90 TABLET | Refills: 4 | Status: SHIPPED | OUTPATIENT
Start: 2020-10-19 | End: 2021-10-11

## 2020-10-19 NOTE — TELEPHONE ENCOUNTER
----- Message from Vijay Hamilton sent at 10/19/2020  1:56 PM CDT -----  Regarding: Rx refill  Contact: Patient [phone]  RX request - refill or new RX.  Is this a refill or new RX:  Refill   RX name and strength: metoprolol succinate (TOPROL-XL) 50 MG 24 hr tablet (  Directions:   Is this a 30 day or 90 day RX:    Pharmacy name and phone # [facility] - [place], [place] - [facility] [address] AT Portal to [facility] [phone] (Phone)  [phone] (Fax       Comments:        Please call an advise  Thank you

## 2020-11-04 ENCOUNTER — OFFICE VISIT (OUTPATIENT)
Dept: PAIN MEDICINE | Facility: CLINIC | Age: 80
End: 2020-11-04
Payer: COMMERCIAL

## 2020-11-04 VITALS
BODY MASS INDEX: 30.95 KG/M2 | OXYGEN SATURATION: 96 % | SYSTOLIC BLOOD PRESSURE: 167 MMHG | TEMPERATURE: 98 F | RESPIRATION RATE: 20 BRPM | HEART RATE: 62 BPM | WEIGHT: 163.81 LBS | DIASTOLIC BLOOD PRESSURE: 55 MMHG

## 2020-11-04 DIAGNOSIS — M50.30 DDD (DEGENERATIVE DISC DISEASE), CERVICAL: ICD-10-CM

## 2020-11-04 DIAGNOSIS — M47.812 SPONDYLOSIS OF CERVICAL REGION WITHOUT MYELOPATHY OR RADICULOPATHY: Primary | ICD-10-CM

## 2020-11-04 DIAGNOSIS — Z13.9 SCREENING PROCEDURE: ICD-10-CM

## 2020-11-04 PROCEDURE — 99999 PR PBB SHADOW E&M-EST. PATIENT-LVL V: ICD-10-PCS | Mod: PBBFAC,,, | Performed by: PHYSICIAN ASSISTANT

## 2020-11-04 PROCEDURE — 3077F PR MOST RECENT SYSTOLIC BLOOD PRESSURE >= 140 MM HG: ICD-10-PCS | Mod: CPTII,S$GLB,, | Performed by: PHYSICIAN ASSISTANT

## 2020-11-04 PROCEDURE — 1125F PR PAIN SEVERITY QUANTIFIED, PAIN PRESENT: ICD-10-PCS | Mod: S$GLB,,, | Performed by: PHYSICIAN ASSISTANT

## 2020-11-04 PROCEDURE — 1159F PR MEDICATION LIST DOCUMENTED IN MEDICAL RECORD: ICD-10-PCS | Mod: S$GLB,,, | Performed by: PHYSICIAN ASSISTANT

## 2020-11-04 PROCEDURE — 1101F PT FALLS ASSESS-DOCD LE1/YR: CPT | Mod: CPTII,S$GLB,, | Performed by: PHYSICIAN ASSISTANT

## 2020-11-04 PROCEDURE — 99214 PR OFFICE/OUTPT VISIT, EST, LEVL IV, 30-39 MIN: ICD-10-PCS | Mod: S$GLB,,, | Performed by: PHYSICIAN ASSISTANT

## 2020-11-04 PROCEDURE — 3077F SYST BP >= 140 MM HG: CPT | Mod: CPTII,S$GLB,, | Performed by: PHYSICIAN ASSISTANT

## 2020-11-04 PROCEDURE — 3078F PR MOST RECENT DIASTOLIC BLOOD PRESSURE < 80 MM HG: ICD-10-PCS | Mod: CPTII,S$GLB,, | Performed by: PHYSICIAN ASSISTANT

## 2020-11-04 PROCEDURE — 1159F MED LIST DOCD IN RCRD: CPT | Mod: S$GLB,,, | Performed by: PHYSICIAN ASSISTANT

## 2020-11-04 PROCEDURE — 1101F PR PT FALLS ASSESS DOC 0-1 FALLS W/OUT INJ PAST YR: ICD-10-PCS | Mod: CPTII,S$GLB,, | Performed by: PHYSICIAN ASSISTANT

## 2020-11-04 PROCEDURE — 1125F AMNT PAIN NOTED PAIN PRSNT: CPT | Mod: S$GLB,,, | Performed by: PHYSICIAN ASSISTANT

## 2020-11-04 PROCEDURE — 99999 PR PBB SHADOW E&M-EST. PATIENT-LVL V: CPT | Mod: PBBFAC,,, | Performed by: PHYSICIAN ASSISTANT

## 2020-11-04 PROCEDURE — 3078F DIAST BP <80 MM HG: CPT | Mod: CPTII,S$GLB,, | Performed by: PHYSICIAN ASSISTANT

## 2020-11-04 PROCEDURE — 99214 OFFICE O/P EST MOD 30 MIN: CPT | Mod: S$GLB,,, | Performed by: PHYSICIAN ASSISTANT

## 2020-11-04 RX ORDER — SODIUM CHLORIDE, SODIUM LACTATE, POTASSIUM CHLORIDE, CALCIUM CHLORIDE 600; 310; 30; 20 MG/100ML; MG/100ML; MG/100ML; MG/100ML
INJECTION, SOLUTION INTRAVENOUS CONTINUOUS
Status: CANCELLED | OUTPATIENT
Start: 2020-11-19

## 2020-11-05 ENCOUNTER — TELEPHONE (OUTPATIENT)
Dept: PAIN MEDICINE | Facility: CLINIC | Age: 80
End: 2020-11-05

## 2020-11-05 NOTE — TELEPHONE ENCOUNTER
----- Message from Candis Antoine sent at 11/5/2020 10:53 AM CST -----  Contact: pt  Type: Needs Medical Advice    Who Called:  Pt  Best Call Back Number: [phone]    Additional Information: Requesting a call back regarding  pt procedure on the 9th   Please Advise ---Thank you

## 2020-11-09 NOTE — H&P (VIEW-ONLY)
This note was completed with dictation software and grammatical errors may exist.    CC:  Neck pain    HPI:  The patient is an 80-year-old woman with a history of rheumatoid arthritis, cardiomyopathy with pacemaker, osteoporosis, chronic renal insufficiency who presents in referral from Dr. Cruz for neck pain.  She is status post C7-T1 interlaminar epidural steroid injection on 10/13/2020 with 100% relief of her headaches and 0% relief of her neck pain.  She has also done physical therapy without relief.  She describes severe left-sided neck pain that is worse with turning her head to the left side but also constant.  She denies any radiation into her arm.  She denies weakness or numbness, no bladder or bowel incontinence.    Pain intervention history:  She underwent a left greater and lesser occipital nerve block on 01/11/2019 by Dr. Blanco without relief. She is status post C7-T1 interlaminar epidural steroid injection on 10/13/2020 with 100% relief of her headaches and 0% relief of her neck pain.     Antineuropathics:  NSAIDs:  She has been taking Tylenol but this does not seem to help.  Physical therapy:  She has done multiple rounds of physical therapy, also did some laser treatments, her daughter has a machine.  Antidepressants:  Muscle relaxers:  Opioids:  Antiplatelets/Anticoagulants:  81 ASA        ROS:  The patient reports neck pain.  Balance of review of systems is negative.    Lab Results   Component Value Date    HGBA1C 6.0 (H) 10/01/2019       Lab Results   Component Value Date    WBC 7.40 09/09/2020    HGB 13.0 09/09/2020    HCT 41.8 09/09/2020     (H) 09/09/2020     09/09/2020             Past Medical History:   Diagnosis Date    Arthritis     rheumatoid arthritis    Cardiomyopathy     CKD (chronic kidney disease) stage 3, GFR 30-59 ml/min 1/9/2019    Diverticulosis     DVT (deep venous thrombosis)     one time 5 years ago    Essential hypertension 11/24/2013    GERD  (gastroesophageal reflux disease)     Glaucoma     sees Dr. Gillette    Hyperlipidemia     Hypertension     Iron deficiency     Iron deficiency anemia 10/21/2016     IMO LOAD Q4    Long-term use of immunosuppressant medication 7/27/2015    Mitral regurgitation 12/20/2013 12/13 mild     Osteoporosis 7/9/2014    Pacemaker 10/26/2016    left chest [employer]ER [employer] [address] [id number] Bonifacio Fuller. 9/9/2016 - current   right atrium LEAD [employer] [address] [id number] Bonifacio Fuller A. 9/9/2016 - current   right ventricle LEAD [employer] [address] [id number] Luizlucy Bonifacio A. 9/9/2016 - current      PONV (postoperative nausea and vomiting)     Rheumatoid arthritis involving multiple sites with positive rheumatoid factor 11/24/2013    Vitamin B12 deficiency 2/28/2015       Past Surgical History:   Procedure Laterality Date    BREAST CYST EXCISION Bilateral     several cysts removed    COLONOSCOPY  2012    EPIDURAL STEROID INJECTION INTO CERVICAL SPINE N/A 10/13/2020    Procedure: Injection-steroid-epidural-cervical;  Surgeon: Bharat Avalos MD;  Location: [facility];  Service: Pain Management;  Laterality: N/A;    FOOT SURGERY      had neuorma and also required hardware    HYSTERECTOMY      SUJATA with BSO    INCONTINENCE SURGERY      KNEE ARTHROSCOPY W/ DEBRIDEMENT      OOPHORECTOMY  2012    pace maker  09/2016       Social History     Socioeconomic History    Marital status:      Spouse name: Not on file    Number of children: 3    Years of education: Not on file    Highest education level: Not on file   Occupational History    Not on file   Social Needs    Financial resource strain: Not on file    Food insecurity     Worry: Not on file     Inability: Not on file    Transportation needs     Medical: Not on file     Non-medical: Not on file   Tobacco Use    Smoking status: Never Smoker    Smokeless tobacco:  Never Used   Substance and Sexual Activity    Alcohol use: Yes     Alcohol/week: 0.8 standard drinks     Types: 1 Standard drinks or equivalent per week     Comment: occ social    Drug use: No    Sexual activity: Never     Birth control/protection: See Surgical Hx   Lifestyle    Physical activity     Days per week: Not on file     Minutes per session: Not on file    Stress: Not on file   Relationships    Social connections     Talks on phone: Not on file     Gets together: Not on file     Attends [employer]ism service: Not on file     Active member of club or organization: Not on file     Attends meetings of clubs or organizations: Not on file     Relationship status: Not on file   Other Topics Concern    Not on file   Social History Narrative    Not on file         Medications/Allergies: See med card    Vitals:    11/04/20 1302   BP: (!) 167/55   Pulse: 62   Resp: 20   Temp: 97.9 °F (36.6 °C)   TempSrc: Temporal   SpO2: 96%   Weight: 74.3 kg (163 lb 12.8 oz)   PainSc:   6   PainLoc: Neck         Physical exam:  Gen: A and O x3, pleasant, well-groomed  Skin: No rashes or obvious lesions  HEENT: PERRLA, no obvious deformities on ears or in canals.Trachea midline.  CVS: Regular rate and rhythm, normal palpable pulses.  Resp: Clear to auscultation bilaterally, no wheezes or rales.  Abdomen: Soft, NT/ND.  Musculoskeletal: Able to heel walk, toe walk. No antalgic gait.     Neuro:  Upper extremities: 5/5 strength bilaterally   Reflexes: Brachioradialis 2+, Bicep 2+, Tricep 2+.   Sensory: Intact and symmetrical to light touch and pinprick in C2-T1 dermatomes bilaterally.    Cervical Spine:  Cervical spine:  Of motion is severely reduced with flexion and right lateral rotation with increased pain in the left neck with right lateral rotation and flexion, moderately reduced with left lateral rotation with slight increased pain, extension is moderately reduced with slight increased pain in the left neck.  Spurling's  maneuver causes left neck pain  Myofascial exam: Tenderness to palpation across left trapezius    Imagin19 Xray C-spine:  There is multilevel degenerative disc and facet disease.  There is no fracture.  There is trace retrolisthesis of C3 on C4.  There is trace anterolisthesis of C4 on C5, C5 on C6, C7 on T1.  This is unchanged with flexion or extension.  There is multilevel facet joint arthropathy.  There is mild disc space narrowing at the C5-6 level, moderate disc space narrowing at the C3-4 and C4-5 levels with marked disc space narrowing at the C6-7 level.  There is at least mild multilevel foraminal stenosis.  The prevertebral soft tissues are normal.  The airway is patent.    Assessment:   The patient is an 80-year-old woman with a history of rheumatoid arthritis, cardiomyopathy with pacemaker, osteoporosis, chronic renal insufficiency who presents in referral from Dr. Cruz for neck pain.   1. Spondylosis of cervical region without myelopathy or radiculopathy  Vital signs    Place 18-22 gauage peripheral IV     Verify informed consent    Notify physician     Notify physician     Notify physician (specify)    Diet NPO    Case Request Operating Room: Block-nerve-medial branch-cervical C3, C4, C5, C6    Place in Outpatient    lactated ringers infusion   2. DDD (degenerative disc disease), cervical     3. Screening procedure  COVID-19 Routine Screening    CANCELED: COVID-19 Routine Screening    CANCELED: COVID-19 Routine Screening         Plan:  1.  The patient no longer has headaches following the cervical PIERO but remains having neck pain.  I am going to schedule her for left C3, 4, 5, 6 diagnostic medial branch nerve blocks for facet mediated pain.  If successful we will repeat the blocks prior to proceeding with radiofrequency ablation.  She is a high risk patient with cardiomyopathy and pacemaker.  She takes  aspirin and we will need to hold this prior to the procedure.  2.  Follow-up in 4 weeks  postprocedure or sooner as needed.

## 2020-11-09 NOTE — PROGRESS NOTES
This note was completed with dictation software and grammatical errors may exist.    CC:  Neck pain    HPI:  The patient is an 80-year-old woman with a history of rheumatoid arthritis, cardiomyopathy with pacemaker, osteoporosis, chronic renal insufficiency who presents in referral from Dr. Cruz for neck pain.  She is status post C7-T1 interlaminar epidural steroid injection on 10/13/2020 with 100% relief of her headaches and 0% relief of her neck pain.  She has also done physical therapy without relief.  She describes severe left-sided neck pain that is worse with turning her head to the left side but also constant.  She denies any radiation into her arm.  She denies weakness or numbness, no bladder or bowel incontinence.    Pain intervention history:  She underwent a left greater and lesser occipital nerve block on 01/11/2019 by Dr. Blanco without relief. She is status post C7-T1 interlaminar epidural steroid injection on 10/13/2020 with 100% relief of her headaches and 0% relief of her neck pain.     Antineuropathics:  NSAIDs:  She has been taking Tylenol but this does not seem to help.  Physical therapy:  She has done multiple rounds of physical therapy, also did some laser treatments, her daughter has a machine.  Antidepressants:  Muscle relaxers:  Opioids:  Antiplatelets/Anticoagulants:  81 ASA        ROS:  The patient reports neck pain.  Balance of review of systems is negative.    Lab Results   Component Value Date    HGBA1C 6.0 (H) 10/01/2019       Lab Results   Component Value Date    WBC 7.40 09/09/2020    HGB 13.0 09/09/2020    HCT 41.8 09/09/2020     (H) 09/09/2020     09/09/2020             Past Medical History:   Diagnosis Date    Arthritis     rheumatoid arthritis    Cardiomyopathy     CKD (chronic kidney disease) stage 3, GFR 30-59 ml/min 1/9/2019    Diverticulosis     DVT (deep venous thrombosis)     one time 5 years ago    Essential hypertension 11/24/2013    GERD  (gastroesophageal reflux disease)     Glaucoma     sees Dr. Gillette    Hyperlipidemia     Hypertension     Iron deficiency     Iron deficiency anemia 10/21/2016     IMO LOAD Q4    Long-term use of immunosuppressant medication 7/27/2015    Mitral regurgitation 12/20/2013 12/13 mild     Osteoporosis 7/9/2014    Pacemaker 10/26/2016    left chest [employer] [address] [id number] Bonifacio Fuller. 9/9/2016 - current   right atrium LEAD [employer] [address] [id number] Bonifacio Fuller A. 9/9/2016 - current   right ventricle LEAD [employer] [address] [id number] Luizlucy Bonifacio A. 9/9/2016 - current      PONV (postoperative nausea and vomiting)     Rheumatoid arthritis involving multiple sites with positive rheumatoid factor 11/24/2013    Vitamin B12 deficiency 2/28/2015       Past Surgical History:   Procedure Laterality Date    BREAST CYST EXCISION Bilateral     several cysts removed    COLONOSCOPY  2012    EPIDURAL STEROID INJECTION INTO CERVICAL SPINE N/A 10/13/2020    Procedure: Injection-steroid-epidural-cervical;  Surgeon: Bharat Avalos MD;  Location: [facility] OR;  Service: Pain Management;  Laterality: N/A;    FOOT SURGERY      had neuorma and also required hardware    HYSTERECTOMY      SUJATA with BSO    INCONTINENCE SURGERY      KNEE ARTHROSCOPY W/ DEBRIDEMENT      OOPHORECTOMY  2012    pace maker  09/2016       Social History     Socioeconomic History    Marital status:      Spouse name: Not on file    Number of children: 3    Years of education: Not on file    Highest education level: Not on file   Occupational History    Not on file   Social Needs    Financial resource strain: Not on file    Food insecurity     Worry: Not on file     Inability: Not on file    Transportation needs     Medical: Not on file     Non-medical: Not on file   Tobacco Use    Smoking status: Never Smoker    Smokeless tobacco:  Never Used   Substance and Sexual Activity    Alcohol use: Yes     Alcohol/week: 0.8 standard drinks     Types: 1 Standard drinks or equivalent per week     Comment: occ social    Drug use: No    Sexual activity: Never     Birth control/protection: See Surgical Hx   Lifestyle    Physical activity     Days per week: Not on file     Minutes per session: Not on file    Stress: Not on file   Relationships    Social connections     Talks on phone: Not on file     Gets together: Not on file     Attends [employer]ology service: Not on file     Active member of club or organization: Not on file     Attends meetings of clubs or organizations: Not on file     Relationship status: Not on file   Other Topics Concern    Not on file   Social History Narrative    Not on file         Medications/Allergies: See med card    Vitals:    11/04/20 1302   BP: (!) 167/55   Pulse: 62   Resp: 20   Temp: 97.9 °F (36.6 °C)   TempSrc: Temporal   SpO2: 96%   Weight: 74.3 kg (163 lb 12.8 oz)   PainSc:   6   PainLoc: Neck         Physical exam:  Gen: A and O x3, pleasant, well-groomed  Skin: No rashes or obvious lesions  HEENT: PERRLA, no obvious deformities on ears or in canals.Trachea midline.  CVS: Regular rate and rhythm, normal palpable pulses.  Resp: Clear to auscultation bilaterally, no wheezes or rales.  Abdomen: Soft, NT/ND.  Musculoskeletal: Able to heel walk, toe walk. No antalgic gait.     Neuro:  Upper extremities: 5/5 strength bilaterally   Reflexes: Brachioradialis 2+, Bicep 2+, Tricep 2+.   Sensory: Intact and symmetrical to light touch and pinprick in C2-T1 dermatomes bilaterally.    Cervical Spine:  Cervical spine:  Of motion is severely reduced with flexion and right lateral rotation with increased pain in the left neck with right lateral rotation and flexion, moderately reduced with left lateral rotation with slight increased pain, extension is moderately reduced with slight increased pain in the left neck.  Spurling's  maneuver causes left neck pain  Myofascial exam: Tenderness to palpation across left trapezius    Imagin19 Xray C-spine:  There is multilevel degenerative disc and facet disease.  There is no fracture.  There is trace retrolisthesis of C3 on C4.  There is trace anterolisthesis of C4 on C5, C5 on C6, C7 on T1.  This is unchanged with flexion or extension.  There is multilevel facet joint arthropathy.  There is mild disc space narrowing at the C5-6 level, moderate disc space narrowing at the C3-4 and C4-5 levels with marked disc space narrowing at the C6-7 level.  There is at least mild multilevel foraminal stenosis.  The prevertebral soft tissues are normal.  The airway is patent.    Assessment:   The patient is an 80-year-old woman with a history of rheumatoid arthritis, cardiomyopathy with pacemaker, osteoporosis, chronic renal insufficiency who presents in referral from Dr. Cruz for neck pain.   1. Spondylosis of cervical region without myelopathy or radiculopathy  Vital signs    Place 18-22 gauage peripheral IV     Verify informed consent    Notify physician     Notify physician     Notify physician (specify)    Diet NPO    Case Request Operating Room: Block-nerve-medial branch-cervical C3, C4, C5, C6    Place in Outpatient    lactated ringers infusion   2. DDD (degenerative disc disease), cervical     3. Screening procedure  COVID-19 Routine Screening    CANCELED: COVID-19 Routine Screening    CANCELED: COVID-19 Routine Screening         Plan:  1.  The patient no longer has headaches following the cervical PIERO but remains having neck pain.  I am going to schedule her for left C3, 4, 5, 6 diagnostic medial branch nerve blocks for facet mediated pain.  If successful we will repeat the blocks prior to proceeding with radiofrequency ablation.  She is a high risk patient with cardiomyopathy and pacemaker.  She takes  aspirin and we will need to hold this prior to the procedure.  2.  Follow-up in 4 weeks  postprocedure or sooner as needed.

## 2020-11-16 ENCOUNTER — LAB VISIT (OUTPATIENT)
Dept: FAMILY MEDICINE | Facility: CLINIC | Age: 80
End: 2020-11-16
Payer: COMMERCIAL

## 2020-11-16 DIAGNOSIS — Z13.9 SCREENING PROCEDURE: ICD-10-CM

## 2020-11-16 PROCEDURE — U0003 INFECTIOUS AGENT DETECTION BY NUCLEIC ACID (DNA OR RNA); SEVERE ACUTE RESPIRATORY SYNDROME CORONAVIRUS 2 (SARS-COV-2) (CORONAVIRUS DISEASE [COVID-19]), AMPLIFIED PROBE TECHNIQUE, MAKING USE OF HIGH THROUGHPUT TECHNOLOGIES AS DESCRIBED BY CMS-2020-01-R: HCPCS

## 2020-11-18 LAB — SARS-COV-2 RNA RESP QL NAA+PROBE: NOT DETECTED

## 2020-11-19 ENCOUNTER — HOSPITAL ENCOUNTER (OUTPATIENT)
Facility: HOSPITAL | Age: 80
Discharge: HOME | End: 2020-11-19
Attending: ANESTHESIOLOGY | Admitting: ANESTHESIOLOGY
Payer: COMMERCIAL

## 2020-11-19 ENCOUNTER — HOSPITAL ENCOUNTER (OUTPATIENT)
Dept: RADIOLOGY | Facility: HOSPITAL | Age: 80
Discharge: HOME | End: 2020-11-19
Attending: ANESTHESIOLOGY
Payer: COMMERCIAL

## 2020-11-19 VITALS
BODY MASS INDEX: 30.96 KG/M2 | HEIGHT: 61 IN | WEIGHT: 164 LBS | SYSTOLIC BLOOD PRESSURE: 138 MMHG | RESPIRATION RATE: 16 BRPM | OXYGEN SATURATION: 96 % | HEART RATE: 60 BPM | DIASTOLIC BLOOD PRESSURE: 63 MMHG | TEMPERATURE: 97 F

## 2020-11-19 DIAGNOSIS — Z79.02 ANTIPLATELET OR ANTITHROMBOTIC LONG-TERM USE: ICD-10-CM

## 2020-11-19 DIAGNOSIS — Z13.9 SCREENING PROCEDURE: ICD-10-CM

## 2020-11-19 DIAGNOSIS — M47.812 SPONDYLOSIS OF CERVICAL REGION WITHOUT MYELOPATHY OR RADICULOPATHY: ICD-10-CM

## 2020-11-19 PROCEDURE — 64490 INJ PARAVERT F JNT C/T 1 LEV: CPT | Mod: LT,,, | Performed by: ANESTHESIOLOGY

## 2020-11-19 PROCEDURE — 64491 INJ PARAVERT F JNT C/T 2 LEV: CPT | Mod: LT,,, | Performed by: ANESTHESIOLOGY

## 2020-11-19 PROCEDURE — 64492 INJ PARAVERT F JNT C/T 3 LEV: CPT | Mod: PO | Performed by: ANESTHESIOLOGY

## 2020-11-19 PROCEDURE — 64492 INJ PARAVERT F JNT C/T 3 LEV: CPT | Mod: LT,,, | Performed by: ANESTHESIOLOGY

## 2020-11-19 PROCEDURE — 63600175 PHARM REV CODE 636 W HCPCS: Mod: PO | Performed by: ANESTHESIOLOGY

## 2020-11-19 PROCEDURE — 64492 PR INJ DX/THER AGNT PARAVERT FACET JOINT,IMG GUIDE,CERV/THORAC, ADD LEVEL: ICD-10-PCS | Mod: LT,,, | Performed by: ANESTHESIOLOGY

## 2020-11-19 PROCEDURE — 64490 PR INJ DX/THER AGNT PARAVERT FACET JOINT,IMG GUIDE,CERV/THORAC, 1ST LEVEL: ICD-10-PCS | Mod: LT,,, | Performed by: ANESTHESIOLOGY

## 2020-11-19 PROCEDURE — 64491 INJ PARAVERT F JNT C/T 2 LEV: CPT | Mod: PO | Performed by: ANESTHESIOLOGY

## 2020-11-19 PROCEDURE — 25500020 PHARM REV CODE 255: Mod: PO | Performed by: ANESTHESIOLOGY

## 2020-11-19 PROCEDURE — 25000003 PHARM REV CODE 250: Mod: PO | Performed by: ANESTHESIOLOGY

## 2020-11-19 PROCEDURE — 64491 PR INJ DX/THER AGNT PARAVERT FACET JOINT,IMG GUIDE,CERV/THORAC, 2ND LEVEL: ICD-10-PCS | Mod: LT,,, | Performed by: ANESTHESIOLOGY

## 2020-11-19 PROCEDURE — 64490 INJ PARAVERT F JNT C/T 1 LEV: CPT | Mod: PO | Performed by: ANESTHESIOLOGY

## 2020-11-19 PROCEDURE — 76000 FLUOROSCOPY <1 HR PHYS/QHP: CPT | Mod: TC,PO

## 2020-11-19 RX ORDER — LIDOCAINE HYDROCHLORIDE 10 MG/ML
INJECTION, SOLUTION EPIDURAL; INFILTRATION; INTRACAUDAL; PERINEURAL
Status: DISCONTINUED | OUTPATIENT
Start: 2020-11-19 | End: 2020-11-19 | Stop reason: HOSPADM

## 2020-11-19 RX ORDER — BUPIVACAINE HYDROCHLORIDE 2.5 MG/ML
INJECTION, SOLUTION EPIDURAL; INFILTRATION; INTRACAUDAL
Status: DISCONTINUED | OUTPATIENT
Start: 2020-11-19 | End: 2020-11-19 | Stop reason: HOSPADM

## 2020-11-19 RX ORDER — SODIUM CHLORIDE, SODIUM LACTATE, POTASSIUM CHLORIDE, CALCIUM CHLORIDE 600; 310; 30; 20 MG/100ML; MG/100ML; MG/100ML; MG/100ML
INJECTION, SOLUTION INTRAVENOUS CONTINUOUS
Status: DISCONTINUED | OUTPATIENT
Start: 2020-11-19 | End: 2020-11-19 | Stop reason: HOSPADM

## 2020-11-19 RX ORDER — MIDAZOLAM HYDROCHLORIDE 5 MG/ML
INJECTION INTRAMUSCULAR; INTRAVENOUS
Status: DISCONTINUED | OUTPATIENT
Start: 2020-11-19 | End: 2020-11-19 | Stop reason: HOSPADM

## 2020-11-19 RX ADMIN — SODIUM CHLORIDE, SODIUM LACTATE, POTASSIUM CHLORIDE, AND CALCIUM CHLORIDE: .6; .31; .03; .02 INJECTION, SOLUTION INTRAVENOUS at 12:11

## 2020-11-19 NOTE — DISCHARGE SUMMARY
[facility]  Discharge Note  Short Stay    Procedure(s) (LRB):  Block-nerve-medial branch-cervical C3, C4, C5, C6 (Left)    OUTCOME: Patient tolerated treatment/procedure well without complication and is now ready for discharge.    DISPOSITION: Home or Self Care    FINAL DIAGNOSIS:  Spondylosis of cervical region without myelopathy or radiculopathy    FOLLOWUP: In clinic    DISCHARGE INSTRUCTIONS:    Discharge Procedure Orders   Diet Adult Regular     Notify your health care provider if you experience any of the following:  temperature >100.4     No dressing needed     Activity as tolerated

## 2020-11-19 NOTE — INTERVAL H&P NOTE
The patient has been examined and the H&P has been reviewed:    I concur with the findings and no changes have occurred since H&P was written.    Anesthesia/Surgery risks, benefits and alternative options discussed and understood by patient/family.      ASA 2, mallampati 2      Active Hospital Problems    Diagnosis  POA    Screening procedure [Z13.9]  Not Applicable      Resolved Hospital Problems   No resolved problems to display.

## 2020-11-19 NOTE — PLAN OF CARE
/Pt AAOx3. Resp even, unlabored. No complaints of pain at this time. NAD noted. Pt tolerating PO well. Discharge and follow-up instructions discussed. Pt and family verbalize understanding. Pt ready for discharge.

## 2020-11-20 ENCOUNTER — TELEPHONE (OUTPATIENT)
Dept: PAIN MEDICINE | Facility: CLINIC | Age: 80
End: 2020-11-20

## 2020-11-20 DIAGNOSIS — M47.812 CERVICAL SPONDYLOSIS: Primary | ICD-10-CM

## 2020-11-20 DIAGNOSIS — Z11.9 SCREENING EXAMINATION FOR INFECTIOUS DISEASE: Primary | ICD-10-CM

## 2020-11-20 RX ORDER — SODIUM CHLORIDE, SODIUM LACTATE, POTASSIUM CHLORIDE, CALCIUM CHLORIDE 600; 310; 30; 20 MG/100ML; MG/100ML; MG/100ML; MG/100ML
INJECTION, SOLUTION INTRAVENOUS CONTINUOUS
Status: CANCELLED | OUTPATIENT
Start: 2020-12-07

## 2020-11-20 NOTE — TELEPHONE ENCOUNTER
Patient had a cervical block on 11/19 with Dr. Avalos. Patient stated she received 80% of relief from procedure that lasted 7 hours. Patient stated she was able to resume normal activities around the house such as cleaning. Patient stated she was able to sleep well. Second block scheduled and covid test. Preop instructions were given.

## 2020-12-03 ENCOUNTER — HOSPITAL ENCOUNTER (OUTPATIENT)
Dept: RADIOLOGY | Facility: HOSPITAL | Age: 80
Discharge: HOME | End: 2020-12-03
Attending: INTERNAL MEDICINE
Payer: COMMERCIAL

## 2020-12-03 ENCOUNTER — OFFICE VISIT (OUTPATIENT)
Dept: RHEUMATOLOGY | Facility: CLINIC | Age: 80
End: 2020-12-03
Payer: COMMERCIAL

## 2020-12-03 VITALS
SYSTOLIC BLOOD PRESSURE: 161 MMHG | HEART RATE: 60 BPM | HEIGHT: 62 IN | WEIGHT: 167.56 LBS | BODY MASS INDEX: 30.83 KG/M2 | DIASTOLIC BLOOD PRESSURE: 86 MMHG

## 2020-12-03 DIAGNOSIS — M05.79 RHEUMATOID ARTHRITIS INVOLVING MULTIPLE SITES WITH POSITIVE RHEUMATOID FACTOR: Primary | ICD-10-CM

## 2020-12-03 DIAGNOSIS — M17.11 ARTHRITIS OF RIGHT KNEE: ICD-10-CM

## 2020-12-03 DIAGNOSIS — M81.0 POSTMENOPAUSAL OSTEOPOROSIS: ICD-10-CM

## 2020-12-03 PROCEDURE — 73562 XR KNEE ORTHO RIGHT: ICD-10-PCS | Mod: 26,RT,, | Performed by: RADIOLOGY

## 2020-12-03 PROCEDURE — 20610 LARGE JOINT ASPIRATION/INJECTION: R KNEE: ICD-10-PCS | Mod: RT,S$GLB,, | Performed by: INTERNAL MEDICINE

## 2020-12-03 PROCEDURE — 3077F SYST BP >= 140 MM HG: CPT | Mod: CPTII,S$GLB,, | Performed by: INTERNAL MEDICINE

## 2020-12-03 PROCEDURE — 73562 X-RAY EXAM OF KNEE 3: CPT | Mod: 26,RT,, | Performed by: RADIOLOGY

## 2020-12-03 PROCEDURE — 73562 X-RAY EXAM OF KNEE 3: CPT | Mod: TC,FY,PO,RT

## 2020-12-03 PROCEDURE — 1157F PR ADVANCE CARE PLAN OR EQUIV PRESENT IN MEDICAL RECORD: ICD-10-PCS | Mod: S$GLB,,, | Performed by: INTERNAL MEDICINE

## 2020-12-03 PROCEDURE — 73560 X-RAY EXAM OF KNEE 1 OR 2: CPT | Mod: TC,FY,LT,59,PO

## 2020-12-03 PROCEDURE — 73560 XR KNEE ORTHO RIGHT: ICD-10-PCS | Mod: 26,LT,, | Performed by: RADIOLOGY

## 2020-12-03 PROCEDURE — 1157F ADVNC CARE PLAN IN RCRD: CPT | Mod: S$GLB,,, | Performed by: INTERNAL MEDICINE

## 2020-12-03 PROCEDURE — 99999 PR PBB SHADOW E&M-EST. PATIENT-LVL V: ICD-10-PCS | Mod: PBBFAC,,, | Performed by: INTERNAL MEDICINE

## 2020-12-03 PROCEDURE — 1125F AMNT PAIN NOTED PAIN PRSNT: CPT | Mod: S$GLB,,, | Performed by: INTERNAL MEDICINE

## 2020-12-03 PROCEDURE — 20610 DRAIN/INJ JOINT/BURSA W/O US: CPT | Mod: RT,S$GLB,, | Performed by: INTERNAL MEDICINE

## 2020-12-03 PROCEDURE — 99999 PR PBB SHADOW E&M-EST. PATIENT-LVL V: CPT | Mod: PBBFAC,,, | Performed by: INTERNAL MEDICINE

## 2020-12-03 PROCEDURE — 99214 OFFICE O/P EST MOD 30 MIN: CPT | Mod: 25,S$GLB,, | Performed by: INTERNAL MEDICINE

## 2020-12-03 PROCEDURE — 3079F PR MOST RECENT DIASTOLIC BLOOD PRESSURE 80-89 MM HG: ICD-10-PCS | Mod: CPTII,S$GLB,, | Performed by: INTERNAL MEDICINE

## 2020-12-03 PROCEDURE — 73560 X-RAY EXAM OF KNEE 1 OR 2: CPT | Mod: 26,LT,, | Performed by: RADIOLOGY

## 2020-12-03 PROCEDURE — 1125F PR PAIN SEVERITY QUANTIFIED, PAIN PRESENT: ICD-10-PCS | Mod: S$GLB,,, | Performed by: INTERNAL MEDICINE

## 2020-12-03 PROCEDURE — 1159F MED LIST DOCD IN RCRD: CPT | Mod: S$GLB,,, | Performed by: INTERNAL MEDICINE

## 2020-12-03 PROCEDURE — 99214 PR OFFICE/OUTPT VISIT, EST, LEVL IV, 30-39 MIN: ICD-10-PCS | Mod: 25,S$GLB,, | Performed by: INTERNAL MEDICINE

## 2020-12-03 PROCEDURE — 1159F PR MEDICATION LIST DOCUMENTED IN MEDICAL RECORD: ICD-10-PCS | Mod: S$GLB,,, | Performed by: INTERNAL MEDICINE

## 2020-12-03 PROCEDURE — 3077F PR MOST RECENT SYSTOLIC BLOOD PRESSURE >= 140 MM HG: ICD-10-PCS | Mod: CPTII,S$GLB,, | Performed by: INTERNAL MEDICINE

## 2020-12-03 PROCEDURE — 3079F DIAST BP 80-89 MM HG: CPT | Mod: CPTII,S$GLB,, | Performed by: INTERNAL MEDICINE

## 2020-12-03 RX ADMIN — METHYLPREDNISOLONE ACETATE 40 MG: 40 INJECTION, SUSPENSION INTRA-ARTICULAR; INTRALESIONAL; INTRAMUSCULAR; SOFT TISSUE at 08:12

## 2020-12-03 ASSESSMENT — ROUTINE ASSESSMENT OF PATIENT INDEX DATA (RAPID3)
PATIENT GLOBAL ASSESSMENT SCORE: 3
TOTAL RAPID3 SCORE: 4.33
MDHAQ FUNCTION SCORE: 1.2
PSYCHOLOGICAL DISTRESS SCORE: 2.2
PAIN SCORE: 6

## 2020-12-03 NOTE — PROGRESS NOTES
AS Subjective:          Chief Complaint: Juanis Mclaughlin is a 80 y.o. female who had concerns including Rheumatoid Arthritis.    HPI:      Rheumatoid Arthritis   79y/o  female with h/o HTN, HLD was diagnosed with sero +  Rheumatoid arthritis in 1998. Off prednisone now.   Has also received HCQ in the past no improvement. -     Right knee with new onset pain, trying infrared therapy but worsening.     Enbrel 50mg weekly on hold since March. - and no worsening.     MTX 5 tabs weekly.   Started HCQ 200mg once daily and feels no worse. Joints are stable       Pain in the DIP and PIP joints.    No ASE of stomatitis,  infections or injection site reaction.   She was also diagnosed with secondary Sjogren's. Currently on restasis.  PPM for bradycardia.     Past 12 months slow decline in renal function from prior year- on PO bisphos and ACEI/HCTZ can switch this to Prolia.  Started Prolia 02/29/2019 she had another injection as of 07/20/2019 tolerated well no adverse side effects renal function stable.    Cervical spasm left sided with headache present >3 months nearly constants. No numbness or tingling in hands/arms no weakness. Tried IR, stretching, heat little benefit. \  Did have trigger point injections brief relief. Using infrared therapy now.  She is now going to PT    She denies fever, weight loss, photosensitivity, rash, ulcer, raynaud's phenomenon, alopecia, dysphagia, diarrhea or blood in the stools.    + hx of GERD. On PPI. Hx of mild MR. No pedal edema.     Doing well with Dr. Madison with Breo and Gabapentin to relax cough impulse.  As of this visit 02/29/2019 patient was having upper respiratory tract infection x3 weeks seem to be feeling better within 10 days ago returned with a more productive cough postnasal drip rhinitis    REVIEW OF SYSTEMS:    Review of Systems   Constitutional: Negative for fever, malaise/fatigue and weight loss.   HENT: Negative for sore throat.    Eyes: Negative for double  vision, photophobia and redness.   Respiratory: Positive for cough and sputum production. Negative for hemoptysis, shortness of breath and wheezing.    Cardiovascular: Negative for chest pain, palpitations and orthopnea.   Gastrointestinal: Negative for abdominal pain, constipation and diarrhea.   Genitourinary: Negative for dysuria, hematuria and urgency.   Musculoskeletal: Positive for joint pain. Negative for back pain and myalgias.   Skin: Negative for rash.   Neurological: Negative for dizziness, tingling, focal weakness and headaches.   Endo/Heme/Allergies: Does not bruise/bleed easily.   Psychiatric/Behavioral: Negative for depression, hallucinations and suicidal ideas.                Objective:            Past Medical History:   Diagnosis Date    Arthritis     rheumatoid arthritis    Cardiomyopathy     CKD (chronic kidney disease) stage 3, GFR 30-59 ml/min 1/9/2019    Diverticulosis     DVT (deep venous thrombosis)     one time 5 years ago    Essential hypertension 11/24/2013    GERD (gastroesophageal reflux disease)     Glaucoma     sees Dr. Gillette    Hyperlipidemia     Hypertension     Iron deficiency     Iron deficiency anemia 10/21/2016     IMO LOAD Q4    Long-term use of immunosuppressant medication 7/27/2015    Mitral regurgitation 12/20/2013 12/13 mild     Osteoporosis 7/9/2014    Pacemaker 10/26/2016    left chest PACEMAKER [employer] [address] [employer] [facility]  [id number] Bonifacio Fuller 9/9/2016 - current   right atrium LEAD [employer] [address] [employer] [facility] [id number] Bonifacio Fuller 9/9/2016 - current   right ventricle LEAD [employer] [address] [employer] [id number] Bonifacio Fuller 9/9/2016 - current      PONV (postoperative nausea and vomiting)     Rheumatoid arthritis involving multiple sites with positive rheumatoid factor 11/24/2013    Vitamin B12 deficiency 2/28/2015     Family History   Problem Relation Age of Onset    Heart disease  Mother         CHF    Hypertension Mother     Heart disease Father     Stroke Father     Heart disease Brother     Stroke Brother     Heart disease Brother     Scleroderma Brother     Ovarian cancer Maternal Aunt 34    Breast cancer Neg Hx      Social History     Tobacco Use    Smoking status: Never Smoker    Smokeless tobacco: Never Used   Substance Use Topics    Alcohol use: Yes     Alcohol/week: 0.8 standard drinks     Types: 1 Standard drinks or equivalent per week     Comment: occ social    Drug use: No         Current Outpatient Medications on File Prior to Visit   Medication Sig Dispense Refill    alpha-1 proteinase inhibitor (PROLASTIN-C) 1,000 mg (+/-)/20 mL Soln infusion Inject into the vein.       aspirin (ECOTRIN) 81 MG EC tablet Take 81 mg by mouth once daily.      atorvastatin (LIPITOR) 20 MG tablet Take 1 tablet (20 mg total) by mouth every evening. 90 tablet 1    B-complex with vitamin C (Z-BEC OR EQUIV) tablet Take 1 tablet by mouth once daily.      brimonidine-timolol (COMBIGAN) 0.2-0.5 % Drop Place 1 drop into both eyes 2 (two) times daily.      calcium-vitamin D3 500 mg(1,250mg) -200 unit per tablet Take 1 tablet by mouth once daily.      candesartan (ATACAND) 16 MG tablet Take 1 tablet (16 mg total) by mouth once daily. 90 tablet 3    diclofenac sodium (VOLTAREN) 1 % Gel Apply 2 g topically 2 (two) times daily as needed. 200 g 3    DIETARY SUPPLEMENT ORAL Take by mouth.      dorzolamide-timolol, PF, (COSOPT, PF,) 2-0.5 % Dpet ophthalmic solution Cosopt (PF) 2 %-0.5 % eye drops in a dropperette   INSTILL 1 DROP INTO AFFECTED EYE(S) BY OPHTHALMIC ROUTE 2 TIMES PER DAY      estradioL (ESTRACE) 0.01 % (0.1 mg/gram) vaginal cream PLACE 1 GRAM VAGINALLY ONCEDAILY 127.5 g 2    FLUZONE HIGHDOSE QUAD 20-21  mcg/0.7 mL Syrg PHARMACY ADMINISTERED      folic acid (FOLVITE) 1 MG tablet Take 2 tablets (2 mg total) by mouth once daily. 180 tablet 3    hydrocortisone 2.5 %  ointment MIX WITH AQUAPHOR OR VASELINE AND APPLY TO AFFECTED AREA as needed  1    hydrOXYchloroQUINE (PLAQUENIL) 200 mg tablet Take 1 tablet (200 mg total) by mouth once daily. 60 tablet 6    magnesium 200 mg Tab Take 400 mg by mouth once daily. Magnesium 400mg with Chelated Zinc 15mg.      methotrexate 2.5 MG Tab Take 5 tablets (12.5 mg total) by mouth every 7 days. 60 tablet 3    metoprolol succinate (TOPROL-XL) 50 MG 24 hr tablet Take 1 tablet (50 mg total) by mouth once daily. 90 tablet 4    pantoprazole (PROTONIX) 40 MG tablet Take 1 tablet (40 mg total) by mouth once daily. (Patient taking differently: Take 40 mg by mouth 2 (two) times daily. ) 90 tablet 2    potassium gluconate 595 (99) mg Tab Take 1 capsule by mouth once daily.      travoprost (TRAVATAN Z) 0.004 % ophthalmic solution Travatan Z 0.004 % eye drops   INSTILL 1 DROP INTO AFFECTED EYE(S) BY OPHTHALMIC ROUTE ONCE DAILY INTHE EVENING      XIIDRA 5 % Dpet Place 1 drop into both eyes 2 (two) times daily.        No current facility-administered medications on file prior to visit.        Vitals:    12/03/20 0805   BP: (!) 161/86   Pulse: 60       Physical Exam:    Physical Exam  Constitutional:       Appearance: Normal appearance. She is well-developed.   HENT:      Nose: No septal deviation.      Mouth/Throat:      Mouth: No oral lesions.   Eyes:      Conjunctiva/sclera:      Right eye: Right conjunctiva is not injected.      Left eye: Left conjunctiva is not injected.      Pupils: Pupils are equal, round, and reactive to light.   Neck:      Thyroid: No thyroid mass or thyromegaly.      Vascular: No JVD.   Cardiovascular:      Rate and Rhythm: Normal rate and regular rhythm.      Pulses: Normal pulses.      Comments: No edema  Pulmonary:      Effort: Pulmonary effort is normal.      Breath sounds: Examination of the right-middle field reveals wheezing. Examination of the left-middle field reveals wheezing. Wheezing present.   Abdominal:       Palpations: Abdomen is soft.   Musculoskeletal:      Right shoulder: She exhibits normal range of motion, no tenderness and no swelling.      Left shoulder: She exhibits normal range of motion, no tenderness and no swelling.      Right elbow: She exhibits normal range of motion and no swelling. No tenderness found.      Left elbow: She exhibits normal range of motion and no swelling. No tenderness found.      Right wrist: She exhibits decreased range of motion and tenderness. She exhibits no swelling.      Left wrist: She exhibits normal range of motion, no tenderness and no swelling.      Right hip: She exhibits normal range of motion, normal strength and no swelling.      Left hip: She exhibits normal range of motion, no tenderness and no swelling.      Right knee: She exhibits decreased range of motion. She exhibits no swelling. Tenderness found. Medial joint line and lateral joint line tenderness noted.      Left knee: She exhibits normal range of motion and no swelling. No tenderness found.      Right ankle: She exhibits normal range of motion and no swelling. No tenderness.      Left ankle: She exhibits normal range of motion and no swelling. No tenderness.      Cervical back: She exhibits decreased range of motion.      Right hand: She exhibits decreased range of motion and tenderness.      Left hand: She exhibits tenderness.      Comments: 1+ synovitis MCP and PIP with heberden nodes all DIP 2-5. Left wrist with tenderness and swelling about the TFCC. No nodules. No flexion contractures.      Cervical with left sided point tenderness and the occiput (nuchal ridge) lesser tenderness at the cervical paraspinals. Decreased ROM in cervical spine   Lymphadenopathy:      Cervical: No cervical adenopathy.   Skin:     General: Skin is dry.   Neurological:      Deep Tendon Reflexes: Reflexes are normal and symmetric.               Assessment:       Encounter Diagnoses   Name Primary?    Rheumatoid arthritis  involving multiple sites with positive rheumatoid factor Yes    Arthritis of right knee     Postmenopausal osteoporosis           Plan:        Rheumatoid arthritis involving multiple sites with positive rheumatoid factor    Arthritis of right knee  -     Cancel: X-Ray Knee AP Standing Bilateral; Future; Expected date: 12/03/2020  -     Large Joint Aspiration/Injection: R knee    Postmenopausal osteoporosis     RA:    -MTX  5 tabs weekly- 90 day supply   -continue folic acid 2mg dialy   -OFF Enbrel   -voltaren gel PRN fingers/hands-using sparingly   -Labs q 3 months.      Right knee injection see procedure note.      Neck Pain:    -perisistent will send for interventional pain.    -now with PT>     Osteoporosis   DC actonel   -doing great on Prolia.            No follow-ups on file.          30min consultation with greater than 50% spent in counseling, chart review and coordination of care. All questions answered.

## 2020-12-04 ENCOUNTER — LAB VISIT (OUTPATIENT)
Dept: FAMILY MEDICINE | Facility: CLINIC | Age: 80
End: 2020-12-04
Payer: COMMERCIAL

## 2020-12-04 ENCOUNTER — CLINICAL SUPPORT (OUTPATIENT)
Dept: CARDIOLOGY | Facility: CLINIC | Age: 80
End: 2020-12-04
Payer: COMMERCIAL

## 2020-12-04 DIAGNOSIS — Z11.9 SCREENING EXAMINATION FOR INFECTIOUS DISEASE: ICD-10-CM

## 2020-12-04 DIAGNOSIS — Z95.0 PRESENCE OF CARDIAC PACEMAKER: ICD-10-CM

## 2020-12-04 PROCEDURE — 93296 REM INTERROG EVL PM/IDS: CPT | Mod: PBBFAC,PO | Performed by: INTERNAL MEDICINE

## 2020-12-04 PROCEDURE — 93294 CARDIAC DEVICE CHECK - REMOTE: ICD-10-PCS | Mod: ,,, | Performed by: INTERNAL MEDICINE

## 2020-12-04 PROCEDURE — U0003 INFECTIOUS AGENT DETECTION BY NUCLEIC ACID (DNA OR RNA); SEVERE ACUTE RESPIRATORY SYNDROME CORONAVIRUS 2 (SARS-COV-2) (CORONAVIRUS DISEASE [COVID-19]), AMPLIFIED PROBE TECHNIQUE, MAKING USE OF HIGH THROUGHPUT TECHNOLOGIES AS DESCRIBED BY CMS-2020-01-R: HCPCS

## 2020-12-04 PROCEDURE — 93294 REM INTERROG EVL PM/LDLS PM: CPT | Mod: ,,, | Performed by: INTERNAL MEDICINE

## 2020-12-05 LAB — SARS-COV-2 RNA RESP QL NAA+PROBE: NOT DETECTED

## 2020-12-07 ENCOUNTER — HOSPITAL ENCOUNTER (OUTPATIENT)
Dept: RADIOLOGY | Facility: HOSPITAL | Age: 80
Discharge: HOME | End: 2020-12-07
Attending: ANESTHESIOLOGY | Admitting: ANESTHESIOLOGY
Payer: COMMERCIAL

## 2020-12-07 ENCOUNTER — HOSPITAL ENCOUNTER (OUTPATIENT)
Facility: HOSPITAL | Age: 80
Discharge: HOME | End: 2020-12-07
Attending: ANESTHESIOLOGY | Admitting: ANESTHESIOLOGY
Payer: COMMERCIAL

## 2020-12-07 VITALS
RESPIRATION RATE: 16 BRPM | SYSTOLIC BLOOD PRESSURE: 166 MMHG | WEIGHT: 174 LBS | OXYGEN SATURATION: 100 % | BODY MASS INDEX: 32.85 KG/M2 | TEMPERATURE: 98 F | HEIGHT: 61 IN | HEART RATE: 60 BPM | DIASTOLIC BLOOD PRESSURE: 74 MMHG

## 2020-12-07 DIAGNOSIS — Z79.02 ANTIPLATELET OR ANTITHROMBOTIC LONG-TERM USE: ICD-10-CM

## 2020-12-07 DIAGNOSIS — M47.812 CERVICAL SPONDYLOSIS: Primary | ICD-10-CM

## 2020-12-07 PROCEDURE — 64492 PR INJ DX/THER AGNT PARAVERT FACET JOINT,IMG GUIDE,CERV/THORAC, ADD LEVEL: ICD-10-PCS | Mod: LT,,, | Performed by: ANESTHESIOLOGY

## 2020-12-07 PROCEDURE — 76000 FLUOROSCOPY <1 HR PHYS/QHP: CPT | Mod: TC,PO

## 2020-12-07 PROCEDURE — 25000003 PHARM REV CODE 250: Mod: PO | Performed by: ANESTHESIOLOGY

## 2020-12-07 PROCEDURE — 64491 INJ PARAVERT F JNT C/T 2 LEV: CPT | Mod: LT,,, | Performed by: ANESTHESIOLOGY

## 2020-12-07 PROCEDURE — 64492 INJ PARAVERT F JNT C/T 3 LEV: CPT | Mod: LT,,, | Performed by: ANESTHESIOLOGY

## 2020-12-07 PROCEDURE — 25500020 PHARM REV CODE 255: Mod: PO | Performed by: ANESTHESIOLOGY

## 2020-12-07 PROCEDURE — 64492 INJ PARAVERT F JNT C/T 3 LEV: CPT | Mod: PO | Performed by: ANESTHESIOLOGY

## 2020-12-07 PROCEDURE — 64490 INJ PARAVERT F JNT C/T 1 LEV: CPT | Mod: PO | Performed by: ANESTHESIOLOGY

## 2020-12-07 PROCEDURE — 64490 PR INJ DX/THER AGNT PARAVERT FACET JOINT,IMG GUIDE,CERV/THORAC, 1ST LEVEL: ICD-10-PCS | Mod: LT,,, | Performed by: ANESTHESIOLOGY

## 2020-12-07 PROCEDURE — 99152 MOD SED SAME PHYS/QHP 5/>YRS: CPT | Mod: ,,, | Performed by: ANESTHESIOLOGY

## 2020-12-07 PROCEDURE — 64491 INJ PARAVERT F JNT C/T 2 LEV: CPT | Mod: PO | Performed by: ANESTHESIOLOGY

## 2020-12-07 PROCEDURE — 64491 PR INJ DX/THER AGNT PARAVERT FACET JOINT,IMG GUIDE,CERV/THORAC, 2ND LEVEL: ICD-10-PCS | Mod: LT,,, | Performed by: ANESTHESIOLOGY

## 2020-12-07 PROCEDURE — 99152 PR MOD CONSCIOUS SEDATION, SAME PHYS, 5+ YRS, FIRST 15 MIN: ICD-10-PCS | Mod: ,,, | Performed by: ANESTHESIOLOGY

## 2020-12-07 PROCEDURE — 64490 INJ PARAVERT F JNT C/T 1 LEV: CPT | Mod: LT,,, | Performed by: ANESTHESIOLOGY

## 2020-12-07 PROCEDURE — 63600175 PHARM REV CODE 636 W HCPCS: Mod: PO | Performed by: ANESTHESIOLOGY

## 2020-12-07 RX ORDER — MIDAZOLAM HYDROCHLORIDE 1 MG/ML
INJECTION INTRAMUSCULAR; INTRAVENOUS
Status: DISCONTINUED | OUTPATIENT
Start: 2020-12-07 | End: 2020-12-07 | Stop reason: HOSPADM

## 2020-12-07 RX ORDER — BUPIVACAINE HYDROCHLORIDE 2.5 MG/ML
INJECTION, SOLUTION EPIDURAL; INFILTRATION; INTRACAUDAL
Status: DISCONTINUED | OUTPATIENT
Start: 2020-12-07 | End: 2020-12-07 | Stop reason: HOSPADM

## 2020-12-07 RX ORDER — LIDOCAINE HYDROCHLORIDE 10 MG/ML
INJECTION, SOLUTION EPIDURAL; INFILTRATION; INTRACAUDAL; PERINEURAL
Status: DISCONTINUED | OUTPATIENT
Start: 2020-12-07 | End: 2020-12-07 | Stop reason: HOSPADM

## 2020-12-07 RX ORDER — SODIUM CHLORIDE, SODIUM LACTATE, POTASSIUM CHLORIDE, CALCIUM CHLORIDE 600; 310; 30; 20 MG/100ML; MG/100ML; MG/100ML; MG/100ML
INJECTION, SOLUTION INTRAVENOUS CONTINUOUS
Status: DISCONTINUED | OUTPATIENT
Start: 2020-12-07 | End: 2020-12-07 | Stop reason: HOSPADM

## 2020-12-07 RX ADMIN — SODIUM CHLORIDE, SODIUM LACTATE, POTASSIUM CHLORIDE, AND CALCIUM CHLORIDE: .6; .31; .03; .02 INJECTION, SOLUTION INTRAVENOUS at 09:12

## 2020-12-07 NOTE — H&P
CC: Neck pain    HPI: The patient is a 81yo woman with a history of cervical spondylosis here for a second left C3,4,5,6 medial branch block. There are no major changes in history and physical from 11/4/20.    Past Medical History:   Diagnosis Date    Arthritis     rheumatoid arthritis    Cardiomyopathy     CKD (chronic kidney disease) stage 3, GFR 30-59 ml/min 1/9/2019    Diverticulosis     DVT (deep venous thrombosis)     one time 5 years ago    Essential hypertension 11/24/2013    GERD (gastroesophageal reflux disease)     Glaucoma     sees Dr. Gillette    Hyperlipidemia     Hypertension     Iron deficiency     Iron deficiency anemia 10/21/2016     IMO LOAD Q4    Long-term use of immunosuppressant medication 7/27/2015    Mitral regurgitation 12/20/2013 12/13 mild     Osteoporosis 7/9/2014    Pacemaker 10/26/2016    left chest [employer] [address] [employer] [facility]  [id number] Bonifacio Fuller 9/9/2016 - current   right atrium LEAD [employer] [address] [employer] [facility] [id number] Bonifacio Fuller 9/9/2016 - current   right ventricle LEAD [employer] [address] [employer] [id number] Bonifacio Fuller. 9/9/2016 - current      PONV (postoperative nausea and vomiting)     Rheumatoid arthritis involving multiple sites with positive rheumatoid factor 11/24/2013    Vitamin B12 deficiency 2/28/2015       Past Surgical History:   Procedure Laterality Date    BREAST CYST EXCISION Bilateral     several cysts removed    COLONOSCOPY  2012    EPIDURAL STEROID INJECTION INTO CERVICAL SPINE N/A 10/13/2020    Procedure: Injection-steroid-epidural-cervical;  Surgeon: Bharat Avalos MD;  Location: [facility];  Service: Pain Management;  Laterality: N/A;    FOOT SURGERY      had neuorma and also required hardware    HYSTERECTOMY      SUJATA with BSO    INCONTINENCE SURGERY      INJECTION OF ANESTHETIC AGENT AROUND MEDIAL BRANCH NERVES INNERVATING CERVICAL FACET JOINT Left  11/19/2020    Procedure: Block-nerve-medial branch-cervical C3, C4, C5, C6;  Surgeon: Bharat Avalos MD;  Location: [facility];  Service: Pain Management;  Laterality: Left;    KNEE ARTHROSCOPY W/ DEBRIDEMENT      OOPHORECTOMY  2012    pace maker  09/2016       Family History   Problem Relation Age of Onset    Heart disease Mother         CHF    Hypertension Mother     Heart disease Father     Stroke Father     Heart disease Brother     Stroke Brother     Heart disease Brother     Scleroderma Brother     Ovarian cancer Maternal Aunt 34    Breast cancer Neg Hx        Social History     Socioeconomic History    Marital status:      Spouse name: Not on file    Number of children: 3    Years of education: Not on file    Highest education level: Not on file   Occupational History    Not on file   Social Needs    Financial resource strain: Not on file    Food insecurity     Worry: Not on file     Inability: Not on file    Transportation needs     Medical: Not on file     Non-medical: Not on file   Tobacco Use    Smoking status: Never Smoker    Smokeless tobacco: Never Used   Substance and Sexual Activity    Alcohol use: Yes     Alcohol/week: 0.8 standard drinks     Types: 1 Standard drinks or equivalent per week     Comment: occ social    Drug use: No    Sexual activity: Never     Birth control/protection: See Surgical Hx   Lifestyle    Physical activity     Days per week: Not on file     Minutes per session: Not on file    Stress: Not on file   Relationships    Social connections     Talks on phone: Not on file     Gets together: Not on file     Attends [employer] service: Not on file     Active member of club or organization: Not on file     Attends meetings of clubs or organizations: Not on file     Relationship status: Not on file   Other Topics Concern    Not on file   Social History Narrative    Not on file       Current Facility-Administered Medications   Medication Dose Route  "Frequency Provider Last Rate Last Dose    lactated ringers infusion   Intravenous Continuous Bharat Avalos MD 25 mL/hr at 12/07/20 0951         Review of patient's allergies indicates:   Allergen Reactions    Ace inhibitors Other (See Comments)     cough       Vitals:    12/04/20 1516 12/07/20 0934   BP:  (!) 161/75   Pulse:  60   Resp:  17   Temp:  97.3 °F (36.3 °C)   TempSrc:  Skin   SpO2:  98%   Weight: 78.9 kg (174 lb)    Height: 5' 1" (1.549 m)      ASA 3, mallampati 2      REVIEW OF SYSTEMS:     GENERAL: No weight loss, malaise or fevers.  HEENT:  No recent changes in vision or hearing  NECK: Negative for lumps, no difficulty with swallowing.  RESPIRATORY: Negative for cough, wheezing or shortness of breath, patient denies any recent URI.  CARDIOVASCULAR: Negative for chest pain, leg swelling or palpitations.  GI: Negative for abdominal discomfort, blood in stools or black stools or change in bowel habits.  MUSCULOSKELETAL: See HPI.  SKIN: Negative for lesions, rash, and itching.  PSYCH: No suicidal or homicidal ideations, no current mood disturbances.  HEMATOLOGY/LYMPHOLOGY: Negative for prolonged bleeding, bruising easily or swollen nodes. Patient is not currently taking any anti-coagulants  ENDO: No history of diabetes or thyroid dysfunction  NEURO: No history of syncope, paralysis, seizures or tremors.All other reviewed and negative other than HPI.    Physical exam:  Gen: A and O x3, pleasant, well-groomed  Skin: No rashes or obvious lesions  HEENT: PERRLA, no obvious deformities on ears or in canals. No thyroid masses, trachea midline, no palpable lymph nodes in neck, axilla.  CVS: Regular rate and rhythm, normal S1 and S2, no murmurs.  Resp: Clear to auscultation bilaterally.  Abdomen: Soft, NT/ND, normal bowel sounds present.  Musculoskeletal/Neuro: Moving all extremities    Assessment:  Cervical spondylosis  -     Case Request Operating Room: Block-nerve-medial branch-cervical, c4, c5, c6  -    "  Place in Outpatient; Standing  -     Diet NPO; Standing  -     lactated ringers infusion  -     Notify physician ; Standing  -     Notify physician ; Standing  -     Notify physician (specify); Standing  -     Place 18-22 gaua peripheral IV ; Standing  -     Verify informed consent; Standing  -     Vital signs; Standing    Other orders  -     Progressive Mobility Protocol (mobilize patient to their highest level of functioning at least twice daily); Standing  -     IP VTE HIGH RISK PATIENT; Standing

## 2020-12-07 NOTE — DISCHARGE SUMMARY
[facility]  Discharge Note  Short Stay    Procedure(s) (LRB):  Block-nerve-medial branch-cervical, c3,c4, c5, c6 (Left)    OUTCOME: Patient tolerated treatment/procedure well without complication and is now ready for discharge.    DISPOSITION: Home or Self Care    FINAL DIAGNOSIS:  Cervical spondylosis    FOLLOWUP: In clinic    DISCHARGE INSTRUCTIONS:    Discharge Procedure Orders   Diet Adult Regular     Notify your health care provider if you experience any of the following:  temperature >100.4     No dressing needed     Activity as tolerated

## 2020-12-07 NOTE — OP NOTE
PROCEDURE DATE: 12/7/2020    PROCEDURE:  Cervical medial branch block of the left C3,4,5,6 medial branch nerves on the left-side utilizing fluoroscopy    DIAGNOSIS:  Cervical spondylosis    PHYSICIAN: Bharat Avalos MD    MEDICATIONS INJECTED:  0.25% bupivicaine, 0.5ml at each level.    LOCAL ANESTHETIC USED:   1% lidocaine, 1ml at each level.    SEDATION MEDICATIONS: 2mg versed    ESTIMATED BLOOD LOSS:  none    COMPLICATIONS:  none    TECHNIQUE : A time-out was taken to identify patient and procedure side prior to starting the procedure.  The patient was positioned prone with the site of interest side up. The patient was prepped and draped in the usual sterile fashion using ChloraPrep and sterile towels.  The level was determined under fluoroscopic guidance using a slightly posteriorly oblique view.   Local anesthetic was infiltrated superficially at the skin level.  Then, a 25 gauge 3.5 inch needle was inserted to the anatomic location of the midsection of the lateral masses of C3,4,5,6. A cross table view was then taken to ensure that needles did not cross into neural foramina. Omnipaque contrast dye was then injected to assess appropriate spread and that there was no vascular uptake. The above noted medication was then injected. The patient tolerated the procedure well.     The patient was monitored after the procedure. The patient will be contacted tomorrow to determine the extent of relief. The patient was given post procedure and discharge instructions to follow at home. The patient was discharged in a stable condition

## 2020-12-08 ENCOUNTER — TELEPHONE (OUTPATIENT)
Dept: PAIN MEDICINE | Facility: CLINIC | Age: 80
End: 2020-12-08

## 2020-12-08 NOTE — TELEPHONE ENCOUNTER
Spoke with patient regarding left MBB C4, 5, 6 performed by Dr. Avalos on 12/7. Patient states she had only 50% relief this time and the relief wore off at 4pm. Activities included turning her head to the left with more ease, but turning her head to the right still hurt and she stated it feels like something catches when she turns her head to the right. Scheduled patient for follow up appointment with KENNA Mitchell on 12/23 at 7:40am.

## 2020-12-10 ENCOUNTER — TELEPHONE (OUTPATIENT)
Dept: RHEUMATOLOGY | Facility: CLINIC | Age: 80
End: 2020-12-10

## 2020-12-10 NOTE — TELEPHONE ENCOUNTER
----- Message from Shyla Larios sent at 12/10/2020 10:46 AM CST -----  Contact: call pt [phone]   Type: Needs Medical Advice  Who Called: pt  Best Call Back Number:call pt [phone]  Additional Information:   pt is  calling  about   pt  prolia   shot   //  need  to  call  for an  approval    [facility] approval [phone]// please call  pt  for  details //   closes at  9:00    Assured the patient that the infusion department will get the PA for the Prolia and that I have given them the number. CG

## 2020-12-22 ENCOUNTER — TELEPHONE (OUTPATIENT)
Dept: RHEUMATOLOGY | Facility: CLINIC | Age: 80
End: 2020-12-22
Payer: COMMERCIAL

## 2020-12-22 NOTE — TELEPHONE ENCOUNTER
----- Message from Adelaide Titus LPN sent at 12/14/2020  9:37 AM CST -----  Regarding: FW: prolia auth  Infusion cannot see order for prolia. Patient asking for PA.   ----- Message -----  From: Lois Faust  Sent: 12/11/2020   7:18 AM CST  To: Adelaide Titus LPN  Subject: RE: prolia auth                                  No, I'm not seeing anything.  ----- Message -----  From: Adelaide Titus LPN  Sent: 12/10/2020   5:07 PM CST  To: Lois Faust  Subject: RE: prolia auth                                  Prolia. Let me know if order is not in. Thanks-Adelaide/[id number]  ----- Message -----  From: Lois Faust  Sent: 12/10/2020  12:57 PM CST  To: Adelaide Titus LPN  Subject: RE: prolia auth                                  A PA for what service?  ----- Message -----  From: Adelaide Titus LPN  Sent: 12/10/2020  12:45 PM CST  To: Lois Faust  Subject: prolia auth                                      Patient called to give her insurance number regarding a PA. It is [phone]. Thanks for your help.     Adelaide Titus LPN with [facility]

## 2020-12-22 NOTE — TELEPHONE ENCOUNTER
Patient received Prolia 8/12/20   Not due again until 2/2021.  I don't see a question for me. So signed orders that were just signed 8/2020???    Dr. JAME Pendleton Start End   denosumab (PROLIA) injection 60 mg 60 mg Clinic/HOD 1 time 8/12/2020 8/12/2020   Route: Subcutaneous

## 2020-12-23 ENCOUNTER — OFFICE VISIT (OUTPATIENT)
Dept: PAIN MEDICINE | Facility: CLINIC | Age: 80
End: 2020-12-23
Payer: COMMERCIAL

## 2020-12-23 VITALS
WEIGHT: 172.5 LBS | RESPIRATION RATE: 18 BRPM | SYSTOLIC BLOOD PRESSURE: 177 MMHG | BODY MASS INDEX: 32.6 KG/M2 | TEMPERATURE: 98 F | HEART RATE: 61 BPM | OXYGEN SATURATION: 98 % | DIASTOLIC BLOOD PRESSURE: 77 MMHG

## 2020-12-23 DIAGNOSIS — M79.18 CERVICAL MYOFASCIAL PAIN SYNDROME: Primary | ICD-10-CM

## 2020-12-23 DIAGNOSIS — M47.812 SPONDYLOSIS OF CERVICAL REGION WITHOUT MYELOPATHY OR RADICULOPATHY: ICD-10-CM

## 2020-12-23 DIAGNOSIS — M50.30 DDD (DEGENERATIVE DISC DISEASE), CERVICAL: ICD-10-CM

## 2020-12-23 PROCEDURE — 1125F AMNT PAIN NOTED PAIN PRSNT: CPT | Mod: S$GLB,,, | Performed by: PHYSICIAN ASSISTANT

## 2020-12-23 PROCEDURE — 99999 PR PBB SHADOW E&M-EST. PATIENT-LVL V: ICD-10-PCS | Mod: PBBFAC,,, | Performed by: PHYSICIAN ASSISTANT

## 2020-12-23 PROCEDURE — 3077F SYST BP >= 140 MM HG: CPT | Mod: CPTII,S$GLB,, | Performed by: PHYSICIAN ASSISTANT

## 2020-12-23 PROCEDURE — 1159F MED LIST DOCD IN RCRD: CPT | Mod: S$GLB,,, | Performed by: PHYSICIAN ASSISTANT

## 2020-12-23 PROCEDURE — 1159F PR MEDICATION LIST DOCUMENTED IN MEDICAL RECORD: ICD-10-PCS | Mod: S$GLB,,, | Performed by: PHYSICIAN ASSISTANT

## 2020-12-23 PROCEDURE — 1157F PR ADVANCE CARE PLAN OR EQUIV PRESENT IN MEDICAL RECORD: ICD-10-PCS | Mod: S$GLB,,, | Performed by: PHYSICIAN ASSISTANT

## 2020-12-23 PROCEDURE — 3078F PR MOST RECENT DIASTOLIC BLOOD PRESSURE < 80 MM HG: ICD-10-PCS | Mod: CPTII,S$GLB,, | Performed by: PHYSICIAN ASSISTANT

## 2020-12-23 PROCEDURE — 99999 PR PBB SHADOW E&M-EST. PATIENT-LVL V: CPT | Mod: PBBFAC,,, | Performed by: PHYSICIAN ASSISTANT

## 2020-12-23 PROCEDURE — 3288F PR FALLS RISK ASSESSMENT DOCUMENTED: ICD-10-PCS | Mod: CPTII,S$GLB,, | Performed by: PHYSICIAN ASSISTANT

## 2020-12-23 PROCEDURE — 1101F PT FALLS ASSESS-DOCD LE1/YR: CPT | Mod: CPTII,S$GLB,, | Performed by: PHYSICIAN ASSISTANT

## 2020-12-23 PROCEDURE — 3078F DIAST BP <80 MM HG: CPT | Mod: CPTII,S$GLB,, | Performed by: PHYSICIAN ASSISTANT

## 2020-12-23 PROCEDURE — 1125F PR PAIN SEVERITY QUANTIFIED, PAIN PRESENT: ICD-10-PCS | Mod: S$GLB,,, | Performed by: PHYSICIAN ASSISTANT

## 2020-12-23 PROCEDURE — 1101F PR PT FALLS ASSESS DOC 0-1 FALLS W/OUT INJ PAST YR: ICD-10-PCS | Mod: CPTII,S$GLB,, | Performed by: PHYSICIAN ASSISTANT

## 2020-12-23 PROCEDURE — 3288F FALL RISK ASSESSMENT DOCD: CPT | Mod: CPTII,S$GLB,, | Performed by: PHYSICIAN ASSISTANT

## 2020-12-23 PROCEDURE — 1157F ADVNC CARE PLAN IN RCRD: CPT | Mod: S$GLB,,, | Performed by: PHYSICIAN ASSISTANT

## 2020-12-23 PROCEDURE — 99213 PR OFFICE/OUTPT VISIT, EST, LEVL III, 20-29 MIN: ICD-10-PCS | Mod: S$GLB,,, | Performed by: PHYSICIAN ASSISTANT

## 2020-12-23 PROCEDURE — 99213 OFFICE O/P EST LOW 20 MIN: CPT | Mod: S$GLB,,, | Performed by: PHYSICIAN ASSISTANT

## 2020-12-23 PROCEDURE — 3077F PR MOST RECENT SYSTOLIC BLOOD PRESSURE >= 140 MM HG: ICD-10-PCS | Mod: CPTII,S$GLB,, | Performed by: PHYSICIAN ASSISTANT

## 2020-12-28 NOTE — PROGRESS NOTES
This note was completed with dictation software and grammatical errors may exist.    CC:  Neck pain    HPI:  The patient is an 80-year-old woman with a history of rheumatoid arthritis, cardiomyopathy with pacemaker, osteoporosis, chronic renal insufficiency who presents in referral from Dr. Cruz for neck pain.  She is status post left C3, 4, 5, 6 diagnostic medial branch nerve blocks on 11/19/2020 with 80% relief lasting 7 hours.  She is status post left C3, 4, 5, 6 diagnostic medial branch nerve blocks on 12/07/2020 with 0% relief so radiofrequency ablation was not scheduled.  She complains of severe left-sided neck pain that is worse with turning her head to the left.  She denies any headaches or radiation into her arm.  She denies weakness, numbness, bladder or bowel incontinence.    Pain intervention history:  She underwent a left greater and lesser occipital nerve block on 01/11/2019 by Dr. Blanco without relief. She is status post C7-T1 interlaminar epidural steroid injection on 10/13/2020 with 100% relief of her headaches and 0% relief of her neck pain.  She is status post left C3, 4, 5, 6 diagnostic medial branch nerve blocks on 11/19/2020 with 80% relief lasting 7 hours.  She is status post left C3, 4, 5, 6 diagnostic medial branch nerve blocks on 12/07/2020 with 0% relief so radiofrequency ablation was not scheduled.      Antineuropathics:  NSAIDs:  She has been taking Tylenol but this does not seem to help.  Physical therapy:  She has done multiple rounds of physical therapy, also did some laser treatments, her daughter has a machine.  Antidepressants:  Muscle relaxers:  Opioids:  Antiplatelets/Anticoagulants:  81 ASA        ROS:  The patient reports neck pain.  Balance of review of systems is negative.    Lab Results   Component Value Date    HGBA1C 6.0 (H) 10/01/2019       Lab Results   Component Value Date    WBC 7.40 09/09/2020    HGB 13.0 09/09/2020    HCT 41.8 09/09/2020     (H) 09/09/2020      09/09/2020             Past Medical History:   Diagnosis Date    Arthritis     rheumatoid arthritis    Cardiomyopathy     CKD (chronic kidney disease) stage 3, GFR 30-59 ml/min 1/9/2019    Diverticulosis     DVT (deep venous thrombosis)     one time 5 years ago    Essential hypertension 11/24/2013    GERD (gastroesophageal reflux disease)     Glaucoma     sees Dr. Gillette    Hyperlipidemia     Hypertension     Iron deficiency     Iron deficiency anemia 10/21/2016     IMO LOAD Q4    Long-term use of immunosuppressant medication 7/27/2015    Mitral regurgitation 12/20/2013 12/13 mild     Osteoporosis 7/9/2014    Pacemaker 10/26/2016    left chest [employer]ER [employer] [address] [id number] Bonifacio Fuller A. 9/9/2016 - current   right atrium LEAD [employer] [address] [id number] Bonifacio Fuller A. 9/9/2016 - current   right ventricle LE[employer] [address] [id number] Bonifacio Fuller A. 9/9/2016 - current      PONV (postoperative nausea and vomiting)     Rheumatoid arthritis involving multiple sites with positive rheumatoid factor 11/24/2013    Vitamin B12 deficiency 2/28/2015       Past Surgical History:   Procedure Laterality Date    BREAST CYST EXCISION Bilateral     several cysts removed    COLONOSCOPY  2012    EPIDURAL STEROID INJECTION INTO CERVICAL SPINE N/A 10/13/2020    Procedure: Injection-steroid-epidural-cervical;  Surgeon: Bharat Avalos MD;  [facility];  Service: Pain Management;  Laterality: N/A;    FOOT SURGERY      had neuorma and also required hardware    HYSTERECTOMY      SUJATA with BSO    INCONTINENCE SURGERY      INJECTION OF ANESTHETIC AGENT AROUND MEDIAL BRANCH NERVES INNERVATING CERVICAL FACET JOINT Left 11/19/2020    Procedure: Block-nerve-medial branch-cervical C3, C4, C5, C6;  Surgeon: Bharat Avalos MD;  Location: [facility] OR;  Service: Pain Management;  Laterality: Left;     INJECTION OF ANESTHETIC AGENT AROUND MEDIAL BRANCH NERVES INNERVATING CERVICAL FACET JOINT Left 12/7/2020    Procedure: Block-nerve-medial branch-cervical, c3,c4, c5, c6;  Surgeon: Bharat Avalos MD;  Location: [facility] OR;  Service: Pain Management;  Laterality: Left;    KNEE ARTHROSCOPY W/ DEBRIDEMENT      OOPHORECTOMY  2012    pace maker  09/2016       Social History     Socioeconomic History    Marital status:      Spouse name: Not on file    Number of children: 3    Years of education: Not on file    Highest education level: Not on file   Occupational History    Not on file   Social Needs    Financial resource strain: Not on file    Food insecurity     Worry: Not on file     Inability: Not on file    Transportation needs     Medical: Not on file     Non-medical: Not on file   Tobacco Use    Smoking status: Never Smoker    Smokeless tobacco: Never Used   Substance and Sexual Activity    Alcohol use: Yes     Alcohol/week: 0.8 standard drinks     Types: 1 Standard drinks or equivalent per week     Comment: occ social    Drug use: No    Sexual activity: Never     Birth control/protection: See Surgical Hx   Lifestyle    Physical activity     Days per week: Not on file     Minutes per session: Not on file    Stress: Not on file   Relationships    Social connections     Talks on phone: Not on file     Gets together: Not on file     Attends Mu-ism service: Not on file     Active member of club or organization: Not on file     Attends meetings of clubs or organizations: Not on file     Relationship status: Not on file   Other Topics Concern    Not on file   Social History Narrative    Not on file         Medications/Allergies: See med card    Vitals:    12/23/20 0749   BP: (!) 177/77   Pulse: 61   Resp: 18   Temp: 97.6 °F (36.4 °C)   TempSrc: Temporal   SpO2: 98%   Weight: 78.3 kg (172 lb 8.2 oz)   PainSc:   6   PainLoc: Neck         Physical exam:  Gen: A and O x3, pleasant,  well-groomed  Skin: No rashes or obvious lesions  HEENT: PERRLA, no obvious deformities on ears or in canals.Trachea midline.  CVS: Regular rate and rhythm, normal palpable pulses.  Resp: Clear to auscultation bilaterally, no wheezes or rales.  Abdomen: Soft, NT/ND.  Musculoskeletal: Able to heel walk, toe walk. No antalgic gait.     Neuro:  Upper extremities: 5/5 strength bilaterally   Reflexes: Brachioradialis 2+, Bicep 2+, Tricep 2+.   Sensory: Intact and symmetrical to light touch and pinprick in C2-T1 dermatomes bilaterally.    Cervical Spine:  Cervical spine:  Of motion is severely reduced with flexion and right lateral rotation with increased pain in the left neck with right lateral rotation and flexion, moderately reduced with left lateral rotation with slight increased pain, extension is moderately reduced with slight increased pain in the left neck.  Spurling's maneuver causes left neck pain  Myofascial exam: Tenderness to palpation across left trapezius    Imagin19 Xray C-spine:  There is multilevel degenerative disc and facet disease.  There is no fracture.  There is trace retrolisthesis of C3 on C4.  There is trace anterolisthesis of C4 on C5, C5 on C6, C7 on T1.  This is unchanged with flexion or extension.  There is multilevel facet joint arthropathy.  There is mild disc space narrowing at the C5-6 level, moderate disc space narrowing at the C3-4 and C4-5 levels with marked disc space narrowing at the C6-7 level.  There is at least mild multilevel foraminal stenosis.  The prevertebral soft tissues are normal.  The airway is patent.    Assessment:   The patient is an 80-year-old woman with a history of rheumatoid arthritis, cardiomyopathy with pacemaker, osteoporosis, chronic renal insufficiency who presents in referral from Dr. Cruz for neck pain.   1. Cervical myofascial pain syndrome  Ambulatory referral/consult to Physical/Occupational Therapy   2. Spondylosis of cervical region without  myelopathy or radiculopathy  Ambulatory referral/consult to Physical/Occupational Therapy   3. DDD (degenerative disc disease), cervical  Ambulatory referral/consult to Physical/Occupational Therapy         Plan:  1.  The patient had relief following the 1st set of blocks but not after the 2nd.  It is possible she had a trigger point like affect after the 1st set of blocks so I am going to schedule her for physical therapy and dry needling at [facility] in [place].    2.  Follow-up in 2 months or sooner as needed.

## 2020-12-30 ENCOUNTER — TELEPHONE (OUTPATIENT)
Dept: PAIN MEDICINE | Facility: CLINIC | Age: 80
End: 2020-12-30

## 2020-12-30 NOTE — PROGRESS NOTES
Severe arthritis of both knees. Right knee did have fluid (effusion) as discussed at visit.   Dr. Cruz-Rheumatology

## 2020-12-30 NOTE — TELEPHONE ENCOUNTER
----- Message from Hemant Dillard sent at 12/30/2020  9:11 AM CST -----  Contact: Mary Ann from [facility] at [phone]  Type: Needs Medical Advice  Who Called:  Mary Ann  Best Call Back Number: [phone]  Additional Information: Mary Ann from [facility] called about a referral for neck therapy but no referral is in the pt's chart. Please call back and advise

## 2020-12-31 RX ORDER — METHYLPREDNISOLONE ACETATE 40 MG/ML
40 INJECTION, SUSPENSION INTRA-ARTICULAR; INTRALESIONAL; INTRAMUSCULAR; SOFT TISSUE
Status: DISCONTINUED | OUTPATIENT
Start: 2020-12-03 | End: 2020-12-31 | Stop reason: HOSPADM

## 2020-12-31 NOTE — PROCEDURES
Large Joint Aspiration/Injection: R knee    Date/Time: 12/3/2020 8:00 AM  Performed by: Christina Cruz,   Authorized by: Christina Cruz, DO     Consent Done?:  Yes (Verbal)  Indications:  Arthritis  Site marked: the procedure site was marked    Timeout: prior to procedure the correct patient, procedure, and site was verified    Prep: patient was prepped and draped in usual sterile fashion      Local anesthesia used?: Yes    Local anesthetic:  Lidocaine 1% without epinephrine  Anesthetic total (ml):  3      Details:  Needle Size:  25 G  Ultrasonic Guidance for needle placement?: No    Approach:  Anterolateral  Location:  Knee  Site:  R knee  Medications:  40 mg methylPREDNISolone acetate 40 mg/mL  Patient tolerance:  Patient tolerated the procedure well with no immediate complications     Patient informed of the risks, benefits, side effects of corticosteroid injections including but not limited to skin hypopigmentation, atrophy, ineffectiveness and infection.

## 2021-01-04 ENCOUNTER — TELEPHONE (OUTPATIENT)
Dept: PAIN MEDICINE | Facility: CLINIC | Age: 81
End: 2021-01-04

## 2021-02-01 DIAGNOSIS — Z95.0 PACEMAKER: Primary | ICD-10-CM

## 2021-02-01 DIAGNOSIS — I49.5 SSS (SICK SINUS SYNDROME): ICD-10-CM

## 2021-02-09 ENCOUNTER — CLINICAL SUPPORT (OUTPATIENT)
Dept: CARDIOLOGY | Facility: CLINIC | Age: 81
End: 2021-02-09
Attending: INTERNAL MEDICINE
Payer: COMMERCIAL

## 2021-02-09 DIAGNOSIS — Z95.0 PACEMAKER: ICD-10-CM

## 2021-02-09 DIAGNOSIS — I49.5 SSS (SICK SINUS SYNDROME): ICD-10-CM

## 2021-02-22 PROBLEM — Z13.9 SCREENING PROCEDURE: Status: RESOLVED | Noted: 2020-11-19 | Resolved: 2021-02-22

## 2021-02-24 ENCOUNTER — OFFICE VISIT (OUTPATIENT)
Dept: PAIN MEDICINE | Facility: CLINIC | Age: 81
End: 2021-02-24
Payer: COMMERCIAL

## 2021-02-24 VITALS
RESPIRATION RATE: 18 BRPM | OXYGEN SATURATION: 99 % | TEMPERATURE: 98 F | SYSTOLIC BLOOD PRESSURE: 148 MMHG | BODY MASS INDEX: 32.6 KG/M2 | DIASTOLIC BLOOD PRESSURE: 73 MMHG | HEART RATE: 60 BPM | WEIGHT: 172.5 LBS

## 2021-02-24 DIAGNOSIS — M50.30 DDD (DEGENERATIVE DISC DISEASE), CERVICAL: ICD-10-CM

## 2021-02-24 DIAGNOSIS — M47.812 SPONDYLOSIS OF CERVICAL REGION WITHOUT MYELOPATHY OR RADICULOPATHY: ICD-10-CM

## 2021-02-24 DIAGNOSIS — M79.18 CERVICAL MYOFASCIAL PAIN SYNDROME: Primary | ICD-10-CM

## 2021-02-24 PROCEDURE — 1101F PT FALLS ASSESS-DOCD LE1/YR: CPT | Mod: CPTII,S$GLB,, | Performed by: PHYSICIAN ASSISTANT

## 2021-02-24 PROCEDURE — 3288F FALL RISK ASSESSMENT DOCD: CPT | Mod: CPTII,S$GLB,, | Performed by: PHYSICIAN ASSISTANT

## 2021-02-24 PROCEDURE — 3288F PR FALLS RISK ASSESSMENT DOCUMENTED: ICD-10-PCS | Mod: CPTII,S$GLB,, | Performed by: PHYSICIAN ASSISTANT

## 2021-02-24 PROCEDURE — 99999 PR PBB SHADOW E&M-EST. PATIENT-LVL V: ICD-10-PCS | Mod: PBBFAC,,, | Performed by: PHYSICIAN ASSISTANT

## 2021-02-24 PROCEDURE — 1159F PR MEDICATION LIST DOCUMENTED IN MEDICAL RECORD: ICD-10-PCS | Mod: S$GLB,,, | Performed by: PHYSICIAN ASSISTANT

## 2021-02-24 PROCEDURE — 1157F ADVNC CARE PLAN IN RCRD: CPT | Mod: S$GLB,,, | Performed by: PHYSICIAN ASSISTANT

## 2021-02-24 PROCEDURE — 3078F PR MOST RECENT DIASTOLIC BLOOD PRESSURE < 80 MM HG: ICD-10-PCS | Mod: CPTII,S$GLB,, | Performed by: PHYSICIAN ASSISTANT

## 2021-02-24 PROCEDURE — 99999 PR PBB SHADOW E&M-EST. PATIENT-LVL V: CPT | Mod: PBBFAC,,, | Performed by: PHYSICIAN ASSISTANT

## 2021-02-24 PROCEDURE — 1159F MED LIST DOCD IN RCRD: CPT | Mod: S$GLB,,, | Performed by: PHYSICIAN ASSISTANT

## 2021-02-24 PROCEDURE — 3078F DIAST BP <80 MM HG: CPT | Mod: CPTII,S$GLB,, | Performed by: PHYSICIAN ASSISTANT

## 2021-02-24 PROCEDURE — 3077F SYST BP >= 140 MM HG: CPT | Mod: CPTII,S$GLB,, | Performed by: PHYSICIAN ASSISTANT

## 2021-02-24 PROCEDURE — 99212 OFFICE O/P EST SF 10 MIN: CPT | Mod: S$GLB,,, | Performed by: PHYSICIAN ASSISTANT

## 2021-02-24 PROCEDURE — 3077F PR MOST RECENT SYSTOLIC BLOOD PRESSURE >= 140 MM HG: ICD-10-PCS | Mod: CPTII,S$GLB,, | Performed by: PHYSICIAN ASSISTANT

## 2021-02-24 PROCEDURE — 1101F PR PT FALLS ASSESS DOC 0-1 FALLS W/OUT INJ PAST YR: ICD-10-PCS | Mod: CPTII,S$GLB,, | Performed by: PHYSICIAN ASSISTANT

## 2021-02-24 PROCEDURE — 1125F AMNT PAIN NOTED PAIN PRSNT: CPT | Mod: S$GLB,,, | Performed by: PHYSICIAN ASSISTANT

## 2021-02-24 PROCEDURE — 1125F PR PAIN SEVERITY QUANTIFIED, PAIN PRESENT: ICD-10-PCS | Mod: S$GLB,,, | Performed by: PHYSICIAN ASSISTANT

## 2021-02-24 PROCEDURE — 99212 PR OFFICE/OUTPT VISIT, EST, LEVL II, 10-19 MIN: ICD-10-PCS | Mod: S$GLB,,, | Performed by: PHYSICIAN ASSISTANT

## 2021-02-24 PROCEDURE — 1157F PR ADVANCE CARE PLAN OR EQUIV PRESENT IN MEDICAL RECORD: ICD-10-PCS | Mod: S$GLB,,, | Performed by: PHYSICIAN ASSISTANT

## 2021-03-04 ENCOUNTER — CLINICAL SUPPORT (OUTPATIENT)
Dept: CARDIOLOGY | Facility: CLINIC | Age: 81
End: 2021-03-04
Payer: COMMERCIAL

## 2021-03-04 DIAGNOSIS — Z95.0 PRESENCE OF CARDIAC PACEMAKER: ICD-10-CM

## 2021-03-04 PROCEDURE — 93294 CARDIAC DEVICE CHECK - REMOTE: ICD-10-PCS | Mod: ,,, | Performed by: INTERNAL MEDICINE

## 2021-03-04 PROCEDURE — 93296 CARDIAC DEVICE CHECK - REMOTE: ICD-10-PCS | Mod: ,,, | Performed by: INTERNAL MEDICINE

## 2021-03-04 PROCEDURE — 93294 REM INTERROG EVL PM/LDLS PM: CPT | Mod: ,,, | Performed by: INTERNAL MEDICINE

## 2021-03-04 PROCEDURE — 93296 REM INTERROG EVL PM/IDS: CPT | Mod: ,,, | Performed by: INTERNAL MEDICINE

## 2021-03-05 DIAGNOSIS — M05.79 RHEUMATOID ARTHRITIS INVOLVING MULTIPLE SITES WITH POSITIVE RHEUMATOID FACTOR: ICD-10-CM

## 2021-03-05 RX ORDER — HYDROXYCHLOROQUINE SULFATE 200 MG/1
200 TABLET, FILM COATED ORAL DAILY
Qty: 90 TABLET | Refills: 3 | Status: CANCELLED | OUTPATIENT
Start: 2021-03-05 | End: 2022-03-05

## 2021-03-15 ENCOUNTER — INFUSION (OUTPATIENT)
Dept: INFUSION THERAPY | Facility: HOSPITAL | Age: 81
End: 2021-03-15
Attending: INTERNAL MEDICINE
Payer: COMMERCIAL

## 2021-03-15 VITALS
DIASTOLIC BLOOD PRESSURE: 84 MMHG | RESPIRATION RATE: 17 BRPM | TEMPERATURE: 98 F | SYSTOLIC BLOOD PRESSURE: 170 MMHG | HEART RATE: 60 BPM

## 2021-03-15 DIAGNOSIS — M81.0 AGE-RELATED OSTEOPOROSIS WITHOUT CURRENT PATHOLOGICAL FRACTURE: Primary | ICD-10-CM

## 2021-03-15 PROCEDURE — 63600175 PHARM REV CODE 636 W HCPCS: Mod: JG,PN | Performed by: INTERNAL MEDICINE

## 2021-03-15 PROCEDURE — 96372 THER/PROPH/DIAG INJ SC/IM: CPT | Mod: PN

## 2021-03-15 RX ADMIN — DENOSUMAB 60 MG: 60 INJECTION SUBCUTANEOUS at 11:03

## 2021-03-30 ENCOUNTER — OFFICE VISIT (OUTPATIENT)
Dept: RHEUMATOLOGY | Facility: CLINIC | Age: 81
End: 2021-03-30
Payer: COMMERCIAL

## 2021-03-30 VITALS
HEART RATE: 59 BPM | HEIGHT: 61 IN | DIASTOLIC BLOOD PRESSURE: 85 MMHG | BODY MASS INDEX: 31.67 KG/M2 | WEIGHT: 167.75 LBS | SYSTOLIC BLOOD PRESSURE: 168 MMHG

## 2021-03-30 DIAGNOSIS — M05.79 RHEUMATOID ARTHRITIS INVOLVING MULTIPLE SITES WITH POSITIVE RHEUMATOID FACTOR: ICD-10-CM

## 2021-03-30 DIAGNOSIS — D84.9 IMMUNOCOMPROMISED: Primary | ICD-10-CM

## 2021-03-30 PROCEDURE — 3077F SYST BP >= 140 MM HG: CPT | Mod: CPTII,S$GLB,, | Performed by: INTERNAL MEDICINE

## 2021-03-30 PROCEDURE — 1157F PR ADVANCE CARE PLAN OR EQUIV PRESENT IN MEDICAL RECORD: ICD-10-PCS | Mod: S$GLB,,, | Performed by: INTERNAL MEDICINE

## 2021-03-30 PROCEDURE — 1125F AMNT PAIN NOTED PAIN PRSNT: CPT | Mod: S$GLB,,, | Performed by: INTERNAL MEDICINE

## 2021-03-30 PROCEDURE — 1159F PR MEDICATION LIST DOCUMENTED IN MEDICAL RECORD: ICD-10-PCS | Mod: S$GLB,,, | Performed by: INTERNAL MEDICINE

## 2021-03-30 PROCEDURE — 1125F PR PAIN SEVERITY QUANTIFIED, PAIN PRESENT: ICD-10-PCS | Mod: S$GLB,,, | Performed by: INTERNAL MEDICINE

## 2021-03-30 PROCEDURE — 3077F PR MOST RECENT SYSTOLIC BLOOD PRESSURE >= 140 MM HG: ICD-10-PCS | Mod: CPTII,S$GLB,, | Performed by: INTERNAL MEDICINE

## 2021-03-30 PROCEDURE — 3079F DIAST BP 80-89 MM HG: CPT | Mod: CPTII,S$GLB,, | Performed by: INTERNAL MEDICINE

## 2021-03-30 PROCEDURE — 1101F PT FALLS ASSESS-DOCD LE1/YR: CPT | Mod: CPTII,S$GLB,, | Performed by: INTERNAL MEDICINE

## 2021-03-30 PROCEDURE — 3079F PR MOST RECENT DIASTOLIC BLOOD PRESSURE 80-89 MM HG: ICD-10-PCS | Mod: CPTII,S$GLB,, | Performed by: INTERNAL MEDICINE

## 2021-03-30 PROCEDURE — 3288F PR FALLS RISK ASSESSMENT DOCUMENTED: ICD-10-PCS | Mod: CPTII,S$GLB,, | Performed by: INTERNAL MEDICINE

## 2021-03-30 PROCEDURE — 3288F FALL RISK ASSESSMENT DOCD: CPT | Mod: CPTII,S$GLB,, | Performed by: INTERNAL MEDICINE

## 2021-03-30 PROCEDURE — 99214 OFFICE O/P EST MOD 30 MIN: CPT | Mod: S$GLB,,, | Performed by: INTERNAL MEDICINE

## 2021-03-30 PROCEDURE — 99214 PR OFFICE/OUTPT VISIT, EST, LEVL IV, 30-39 MIN: ICD-10-PCS | Mod: S$GLB,,, | Performed by: INTERNAL MEDICINE

## 2021-03-30 PROCEDURE — 1101F PR PT FALLS ASSESS DOC 0-1 FALLS W/OUT INJ PAST YR: ICD-10-PCS | Mod: CPTII,S$GLB,, | Performed by: INTERNAL MEDICINE

## 2021-03-30 PROCEDURE — 99999 PR PBB SHADOW E&M-EST. PATIENT-LVL IV: CPT | Mod: PBBFAC,,, | Performed by: INTERNAL MEDICINE

## 2021-03-30 PROCEDURE — 99999 PR PBB SHADOW E&M-EST. PATIENT-LVL IV: ICD-10-PCS | Mod: PBBFAC,,, | Performed by: INTERNAL MEDICINE

## 2021-03-30 PROCEDURE — 1159F MED LIST DOCD IN RCRD: CPT | Mod: S$GLB,,, | Performed by: INTERNAL MEDICINE

## 2021-03-30 PROCEDURE — 1157F ADVNC CARE PLAN IN RCRD: CPT | Mod: S$GLB,,, | Performed by: INTERNAL MEDICINE

## 2021-03-30 RX ORDER — METHOTREXATE 2.5 MG/1
12.5 TABLET ORAL
Qty: 60 TABLET | Refills: 3 | Status: SHIPPED | OUTPATIENT
Start: 2021-03-30 | End: 2022-05-09

## 2021-03-30 ASSESSMENT — ROUTINE ASSESSMENT OF PATIENT INDEX DATA (RAPID3)
TOTAL RAPID3 SCORE: 4
PSYCHOLOGICAL DISTRESS SCORE: 0
PAIN SCORE: 4
PATIENT GLOBAL ASSESSMENT SCORE: 4
MDHAQ FUNCTION SCORE: 1.2
FATIGUE SCORE: 1.1

## 2021-04-13 ENCOUNTER — TELEPHONE (OUTPATIENT)
Dept: CARDIOLOGY | Facility: CLINIC | Age: 81
End: 2021-04-13

## 2021-04-21 PROBLEM — R10.11 RIGHT UPPER QUADRANT ABDOMINAL PAIN: Status: ACTIVE | Noted: 2021-04-21

## 2021-05-21 ENCOUNTER — TELEPHONE (OUTPATIENT)
Dept: INFUSION THERAPY | Facility: HOSPITAL | Age: 81
End: 2021-05-21

## 2021-05-25 ENCOUNTER — TELEPHONE (OUTPATIENT)
Dept: OBSTETRICS AND GYNECOLOGY | Facility: CLINIC | Age: 81
End: 2021-05-25

## 2021-05-25 DIAGNOSIS — Z12.31 VISIT FOR SCREENING MAMMOGRAM: Primary | ICD-10-CM

## 2021-06-02 ENCOUNTER — CLINICAL SUPPORT (OUTPATIENT)
Dept: CARDIOLOGY | Facility: HOSPITAL | Age: 81
End: 2021-06-02
Payer: COMMERCIAL

## 2021-06-02 ENCOUNTER — HOSPITAL ENCOUNTER (OUTPATIENT)
Dept: CARDIOLOGY | Facility: HOSPITAL | Age: 81
Discharge: HOME | End: 2021-06-02
Payer: COMMERCIAL

## 2021-06-02 DIAGNOSIS — Z95.0 PRESENCE OF CARDIAC PACEMAKER: ICD-10-CM

## 2021-06-02 PROCEDURE — 93294 REM INTERROG EVL PM/LDLS PM: CPT | Mod: ,,, | Performed by: INTERNAL MEDICINE

## 2021-06-02 PROCEDURE — 93296 REM INTERROG EVL PM/IDS: CPT | Mod: PO | Performed by: INTERNAL MEDICINE

## 2021-06-02 PROCEDURE — 93294 CARDIAC DEVICE CHECK - REMOTE: ICD-10-PCS | Mod: ,,, | Performed by: INTERNAL MEDICINE

## 2021-06-15 ENCOUNTER — LAB VISIT (OUTPATIENT)
Dept: LAB | Facility: HOSPITAL | Age: 81
End: 2021-06-15
Attending: INTERNAL MEDICINE
Payer: COMMERCIAL

## 2021-06-15 DIAGNOSIS — M05.79 RHEUMATOID ARTHRITIS INVOLVING MULTIPLE SITES WITH POSITIVE RHEUMATOID FACTOR: ICD-10-CM

## 2021-06-15 DIAGNOSIS — D84.9 IMMUNOCOMPROMISED: ICD-10-CM

## 2021-06-15 LAB
ALBUMIN SERPL BCP-MCNC: 3.7 G/DL (ref 3.5–5.2)
ALP SERPL-CCNC: 46 U/L (ref 55–135)
ALT SERPL W/O P-5'-P-CCNC: 6 U/L (ref 10–44)
ANION GAP SERPL CALC-SCNC: 8 MMOL/L (ref 8–16)
AST SERPL-CCNC: 27 U/L (ref 10–40)
BASOPHILS # BLD AUTO: 0.07 K/UL (ref 0–0.2)
BASOPHILS NFR BLD: 0.8 % (ref 0–1.9)
BILIRUB SERPL-MCNC: 0.7 MG/DL (ref 0.1–1)
BUN SERPL-MCNC: 19 MG/DL (ref 8–23)
CALCIUM SERPL-MCNC: 10 MG/DL (ref 8.7–10.5)
CHLORIDE SERPL-SCNC: 106 MMOL/L (ref 95–110)
CO2 SERPL-SCNC: 27 MMOL/L (ref 23–29)
CREAT SERPL-MCNC: 1.2 MG/DL (ref 0.5–1.4)
CRP SERPL-MCNC: 1.9 MG/L (ref 0–8.2)
DIFFERENTIAL METHOD: ABNORMAL
EOSINOPHIL # BLD AUTO: 0.3 K/UL (ref 0–0.5)
EOSINOPHIL NFR BLD: 3.8 % (ref 0–8)
ERYTHROCYTE [DISTWIDTH] IN BLOOD BY AUTOMATED COUNT: 14.6 % (ref 11.5–14.5)
ERYTHROCYTE [SEDIMENTATION RATE] IN BLOOD BY WESTERGREN METHOD: 11 MM/HR (ref 0–20)
EST. GFR  (AFRICAN AMERICAN): 49 ML/MIN/1.73 M^2
EST. GFR  (NON AFRICAN AMERICAN): 42.5 ML/MIN/1.73 M^2
GLUCOSE SERPL-MCNC: 110 MG/DL (ref 70–110)
HCT VFR BLD AUTO: 41.4 % (ref 37–48.5)
HGB BLD-MCNC: 13.2 G/DL (ref 12–16)
IMM GRANULOCYTES # BLD AUTO: 0.03 K/UL (ref 0–0.04)
IMM GRANULOCYTES NFR BLD AUTO: 0.3 % (ref 0–0.5)
LYMPHOCYTES # BLD AUTO: 1.7 K/UL (ref 1–4.8)
LYMPHOCYTES NFR BLD: 19.7 % (ref 18–48)
MCH RBC QN AUTO: 33.7 PG (ref 27–31)
MCHC RBC AUTO-ENTMCNC: 31.9 G/DL (ref 32–36)
MCV RBC AUTO: 106 FL (ref 82–98)
MONOCYTES # BLD AUTO: 0.7 K/UL (ref 0.3–1)
MONOCYTES NFR BLD: 7.8 % (ref 4–15)
NEUTROPHILS # BLD AUTO: 5.8 K/UL (ref 1.8–7.7)
NEUTROPHILS NFR BLD: 67.6 % (ref 38–73)
NRBC BLD-RTO: 0 /100 WBC
PLATELET # BLD AUTO: 222 K/UL (ref 150–450)
PMV BLD AUTO: 13.3 FL (ref 9.2–12.9)
POTASSIUM SERPL-SCNC: 5.1 MMOL/L (ref 3.5–5.1)
PROT SERPL-MCNC: 6.8 G/DL (ref 6–8.4)
RBC # BLD AUTO: 3.92 M/UL (ref 4–5.4)
SODIUM SERPL-SCNC: 141 MMOL/L (ref 136–145)
WBC # BLD AUTO: 8.62 K/UL (ref 3.9–12.7)

## 2021-06-15 PROCEDURE — 85651 RBC SED RATE NONAUTOMATED: CPT | Mod: PO | Performed by: INTERNAL MEDICINE

## 2021-06-15 PROCEDURE — 86140 C-REACTIVE PROTEIN: CPT | Performed by: INTERNAL MEDICINE

## 2021-06-15 PROCEDURE — 85025 COMPLETE CBC W/AUTO DIFF WBC: CPT | Performed by: INTERNAL MEDICINE

## 2021-06-15 PROCEDURE — 80053 COMPREHEN METABOLIC PANEL: CPT | Performed by: INTERNAL MEDICINE

## 2021-06-15 PROCEDURE — 36415 COLL VENOUS BLD VENIPUNCTURE: CPT | Mod: PO | Performed by: INTERNAL MEDICINE

## 2021-07-11 NOTE — TELEPHONE ENCOUNTER
Left patient a vm with this information.   
Patient is scheduled for a cervical steroid injection with Dr. Avalos on 10/13. She will need to stop the aspirin 7 days before,. Please advise if this is ok. Thanks.    
Medical Records sent/Images sent

## 2021-07-19 ENCOUNTER — TELEPHONE (OUTPATIENT)
Dept: RADIOLOGY | Facility: HOSPITAL | Age: 81
End: 2021-07-19

## 2021-07-19 ENCOUNTER — HOSPITAL ENCOUNTER (OUTPATIENT)
Dept: RADIOLOGY | Facility: HOSPITAL | Age: 81
Discharge: HOME | End: 2021-07-19
Attending: SPECIALIST
Payer: COMMERCIAL

## 2021-07-19 ENCOUNTER — OFFICE VISIT (OUTPATIENT)
Dept: OBSTETRICS AND GYNECOLOGY | Facility: CLINIC | Age: 81
End: 2021-07-19
Payer: COMMERCIAL

## 2021-07-19 VITALS
DIASTOLIC BLOOD PRESSURE: 78 MMHG | SYSTOLIC BLOOD PRESSURE: 122 MMHG | RESPIRATION RATE: 16 BRPM | HEIGHT: 61 IN | WEIGHT: 165.81 LBS | BODY MASS INDEX: 31.3 KG/M2

## 2021-07-19 DIAGNOSIS — Z12.31 VISIT FOR SCREENING MAMMOGRAM: ICD-10-CM

## 2021-07-19 DIAGNOSIS — Z91.89 GYN EXAM FOR HIGH-RISK MEDICARE PATIENT: Primary | ICD-10-CM

## 2021-07-19 PROCEDURE — 77063 BREAST TOMOSYNTHESIS BI: CPT | Mod: 26,,, | Performed by: RADIOLOGY

## 2021-07-19 PROCEDURE — 77067 SCR MAMMO BI INCL CAD: CPT | Mod: TC,PN

## 2021-07-19 PROCEDURE — 1157F ADVNC CARE PLAN IN RCRD: CPT | Mod: CPTII,S$GLB,, | Performed by: SPECIALIST

## 2021-07-19 PROCEDURE — 1126F AMNT PAIN NOTED NONE PRSNT: CPT | Mod: CPTII,S$GLB,, | Performed by: SPECIALIST

## 2021-07-19 PROCEDURE — 3288F PR FALLS RISK ASSESSMENT DOCUMENTED: ICD-10-PCS | Mod: CPTII,S$GLB,, | Performed by: SPECIALIST

## 2021-07-19 PROCEDURE — 3288F FALL RISK ASSESSMENT DOCD: CPT | Mod: CPTII,S$GLB,, | Performed by: SPECIALIST

## 2021-07-19 PROCEDURE — 99999 PR PBB SHADOW E&M-EST. PATIENT-LVL IV: CPT | Mod: PBBFAC,,, | Performed by: SPECIALIST

## 2021-07-19 PROCEDURE — 99397 PR PREVENTIVE VISIT,EST,65 & OVER: ICD-10-PCS | Mod: S$GLB,,, | Performed by: SPECIALIST

## 2021-07-19 PROCEDURE — 1101F PT FALLS ASSESS-DOCD LE1/YR: CPT | Mod: CPTII,S$GLB,, | Performed by: SPECIALIST

## 2021-07-19 PROCEDURE — 99999 PR PBB SHADOW E&M-EST. PATIENT-LVL IV: ICD-10-PCS | Mod: PBBFAC,,, | Performed by: SPECIALIST

## 2021-07-19 PROCEDURE — 1126F PR PAIN SEVERITY QUANTIFIED, NO PAIN PRESENT: ICD-10-PCS | Mod: CPTII,S$GLB,, | Performed by: SPECIALIST

## 2021-07-19 PROCEDURE — 77067 MAMMO DIGITAL SCREENING BILAT WITH TOMO: ICD-10-PCS | Mod: 26,,, | Performed by: RADIOLOGY

## 2021-07-19 PROCEDURE — 1157F PR ADVANCE CARE PLAN OR EQUIV PRESENT IN MEDICAL RECORD: ICD-10-PCS | Mod: CPTII,S$GLB,, | Performed by: SPECIALIST

## 2021-07-19 PROCEDURE — 77063 MAMMO DIGITAL SCREENING BILAT WITH TOMO: ICD-10-PCS | Mod: 26,,, | Performed by: RADIOLOGY

## 2021-07-19 PROCEDURE — 99397 PER PM REEVAL EST PAT 65+ YR: CPT | Mod: S$GLB,,, | Performed by: SPECIALIST

## 2021-07-19 PROCEDURE — 1101F PR PT FALLS ASSESS DOC 0-1 FALLS W/OUT INJ PAST YR: ICD-10-PCS | Mod: CPTII,S$GLB,, | Performed by: SPECIALIST

## 2021-07-19 PROCEDURE — 77067 SCR MAMMO BI INCL CAD: CPT | Mod: 26,,, | Performed by: RADIOLOGY

## 2021-07-28 ENCOUNTER — HOSPITAL ENCOUNTER (OUTPATIENT)
Dept: RADIOLOGY | Facility: HOSPITAL | Age: 81
Discharge: HOME | End: 2021-07-28
Attending: SPECIALIST
Payer: COMMERCIAL

## 2021-07-28 DIAGNOSIS — R92.8 ABNORMAL MAMMOGRAM: ICD-10-CM

## 2021-07-28 PROCEDURE — 77061 BREAST TOMOSYNTHESIS UNI: CPT | Mod: TC,PO,RT

## 2021-07-28 PROCEDURE — 76642 US BREAST RIGHT LIMITED: ICD-10-PCS | Mod: 26,RT,, | Performed by: RADIOLOGY

## 2021-07-28 PROCEDURE — 76642 ULTRASOUND BREAST LIMITED: CPT | Mod: 26,RT,, | Performed by: RADIOLOGY

## 2021-07-28 PROCEDURE — 76642 ULTRASOUND BREAST LIMITED: CPT | Mod: TC,PO,RT

## 2021-07-28 PROCEDURE — 77065 DX MAMMO INCL CAD UNI: CPT | Mod: 26,RT,, | Performed by: RADIOLOGY

## 2021-07-28 PROCEDURE — 77061 MAMMO DIGITAL DIAGNOSTIC RIGHT WITH TOMO: ICD-10-PCS | Mod: 26,RT,, | Performed by: RADIOLOGY

## 2021-07-28 PROCEDURE — 77061 BREAST TOMOSYNTHESIS UNI: CPT | Mod: 26,RT,, | Performed by: RADIOLOGY

## 2021-07-28 PROCEDURE — 77065 MAMMO DIGITAL DIAGNOSTIC RIGHT WITH TOMO: ICD-10-PCS | Mod: 26,RT,, | Performed by: RADIOLOGY

## 2021-08-02 ENCOUNTER — CLINICAL SUPPORT (OUTPATIENT)
Dept: CARDIOLOGY | Facility: HOSPITAL | Age: 81
End: 2021-08-02
Attending: INTERNAL MEDICINE
Payer: COMMERCIAL

## 2021-08-02 VITALS — BODY MASS INDEX: 31.15 KG/M2 | WEIGHT: 165 LBS | HEIGHT: 61 IN

## 2021-08-02 DIAGNOSIS — I51.9 DIASTOLIC DYSFUNCTION, LEFT VENTRICLE: ICD-10-CM

## 2021-08-02 DIAGNOSIS — I34.0 MITRAL VALVE INSUFFICIENCY, UNSPECIFIED ETIOLOGY: ICD-10-CM

## 2021-08-02 DIAGNOSIS — I10 ESSENTIAL HYPERTENSION: ICD-10-CM

## 2021-08-02 DIAGNOSIS — N18.30 CKD (CHRONIC KIDNEY DISEASE) STAGE 3, GFR 30-59 ML/MIN: ICD-10-CM

## 2021-08-02 DIAGNOSIS — Z95.0 PACEMAKER: ICD-10-CM

## 2021-08-02 PROCEDURE — 93306 TTE W/DOPPLER COMPLETE: CPT | Mod: PO

## 2021-08-02 PROCEDURE — 93306 ECHO (CUPID ONLY): ICD-10-PCS | Mod: 26,,, | Performed by: INTERNAL MEDICINE

## 2021-08-02 PROCEDURE — 93306 TTE W/DOPPLER COMPLETE: CPT | Mod: 26,,, | Performed by: INTERNAL MEDICINE

## 2021-08-02 NOTE — TELEPHONE ENCOUNTER
----- Message from RT Salvador sent at 7/27/2017  2:32 PM CDT -----  Contact: [facility],[phone],  Ref No. [id number]   [facility]  [facility],[phone],  Ref No. [id number], [facility], requesting new order or verbal order for the pt's medication: Diovan, thanks.   NEUROVASCULAR CLINIC FOLLOW UP VISIT    Name: Lida Payne  : 1949  MRN: [id number]    Chief Complaint  TIA follow up      History of Present Illness  Abstracted from stroke service colleagues. 73 y/o RH woman with h/o DM, HTN, HL, CKD, GERD, R ICA stenosis severe asymptomatic 80%.    Presents today for follow up for TIA presenting as aphasia likely CE, is RH so R ICA stenosis not symptomatic.     Initially presented 20 with 2 minute episode of gibberish speech when speaking with her grandson about the bettermarks game.  He thought she was trying to recall the name of a player!  She states that the symptoms resolved and were not a/w any other focal neurologic deficits.  She denies h/o prior transient neurologic deficits including L hemiparesis, sensory loss or loss of vision in the R eye.  She denies LOC/shaking/incontinence/tongue biting, headache, or h/o headaches/migraines.      Initial NIHSS Score:  0 per stroke RN and received a pass on the bedside dysphagia screen.  The last known well time was 21 at 1500.  CT of the head was negative for acute intracranial findings.  The patient was not given IV Alteplase, as the last known well time was greater than 4.5 hours & symptom resolution.  CTA head and neck done which showed ANKIT atherosclerosis with severe, greater than 80% stenosis and less than 50% stenosis of LICA.  The patient was not a candidate for intervention due to absence of large vessel occlusion (LVO).      MRI brain no e/o acute ischemia.    TTE with EF 69%, LAV WNL, mild TR, no e/o shunt/thrombus/RWMA. A1C 6.9, .     Current Clinic Visit  Patient presents to the visit alert and oriented with daughter Alicia.   Residual deficits: No    Taking Medications as Prescribed: Yes  Taking pravastatin  Endorses decreased sensation to BLE as well as BLE weakness. Denies weakness standing up from chairs and going up stairs, denies difficulty raising arms above head. Though can not walk long  distances.   Endorses B ankle and feet R>L, since starting stain and amlodipine  Home BP: Runs 140's/60, takes BID  Blood Sugar: Has CGM; this AM was 98, follows with endocrine, can have lows.   Falls: No  Assistive Devices: None  Diet: Mostly diabetic diet.   Exercise: Nothing  Hx of sinus HA  Endorses stress,  had dementia, pt is caregiver. Endorses depression and anxiety as well. No SI/HI. Declines therapy.     Review of Systems  Denies new or worsening balance/coordination difficulties, double vision, loss of vision, facial weakness, swallowing difficulties, weakness/numbness/tingling to face arm or legs, speech slurred, word finding difficulties, difficulty understanding others, terrible headache  Denies any bleeding or clotting concerns including hemoptysis, epistaxis, hematuria, melena, bloody stool, prolonged bleeding  Denies palpitations   Denies fatigue, memory problems    History  I have reviewed the patient's history, pertinent notes, imaging, labs, procedures, and encounters.     Allergies   Allergen Reactions   • Lisinopril Cough   • Red Yeast Rice [Monascus Purpureus Went Yeast] MYALGIA   • Statins MYALGIA   • Tylenol With Codeine Other (See Comments)     Patient reports feeling loopy from it   • Zocor [Simvastatin] RASH     Medications  Current Outpatient Medications   Medication   • amLODIPine (Norvasc) 5 MG tablet   • aspirin 325 MG tablet   • pravastatin (PRAVACHOL) 10 MG tablet   • Cyanocobalamin (Vitamin B-12) 1000 MCG/15ML Liquid   • insulin glargine (Lantus SoloStar) 100 UNIT/ML pen-injector   • ezetimibe (ZETIA) 10 MG tablet   • ibandronate (BONIVA) 150 MG tablet   • losartan (COZAAR) 100 MG tablet   • hydrochlorothiazide (HYDRODIURIL) 25 MG tablet   • HumaLOG KwikPen 100 UNIT/ML pen-injector   • diclofenac (diclofenac) 1 % gel   • metFORMIN (GLUCOPHAGE-XR) 500 MG 24 hr tablet   • omeprazole (PrilOSEC) 20 MG capsule   • blood glucose (Accu-Chek Yaz Plus) test strip   • Insulin Pen  Needle (BD Pen Needle Robyn U/F) 32G X 4 MM Misc   • SOFTCLIX LANCETS Misc   • loratadine (CLARITIN) 10 MG tablet   • Blood Glucose Monitoring Suppl (ACCU-CHEK JALEN PLUS) w/Device Kit   • Blood Glucose Monitoring Suppl (ACCU-CHEK COMPACT CARE KIT) Kit   • Coenzyme Q10 (COQ10 PO)   • cholecalciferol (VITAMIN D3) 1000 UNITS tablet   • fish oil-omega-3 fatty acids 1000 MG CAPS     No current facility-administered medications for this visit.     Labs  Hemoglobin A1C (%)   Date Value   07/09/2021 6.9 (H)     LDL (mg/dL)   Date Value   07/09/2021 108     Cholesterol (mg/dL)   Date Value   07/09/2021 218 (H)     HDL (mg/dL)   Date Value   07/09/2021 87     Triglycerides (mg/dL)   Date Value   07/09/2021 115     Alkaline Phosphatase (Units/L)   Date Value   07/09/2021 70     Bilirubin, Total (mg/dL)   Date Value   07/09/2021 0.4     GOT/AST (Units/L)   Date Value   07/09/2021 16     GPT/ALT (Units/L)   Date Value   07/09/2021 31     No results found for: TSH  GFR Estimate, Non  (no units)   Date Value   01/22/2020 59     Creatinine (mg/dL)   Date Value   07/10/2021 0.96 (H)     Physical Exam  Visit Vitals  BP (!) 171/70 (BP Location: LUE - Left upper extremity, Patient Position: Sitting)   Pulse 75   Ht 5' 4\" (1.626 m)   Wt 70.9 kg   LMP  (LMP Unknown)   BMI 26.85 kg/m²   Recheck 150/60  General  Alert, cooperative, conversive. No distress.   Skin  Warm and dry.    Neurological  Mental Status   Cognition: Attention intact, speech fluent, comprehension intact, normal repetition, naming intact, reading intact, writing intact, registers 3/3 and recalls 3/3 at 3 minutes.   Normal fund of knowledge   No neglect  Executive Functioning: Luria WNL  Cranial Nerves  CN II: Visual fields are full to confrontation   CN III, IV, VI: No eye deviation. EOMs full. PERRLA 4 mm B. No nystagmus. No ptosis.  CN V: Facial sensation intact to light touch bilaterally in V1-V3  CN VII: Face is symmetric with normal eye closure and  smile  CN VIII: Bilateral hearing is normal to voice  CN IX, X: Palate elevates symmetrically. Phonation is normal.  CN XI: Shoulder shrug equally intact bilaterally  CN XII: Tongue is midline with normal movements  Motor   LUE: No pronator drift. Muscle bulk and tone normal. Strength: deltoid 5/5, biceps 5/5, triceps 5/5, and interossei 5/5  RUE: No pronator drift. Muscle bulk and tone normal. Strength: deltoid 5/5, biceps 5/5, triceps 5/5, and interossei 5/5  Finger taps with equal speed and amplitude.   BUE without orbiting.  LLE: No drift. Muscle bulk and tone normal. Strength: iliopsoas 5/5, quadriceps 5/5, hamstrings 5/5, and plantar/dorsiflexion 5/5   RLE: No drift. Muscle bulk and tone normal. Strength: iliopsoas 5/5, quadriceps 5/5, hamstrings 5/5, and plantar/dorsiflexion 5/5   Reflexes   Biceps 1+ bilaterally  Patellar 1+ bilaterally  Sensory   Light touch intact symetrically in BUE and BLE  Temperature and pinprick decreased to BLE  Coordination   No dysmetria finger-to-nose  No dysmetria heel-knee-shin   No abnormal/extraneous movements   Romberg mild sway  Tone   No increased tone, cogwheel rigidity, or fasciculations   Gait/Stance   Narrow based gait without ataxia  Balance difficulty with heel/toe gait   Head Normocephalic, atraumatic   Neck Trachea midline  CV RRR. No carotid bruits bilaterally   PV +1 pitting edema to BLE, mild redness to R anterior ankle, B DP +2  Psych Normal mood and affect    mRS: 1 = no significant disability despite symptoms; able to carry out all usual duties and activities    Assessment & Plan  # TIA presenting as aphasia, now resolved.  Etiology likely cardioembolic, she is RH and so her R ICA stenosis is not symptomatic  # Hyperlipidemia ()  # Diabetes Mellitus  # Hypertension  # Asymptomatic severe R ICA stenosis (80%)  # Statin Myalgia  # Ankle Edema, new since starting amlodipine      -Call Dr. Ventura about the swelling in your legs after starting BP  medication; I will also let MD know  -aspirin 81 mg daily for stroke prevention  -, continue fish oil and ezetimibe, CK at baseline normal. Has h/o leg cramps with simvastatin, did okay with pravastatin last night). LDL goal < 70 with ICA stenosis.  -Stop pravastatin in setting of BLE myalgia and difficulty with walking  -Trial 5 mg rosuvastatin nightly, start when myalgia resolves  -A1c 6.9, goal < 7, continue diabetes medications  -repeat carotid ultrasound 3 months  -TCD with hits to R MCA as outpatient to monitor R carotid   -Blood pressure goal 100-130/60-80  Check daily, if out of range follow up with primary care doctor  -Diabetic Diet  -Exercise 3-5 times per week, for 30 plus minutes as tolerated  -Weight BMI goal 18.5-24.9; 5% weight loss sustained over time shows improvements in metabolic function  -Discussed EMS activation for BEFAST/worst HA of life symptoms and regular PCP follow up  -Plan discussed with patient and daughter Alicia. Addressed questions/concerns. AVS provided.   -Follow up: Dr. Maeve Jeter  in 3 months    Total time including pre-charting, chart review, documentation, referral/communication with healthcare providers, and evaluation/management/discussion with patient today: 63 minutes.    Yina Lion DNP, APNP, FNP-BC  Stroke & Endovascular Nurse Practitioner  [facility]   T [phone]  F [phone]    CC: Lorenzo

## 2021-08-03 LAB
ASCENDING AORTA: 3.83 CM
AV INDEX (PROSTH): 0.63
AV MEAN GRADIENT: 6 MMHG
AV PEAK GRADIENT: 11 MMHG
AV VALVE AREA: 2.2 CM2
AV VELOCITY RATIO: 0.6
BSA FOR ECHO PROCEDURE: 1.79 M2
CV ECHO LV RWT: 0.39 CM
DOP CALC AO PEAK VEL: 1.66 M/S
DOP CALC AO VTI: 39.56 CM
DOP CALC LVOT AREA: 3.5 CM2
DOP CALC LVOT DIAMETER: 2.11 CM
DOP CALC LVOT PEAK VEL: 1 M/S
DOP CALC LVOT STROKE VOLUME: 87.02 CM3
DOP CALCLVOT PEAK VEL VTI: 24.9 CM
E WAVE DECELERATION TIME: 179.15 MSEC
E/A RATIO: 0.9
E/E' RATIO: 15.8 M/S
ECHO LV POSTERIOR WALL: 0.97 CM (ref 0.6–1.1)
EJECTION FRACTION: 55 %
FRACTIONAL SHORTENING: 27 % (ref 28–44)
INTERVENTRICULAR SEPTUM: 0.99 CM (ref 0.6–1.1)
LA MAJOR: 4.88 CM
LA MINOR: 4.76 CM
LA WIDTH: 4.16 CM
LEFT ATRIUM SIZE: 4.48 CM
LEFT ATRIUM VOLUME INDEX: 43.9 ML/M2
LEFT ATRIUM VOLUME: 76.34 CM3
LEFT INTERNAL DIMENSION IN SYSTOLE: 3.62 CM (ref 2.1–4)
LEFT VENTRICLE DIASTOLIC VOLUME INDEX: 67.76 ML/M2
LEFT VENTRICLE DIASTOLIC VOLUME: 117.91 ML
LEFT VENTRICLE MASS INDEX: 101 G/M2
LEFT VENTRICLE SYSTOLIC VOLUME INDEX: 31.6 ML/M2
LEFT VENTRICLE SYSTOLIC VOLUME: 55.06 ML
LEFT VENTRICULAR INTERNAL DIMENSION IN DIASTOLE: 4.99 CM (ref 3.5–6)
LEFT VENTRICULAR MASS: 176.53 G
LV LATERAL E/E' RATIO: 13.17 M/S
LV SEPTAL E/E' RATIO: 19.75 M/S
MV A" WAVE DURATION": 7.23 MSEC
MV PEAK A VEL: 0.88 M/S
MV PEAK E VEL: 0.79 M/S
PISA TR MAX VEL: 1.85 M/S
PULM VEIN S/D RATIO: 1.17
PV PEAK D VEL: 0.47 M/S
PV PEAK S VEL: 0.55 M/S
RA MAJOR: 4.17 CM
RA PRESSURE: 3 MMHG
RA WIDTH: 3.17 CM
RIGHT VENTRICULAR END-DIASTOLIC DIMENSION: 3.31 CM
RV TISSUE DOPPLER FREE WALL SYSTOLIC VELOCITY 1 (APICAL 4 CHAMBER VIEW): 10.59 CM/S
SINUS: 3.47 CM
STJ: 3.36 CM
TDI LATERAL: 0.06 M/S
TDI SEPTAL: 0.04 M/S
TDI: 0.05 M/S
TR MAX PG: 14 MMHG
TRICUSPID ANNULAR PLANE SYSTOLIC EXCURSION: 1.65 CM
TV REST PULMONARY ARTERY PRESSURE: 17 MMHG

## 2021-08-06 ENCOUNTER — OFFICE VISIT (OUTPATIENT)
Dept: CARDIOLOGY | Facility: CLINIC | Age: 81
End: 2021-08-06
Attending: INTERNAL MEDICINE
Payer: COMMERCIAL

## 2021-08-06 VITALS
HEART RATE: 80 BPM | WEIGHT: 167 LBS | HEIGHT: 61 IN | BODY MASS INDEX: 31.53 KG/M2 | SYSTOLIC BLOOD PRESSURE: 135 MMHG | DIASTOLIC BLOOD PRESSURE: 82 MMHG

## 2021-08-06 DIAGNOSIS — I10 ESSENTIAL HYPERTENSION: ICD-10-CM

## 2021-08-06 DIAGNOSIS — N18.30 CKD (CHRONIC KIDNEY DISEASE) STAGE 3, GFR 30-59 ML/MIN: ICD-10-CM

## 2021-08-06 DIAGNOSIS — I49.5 SSS (SICK SINUS SYNDROME): ICD-10-CM

## 2021-08-06 DIAGNOSIS — Z95.0 PACEMAKER: Primary | ICD-10-CM

## 2021-08-06 DIAGNOSIS — I34.0 MITRAL VALVE INSUFFICIENCY, UNSPECIFIED ETIOLOGY: ICD-10-CM

## 2021-08-06 DIAGNOSIS — I51.9 DIASTOLIC DYSFUNCTION, LEFT VENTRICLE: ICD-10-CM

## 2021-08-06 PROCEDURE — 99999 PR PBB SHADOW E&M-EST. PATIENT-LVL V: CPT | Mod: PBBFAC,,, | Performed by: INTERNAL MEDICINE

## 2021-08-06 PROCEDURE — 1126F AMNT PAIN NOTED NONE PRSNT: CPT | Mod: CPTII,S$GLB,, | Performed by: INTERNAL MEDICINE

## 2021-08-06 PROCEDURE — 1157F PR ADVANCE CARE PLAN OR EQUIV PRESENT IN MEDICAL RECORD: ICD-10-PCS | Mod: CPTII,S$GLB,, | Performed by: INTERNAL MEDICINE

## 2021-08-06 PROCEDURE — 1101F PR PT FALLS ASSESS DOC 0-1 FALLS W/OUT INJ PAST YR: ICD-10-PCS | Mod: CPTII,S$GLB,, | Performed by: INTERNAL MEDICINE

## 2021-08-06 PROCEDURE — 3079F DIAST BP 80-89 MM HG: CPT | Mod: CPTII,S$GLB,, | Performed by: INTERNAL MEDICINE

## 2021-08-06 PROCEDURE — 1101F PT FALLS ASSESS-DOCD LE1/YR: CPT | Mod: CPTII,S$GLB,, | Performed by: INTERNAL MEDICINE

## 2021-08-06 PROCEDURE — 99214 PR OFFICE/OUTPT VISIT, EST, LEVL IV, 30-39 MIN: ICD-10-PCS | Mod: S$GLB,,, | Performed by: INTERNAL MEDICINE

## 2021-08-06 PROCEDURE — 1160F RVW MEDS BY RX/DR IN RCRD: CPT | Mod: CPTII,S$GLB,, | Performed by: INTERNAL MEDICINE

## 2021-08-06 PROCEDURE — 3075F PR MOST RECENT SYSTOLIC BLOOD PRESS GE 130-139MM HG: ICD-10-PCS | Mod: CPTII,S$GLB,, | Performed by: INTERNAL MEDICINE

## 2021-08-06 PROCEDURE — 1126F PR PAIN SEVERITY QUANTIFIED, NO PAIN PRESENT: ICD-10-PCS | Mod: CPTII,S$GLB,, | Performed by: INTERNAL MEDICINE

## 2021-08-06 PROCEDURE — 1157F ADVNC CARE PLAN IN RCRD: CPT | Mod: CPTII,S$GLB,, | Performed by: INTERNAL MEDICINE

## 2021-08-06 PROCEDURE — 3288F PR FALLS RISK ASSESSMENT DOCUMENTED: ICD-10-PCS | Mod: CPTII,S$GLB,, | Performed by: INTERNAL MEDICINE

## 2021-08-06 PROCEDURE — 99999 PR PBB SHADOW E&M-EST. PATIENT-LVL V: ICD-10-PCS | Mod: PBBFAC,,, | Performed by: INTERNAL MEDICINE

## 2021-08-06 PROCEDURE — 99214 OFFICE O/P EST MOD 30 MIN: CPT | Mod: S$GLB,,, | Performed by: INTERNAL MEDICINE

## 2021-08-06 PROCEDURE — 1159F MED LIST DOCD IN RCRD: CPT | Mod: CPTII,S$GLB,, | Performed by: INTERNAL MEDICINE

## 2021-08-06 PROCEDURE — 1160F PR REVIEW ALL MEDS BY PRESCRIBER/CLIN PHARMACIST DOCUMENTED: ICD-10-PCS | Mod: CPTII,S$GLB,, | Performed by: INTERNAL MEDICINE

## 2021-08-06 PROCEDURE — 1159F PR MEDICATION LIST DOCUMENTED IN MEDICAL RECORD: ICD-10-PCS | Mod: CPTII,S$GLB,, | Performed by: INTERNAL MEDICINE

## 2021-08-06 PROCEDURE — 3075F SYST BP GE 130 - 139MM HG: CPT | Mod: CPTII,S$GLB,, | Performed by: INTERNAL MEDICINE

## 2021-08-06 PROCEDURE — 3288F FALL RISK ASSESSMENT DOCD: CPT | Mod: CPTII,S$GLB,, | Performed by: INTERNAL MEDICINE

## 2021-08-06 PROCEDURE — 3079F PR MOST RECENT DIASTOLIC BLOOD PRESSURE 80-89 MM HG: ICD-10-PCS | Mod: CPTII,S$GLB,, | Performed by: INTERNAL MEDICINE

## 2021-08-06 RX ORDER — POTASSIUM CHLORIDE 20 MEQ/1
20 TABLET, EXTENDED RELEASE ORAL DAILY PRN
Qty: 90 TABLET | Refills: 6 | Status: SHIPPED | OUTPATIENT
Start: 2021-08-06 | End: 2022-11-09

## 2021-08-06 RX ORDER — FUROSEMIDE 20 MG/1
20 TABLET ORAL DAILY PRN
Qty: 90 TABLET | Refills: 3 | Status: SHIPPED | OUTPATIENT
Start: 2021-08-06 | End: 2022-07-28

## 2021-08-30 ENCOUNTER — PATIENT MESSAGE (OUTPATIENT)
Dept: INFUSION THERAPY | Facility: HOSPITAL | Age: 81
End: 2021-08-30

## 2021-08-31 ENCOUNTER — CLINICAL SUPPORT (OUTPATIENT)
Dept: CARDIOLOGY | Facility: HOSPITAL | Age: 81
End: 2021-08-31
Payer: COMMERCIAL

## 2021-08-31 DIAGNOSIS — Z95.0 PRESENCE OF CARDIAC PACEMAKER: ICD-10-CM

## 2021-08-31 PROCEDURE — 93294 REM INTERROG EVL PM/LDLS PM: CPT | Mod: ,,, | Performed by: INTERNAL MEDICINE

## 2021-08-31 PROCEDURE — 93296 REM INTERROG EVL PM/IDS: CPT | Mod: PO | Performed by: INTERNAL MEDICINE

## 2021-08-31 PROCEDURE — 93294 CARDIAC DEVICE CHECK - REMOTE: ICD-10-PCS | Mod: ,,, | Performed by: INTERNAL MEDICINE

## 2021-09-07 ENCOUNTER — OFFICE VISIT (OUTPATIENT)
Dept: RHEUMATOLOGY | Facility: CLINIC | Age: 81
End: 2021-09-07
Payer: COMMERCIAL

## 2021-09-07 VITALS
DIASTOLIC BLOOD PRESSURE: 64 MMHG | BODY MASS INDEX: 30.14 KG/M2 | SYSTOLIC BLOOD PRESSURE: 117 MMHG | WEIGHT: 159.63 LBS | HEIGHT: 61 IN | HEART RATE: 64 BPM

## 2021-09-07 DIAGNOSIS — M05.79 RHEUMATOID ARTHRITIS INVOLVING MULTIPLE SITES WITH POSITIVE RHEUMATOID FACTOR: Primary | ICD-10-CM

## 2021-09-07 DIAGNOSIS — Z79.60 LONG-TERM USE OF IMMUNOSUPPRESSANT MEDICATION: ICD-10-CM

## 2021-09-07 PROCEDURE — 1157F PR ADVANCE CARE PLAN OR EQUIV PRESENT IN MEDICAL RECORD: ICD-10-PCS | Mod: CPTII,S$GLB,, | Performed by: INTERNAL MEDICINE

## 2021-09-07 PROCEDURE — 1101F PR PT FALLS ASSESS DOC 0-1 FALLS W/OUT INJ PAST YR: ICD-10-PCS | Mod: CPTII,S$GLB,, | Performed by: INTERNAL MEDICINE

## 2021-09-07 PROCEDURE — 3074F SYST BP LT 130 MM HG: CPT | Mod: CPTII,S$GLB,, | Performed by: INTERNAL MEDICINE

## 2021-09-07 PROCEDURE — 3074F PR MOST RECENT SYSTOLIC BLOOD PRESSURE < 130 MM HG: ICD-10-PCS | Mod: CPTII,S$GLB,, | Performed by: INTERNAL MEDICINE

## 2021-09-07 PROCEDURE — 3288F FALL RISK ASSESSMENT DOCD: CPT | Mod: CPTII,S$GLB,, | Performed by: INTERNAL MEDICINE

## 2021-09-07 PROCEDURE — 1157F ADVNC CARE PLAN IN RCRD: CPT | Mod: CPTII,S$GLB,, | Performed by: INTERNAL MEDICINE

## 2021-09-07 PROCEDURE — 99214 OFFICE O/P EST MOD 30 MIN: CPT | Mod: S$GLB,,, | Performed by: INTERNAL MEDICINE

## 2021-09-07 PROCEDURE — 1159F MED LIST DOCD IN RCRD: CPT | Mod: CPTII,S$GLB,, | Performed by: INTERNAL MEDICINE

## 2021-09-07 PROCEDURE — 1160F PR REVIEW ALL MEDS BY PRESCRIBER/CLIN PHARMACIST DOCUMENTED: ICD-10-PCS | Mod: CPTII,S$GLB,, | Performed by: INTERNAL MEDICINE

## 2021-09-07 PROCEDURE — 3078F PR MOST RECENT DIASTOLIC BLOOD PRESSURE < 80 MM HG: ICD-10-PCS | Mod: CPTII,S$GLB,, | Performed by: INTERNAL MEDICINE

## 2021-09-07 PROCEDURE — 3078F DIAST BP <80 MM HG: CPT | Mod: CPTII,S$GLB,, | Performed by: INTERNAL MEDICINE

## 2021-09-07 PROCEDURE — 99214 PR OFFICE/OUTPT VISIT, EST, LEVL IV, 30-39 MIN: ICD-10-PCS | Mod: S$GLB,,, | Performed by: INTERNAL MEDICINE

## 2021-09-07 PROCEDURE — 99999 PR PBB SHADOW E&M-EST. PATIENT-LVL IV: ICD-10-PCS | Mod: PBBFAC,,, | Performed by: INTERNAL MEDICINE

## 2021-09-07 PROCEDURE — 1125F PR PAIN SEVERITY QUANTIFIED, PAIN PRESENT: ICD-10-PCS | Mod: CPTII,S$GLB,, | Performed by: INTERNAL MEDICINE

## 2021-09-07 PROCEDURE — 99999 PR PBB SHADOW E&M-EST. PATIENT-LVL IV: CPT | Mod: PBBFAC,,, | Performed by: INTERNAL MEDICINE

## 2021-09-07 PROCEDURE — 1159F PR MEDICATION LIST DOCUMENTED IN MEDICAL RECORD: ICD-10-PCS | Mod: CPTII,S$GLB,, | Performed by: INTERNAL MEDICINE

## 2021-09-07 PROCEDURE — 3288F PR FALLS RISK ASSESSMENT DOCUMENTED: ICD-10-PCS | Mod: CPTII,S$GLB,, | Performed by: INTERNAL MEDICINE

## 2021-09-07 PROCEDURE — 1101F PT FALLS ASSESS-DOCD LE1/YR: CPT | Mod: CPTII,S$GLB,, | Performed by: INTERNAL MEDICINE

## 2021-09-07 PROCEDURE — 1160F RVW MEDS BY RX/DR IN RCRD: CPT | Mod: CPTII,S$GLB,, | Performed by: INTERNAL MEDICINE

## 2021-09-07 PROCEDURE — 1125F AMNT PAIN NOTED PAIN PRSNT: CPT | Mod: CPTII,S$GLB,, | Performed by: INTERNAL MEDICINE

## 2021-09-07 RX ORDER — HYDROXYCHLOROQUINE SULFATE 200 MG/1
200 TABLET, FILM COATED ORAL DAILY
Qty: 90 TABLET | Refills: 3 | Status: SHIPPED | OUTPATIENT
Start: 2021-09-07 | End: 2022-08-31

## 2021-09-07 ASSESSMENT — ROUTINE ASSESSMENT OF PATIENT INDEX DATA (RAPID3)
PSYCHOLOGICAL DISTRESS SCORE: 5.2
PAIN SCORE: 3
MDHAQ FUNCTION SCORE: 1.1

## 2021-09-15 ENCOUNTER — INFUSION (OUTPATIENT)
Dept: INFUSION THERAPY | Facility: HOSPITAL | Age: 81
End: 2021-09-15
Attending: INTERNAL MEDICINE
Payer: COMMERCIAL

## 2021-09-15 ENCOUNTER — LAB VISIT (OUTPATIENT)
Dept: LAB | Facility: HOSPITAL | Age: 81
End: 2021-09-15
Attending: INTERNAL MEDICINE
Payer: COMMERCIAL

## 2021-09-15 VITALS
BODY MASS INDEX: 30.66 KG/M2 | WEIGHT: 162.25 LBS | HEART RATE: 73 BPM | TEMPERATURE: 98 F | DIASTOLIC BLOOD PRESSURE: 67 MMHG | SYSTOLIC BLOOD PRESSURE: 121 MMHG | RESPIRATION RATE: 18 BRPM

## 2021-09-15 DIAGNOSIS — M05.79 RHEUMATOID ARTHRITIS INVOLVING MULTIPLE SITES WITH POSITIVE RHEUMATOID FACTOR: ICD-10-CM

## 2021-09-15 DIAGNOSIS — Z79.60 LONG-TERM USE OF IMMUNOSUPPRESSANT MEDICATION: ICD-10-CM

## 2021-09-15 DIAGNOSIS — M81.0 AGE-RELATED OSTEOPOROSIS WITHOUT CURRENT PATHOLOGICAL FRACTURE: Primary | ICD-10-CM

## 2021-09-15 LAB
ALBUMIN SERPL BCP-MCNC: 3.8 G/DL (ref 3.5–5.2)
ALP SERPL-CCNC: 44 U/L (ref 55–135)
ALT SERPL W/O P-5'-P-CCNC: 8 U/L (ref 10–44)
ANION GAP SERPL CALC-SCNC: 10 MMOL/L (ref 8–16)
AST SERPL-CCNC: 24 U/L (ref 10–40)
BILIRUB SERPL-MCNC: 0.6 MG/DL (ref 0.1–1)
BUN SERPL-MCNC: 21 MG/DL (ref 8–23)
CALCIUM SERPL-MCNC: 9.7 MG/DL (ref 8.7–10.5)
CHLORIDE SERPL-SCNC: 106 MMOL/L (ref 95–110)
CO2 SERPL-SCNC: 24 MMOL/L (ref 23–29)
CREAT SERPL-MCNC: 1.3 MG/DL (ref 0.5–1.4)
EST. GFR  (AFRICAN AMERICAN): 44 ML/MIN/1.73 M^2
EST. GFR  (NON AFRICAN AMERICAN): 38 ML/MIN/1.73 M^2
GLUCOSE SERPL-MCNC: 141 MG/DL (ref 70–110)
POTASSIUM SERPL-SCNC: 4.8 MMOL/L (ref 3.5–5.1)
PROT SERPL-MCNC: 6.8 G/DL (ref 6–8.4)
SODIUM SERPL-SCNC: 140 MMOL/L (ref 136–145)

## 2021-09-15 PROCEDURE — 96372 THER/PROPH/DIAG INJ SC/IM: CPT | Mod: PN

## 2021-09-15 PROCEDURE — 63600175 PHARM REV CODE 636 W HCPCS: Mod: JG,PN | Performed by: INTERNAL MEDICINE

## 2021-09-15 PROCEDURE — 36415 COLL VENOUS BLD VENIPUNCTURE: CPT | Mod: PN | Performed by: INTERNAL MEDICINE

## 2021-09-15 PROCEDURE — 80053 COMPREHEN METABOLIC PANEL: CPT | Mod: PN | Performed by: INTERNAL MEDICINE

## 2021-09-15 RX ADMIN — DENOSUMAB 60 MG: 60 INJECTION SUBCUTANEOUS at 11:09

## 2021-09-23 ENCOUNTER — TELEPHONE (OUTPATIENT)
Dept: CARDIOLOGY | Facility: HOSPITAL | Age: 81
End: 2021-09-23

## 2021-10-04 ENCOUNTER — LAB VISIT (OUTPATIENT)
Dept: LAB | Facility: HOSPITAL | Age: 81
End: 2021-10-04
Attending: INTERNAL MEDICINE
Payer: COMMERCIAL

## 2021-10-04 DIAGNOSIS — M05.79 RHEUMATOID ARTHRITIS INVOLVING MULTIPLE SITES WITH POSITIVE RHEUMATOID FACTOR: ICD-10-CM

## 2021-10-04 DIAGNOSIS — Z79.60 LONG-TERM USE OF IMMUNOSUPPRESSANT MEDICATION: ICD-10-CM

## 2021-10-04 LAB
ALBUMIN SERPL BCP-MCNC: 3.9 G/DL (ref 3.5–5.2)
ALP SERPL-CCNC: 45 U/L (ref 55–135)
ALT SERPL W/O P-5'-P-CCNC: 9 U/L (ref 10–44)
ANION GAP SERPL CALC-SCNC: 9 MMOL/L (ref 8–16)
AST SERPL-CCNC: 27 U/L (ref 10–40)
BASOPHILS # BLD AUTO: 0.05 K/UL (ref 0–0.2)
BASOPHILS # BLD AUTO: 0.05 K/UL (ref 0–0.2)
BASOPHILS NFR BLD: 0.7 % (ref 0–1.9)
BASOPHILS NFR BLD: 0.7 % (ref 0–1.9)
BILIRUB SERPL-MCNC: 1 MG/DL (ref 0.1–1)
BUN SERPL-MCNC: 16 MG/DL (ref 8–23)
CALCIUM SERPL-MCNC: 10.1 MG/DL (ref 8.7–10.5)
CHLORIDE SERPL-SCNC: 107 MMOL/L (ref 95–110)
CO2 SERPL-SCNC: 22 MMOL/L (ref 23–29)
CREAT SERPL-MCNC: 1.1 MG/DL (ref 0.5–1.4)
CRP SERPL-MCNC: 1.4 MG/L (ref 0–8.2)
CRP SERPL-MCNC: 1.4 MG/L (ref 0–8.2)
DIFFERENTIAL METHOD: ABNORMAL
DIFFERENTIAL METHOD: ABNORMAL
EOSINOPHIL # BLD AUTO: 0.2 K/UL (ref 0–0.5)
EOSINOPHIL # BLD AUTO: 0.2 K/UL (ref 0–0.5)
EOSINOPHIL NFR BLD: 2.8 % (ref 0–8)
EOSINOPHIL NFR BLD: 2.8 % (ref 0–8)
ERYTHROCYTE [DISTWIDTH] IN BLOOD BY AUTOMATED COUNT: 14.6 % (ref 11.5–14.5)
ERYTHROCYTE [DISTWIDTH] IN BLOOD BY AUTOMATED COUNT: 14.6 % (ref 11.5–14.5)
ERYTHROCYTE [SEDIMENTATION RATE] IN BLOOD BY WESTERGREN METHOD: 9 MM/HR (ref 0–20)
ERYTHROCYTE [SEDIMENTATION RATE] IN BLOOD BY WESTERGREN METHOD: 9 MM/HR (ref 0–20)
EST. GFR  (AFRICAN AMERICAN): 54 ML/MIN/1.73 M^2
EST. GFR  (NON AFRICAN AMERICAN): 46.9 ML/MIN/1.73 M^2
GLUCOSE SERPL-MCNC: 146 MG/DL (ref 70–110)
HCT VFR BLD AUTO: 40.5 % (ref 37–48.5)
HCT VFR BLD AUTO: 40.5 % (ref 37–48.5)
HGB BLD-MCNC: 13.1 G/DL (ref 12–16)
HGB BLD-MCNC: 13.1 G/DL (ref 12–16)
IMM GRANULOCYTES # BLD AUTO: 0.02 K/UL (ref 0–0.04)
IMM GRANULOCYTES # BLD AUTO: 0.02 K/UL (ref 0–0.04)
IMM GRANULOCYTES NFR BLD AUTO: 0.3 % (ref 0–0.5)
IMM GRANULOCYTES NFR BLD AUTO: 0.3 % (ref 0–0.5)
LYMPHOCYTES # BLD AUTO: 1.3 K/UL (ref 1–4.8)
LYMPHOCYTES # BLD AUTO: 1.3 K/UL (ref 1–4.8)
LYMPHOCYTES NFR BLD: 19.5 % (ref 18–48)
LYMPHOCYTES NFR BLD: 19.5 % (ref 18–48)
MCH RBC QN AUTO: 34.1 PG (ref 27–31)
MCH RBC QN AUTO: 34.1 PG (ref 27–31)
MCHC RBC AUTO-ENTMCNC: 32.3 G/DL (ref 32–36)
MCHC RBC AUTO-ENTMCNC: 32.3 G/DL (ref 32–36)
MCV RBC AUTO: 106 FL (ref 82–98)
MCV RBC AUTO: 106 FL (ref 82–98)
MONOCYTES # BLD AUTO: 0.6 K/UL (ref 0.3–1)
MONOCYTES # BLD AUTO: 0.6 K/UL (ref 0.3–1)
MONOCYTES NFR BLD: 9.1 % (ref 4–15)
MONOCYTES NFR BLD: 9.1 % (ref 4–15)
NEUTROPHILS # BLD AUTO: 4.6 K/UL (ref 1.8–7.7)
NEUTROPHILS # BLD AUTO: 4.6 K/UL (ref 1.8–7.7)
NEUTROPHILS NFR BLD: 67.6 % (ref 38–73)
NEUTROPHILS NFR BLD: 67.6 % (ref 38–73)
NRBC BLD-RTO: 0 /100 WBC
NRBC BLD-RTO: 0 /100 WBC
PLATELET # BLD AUTO: 204 K/UL (ref 150–450)
PLATELET # BLD AUTO: 204 K/UL (ref 150–450)
PMV BLD AUTO: 13.2 FL (ref 9.2–12.9)
PMV BLD AUTO: 13.2 FL (ref 9.2–12.9)
POTASSIUM SERPL-SCNC: 4.9 MMOL/L (ref 3.5–5.1)
PROT SERPL-MCNC: 7.1 G/DL (ref 6–8.4)
RBC # BLD AUTO: 3.84 M/UL (ref 4–5.4)
RBC # BLD AUTO: 3.84 M/UL (ref 4–5.4)
SODIUM SERPL-SCNC: 138 MMOL/L (ref 136–145)
WBC # BLD AUTO: 6.73 K/UL (ref 3.9–12.7)
WBC # BLD AUTO: 6.73 K/UL (ref 3.9–12.7)

## 2021-10-04 PROCEDURE — 85651 RBC SED RATE NONAUTOMATED: CPT | Mod: PO | Performed by: INTERNAL MEDICINE

## 2021-10-04 PROCEDURE — 80053 COMPREHEN METABOLIC PANEL: CPT | Performed by: INTERNAL MEDICINE

## 2021-10-04 PROCEDURE — 86140 C-REACTIVE PROTEIN: CPT | Performed by: INTERNAL MEDICINE

## 2021-10-04 PROCEDURE — 85025 COMPLETE CBC W/AUTO DIFF WBC: CPT | Performed by: INTERNAL MEDICINE

## 2021-10-04 PROCEDURE — 36415 COLL VENOUS BLD VENIPUNCTURE: CPT | Mod: PO | Performed by: INTERNAL MEDICINE

## 2021-10-11 RX ORDER — METOPROLOL SUCCINATE 50 MG/1
TABLET, EXTENDED RELEASE ORAL
Qty: 90 TABLET | Refills: 4 | Status: SHIPPED | OUTPATIENT
Start: 2021-10-11 | End: 2022-09-20 | Stop reason: SDUPTHER

## 2021-11-29 ENCOUNTER — CLINICAL SUPPORT (OUTPATIENT)
Dept: CARDIOLOGY | Facility: HOSPITAL | Age: 81
End: 2021-11-29
Payer: COMMERCIAL

## 2021-11-29 DIAGNOSIS — Z95.0 PRESENCE OF CARDIAC PACEMAKER: ICD-10-CM

## 2021-11-29 PROCEDURE — 93294 CARDIAC DEVICE CHECK - REMOTE: ICD-10-PCS | Mod: ,,, | Performed by: INTERNAL MEDICINE

## 2021-11-29 PROCEDURE — 93294 REM INTERROG EVL PM/LDLS PM: CPT | Mod: ,,, | Performed by: INTERNAL MEDICINE

## 2021-11-29 PROCEDURE — 93296 REM INTERROG EVL PM/IDS: CPT | Mod: PO | Performed by: INTERNAL MEDICINE

## 2022-01-10 ENCOUNTER — OFFICE VISIT (OUTPATIENT)
Dept: RHEUMATOLOGY | Facility: CLINIC | Age: 82
End: 2022-01-10
Payer: COMMERCIAL

## 2022-01-10 VITALS
BODY MASS INDEX: 32.38 KG/M2 | HEART RATE: 61 BPM | WEIGHT: 171.5 LBS | DIASTOLIC BLOOD PRESSURE: 81 MMHG | SYSTOLIC BLOOD PRESSURE: 154 MMHG | HEIGHT: 61 IN

## 2022-01-10 DIAGNOSIS — D84.9 IMMUNOCOMPROMISED: ICD-10-CM

## 2022-01-10 DIAGNOSIS — M05.79 RHEUMATOID ARTHRITIS INVOLVING MULTIPLE SITES WITH POSITIVE RHEUMATOID FACTOR: Primary | ICD-10-CM

## 2022-01-10 PROCEDURE — 3079F PR MOST RECENT DIASTOLIC BLOOD PRESSURE 80-89 MM HG: ICD-10-PCS | Mod: CPTII,S$GLB,, | Performed by: INTERNAL MEDICINE

## 2022-01-10 PROCEDURE — 1159F MED LIST DOCD IN RCRD: CPT | Mod: CPTII,S$GLB,, | Performed by: INTERNAL MEDICINE

## 2022-01-10 PROCEDURE — 3077F SYST BP >= 140 MM HG: CPT | Mod: CPTII,S$GLB,, | Performed by: INTERNAL MEDICINE

## 2022-01-10 PROCEDURE — 99214 OFFICE O/P EST MOD 30 MIN: CPT | Mod: S$GLB,,, | Performed by: INTERNAL MEDICINE

## 2022-01-10 PROCEDURE — 1159F PR MEDICATION LIST DOCUMENTED IN MEDICAL RECORD: ICD-10-PCS | Mod: CPTII,S$GLB,, | Performed by: INTERNAL MEDICINE

## 2022-01-10 PROCEDURE — 3077F PR MOST RECENT SYSTOLIC BLOOD PRESSURE >= 140 MM HG: ICD-10-PCS | Mod: CPTII,S$GLB,, | Performed by: INTERNAL MEDICINE

## 2022-01-10 PROCEDURE — 99999 PR PBB SHADOW E&M-EST. PATIENT-LVL IV: CPT | Mod: PBBFAC,,, | Performed by: INTERNAL MEDICINE

## 2022-01-10 PROCEDURE — 1101F PR PT FALLS ASSESS DOC 0-1 FALLS W/OUT INJ PAST YR: ICD-10-PCS | Mod: CPTII,S$GLB,, | Performed by: INTERNAL MEDICINE

## 2022-01-10 PROCEDURE — 1125F PR PAIN SEVERITY QUANTIFIED, PAIN PRESENT: ICD-10-PCS | Mod: CPTII,S$GLB,, | Performed by: INTERNAL MEDICINE

## 2022-01-10 PROCEDURE — 99999 PR PBB SHADOW E&M-EST. PATIENT-LVL IV: ICD-10-PCS | Mod: PBBFAC,,, | Performed by: INTERNAL MEDICINE

## 2022-01-10 PROCEDURE — 1157F ADVNC CARE PLAN IN RCRD: CPT | Mod: CPTII,S$GLB,, | Performed by: INTERNAL MEDICINE

## 2022-01-10 PROCEDURE — 1101F PT FALLS ASSESS-DOCD LE1/YR: CPT | Mod: CPTII,S$GLB,, | Performed by: INTERNAL MEDICINE

## 2022-01-10 PROCEDURE — 3079F DIAST BP 80-89 MM HG: CPT | Mod: CPTII,S$GLB,, | Performed by: INTERNAL MEDICINE

## 2022-01-10 PROCEDURE — 1125F AMNT PAIN NOTED PAIN PRSNT: CPT | Mod: CPTII,S$GLB,, | Performed by: INTERNAL MEDICINE

## 2022-01-10 PROCEDURE — 1157F PR ADVANCE CARE PLAN OR EQUIV PRESENT IN MEDICAL RECORD: ICD-10-PCS | Mod: CPTII,S$GLB,, | Performed by: INTERNAL MEDICINE

## 2022-01-10 PROCEDURE — 99214 PR OFFICE/OUTPT VISIT, EST, LEVL IV, 30-39 MIN: ICD-10-PCS | Mod: S$GLB,,, | Performed by: INTERNAL MEDICINE

## 2022-01-10 PROCEDURE — 3288F FALL RISK ASSESSMENT DOCD: CPT | Mod: CPTII,S$GLB,, | Performed by: INTERNAL MEDICINE

## 2022-01-10 PROCEDURE — 1160F RVW MEDS BY RX/DR IN RCRD: CPT | Mod: CPTII,S$GLB,, | Performed by: INTERNAL MEDICINE

## 2022-01-10 PROCEDURE — 1160F PR REVIEW ALL MEDS BY PRESCRIBER/CLIN PHARMACIST DOCUMENTED: ICD-10-PCS | Mod: CPTII,S$GLB,, | Performed by: INTERNAL MEDICINE

## 2022-01-10 PROCEDURE — 3288F PR FALLS RISK ASSESSMENT DOCUMENTED: ICD-10-PCS | Mod: CPTII,S$GLB,, | Performed by: INTERNAL MEDICINE

## 2022-01-10 ASSESSMENT — ROUTINE ASSESSMENT OF PATIENT INDEX DATA (RAPID3)
FATIGUE SCORE: 1.1
TOTAL RAPID3 SCORE: 3.44
MDHAQ FUNCTION SCORE: 1.3
PAIN SCORE: 3
PSYCHOLOGICAL DISTRESS SCORE: 0
PATIENT GLOBAL ASSESSMENT SCORE: 3

## 2022-01-10 NOTE — PATIENT INSTRUCTIONS
CATHERINE Zarco - orthopedic shoes - [employer] or       Napoleon Henry  Address: [address] [place] [address]  Phone: [phone]

## 2022-01-10 NOTE — PROGRESS NOTES
AS Subjective:          Chief Complaint: Juanis Mclaughlin is a 81 y.o. female who had concerns including Disease Management.    HPI:      Rheumatoid Arthritis   79y/o  female with h/o HTN, HLD was diagnosed with sero +  Rheumatoid arthritis in 1998. Off prednisone now.    Cholelithiasis: pending eval with Gen surg. Today.     Enbrel 50mg weekly on hold since March 2020. - and no worsening.   MTX 5 tabs weekly.   Started HCQ 200mg once daily and feels no worse. Joints are stable NEEDS #90 day supply.       Pain in the DIP and PIP joints.    No ASE of stomatitis,  infections or injection site reaction.   She was also diagnosed with secondary Sjogren's. Currently on restasis.  PPM for bradycardia.     Past 12 months slow decline in renal function from prior year- on PO bisphos and ACEI/HCTZ can switch this to Prolia.  Started Prolia 02/29/2019 she had another injection as of 07/20/2019 tolerated well no adverse side effects renal function stable.    Cervical spasm left sided with headache present >3 months nearly constants. No numbness or tingling in hands/arms no weakness. Tried IR, stretching, heat little benefit. \  Did have trigger point injections brief relief., facet block no benefit cancelled RFA, dry needling.     She denies fever, weight loss, photosensitivity, rash, ulcer, raynaud's phenomenon, alopecia, dysphagia, diarrhea or blood in the stools.    + hx of GERD. On PPI. Hx of mild MR. No pedal edema.     Doing well with Dr. Madison with Breo and Gabapentin to relax cough impulse.  As of this visit 02/29/2019 patient was having upper respiratory tract infection x3 weeks seem to be feeling better within 10 days ago returned with a more productive cough postnasal drip rhinitis    REVIEW OF SYSTEMS:    Review of Systems   Constitutional: Negative for fever, malaise/fatigue and weight loss.   HENT: Negative for sore throat.    Eyes: Negative for double vision, photophobia and redness.   Respiratory:  Positive for cough and sputum production. Negative for hemoptysis, shortness of breath and wheezing.    Cardiovascular: Negative for chest pain, palpitations and orthopnea.   Gastrointestinal: Negative for abdominal pain, constipation and diarrhea.   Genitourinary: Negative for dysuria, hematuria and urgency.   Musculoskeletal: Positive for joint pain. Negative for back pain and myalgias.   Skin: Negative for rash.   Neurological: Negative for dizziness, tingling, focal weakness and headaches.   Endo/Heme/Allergies: Does not bruise/bleed easily.   Psychiatric/Behavioral: Negative for depression, hallucinations and suicidal ideas.                Objective:            Past Medical History:   Diagnosis Date    Arthritis     rheumatoid arthritis    Cardiomyopathy     CKD (chronic kidney disease) stage 3, GFR 30-59 ml/min 1/9/2019    Diverticulosis     Diverticulosis     DVT (deep venous thrombosis)     one time 5 years ago    Essential hypertension 11/24/2013    GERD (gastroesophageal reflux disease)     Glaucoma     sees Dr. Gillette    Hyperlipidemia     Hypertension     Iron deficiency     Iron deficiency anemia 10/21/2016     IMO LOAD Q4    Long-term use of immunosuppressant medication 7/27/2015    Mitral regurgitation 12/20/2013 12/13 mild     Osteoporosis 7/9/2014    Pacemaker 10/26/2016    left chest [employer]EMAKER [employer] [address] [employer] [facility]  S/[id number] Bonifacio Fuller 9/9/2016 - current   right atrium LEAD [employer] [address] [employer] [id number] Bonifacio Fuller 9/9/2016 - current   right ventricle LEAD [employer] [address] [employer] [id number] Bonifacio Fuller 9/9/2016 - current      PONV (postoperative nausea and vomiting)     Rheumatoid arthritis involving multiple sites with positive rheumatoid factor 11/24/2013    Vitamin B12 deficiency 2/28/2015     Family History   Problem Relation Age of Onset    Heart disease Mother         CHF     Hypertension Mother     Heart disease Father     Stroke Father     Heart disease Brother     Stroke Brother     Heart disease Brother     Scleroderma Brother     Ovarian cancer Maternal Aunt 34    Breast cancer Neg Hx      Social History     Tobacco Use    Smoking status: Never Smoker    Smokeless tobacco: Never Used   Substance Use Topics    Alcohol use: Yes     Alcohol/week: 0.8 standard drinks     Types: 1 Standard drinks or equivalent per week     Comment: occ social    Drug use: No         Current Outpatient Medications on File Prior to Visit   Medication Sig Dispense Refill    aspirin (ECOTRIN) 81 MG EC tablet Take 81 mg by mouth once daily.      atorvastatin (LIPITOR) 20 MG tablet Take 1 tablet (20 mg total) by mouth every evening. 90 tablet 2    B-complex with vitamin C (Z-BEC OR EQUIV) tablet Take 1 tablet by mouth once daily.      brimonidine-timoloL (COMBIGAN) 0.2-0.5 % Drop Place 1 drop into both eyes 2 (two) times daily.      candesartan (ATACAND) 16 MG tablet TAKE 1 TABLET ONCE DAILY 90 tablet 3    cycloSPORINE 0.05 % Drop cyclosporine 0.05 % eye drops   INSTILL 1 DROP INTO AFFECTED EYE(S) BY OPHTHALMIC ROUTE EVERY 12 HOURS      denosumab (PROLIA) 60 mg/mL Syrg Inject 60 mg into the skin every 6 (six) months.      diclofenac sodium (VOLTAREN) 1 % Gel Apply 2 g topically 2 (two) times daily as needed. 200 g 3    estradioL (ESTRACE) 0.01 % (0.1 mg/gram) vaginal cream PLACE 1 GRAM VAGINALLY ONCEDAILY (Patient taking differently: every 7 days. PLACE 1 GRAM VAGINALLY ONCEDAILY) 127.5 g 2    folic acid (FOLVITE) 1 MG tablet Take 2 tablets (2 mg total) by mouth once daily. 180 tablet 3    furosemide (LASIX) 20 MG tablet Take 1 tablet (20 mg total) by mouth daily as needed (edema). 90 tablet 3    hydrocortisone 2.5 % ointment MIX WITH AQUAPHOR OR VASELINE AND APPLY TO AFFECTED AREA as needed  1    hydrOXYchloroQUINE (PLAQUENIL) 200 mg tablet Take 1 tablet (200 mg total) by mouth once  daily. 90 tablet 3    L.acid/L.casei/B.bif/B.michael/FOS (PROBIOTIC BLEND ORAL) Take 1 capsule by mouth once daily.      latanoprost 0.005 % ophthalmic solution       magnesium 200 mg Tab Take 400 mg by mouth once daily. Magnesium 400mg with Chelated Zinc 15mg.      methotrexate 2.5 MG Tab Take 5 tablets (12.5 mg total) by mouth every 7 days. 60 tablet 3    metoprolol succinate (TOPROL-XL) 50 MG 24 hr tablet TAKE 1 TABLET ONCE DAILY 90 tablet 4    pantoprazole (PROTONIX) 40 MG tablet Take 1 tablet (40 mg total) by mouth once daily. 90 tablet 2    potassium chloride SA (K-DUR,KLOR-CON) 20 MEQ tablet Take 1 tablet (20 mEq total) by mouth daily as needed (edema). 90 tablet 6    travoprost, benzalkonium, (TRAVATAN) 0.004 % ophthalmic solution Travatan Z 0.004 % eye drops   INSTILL 1 DROP INTO AFFECTED EYE(S) BY OPHTHALMIC ROUTE ONCE DAILY INTHE EVENING      XIIDRA 5 % Dpet Place 1 drop into both eyes 2 (two) times daily.        No current facility-administered medications on file prior to visit.       Vitals:    01/10/22 1030   BP: (!) 154/81   Pulse: 61       Physical Exam:    Physical Exam  Constitutional:       Appearance: Normal appearance. She is well-developed.   HENT:      Nose: No septal deviation.      Mouth/Throat:      Mouth: No oral lesions.   Eyes:      Conjunctiva/sclera:      Right eye: Right conjunctiva is not injected.      Left eye: Left conjunctiva is not injected.      Pupils: Pupils are equal, round, and reactive to light.   Neck:      Thyroid: No thyroid mass or thyromegaly.      Vascular: No JVD.   Cardiovascular:      Rate and Rhythm: Normal rate and regular rhythm.      Pulses: Normal pulses.      Comments: No edema  Pulmonary:      Effort: Pulmonary effort is normal.      Breath sounds: Examination of the right-middle field reveals wheezing. Examination of the left-middle field reveals wheezing. Wheezing present.   Abdominal:      Palpations: Abdomen is soft.   Musculoskeletal:      Right  shoulder: No swelling or tenderness. Normal range of motion.      Left shoulder: No swelling or tenderness. Normal range of motion.      Right elbow: No swelling. Normal range of motion. No tenderness.      Left elbow: No swelling. Normal range of motion. No tenderness.      Right wrist: Tenderness present. No swelling. Decreased range of motion.      Left wrist: No swelling or tenderness. Normal range of motion.      Right hand: Tenderness present. Decreased range of motion.      Left hand: Tenderness present.      Right hip: Normal range of motion. Normal strength.      Left hip: No tenderness. Normal range of motion.      Right knee: No swelling. Decreased range of motion. Tenderness present over the medial joint line and lateral joint line.      Left knee: No swelling. Normal range of motion. No tenderness.      Right ankle: No swelling. No tenderness. Normal range of motion.      Left ankle: No swelling. No tenderness. Normal range of motion.      Comments: 1+ synovitis MCP and PIP with heberden nodes all DIP 2-5. Left wrist with tenderness and swelling about the TFCC. No nodules. No flexion contractures.      Cervical with left sided point tenderness and the occiput (nuchal ridge) lesser tenderness at the cervical paraspinals. Decreased ROM in cervical spine   Lymphadenopathy:      Cervical: No cervical adenopathy.   Skin:     General: Skin is dry.   Neurological:      Deep Tendon Reflexes: Reflexes are normal and symmetric.               Assessment:       Encounter Diagnoses   Name Primary?    Rheumatoid arthritis involving multiple sites with positive rheumatoid factor Yes    Immunocompromised           Plan:        Rheumatoid arthritis involving multiple sites with positive rheumatoid factor  -     CBC Auto Differential; Standing; Expected date: 01/10/2022  -     Comprehensive Metabolic Panel; Standing; Expected date: 01/10/2022  -     Sedimentation rate; Standing; Expected date: 01/10/2022  -      C-Reactive Protein; Standing; Expected date: 01/10/2022  -     CBC Auto Differential; Standing; Expected date: 01/10/2022  -     Comprehensive Metabolic Panel; Standing; Expected date: 01/10/2022  -     Sedimentation rate; Standing; Expected date: 01/10/2022  -     C-Reactive Protein; Standing; Expected date: 01/10/2022    Immunocompromised  -     CBC Auto Differential; Standing; Expected date: 01/10/2022  -     Comprehensive Metabolic Panel; Standing; Expected date: 01/10/2022  -     Sedimentation rate; Standing; Expected date: 01/10/2022  -     C-Reactive Protein; Standing; Expected date: 01/10/2022  -     CBC Auto Differential; Standing; Expected date: 01/10/2022  -     Comprehensive Metabolic Panel; Standing; Expected date: 01/10/2022  -     Sedimentation rate; Standing; Expected date: 01/10/2022  -     C-Reactive Protein; Standing; Expected date: 01/10/2022         RA:    -MTX  5 tabs weekly- 90 day supply   HCQ 200mg once dialy 90 day   -continue folic acid 2mg dialy   -OFF Enbrel   -voltaren gel PRN fingers/hands-using sparingly   -Labs q 3 months.    Neck Pain:    -perisistent will send for interventional pain.    -now with PT>     Osteoporosis   DC actonel   -doing great on Prolia.        Follow up in about 4 months (around 5/10/2022).          30min consultation with greater than 50% spent in counseling, chart review and coordination of care. All questions answered.

## 2022-01-11 ENCOUNTER — LAB VISIT (OUTPATIENT)
Dept: LAB | Facility: HOSPITAL | Age: 82
End: 2022-01-11
Attending: INTERNAL MEDICINE
Payer: COMMERCIAL

## 2022-01-11 DIAGNOSIS — D84.9 IMMUNOCOMPROMISED: ICD-10-CM

## 2022-01-11 DIAGNOSIS — M05.79 RHEUMATOID ARTHRITIS INVOLVING MULTIPLE SITES WITH POSITIVE RHEUMATOID FACTOR: ICD-10-CM

## 2022-01-11 LAB
ALBUMIN SERPL BCP-MCNC: 3.6 G/DL (ref 3.5–5.2)
ALP SERPL-CCNC: 56 U/L (ref 55–135)
ALT SERPL W/O P-5'-P-CCNC: 9 U/L (ref 10–44)
ANION GAP SERPL CALC-SCNC: 8 MMOL/L (ref 8–16)
AST SERPL-CCNC: 29 U/L (ref 10–40)
BASOPHILS # BLD AUTO: 0.08 K/UL (ref 0–0.2)
BASOPHILS NFR BLD: 1.4 % (ref 0–1.9)
BILIRUB SERPL-MCNC: 0.7 MG/DL (ref 0.1–1)
BUN SERPL-MCNC: 14 MG/DL (ref 8–23)
CALCIUM SERPL-MCNC: 9.4 MG/DL (ref 8.7–10.5)
CHLORIDE SERPL-SCNC: 106 MMOL/L (ref 95–110)
CO2 SERPL-SCNC: 24 MMOL/L (ref 23–29)
CREAT SERPL-MCNC: 1.1 MG/DL (ref 0.5–1.4)
CRP SERPL-MCNC: 0.9 MG/L (ref 0–8.2)
DIFFERENTIAL METHOD: ABNORMAL
EOSINOPHIL # BLD AUTO: 0.2 K/UL (ref 0–0.5)
EOSINOPHIL NFR BLD: 3.9 % (ref 0–8)
ERYTHROCYTE [DISTWIDTH] IN BLOOD BY AUTOMATED COUNT: 14.6 % (ref 11.5–14.5)
ERYTHROCYTE [SEDIMENTATION RATE] IN BLOOD BY WESTERGREN METHOD: 17 MM/HR (ref 0–20)
EST. GFR  (AFRICAN AMERICAN): 54 ML/MIN/1.73 M^2
EST. GFR  (NON AFRICAN AMERICAN): 46.9 ML/MIN/1.73 M^2
GLUCOSE SERPL-MCNC: 111 MG/DL (ref 70–110)
HCT VFR BLD AUTO: 42.1 % (ref 37–48.5)
HGB BLD-MCNC: 12.9 G/DL (ref 12–16)
IMM GRANULOCYTES # BLD AUTO: 0.01 K/UL (ref 0–0.04)
IMM GRANULOCYTES NFR BLD AUTO: 0.2 % (ref 0–0.5)
LYMPHOCYTES # BLD AUTO: 1.3 K/UL (ref 1–4.8)
LYMPHOCYTES NFR BLD: 22.8 % (ref 18–48)
MCH RBC QN AUTO: 33.3 PG (ref 27–31)
MCHC RBC AUTO-ENTMCNC: 30.6 G/DL (ref 32–36)
MCV RBC AUTO: 109 FL (ref 82–98)
MONOCYTES # BLD AUTO: 0.6 K/UL (ref 0.3–1)
MONOCYTES NFR BLD: 9.6 % (ref 4–15)
NEUTROPHILS # BLD AUTO: 3.6 K/UL (ref 1.8–7.7)
NEUTROPHILS NFR BLD: 62.1 % (ref 38–73)
NRBC BLD-RTO: 0 /100 WBC
PLATELET # BLD AUTO: 182 K/UL (ref 150–450)
PMV BLD AUTO: 12.4 FL (ref 9.2–12.9)
POTASSIUM SERPL-SCNC: 5 MMOL/L (ref 3.5–5.1)
PROT SERPL-MCNC: 6.5 G/DL (ref 6–8.4)
RBC # BLD AUTO: 3.87 M/UL (ref 4–5.4)
SODIUM SERPL-SCNC: 138 MMOL/L (ref 136–145)
WBC # BLD AUTO: 5.71 K/UL (ref 3.9–12.7)

## 2022-01-11 PROCEDURE — 80053 COMPREHEN METABOLIC PANEL: CPT | Performed by: INTERNAL MEDICINE

## 2022-01-11 PROCEDURE — 85025 COMPLETE CBC W/AUTO DIFF WBC: CPT | Performed by: INTERNAL MEDICINE

## 2022-01-11 PROCEDURE — 86140 C-REACTIVE PROTEIN: CPT | Performed by: INTERNAL MEDICINE

## 2022-01-11 PROCEDURE — 85651 RBC SED RATE NONAUTOMATED: CPT | Mod: PO | Performed by: INTERNAL MEDICINE

## 2022-01-11 PROCEDURE — 36415 COLL VENOUS BLD VENIPUNCTURE: CPT | Mod: PO | Performed by: INTERNAL MEDICINE

## 2022-01-18 DIAGNOSIS — I49.5 SSS (SICK SINUS SYNDROME): ICD-10-CM

## 2022-01-18 DIAGNOSIS — Z95.0 PACEMAKER: Primary | ICD-10-CM

## 2022-02-15 ENCOUNTER — CLINICAL SUPPORT (OUTPATIENT)
Dept: CARDIOLOGY | Facility: HOSPITAL | Age: 82
End: 2022-02-15
Attending: INTERNAL MEDICINE
Payer: COMMERCIAL

## 2022-02-15 DIAGNOSIS — Z95.0 PACEMAKER: ICD-10-CM

## 2022-02-15 DIAGNOSIS — I49.5 SSS (SICK SINUS SYNDROME): ICD-10-CM

## 2022-02-15 PROCEDURE — 93280 PM DEVICE PROGR EVAL DUAL: CPT | Mod: 26,,, | Performed by: INTERNAL MEDICINE

## 2022-02-15 PROCEDURE — 93280 CARDIAC DEVICE CHECK - IN CLINIC & HOSPITAL: ICD-10-PCS | Mod: 26,,, | Performed by: INTERNAL MEDICINE

## 2022-02-27 ENCOUNTER — CLINICAL SUPPORT (OUTPATIENT)
Dept: CARDIOLOGY | Facility: HOSPITAL | Age: 82
End: 2022-02-27
Payer: COMMERCIAL

## 2022-02-27 DIAGNOSIS — Z95.0 PRESENCE OF CARDIAC PACEMAKER: ICD-10-CM

## 2022-02-27 PROCEDURE — 93296 REM INTERROG EVL PM/IDS: CPT | Mod: PO | Performed by: INTERNAL MEDICINE

## 2022-03-10 ENCOUNTER — PATIENT MESSAGE (OUTPATIENT)
Dept: RHEUMATOLOGY | Facility: CLINIC | Age: 82
End: 2022-03-10
Payer: COMMERCIAL

## 2022-03-15 ENCOUNTER — INFUSION (OUTPATIENT)
Dept: INFUSION THERAPY | Facility: HOSPITAL | Age: 82
End: 2022-03-15
Attending: INTERNAL MEDICINE
Payer: COMMERCIAL

## 2022-03-15 ENCOUNTER — TELEPHONE (OUTPATIENT)
Dept: RHEUMATOLOGY | Facility: CLINIC | Age: 82
End: 2022-03-15
Payer: COMMERCIAL

## 2022-03-15 ENCOUNTER — LAB VISIT (OUTPATIENT)
Dept: LAB | Facility: HOSPITAL | Age: 82
End: 2022-03-15
Attending: INTERNAL MEDICINE
Payer: COMMERCIAL

## 2022-03-15 VITALS
SYSTOLIC BLOOD PRESSURE: 152 MMHG | WEIGHT: 171.5 LBS | HEART RATE: 61 BPM | HEIGHT: 61 IN | DIASTOLIC BLOOD PRESSURE: 79 MMHG | TEMPERATURE: 99 F | BODY MASS INDEX: 32.38 KG/M2 | RESPIRATION RATE: 16 BRPM

## 2022-03-15 DIAGNOSIS — M05.79 RHEUMATOID ARTHRITIS INVOLVING MULTIPLE SITES WITH POSITIVE RHEUMATOID FACTOR: ICD-10-CM

## 2022-03-15 DIAGNOSIS — D84.9 IMMUNOCOMPROMISED: ICD-10-CM

## 2022-03-15 DIAGNOSIS — M81.0 AGE-RELATED OSTEOPOROSIS WITHOUT CURRENT PATHOLOGICAL FRACTURE: Primary | ICD-10-CM

## 2022-03-15 LAB
ALBUMIN SERPL BCP-MCNC: 3.9 G/DL (ref 3.5–5.2)
ALP SERPL-CCNC: 43 U/L (ref 55–135)
ALT SERPL W/O P-5'-P-CCNC: 11 U/L (ref 10–44)
ANION GAP SERPL CALC-SCNC: 8 MMOL/L (ref 8–16)
AST SERPL-CCNC: 30 U/L (ref 10–40)
BILIRUB SERPL-MCNC: 0.7 MG/DL (ref 0.1–1)
BUN SERPL-MCNC: 23 MG/DL (ref 8–23)
CALCIUM SERPL-MCNC: 9.6 MG/DL (ref 8.7–10.5)
CHLORIDE SERPL-SCNC: 105 MMOL/L (ref 95–110)
CO2 SERPL-SCNC: 27 MMOL/L (ref 23–29)
CREAT SERPL-MCNC: 1.3 MG/DL (ref 0.5–1.4)
EST. GFR  (AFRICAN AMERICAN): 44 ML/MIN/1.73 M^2
EST. GFR  (NON AFRICAN AMERICAN): 38 ML/MIN/1.73 M^2
GLUCOSE SERPL-MCNC: 123 MG/DL (ref 70–110)
POTASSIUM SERPL-SCNC: 4.8 MMOL/L (ref 3.5–5.1)
PROT SERPL-MCNC: 6.9 G/DL (ref 6–8.4)
SODIUM SERPL-SCNC: 140 MMOL/L (ref 136–145)

## 2022-03-15 PROCEDURE — 36415 COLL VENOUS BLD VENIPUNCTURE: CPT | Mod: PN | Performed by: INTERNAL MEDICINE

## 2022-03-15 PROCEDURE — 63600175 PHARM REV CODE 636 W HCPCS: Mod: JG,PN | Performed by: INTERNAL MEDICINE

## 2022-03-15 PROCEDURE — 80053 COMPREHEN METABOLIC PANEL: CPT | Mod: PN | Performed by: INTERNAL MEDICINE

## 2022-03-15 PROCEDURE — 96372 THER/PROPH/DIAG INJ SC/IM: CPT | Mod: PN

## 2022-03-15 RX ADMIN — DENOSUMAB 60 MG: 60 INJECTION SUBCUTANEOUS at 12:03

## 2022-03-15 NOTE — PLAN OF CARE
Problem: Adult Inpatient Plan of Care  Goal: Plan of Care Review  Outcome: Ongoing, Progressing  Flowsheets (Taken 3/15/2022 1235)  Plan of Care Reviewed With: patient     Problem: Fatigue  Goal: Improved Activity Tolerance  Outcome: Ongoing, Progressing  Intervention: Promote Improved Energy  Flowsheets (Taken 3/15/2022 1235)  Fatigue Management:   paced activity encouraged   frequent rest breaks encouraged   fatigue-related activity identified  Sleep/Rest Enhancement:   relaxation techniques promoted   family presence promoted   therapeutic touch utilized   consistent schedule promoted  Activity Management: Ambulated -L4  Pt arrived to clinic for Prolia injection and tolerated well with no changes throughout therapy. Pt aware to continue to take oral Calcium and vitamin D supplements and to notify MD of any dental procedures needed. Pt aware of f/u appts and discharged to home in NAD.

## 2022-04-13 ENCOUNTER — OFFICE VISIT (OUTPATIENT)
Dept: RHEUMATOLOGY | Facility: CLINIC | Age: 82
End: 2022-04-13
Payer: COMMERCIAL

## 2022-04-13 ENCOUNTER — DOCUMENTATION ONLY (OUTPATIENT)
Dept: RHEUMATOLOGY | Facility: CLINIC | Age: 82
End: 2022-04-13
Payer: COMMERCIAL

## 2022-04-13 VITALS
HEIGHT: 61 IN | SYSTOLIC BLOOD PRESSURE: 171 MMHG | DIASTOLIC BLOOD PRESSURE: 93 MMHG | BODY MASS INDEX: 32.73 KG/M2 | WEIGHT: 173.38 LBS | HEART RATE: 69 BPM

## 2022-04-13 DIAGNOSIS — M81.0 POSTMENOPAUSAL OSTEOPOROSIS: ICD-10-CM

## 2022-04-13 DIAGNOSIS — D84.9 IMMUNOCOMPROMISED: ICD-10-CM

## 2022-04-13 DIAGNOSIS — M15.3 POST-TRAUMATIC OSTEOARTHRITIS OF MULTIPLE JOINTS: ICD-10-CM

## 2022-04-13 DIAGNOSIS — Z91.81 AT HIGH RISK FOR INJURY RELATED TO FALL: ICD-10-CM

## 2022-04-13 DIAGNOSIS — M05.79 RHEUMATOID ARTHRITIS INVOLVING MULTIPLE SITES WITH POSITIVE RHEUMATOID FACTOR: Primary | ICD-10-CM

## 2022-04-13 PROCEDURE — 99999 PR PBB SHADOW E&M-EST. PATIENT-LVL V: ICD-10-PCS | Mod: PBBFAC,,, | Performed by: INTERNAL MEDICINE

## 2022-04-13 PROCEDURE — 3077F SYST BP >= 140 MM HG: CPT | Mod: CPTII,S$GLB,, | Performed by: INTERNAL MEDICINE

## 2022-04-13 PROCEDURE — 99999 PR PBB SHADOW E&M-EST. PATIENT-LVL V: CPT | Mod: PBBFAC,,, | Performed by: INTERNAL MEDICINE

## 2022-04-13 PROCEDURE — 99215 PR OFFICE/OUTPT VISIT, EST, LEVL V, 40-54 MIN: ICD-10-PCS | Mod: S$GLB,,, | Performed by: INTERNAL MEDICINE

## 2022-04-13 PROCEDURE — 3077F PR MOST RECENT SYSTOLIC BLOOD PRESSURE >= 140 MM HG: ICD-10-PCS | Mod: CPTII,S$GLB,, | Performed by: INTERNAL MEDICINE

## 2022-04-13 PROCEDURE — 3080F PR MOST RECENT DIASTOLIC BLOOD PRESSURE >= 90 MM HG: ICD-10-PCS | Mod: CPTII,S$GLB,, | Performed by: INTERNAL MEDICINE

## 2022-04-13 PROCEDURE — 99215 OFFICE O/P EST HI 40 MIN: CPT | Mod: S$GLB,,, | Performed by: INTERNAL MEDICINE

## 2022-04-13 PROCEDURE — 1157F ADVNC CARE PLAN IN RCRD: CPT | Mod: CPTII,S$GLB,, | Performed by: INTERNAL MEDICINE

## 2022-04-13 PROCEDURE — 1125F AMNT PAIN NOTED PAIN PRSNT: CPT | Mod: CPTII,S$GLB,, | Performed by: INTERNAL MEDICINE

## 2022-04-13 PROCEDURE — 1157F PR ADVANCE CARE PLAN OR EQUIV PRESENT IN MEDICAL RECORD: ICD-10-PCS | Mod: CPTII,S$GLB,, | Performed by: INTERNAL MEDICINE

## 2022-04-13 PROCEDURE — 1125F PR PAIN SEVERITY QUANTIFIED, PAIN PRESENT: ICD-10-PCS | Mod: CPTII,S$GLB,, | Performed by: INTERNAL MEDICINE

## 2022-04-13 PROCEDURE — 1159F PR MEDICATION LIST DOCUMENTED IN MEDICAL RECORD: ICD-10-PCS | Mod: CPTII,S$GLB,, | Performed by: INTERNAL MEDICINE

## 2022-04-13 PROCEDURE — 3288F FALL RISK ASSESSMENT DOCD: CPT | Mod: CPTII,S$GLB,, | Performed by: INTERNAL MEDICINE

## 2022-04-13 PROCEDURE — 1101F PR PT FALLS ASSESS DOC 0-1 FALLS W/OUT INJ PAST YR: ICD-10-PCS | Mod: CPTII,S$GLB,, | Performed by: INTERNAL MEDICINE

## 2022-04-13 PROCEDURE — 1101F PT FALLS ASSESS-DOCD LE1/YR: CPT | Mod: CPTII,S$GLB,, | Performed by: INTERNAL MEDICINE

## 2022-04-13 PROCEDURE — 3080F DIAST BP >= 90 MM HG: CPT | Mod: CPTII,S$GLB,, | Performed by: INTERNAL MEDICINE

## 2022-04-13 PROCEDURE — 3288F PR FALLS RISK ASSESSMENT DOCUMENTED: ICD-10-PCS | Mod: CPTII,S$GLB,, | Performed by: INTERNAL MEDICINE

## 2022-04-13 PROCEDURE — 1159F MED LIST DOCD IN RCRD: CPT | Mod: CPTII,S$GLB,, | Performed by: INTERNAL MEDICINE

## 2022-04-13 ASSESSMENT — ROUTINE ASSESSMENT OF PATIENT INDEX DATA (RAPID3)
MDHAQ FUNCTION SCORE: 1.4
TOTAL RAPID3 SCORE: 4.22
PSYCHOLOGICAL DISTRESS SCORE: 0
PAIN SCORE: 4
PATIENT GLOBAL ASSESSMENT SCORE: 4
FATIGUE SCORE: 1.1

## 2022-04-13 NOTE — PROGRESS NOTES
AS Subjective:          Chief Complaint: Juanis Mclaughlin is a 81 y.o. female who had no chief complaint listed for this encounter.    HPI:      Rheumatoid Arthritis   82y/o  female with h/o HTN, HLD was diagnosed with sero +  Rheumatoid arthritis in 1998. Off prednisone now.    Cholelithiasis: s/p galina 4/2021.     Enbrel 50mg weekly on hold since March 2020. - and no worsening.   MTX 5 tabs weekly.   Started HCQ 200mg once daily and feels no worse. Joints are stable NEEDS #90 day supply.       Pain in the DIP and PIP joints.    No ASE of stomatitis,  infections or injection site reaction.   She was also diagnosed with secondary Sjogren's. Currently on restasis.  PPM for bradycardia.     Past 12 months slow decline in renal function from prior year- on PO bisphos and ACEI/HCTZ can switch this to Prolia.  Started Prolia 02/29/2019 she had another injection as of 07/20/2019 tolerated well no adverse side effects renal function stable.    She denies fever, weight loss, photosensitivity, rash, ulcer, raynaud's phenomenon, alopecia, dysphagia, diarrhea or blood in the stools.  + hx of GERD. On PPI. Hx of mild MR. No pedal edema.     Doing well with Dr. Madison with Breo and Gabapentin to relax cough impulse.  As of this visit 02/29/2019 patient was having upper respiratory tract infection x3 weeks seem to be feeling better within 10 days ago returned with a more productive cough postnasal drip rhinitis    REVIEW OF SYSTEMS:    Review of Systems   Constitutional: Negative for fever, malaise/fatigue and weight loss.   HENT: Negative for sore throat.    Eyes: Negative for double vision, photophobia and redness.   Respiratory: Positive for cough and sputum production. Negative for hemoptysis, shortness of breath and wheezing.    Cardiovascular: Negative for chest pain, palpitations and orthopnea.   Gastrointestinal: Negative for abdominal pain, constipation and diarrhea.   Genitourinary: Negative for dysuria, hematuria and  urgency.   Musculoskeletal: Positive for joint pain. Negative for back pain and myalgias.   Skin: Negative for rash.   Neurological: Negative for dizziness, tingling, focal weakness and headaches.   Endo/Heme/Allergies: Does not bruise/bleed easily.   Psychiatric/Behavioral: Negative for depression, hallucinations and suicidal ideas.                Objective:            Past Medical History:   Diagnosis Date    Arthritis     rheumatoid arthritis    Cardiomyopathy     CKD (chronic kidney disease) stage 3, GFR 30-59 ml/min 1/9/2019    Diverticulosis     Diverticulosis     DVT (deep venous thrombosis)     one time 5 years ago    Essential hypertension 11/24/2013    GERD (gastroesophageal reflux disease)     Glaucoma     sees Dr. Gillette    Hyperlipidemia     Hypertension     Iron deficiency     Iron deficiency anemia 10/21/2016     IMO LOAD Q4    Long-term use of immunosuppressant medication 7/27/2015    Mitral regurgitation 12/20/2013 12/13 mild     Osteoporosis 7/9/2014    Pacemaker 10/26/2016    left chest [employer] [address] [employer]  [id number] Bonifacio Fuller 9/9/2016 - current   right atrium LEAD [employer] [address] [employer] [id number]/[id number] Bonifacio uFller 9/9/2016 - current   right ventricle LEAD [employer] [address] [employer] [id number] Bonifacio Fuller 9/9/2016 - current      PONV (postoperative nausea and vomiting)     Rheumatoid arthritis involving multiple sites with positive rheumatoid factor 11/24/2013    Vitamin B12 deficiency 2/28/2015     Family History   Problem Relation Age of Onset    Heart disease Mother         CHF    Hypertension Mother     Heart disease Father     Stroke Father     Heart disease Brother     Stroke Brother     Heart disease Brother     Scleroderma Brother     Ovarian cancer Maternal Aunt 34    Breast cancer Neg Hx      Social History     Tobacco Use    Smoking status: Never Smoker    Smokeless  tobacco: Never Used   Substance Use Topics    Alcohol use: Yes     Alcohol/week: 0.8 standard drinks     Types: 1 Standard drinks or equivalent per week     Comment: occ social    Drug use: No         Current Outpatient Medications on File Prior to Visit   Medication Sig Dispense Refill    aspirin (ECOTRIN) 81 MG EC tablet Take 81 mg by mouth once daily.      atorvastatin (LIPITOR) 20 MG tablet Take 1 tablet (20 mg total) by mouth every evening. 90 tablet 2    B-complex with vitamin C (Z-BEC OR EQUIV) tablet Take 1 tablet by mouth once daily.      brimonidine-timoloL (COMBIGAN) 0.2-0.5 % Drop Place 1 drop into both eyes 2 (two) times daily.      candesartan (ATACAND) 16 MG tablet TAKE 1 TABLET ONCE DAILY 90 tablet 3    denosumab (PROLIA) 60 mg/mL Syrg Inject 60 mg into the skin every 6 (six) months.      estradioL (ESTRACE) 0.01 % (0.1 mg/gram) vaginal cream PLACE 1 GRAM VAGINALLY ONCEDAILY (Patient taking differently: every 7 days. PLACE 1 GRAM VAGINALLY ONCEDAILY) 127.5 g 2    folic acid (FOLVITE) 1 MG tablet Take 2 tablets (2 mg total) by mouth once daily. 180 tablet 3    furosemide (LASIX) 20 MG tablet Take 1 tablet (20 mg total) by mouth daily as needed (edema). 90 tablet 3    hydrOXYchloroQUINE (PLAQUENIL) 200 mg tablet Take 1 tablet (200 mg total) by mouth once daily. 90 tablet 3    L.acid/L.casei/B.bif/B.michael/FOS (PROBIOTIC BLEND ORAL) Take 1 capsule by mouth once daily.      latanoprost 0.005 % ophthalmic solution Place 1 drop into both eyes every evening.      magnesium 200 mg Tab Take 400 mg by mouth once daily. Magnesium 400mg with Chelated Zinc 15mg.      methotrexate 2.5 MG Tab Take 5 tablets (12.5 mg total) by mouth every 7 days. 60 tablet 3    metoprolol succinate (TOPROL-XL) 50 MG 24 hr tablet TAKE 1 TABLET ONCE DAILY 90 tablet 4    pantoprazole (PROTONIX) 40 MG tablet Take 1 tablet (40 mg total) by mouth once daily. 90 tablet 2    potassium chloride SA (K-DUR,KLOR-CON) 20 MEQ  tablet Take 1 tablet (20 mEq total) by mouth daily as needed (edema). 90 tablet 6    XIIDRA 5 % Dpet Place 1 drop into both eyes 2 (two) times daily.        No current facility-administered medications on file prior to visit.       There were no vitals filed for this visit.    Physical Exam:    Physical Exam  Constitutional:       Appearance: Normal appearance. She is well-developed.   HENT:      Nose: No septal deviation.      Mouth/Throat:      Mouth: No oral lesions.   Eyes:      Conjunctiva/sclera:      Right eye: Right conjunctiva is not injected.      Left eye: Left conjunctiva is not injected.      Pupils: Pupils are equal, round, and reactive to light.   Neck:      Thyroid: No thyroid mass or thyromegaly.      Vascular: No JVD.   Cardiovascular:      Rate and Rhythm: Normal rate and regular rhythm.      Pulses: Normal pulses.      Comments: No edema  Pulmonary:      Effort: Pulmonary effort is normal.      Breath sounds: Examination of the right-middle field reveals wheezing. Examination of the left-middle field reveals wheezing. Wheezing present.   Abdominal:      Palpations: Abdomen is soft.   Musculoskeletal:      Right shoulder: No swelling or tenderness. Normal range of motion.      Left shoulder: No swelling or tenderness. Normal range of motion.      Right elbow: No swelling. Normal range of motion. No tenderness.      Left elbow: No swelling. Normal range of motion. No tenderness.      Right wrist: Tenderness present. No swelling. Decreased range of motion.      Left wrist: No swelling or tenderness. Normal range of motion.      Right hand: Tenderness present. Decreased range of motion.      Left hand: Tenderness present.      Right hip: Normal range of motion. Normal strength.      Left hip: No tenderness. Normal range of motion.      Right knee: No swelling. Decreased range of motion. Tenderness present over the medial joint line and lateral joint line.      Left knee: No swelling. Normal range of  motion. No tenderness.      Right ankle: No swelling. No tenderness. Normal range of motion.      Left ankle: No swelling. No tenderness. Normal range of motion.      Comments: 1+ synovitis MCP and PIP with heberden nodes all DIP 2-5. Left wrist with tenderness and swelling about the TFCC. No nodules. No flexion contractures.      Cervical with left sided point tenderness and the occiput (nuchal ridge) lesser tenderness at the cervical paraspinals. Decreased ROM in cervical spine   Lymphadenopathy:      Cervical: No cervical adenopathy.   Skin:     General: Skin is dry.   Neurological:      Deep Tendon Reflexes: Reflexes are normal and symmetric.               Assessment:       Encounter Diagnoses   Name Primary?    Rheumatoid arthritis involving multiple sites with positive rheumatoid factor Yes    Postmenopausal osteoporosis     Post-traumatic osteoarthritis of multiple joints     At high risk for injury related to fall           Plan:        Rheumatoid arthritis involving multiple sites with positive rheumatoid factor  -     Ambulatory referral/consult to Home Health; Future; Expected date: 04/14/2022  -     CBC Auto Differential; Standing; Expected date: 04/13/2022  -     Comprehensive Metabolic Panel; Standing; Expected date: 04/13/2022  -     Sedimentation rate; Standing; Expected date: 04/13/2022  -     C-Reactive Protein; Standing; Expected date: 04/13/2022  -     Ambulatory referral/consult Order for Evusheld; Future; Expected date: 04/14/2022  -     E - OTHER    Postmenopausal osteoporosis  -     Ambulatory referral/consult to Home Health; Future; Expected date: 04/14/2022  -     E - OTHER    Post-traumatic osteoarthritis of multiple joints  -     Ambulatory referral/consult to Home Health; Future; Expected date: 04/14/2022  -     E - OTHER    At high risk for injury related to fall  -     Ambulatory referral/consult to Home Health; Future; Expected date: 04/14/2022  -     E -  OTHER    Immunocompromised  -     CBC Auto Differential; Standing; Expected date: 04/13/2022  -     Comprehensive Metabolic Panel; Standing; Expected date: 04/13/2022  -     Sedimentation rate; Standing; Expected date: 04/13/2022  -     C-Reactive Protein; Standing; Expected date: 04/13/2022  -     Ambulatory referral/consult Order for Nellie; Future; Expected date: 04/14/2022         RA:    -MTX  5 tabs weekly- 90 day supply   HCQ 200mg once dialy 90 day   -continue folic acid 2mg dialy   -OFF Enbrel   -voltaren gel PRN fingers/hands-using sparingly   -Labs q 3 months.    Neck Pain:    -seems to be stable and not problematic as of last 6 months. Great.    Osteoporosis   ASE  actonel   -doing great on Prolia. Last dose 3/2022    Fall: sent orders to [facility] for rolling walker w/ seat. They should reach out to her for delivery.    -[facility] orders for OT to eval home for any added needs to reduce falls. And PT for balance and strength.       Immunosuppression: COVID #4 scheduled next week, discussed r/b/se with Nellie and given likely rise in cases feel she is excellent candidate to receive.      No follow-ups on file.          40min consultation with greater than 50% spent in counseling, chart review and coordination of care. All questions answered.

## 2022-04-13 NOTE — PATIENT INSTRUCTIONS
sent orders to [facility] for rolling walker w/ seat. They should reach out to her for delivery.    -[facility] orders for OT to eval home for any added needs to reduce falls. And PT for balance and strength.     Orders in for Evusheld and discussed today with patient.

## 2022-04-14 ENCOUNTER — TELEPHONE (OUTPATIENT)
Dept: RHEUMATOLOGY | Facility: CLINIC | Age: 82
End: 2022-04-14
Payer: COMMERCIAL

## 2022-04-14 NOTE — TELEPHONE ENCOUNTER
----- Message from Camila Anthony sent at 4/14/2022  8:22 AM CDT -----  Regarding: pt called  Name of Who is Calling: KASSIE GAYLE [[id number]] Jackie  ( nurse home health)      What is the request in detail: She received the referral and will be able to admit the patient on tomorrow for services, and wanted to make sure if that was okay. Please advise       Can the clinic reply by [facility]: NO      What Number to [facility] Back [facility]: [phone]

## 2022-04-14 NOTE — TELEPHONE ENCOUNTER
Spoke to Jackie with [facility] She has spoken with Ms Arnoldo and will be admitting for services on 4/15/22. Just wanted Dr Cruz to know. Will forward to Dr Cruz.

## 2022-04-15 PROCEDURE — G0180 PR HOME HEALTH MD CERTIFICATION: ICD-10-PCS | Mod: ,,, | Performed by: INTERNAL MEDICINE

## 2022-04-15 PROCEDURE — G0180 MD CERTIFICATION HHA PATIENT: HCPCS | Mod: ,,, | Performed by: INTERNAL MEDICINE

## 2022-04-18 ENCOUNTER — IMMUNIZATION (OUTPATIENT)
Dept: FAMILY MEDICINE | Facility: CLINIC | Age: 82
End: 2022-04-18
Payer: COMMERCIAL

## 2022-04-18 DIAGNOSIS — Z23 NEED FOR VACCINATION: Primary | ICD-10-CM

## 2022-04-18 PROCEDURE — 91305 COVID-19, MRNA, LNP-S, PF, 30 MCG/0.3 ML DOSE VACCINE (PFIZER): CPT | Mod: PBBFAC | Performed by: RADIOLOGY

## 2022-05-03 ENCOUNTER — DOCUMENT SCAN (OUTPATIENT)
Dept: HOME HEALTH SERVICES | Facility: HOSPITAL | Age: 82
End: 2022-05-03
Payer: COMMERCIAL

## 2022-05-04 ENCOUNTER — INFUSION (OUTPATIENT)
Dept: INFUSION THERAPY | Facility: HOSPITAL | Age: 82
End: 2022-05-04
Attending: INTERNAL MEDICINE
Payer: COMMERCIAL

## 2022-05-04 VITALS
TEMPERATURE: 99 F | WEIGHT: 173.38 LBS | SYSTOLIC BLOOD PRESSURE: 121 MMHG | BODY MASS INDEX: 32.73 KG/M2 | DIASTOLIC BLOOD PRESSURE: 68 MMHG | HEART RATE: 81 BPM | HEIGHT: 61 IN | RESPIRATION RATE: 18 BRPM

## 2022-05-04 DIAGNOSIS — M47.812 SPONDYLOSIS OF CERVICAL REGION WITHOUT MYELOPATHY OR RADICULOPATHY: ICD-10-CM

## 2022-05-04 DIAGNOSIS — D84.9 IMMUNOCOMPROMISED: ICD-10-CM

## 2022-05-04 DIAGNOSIS — M05.79 RHEUMATOID ARTHRITIS INVOLVING MULTIPLE SITES WITH POSITIVE RHEUMATOID FACTOR: Primary | ICD-10-CM

## 2022-05-04 PROCEDURE — 63600175 PHARM REV CODE 636 W HCPCS: Mod: PN | Performed by: INTERNAL MEDICINE

## 2022-05-04 PROCEDURE — M0220 HC INJECTION ADMIN, TIXAGEVIMAB-CILGAVIMAB, INCL POST ADMIN MONIT: HCPCS | Performed by: INTERNAL MEDICINE

## 2022-05-04 RX ORDER — DIPHENHYDRAMINE HCL 25 MG
25 CAPSULE ORAL ONCE
Status: CANCELLED | OUTPATIENT
Start: 2022-05-04 | End: 2022-05-04

## 2022-05-04 RX ORDER — ALBUTEROL SULFATE 90 UG/1
2 AEROSOL, METERED RESPIRATORY (INHALATION)
Status: CANCELLED | OUTPATIENT
Start: 2022-05-04

## 2022-05-04 RX ORDER — ACETAMINOPHEN 325 MG/1
650 TABLET ORAL ONCE
Status: CANCELLED | OUTPATIENT
Start: 2022-05-04 | End: 2022-05-04

## 2022-05-04 RX ORDER — ONDANSETRON 4 MG/1
4 TABLET, ORALLY DISINTEGRATING ORAL ONCE
Status: CANCELLED | OUTPATIENT
Start: 2022-05-04 | End: 2022-05-04

## 2022-05-04 RX ORDER — PREDNISONE 20 MG/1
40 TABLET ORAL ONCE
Status: CANCELLED | OUTPATIENT
Start: 2022-05-04 | End: 2022-05-04

## 2022-05-04 RX ORDER — EPINEPHRINE 0.3 MG/.3ML
0.3 INJECTION SUBCUTANEOUS
Status: CANCELLED | OUTPATIENT
Start: 2022-05-04

## 2022-05-04 RX ADMIN — Medication 300 MG: at 02:05

## 2022-05-04 NOTE — PLAN OF CARE
Patient arrived to clinic in [facility] and received two gluteal intramuscular injections (left and right) of tixagevimab/cilgavimab (EVUSHELD) with no difficulties, tolerating the medication and monitored 1 hour post. Pt given handout for medication information.

## 2022-05-04 NOTE — PLAN OF CARE
Problem: Adult Inpatient Plan of Care  Goal: Plan of Care Review  Outcome: Ongoing, Progressing  Flowsheets (Taken 5/4/2022 1896)  Plan of Care Reviewed With:   patient   spouse

## 2022-05-28 ENCOUNTER — CLINICAL SUPPORT (OUTPATIENT)
Dept: CARDIOLOGY | Facility: HOSPITAL | Age: 82
End: 2022-05-28
Payer: COMMERCIAL

## 2022-05-28 DIAGNOSIS — Z95.0 PRESENCE OF CARDIAC PACEMAKER: ICD-10-CM

## 2022-05-28 PROCEDURE — 93294 REM INTERROG EVL PM/LDLS PM: CPT | Mod: ,,, | Performed by: INTERNAL MEDICINE

## 2022-05-28 PROCEDURE — 93296 REM INTERROG EVL PM/IDS: CPT | Mod: PO | Performed by: INTERNAL MEDICINE

## 2022-05-28 PROCEDURE — 93294 CARDIAC DEVICE CHECK - REMOTE: ICD-10-PCS | Mod: ,,, | Performed by: INTERNAL MEDICINE

## 2022-06-23 ENCOUNTER — EXTERNAL HOME HEALTH (OUTPATIENT)
Dept: HOME HEALTH SERVICES | Facility: HOSPITAL | Age: 82
End: 2022-06-23
Payer: COMMERCIAL

## 2022-06-29 ENCOUNTER — TELEPHONE (OUTPATIENT)
Dept: CARDIOLOGY | Facility: HOSPITAL | Age: 82
End: 2022-06-29
Payer: COMMERCIAL

## 2022-06-29 NOTE — TELEPHONE ENCOUNTER
Pt has appt salo/Dr. Valencia 8/15/22.    Pt has MDT PPM     Model: L111 [facility] MRI  SN: [id number]

## 2022-07-19 ENCOUNTER — TELEPHONE (OUTPATIENT)
Dept: CARDIOLOGY | Facility: HOSPITAL | Age: 82
End: 2022-07-19
Payer: COMMERCIAL

## 2022-07-19 NOTE — TELEPHONE ENCOUNTER
[facility]

## 2022-07-28 RX ORDER — FUROSEMIDE 20 MG/1
TABLET ORAL
Qty: 90 TABLET | Refills: 4 | Status: SHIPPED | OUTPATIENT
Start: 2022-07-28 | End: 2022-09-20 | Stop reason: SDUPTHER

## 2022-07-29 ENCOUNTER — OFFICE VISIT (OUTPATIENT)
Dept: OBSTETRICS AND GYNECOLOGY | Facility: CLINIC | Age: 82
End: 2022-07-29
Payer: COMMERCIAL

## 2022-07-29 ENCOUNTER — HOSPITAL ENCOUNTER (OUTPATIENT)
Dept: RADIOLOGY | Facility: HOSPITAL | Age: 82
Discharge: HOME | End: 2022-07-29
Attending: SPECIALIST
Payer: COMMERCIAL

## 2022-07-29 VITALS
WEIGHT: 177.69 LBS | HEIGHT: 61 IN | SYSTOLIC BLOOD PRESSURE: 140 MMHG | BODY MASS INDEX: 33.55 KG/M2 | DIASTOLIC BLOOD PRESSURE: 88 MMHG

## 2022-07-29 DIAGNOSIS — Z12.31 VISIT FOR SCREENING MAMMOGRAM: Primary | ICD-10-CM

## 2022-07-29 DIAGNOSIS — Z12.31 VISIT FOR SCREENING MAMMOGRAM: ICD-10-CM

## 2022-07-29 PROCEDURE — 1157F PR ADVANCE CARE PLAN OR EQUIV PRESENT IN MEDICAL RECORD: ICD-10-PCS | Mod: CPTII,S$GLB,, | Performed by: SPECIALIST

## 2022-07-29 PROCEDURE — 3077F SYST BP >= 140 MM HG: CPT | Mod: CPTII,S$GLB,, | Performed by: SPECIALIST

## 2022-07-29 PROCEDURE — 77067 SCR MAMMO BI INCL CAD: CPT | Mod: TC,PN

## 2022-07-29 PROCEDURE — 1126F AMNT PAIN NOTED NONE PRSNT: CPT | Mod: CPTII,S$GLB,, | Performed by: SPECIALIST

## 2022-07-29 PROCEDURE — 77063 MAMMO DIGITAL SCREENING BILAT WITH TOMO: ICD-10-PCS | Mod: 26,,, | Performed by: RADIOLOGY

## 2022-07-29 PROCEDURE — 1157F ADVNC CARE PLAN IN RCRD: CPT | Mod: CPTII,S$GLB,, | Performed by: SPECIALIST

## 2022-07-29 PROCEDURE — 77063 BREAST TOMOSYNTHESIS BI: CPT | Mod: TC,PN

## 2022-07-29 PROCEDURE — 77067 SCR MAMMO BI INCL CAD: CPT | Mod: 26,,, | Performed by: RADIOLOGY

## 2022-07-29 PROCEDURE — 77063 BREAST TOMOSYNTHESIS BI: CPT | Mod: 26,,, | Performed by: RADIOLOGY

## 2022-07-29 PROCEDURE — 99397 PR PREVENTIVE VISIT,EST,65 & OVER: ICD-10-PCS | Mod: S$GLB,,, | Performed by: SPECIALIST

## 2022-07-29 PROCEDURE — 77067 MAMMO DIGITAL SCREENING BILAT WITH TOMO: ICD-10-PCS | Mod: 26,,, | Performed by: RADIOLOGY

## 2022-07-29 PROCEDURE — 3077F PR MOST RECENT SYSTOLIC BLOOD PRESSURE >= 140 MM HG: ICD-10-PCS | Mod: CPTII,S$GLB,, | Performed by: SPECIALIST

## 2022-07-29 PROCEDURE — 3079F PR MOST RECENT DIASTOLIC BLOOD PRESSURE 80-89 MM HG: ICD-10-PCS | Mod: CPTII,S$GLB,, | Performed by: SPECIALIST

## 2022-07-29 PROCEDURE — 99999 PR PBB SHADOW E&M-EST. PATIENT-LVL III: ICD-10-PCS | Mod: PBBFAC,,, | Performed by: SPECIALIST

## 2022-07-29 PROCEDURE — 99999 PR PBB SHADOW E&M-EST. PATIENT-LVL III: CPT | Mod: PBBFAC,,, | Performed by: SPECIALIST

## 2022-07-29 PROCEDURE — 99397 PER PM REEVAL EST PAT 65+ YR: CPT | Mod: S$GLB,,, | Performed by: SPECIALIST

## 2022-07-29 PROCEDURE — 1126F PR PAIN SEVERITY QUANTIFIED, NO PAIN PRESENT: ICD-10-PCS | Mod: CPTII,S$GLB,, | Performed by: SPECIALIST

## 2022-07-29 PROCEDURE — 3079F DIAST BP 80-89 MM HG: CPT | Mod: CPTII,S$GLB,, | Performed by: SPECIALIST

## 2022-07-29 NOTE — PROGRESS NOTES
82 yo WF presents for annual gyn eval  No overt gyn complaints today  Mammo screening completed  Past Medical History:   Diagnosis Date    Arthritis     rheumatoid arthritis    Cardiomyopathy     CKD (chronic kidney disease) stage 3, GFR 30-59 ml/min 1/9/2019    Diverticulosis     Diverticulosis     DVT (deep venous thrombosis)     one time 5 years ago    Essential hypertension 11/24/2013    GERD (gastroesophageal reflux disease)     Glaucoma     sees Dr. Gillette    Hyperlipidemia     Hypertension     Iron deficiency     Iron deficiency anemia 10/21/2016     IMO LOAD Q4    Long-term use of immunosuppressant medication 7/27/2015    Mitral regurgitation 12/20/2013 12/13 mild     Osteoporosis 7/9/2014    Pacemaker 10/26/2016    left chest PACEMAKER [employer] L[address] [facility] [address] [id number] Bonifacio Fuller 9/9/2016 - current   right atrium LEAD [employer] [address] [employer] MRI [id number] Bonifacio Fuller ALittle 9/9/2016 - current   right ventricle LEAD [employer] [address] [employer] MRI [id number] Bonifacio Fuller 9/9/2016 - current      PONV (postoperative nausea and vomiting)     Rheumatoid arthritis involving multiple sites with positive rheumatoid factor 11/24/2013    Vitamin B12 deficiency 2/28/2015       Past Surgical History:   Procedure Laterality Date    BREAST CYST EXCISION Bilateral     several cysts removed    COLONOSCOPY  2012    EPIDURAL STEROID INJECTION INTO CERVICAL SPINE N/A 10/13/2020    Procedure: Injection-steroid-epidural-cervical;  Surgeon: Bharat Avalos MD;  Location: [facility];  Service: Pain Management;  Laterality: N/A;    FOOT SURGERY      had neuorma and also required hardware    HYSTERECTOMY      SUJATA with BSO    INCONTINENCE SURGERY      INJECTION OF ANESTHETIC AGENT AROUND MEDIAL BRANCH NERVES INNERVATING CERVICAL FACET JOINT Left 11/19/2020    Procedure: Block-nerve-medial branch-cervical C3, C4, C5, C6;  Surgeon: Bharat WOODWARD  MD Robbie;  Location: [facility] OR;  Service: Pain Management;  Laterality: Left;    INJECTION OF ANESTHETIC AGENT AROUND MEDIAL BRANCH NERVES INNERVATING CERVICAL FACET JOINT Left 12/7/2020    Procedure: Block-nerve-medial branch-cervical, c3,c4, c5, c6;  Surgeon: Bharat Avalos MD;  Location: [facility] OR;  Service: Pain Management;  Laterality: Left;    KNEE ARTHROSCOPY W/ DEBRIDEMENT      LAPAROSCOPIC CHOLECYSTECTOMY N/A 4/21/2021    Procedure: CHOLECYSTECTOMY, LAPAROSCOPIC;  Surgeon: Marilyn Borrero MD;  Location: [facility] OR;  Service: General;  Laterality: N/A;    LAPAROSCOPIC GASTRIC BANDING      OOPHORECTOMY  2012    pace maker  09/2016       Family History   Problem Relation Age of Onset    Heart disease Mother         CHF    Hypertension Mother     Heart disease Father     Stroke Father     Heart disease Brother     Stroke Brother     Heart disease Brother     Scleroderma Brother     Ovarian cancer Maternal Aunt 34    Breast cancer Neg Hx        Social History     Socioeconomic History    Marital status:     Number of children: 3   Tobacco Use    Smoking status: Never Smoker    Smokeless tobacco: Never Used   Substance and Sexual Activity    Alcohol use: Yes     Alcohol/week: 0.8 standard drinks     Types: 1 Standard drinks or equivalent per week     Comment: occ social    Drug use: No    Sexual activity: Never     Birth control/protection: See Surgical Hx       Current Outpatient Medications   Medication Sig Dispense Refill    aspirin (ECOTRIN) 81 MG EC tablet Take 81 mg by mouth once daily.      atorvastatin (LIPITOR) 20 MG tablet Take 1 tablet (20 mg total) by mouth every evening. 90 tablet 2    B-complex with vitamin C (Z-BEC OR EQUIV) tablet Take 1 tablet by mouth once daily.      brimonidine-timoloL (COMBIGAN) 0.2-0.5 % Drop Place 1 drop into both eyes 2 (two) times daily.      candesartan (ATACAND) 16 MG tablet TAKE 1 TABLET ONCE DAILY 90 tablet 3    denosumab  (PROLIA) 60 mg/mL Syrg Inject 60 mg into the skin every 6 (six) months.      estradioL (ESTRACE) 0.01 % (0.1 mg/gram) vaginal cream PLACE 1 GRAM VAGINALLY ONCEDAILY (Patient taking differently: every 7 days. PLACE 1 GRAM VAGINALLY ONCEDAILY) 127.5 g 2    folic acid (FOLVITE) 1 MG tablet TAKE 2 TABLETS ONCE DAILY 180 tablet 3    furosemide (LASIX) 20 MG tablet TAKE 1 TABLET BY MOUTH DAILY AS NEEDED FOR EDEMA. 90 tablet 4    hydrOXYchloroQUINE (PLAQUENIL) 200 mg tablet Take 1 tablet (200 mg total) by mouth once daily. 90 tablet 3    L.acid/L.casei/B.bif/B.michael/FOS (PROBIOTIC BLEND ORAL) Take 1 capsule by mouth once daily.      latanoprost 0.005 % ophthalmic solution Place 1 drop into both eyes every evening.      magnesium 200 mg Tab Take 400 mg by mouth once daily. Magnesium 400mg with Chelated Zinc 15mg.      methotrexate 2.5 MG Tab TAKE 5 TABLETS EVERY 7 DAYS 60 tablet 3    metoprolol succinate (TOPROL-XL) 50 MG 24 hr tablet TAKE 1 TABLET ONCE DAILY 90 tablet 4    pantoprazole (PROTONIX) 40 MG tablet Take 1 tablet (40 mg total) by mouth once daily. 90 tablet 2    potassium chloride SA (K-DUR,KLOR-CON) 20 MEQ tablet Take 1 tablet (20 mEq total) by mouth daily as needed (edema). 90 tablet 6    XIIDRA 5 % Dpet Place 1 drop into both eyes 2 (two) times daily.        No current facility-administered medications for this visit.       Review of patient's allergies indicates:   Allergen Reactions    Ace inhibitors Other (See Comments)     cough       Review of System:   General: no chills, fever, night sweats, weight gain or weight loss  Psychological: no depression or suicidal ideation  Breasts: no new or changing breast lumps, nipple discharge or masses.  Respiratory: no cough, shortness of breath, or wheezing  Cardiovascular: no chest pain or dyspnea on exertion  Gastrointestinal: no abdominal pain, change in bowel habits, or black or bloody stools  Genito-Urinary: no incontinence, urinary frequency/urgency  or vulvar/vaginal symptoms, pelvic pain or abnormal vaginal bleeding.  Musculoskeletal: no gait disturbance or muscular weakness    PE:   APPEARANCE: Well nourished, well developed, in no acute distress.  NECK: Neck symmetric without masses or thyromegaly.  NODES: No inguinal lymph node enlargement.  ABDOMEN: Soft. No tenderness or masses. No hepatosplenomegaly. No hernias.  BREASTS: deferred    PELVIC: Normal external female genitalia without lesions. Normal hair distribution. Adequate perineal body, normal urethral meatus. Vagina moist and well rugated without lesions or discharge. No significant cystocele or rectocele. Uterus and cervix surgically absent. Bimanual exam revealed no masses, tenderness or abnormality.    NOTE  NURSING PERSONAL PRESENT FOR ENTIRE PHYSICAL EXAM    Daily calcium intake  RTO 2 years/prn

## 2022-08-08 ENCOUNTER — PATIENT MESSAGE (OUTPATIENT)
Dept: INFUSION THERAPY | Facility: HOSPITAL | Age: 82
End: 2022-08-08
Payer: COMMERCIAL

## 2022-08-31 ENCOUNTER — PATIENT MESSAGE (OUTPATIENT)
Dept: INFUSION THERAPY | Facility: HOSPITAL | Age: 82
End: 2022-08-31
Payer: COMMERCIAL

## 2022-09-15 ENCOUNTER — LAB VISIT (OUTPATIENT)
Dept: LAB | Facility: HOSPITAL | Age: 82
End: 2022-09-15
Payer: COMMERCIAL

## 2022-09-15 ENCOUNTER — INFUSION (OUTPATIENT)
Dept: INFUSION THERAPY | Facility: HOSPITAL | Age: 82
End: 2022-09-15
Attending: INTERNAL MEDICINE
Payer: COMMERCIAL

## 2022-09-15 VITALS
HEART RATE: 68 BPM | DIASTOLIC BLOOD PRESSURE: 85 MMHG | SYSTOLIC BLOOD PRESSURE: 157 MMHG | OXYGEN SATURATION: 97 % | RESPIRATION RATE: 17 BRPM | TEMPERATURE: 98 F

## 2022-09-15 DIAGNOSIS — M81.0 AGE-RELATED OSTEOPOROSIS WITHOUT CURRENT PATHOLOGICAL FRACTURE: Primary | ICD-10-CM

## 2022-09-15 DIAGNOSIS — M05.79 RHEUMATOID ARTHRITIS INVOLVING MULTIPLE SITES WITH POSITIVE RHEUMATOID FACTOR: ICD-10-CM

## 2022-09-15 DIAGNOSIS — D84.9 IMMUNOCOMPROMISED: ICD-10-CM

## 2022-09-15 LAB
ALBUMIN SERPL BCP-MCNC: 4 G/DL (ref 3.5–5.2)
ALP SERPL-CCNC: 42 U/L (ref 55–135)
ALT SERPL W/O P-5'-P-CCNC: 8 U/L (ref 10–44)
ANION GAP SERPL CALC-SCNC: 12 MMOL/L (ref 8–16)
AST SERPL-CCNC: 28 U/L (ref 10–40)
BILIRUB SERPL-MCNC: 0.6 MG/DL (ref 0.1–1)
BUN SERPL-MCNC: 15 MG/DL (ref 8–23)
CALCIUM SERPL-MCNC: 10 MG/DL (ref 8.7–10.5)
CHLORIDE SERPL-SCNC: 103 MMOL/L (ref 95–110)
CO2 SERPL-SCNC: 24 MMOL/L (ref 23–29)
CREAT SERPL-MCNC: 1.4 MG/DL (ref 0.5–1.4)
EST. GFR  (NO RACE VARIABLE): 37.3 ML/MIN/1.73 M^2
GLUCOSE SERPL-MCNC: 125 MG/DL (ref 70–110)
POTASSIUM SERPL-SCNC: 5.4 MMOL/L (ref 3.5–5.1)
PROT SERPL-MCNC: 7.1 G/DL (ref 6–8.4)
SODIUM SERPL-SCNC: 139 MMOL/L (ref 136–145)

## 2022-09-15 PROCEDURE — 63600175 PHARM REV CODE 636 W HCPCS: Mod: JG,PN | Performed by: INTERNAL MEDICINE

## 2022-09-15 PROCEDURE — 80053 COMPREHEN METABOLIC PANEL: CPT | Mod: PN | Performed by: INTERNAL MEDICINE

## 2022-09-15 PROCEDURE — 96372 THER/PROPH/DIAG INJ SC/IM: CPT | Mod: PN

## 2022-09-15 PROCEDURE — 36415 COLL VENOUS BLD VENIPUNCTURE: CPT | Mod: PN | Performed by: INTERNAL MEDICINE

## 2022-09-15 RX ADMIN — DENOSUMAB 60 MG: 60 INJECTION SUBCUTANEOUS at 10:09

## 2022-09-15 NOTE — PLAN OF CARE
Problem: Adult Inpatient Plan of Care  Goal: Plan of Care Review  Outcome: Ongoing, Progressing  Goal: Patient-Specific Goal (Individualized)  Outcome: Ongoing, Progressing     Problem: Fatigue  Goal: Improved Activity Tolerance  Outcome: Ongoing, Progressing   Pt tolerated prolia injection well.   No adverse reaction noted.  All questions answered.  Pt left clinic in no acute distress.

## 2022-09-21 ENCOUNTER — LAB VISIT (OUTPATIENT)
Dept: LAB | Facility: HOSPITAL | Age: 82
End: 2022-09-21
Attending: INTERNAL MEDICINE
Payer: COMMERCIAL

## 2022-09-21 DIAGNOSIS — M05.79 RHEUMATOID ARTHRITIS INVOLVING MULTIPLE SITES WITH POSITIVE RHEUMATOID FACTOR: ICD-10-CM

## 2022-09-21 DIAGNOSIS — D84.9 IMMUNOCOMPROMISED: ICD-10-CM

## 2022-09-21 LAB
ALBUMIN SERPL BCP-MCNC: 3.9 G/DL (ref 3.5–5.2)
ALP SERPL-CCNC: 41 U/L (ref 55–135)
ALT SERPL W/O P-5'-P-CCNC: 9 U/L (ref 10–44)
ANION GAP SERPL CALC-SCNC: 7 MMOL/L (ref 8–16)
AST SERPL-CCNC: 32 U/L (ref 10–40)
BASOPHILS # BLD AUTO: 0.05 K/UL (ref 0–0.2)
BASOPHILS NFR BLD: 0.8 % (ref 0–1.9)
BILIRUB SERPL-MCNC: 0.7 MG/DL (ref 0.1–1)
BUN SERPL-MCNC: 18 MG/DL (ref 8–23)
CALCIUM SERPL-MCNC: 9.6 MG/DL (ref 8.7–10.5)
CHLORIDE SERPL-SCNC: 104 MMOL/L (ref 95–110)
CO2 SERPL-SCNC: 27 MMOL/L (ref 23–29)
CREAT SERPL-MCNC: 1.5 MG/DL (ref 0.5–1.4)
CRP SERPL-MCNC: 1.1 MG/L (ref 0–8.2)
DIFFERENTIAL METHOD: ABNORMAL
EOSINOPHIL # BLD AUTO: 0.2 K/UL (ref 0–0.5)
EOSINOPHIL NFR BLD: 3 % (ref 0–8)
ERYTHROCYTE [DISTWIDTH] IN BLOOD BY AUTOMATED COUNT: 14.1 % (ref 11.5–14.5)
ERYTHROCYTE [SEDIMENTATION RATE] IN BLOOD BY PHOTOMETRIC METHOD: 7 MM/HR (ref 0–36)
EST. GFR  (NO RACE VARIABLE): 34.4 ML/MIN/1.73 M^2
GLUCOSE SERPL-MCNC: 125 MG/DL (ref 70–110)
HCT VFR BLD AUTO: 42.7 % (ref 37–48.5)
HGB BLD-MCNC: 13.5 G/DL (ref 12–16)
IMM GRANULOCYTES # BLD AUTO: 0.02 K/UL (ref 0–0.04)
IMM GRANULOCYTES NFR BLD AUTO: 0.3 % (ref 0–0.5)
LYMPHOCYTES # BLD AUTO: 1.1 K/UL (ref 1–4.8)
LYMPHOCYTES NFR BLD: 18.1 % (ref 18–48)
MCH RBC QN AUTO: 34.2 PG (ref 27–31)
MCHC RBC AUTO-ENTMCNC: 31.6 G/DL (ref 32–36)
MCV RBC AUTO: 108 FL (ref 82–98)
MONOCYTES # BLD AUTO: 0.8 K/UL (ref 0.3–1)
MONOCYTES NFR BLD: 13 % (ref 4–15)
NEUTROPHILS # BLD AUTO: 3.9 K/UL (ref 1.8–7.7)
NEUTROPHILS NFR BLD: 64.8 % (ref 38–73)
NRBC BLD-RTO: 0 /100 WBC
PLATELET # BLD AUTO: 147 K/UL (ref 150–450)
PMV BLD AUTO: 12.8 FL (ref 9.2–12.9)
POTASSIUM SERPL-SCNC: 4.8 MMOL/L (ref 3.5–5.1)
PROT SERPL-MCNC: 7 G/DL (ref 6–8.4)
RBC # BLD AUTO: 3.95 M/UL (ref 4–5.4)
SODIUM SERPL-SCNC: 138 MMOL/L (ref 136–145)
WBC # BLD AUTO: 6.07 K/UL (ref 3.9–12.7)

## 2022-09-21 PROCEDURE — 36415 COLL VENOUS BLD VENIPUNCTURE: CPT | Mod: PO | Performed by: INTERNAL MEDICINE

## 2022-09-21 PROCEDURE — 86140 C-REACTIVE PROTEIN: CPT | Performed by: INTERNAL MEDICINE

## 2022-09-21 PROCEDURE — 85652 RBC SED RATE AUTOMATED: CPT | Performed by: INTERNAL MEDICINE

## 2022-09-21 PROCEDURE — 80053 COMPREHEN METABOLIC PANEL: CPT | Performed by: INTERNAL MEDICINE

## 2022-09-21 PROCEDURE — 85025 COMPLETE CBC W/AUTO DIFF WBC: CPT | Performed by: INTERNAL MEDICINE

## 2022-09-28 ENCOUNTER — OFFICE VISIT (OUTPATIENT)
Dept: RHEUMATOLOGY | Facility: CLINIC | Age: 82
End: 2022-09-28
Payer: COMMERCIAL

## 2022-09-28 VITALS
SYSTOLIC BLOOD PRESSURE: 191 MMHG | HEART RATE: 67 BPM | WEIGHT: 176.25 LBS | HEIGHT: 61 IN | BODY MASS INDEX: 33.28 KG/M2 | DIASTOLIC BLOOD PRESSURE: 84 MMHG

## 2022-09-28 DIAGNOSIS — D84.9 IMMUNOCOMPROMISED: ICD-10-CM

## 2022-09-28 DIAGNOSIS — M06.9: ICD-10-CM

## 2022-09-28 DIAGNOSIS — M05.79 RHEUMATOID ARTHRITIS INVOLVING MULTIPLE SITES WITH POSITIVE RHEUMATOID FACTOR: Primary | ICD-10-CM

## 2022-09-28 DIAGNOSIS — N18.30 STAGE 3 CHRONIC KIDNEY DISEASE, UNSPECIFIED WHETHER STAGE 3A OR 3B CKD: ICD-10-CM

## 2022-09-28 DIAGNOSIS — M25.449 EFFUSION OF PROXIMAL INTERPHALANGEAL (PIP) JOINT OF FINGER: ICD-10-CM

## 2022-09-28 PROCEDURE — 1101F PR PT FALLS ASSESS DOC 0-1 FALLS W/OUT INJ PAST YR: ICD-10-PCS | Mod: CPTII,S$GLB,, | Performed by: INTERNAL MEDICINE

## 2022-09-28 PROCEDURE — 1125F PR PAIN SEVERITY QUANTIFIED, PAIN PRESENT: ICD-10-PCS | Mod: CPTII,S$GLB,, | Performed by: INTERNAL MEDICINE

## 2022-09-28 PROCEDURE — 3288F PR FALLS RISK ASSESSMENT DOCUMENTED: ICD-10-PCS | Mod: CPTII,S$GLB,, | Performed by: INTERNAL MEDICINE

## 2022-09-28 PROCEDURE — 1157F ADVNC CARE PLAN IN RCRD: CPT | Mod: CPTII,S$GLB,, | Performed by: INTERNAL MEDICINE

## 2022-09-28 PROCEDURE — 99213 PR OFFICE/OUTPT VISIT, EST, LEVL III, 20-29 MIN: ICD-10-PCS | Mod: 25,S$GLB,, | Performed by: INTERNAL MEDICINE

## 2022-09-28 PROCEDURE — 99999 PR PBB SHADOW E&M-EST. PATIENT-LVL IV: CPT | Mod: PBBFAC,,, | Performed by: INTERNAL MEDICINE

## 2022-09-28 PROCEDURE — 1101F PT FALLS ASSESS-DOCD LE1/YR: CPT | Mod: CPTII,S$GLB,, | Performed by: INTERNAL MEDICINE

## 2022-09-28 PROCEDURE — 1159F MED LIST DOCD IN RCRD: CPT | Mod: CPTII,S$GLB,, | Performed by: INTERNAL MEDICINE

## 2022-09-28 PROCEDURE — 3079F PR MOST RECENT DIASTOLIC BLOOD PRESSURE 80-89 MM HG: ICD-10-PCS | Mod: CPTII,S$GLB,, | Performed by: INTERNAL MEDICINE

## 2022-09-28 PROCEDURE — 1125F AMNT PAIN NOTED PAIN PRSNT: CPT | Mod: CPTII,S$GLB,, | Performed by: INTERNAL MEDICINE

## 2022-09-28 PROCEDURE — 20600 DRAIN/INJ JOINT/BURSA W/O US: CPT | Mod: F2,F3,S$GLB, | Performed by: INTERNAL MEDICINE

## 2022-09-28 PROCEDURE — 3077F SYST BP >= 140 MM HG: CPT | Mod: CPTII,S$GLB,, | Performed by: INTERNAL MEDICINE

## 2022-09-28 PROCEDURE — 1157F PR ADVANCE CARE PLAN OR EQUIV PRESENT IN MEDICAL RECORD: ICD-10-PCS | Mod: CPTII,S$GLB,, | Performed by: INTERNAL MEDICINE

## 2022-09-28 PROCEDURE — 3077F PR MOST RECENT SYSTOLIC BLOOD PRESSURE >= 140 MM HG: ICD-10-PCS | Mod: CPTII,S$GLB,, | Performed by: INTERNAL MEDICINE

## 2022-09-28 PROCEDURE — 3079F DIAST BP 80-89 MM HG: CPT | Mod: CPTII,S$GLB,, | Performed by: INTERNAL MEDICINE

## 2022-09-28 PROCEDURE — 3288F FALL RISK ASSESSMENT DOCD: CPT | Mod: CPTII,S$GLB,, | Performed by: INTERNAL MEDICINE

## 2022-09-28 PROCEDURE — 99213 OFFICE O/P EST LOW 20 MIN: CPT | Mod: 25,S$GLB,, | Performed by: INTERNAL MEDICINE

## 2022-09-28 PROCEDURE — 1159F PR MEDICATION LIST DOCUMENTED IN MEDICAL RECORD: ICD-10-PCS | Mod: CPTII,S$GLB,, | Performed by: INTERNAL MEDICINE

## 2022-09-28 PROCEDURE — 99999 PR PBB SHADOW E&M-EST. PATIENT-LVL IV: ICD-10-PCS | Mod: PBBFAC,,, | Performed by: INTERNAL MEDICINE

## 2022-09-28 PROCEDURE — 20600 SMALL JOINT ASPIRATION/INJECTION: L LONG PIP: ICD-10-PCS | Mod: F2,F3,S$GLB, | Performed by: INTERNAL MEDICINE

## 2022-09-28 RX ADMIN — METHYLPREDNISOLONE ACETATE 40 MG: 40 INJECTION, SUSPENSION INTRA-ARTICULAR; INTRALESIONAL; INTRAMUSCULAR; SOFT TISSUE at 03:09

## 2022-09-28 ASSESSMENT — ROUTINE ASSESSMENT OF PATIENT INDEX DATA (RAPID3)
PSYCHOLOGICAL DISTRESS SCORE: 1.1
TOTAL RAPID3 SCORE: 2.11
PATIENT GLOBAL ASSESSMENT SCORE: 1
PAIN SCORE: 1
MDHAQ FUNCTION SCORE: 1.3
FATIGUE SCORE: 1.1

## 2022-09-28 NOTE — PROGRESS NOTES
AS Subjective:          Chief Complaint: Juanis Mclaughlin is a 82 y.o. female who had concerns including Disease Management.    HPI:      Rheumatoid Arthritis   7ey/o  female with h/o HTN, HLD was diagnosed with sero +  Rheumatoid arthritis in 1998. Off prednisone now.      Enbrel 50mg weekly on hold since March 2020. - and no worsening.   MTX 5 tabs weekly.   Started HCQ 200mg once daily and feels no worse. Joints are stable NEEDS #90 day supply.       Pain in the DIP and PIP joints. Most painful is the left 3rd and 4th PIP with swelling this visit.    No ASE of stomatitis,  infections or injection site reaction.   She was also diagnosed with secondary Sjogren's. Currently on restasis.  PPM for bradycardia.     Past 12 months slow decline in renal function from prior year- on PO bisphos and ACEI/HCTZ can switch this to Prolia.  Started Prolia 02/29/2019 she had another injection as of 07/20/2019 tolerated well no adverse side effects renal function stable.    She denies fever, weight loss, photosensitivity, rash, ulcer, raynaud's phenomenon, alopecia, dysphagia, diarrhea or blood in the stools.  + hx of GERD. On PPI. Hx of mild MR. No pedal edema.     Doing well with Dr. Madison with Breo and Gabapentin to relax cough impulse.  As of this visit 02/29/2019 patient was having upper respiratory tract infection x3 weeks seem to be feeling better within 10 days ago returned with a more productive cough postnasal drip rhinitis    REVIEW OF SYSTEMS:    Review of Systems   Constitutional:  Negative for fever, malaise/fatigue and weight loss.   HENT:  Negative for sore throat.    Eyes:  Negative for double vision, photophobia and redness.   Respiratory:  Positive for cough and sputum production. Negative for hemoptysis, shortness of breath and wheezing.    Cardiovascular:  Negative for chest pain, palpitations and orthopnea.   Gastrointestinal:  Negative for abdominal pain, constipation and diarrhea.   Genitourinary:   Negative for dysuria, hematuria and urgency.   Musculoskeletal:  Positive for joint pain. Negative for back pain and myalgias.   Skin:  Negative for rash.   Neurological:  Negative for dizziness, tingling, focal weakness and headaches.   Endo/Heme/Allergies:  Does not bruise/bleed easily.   Psychiatric/Behavioral:  Negative for depression, hallucinations and suicidal ideas.               Objective:            Past Medical History:   Diagnosis Date    Arthritis     rheumatoid arthritis    Cardiomyopathy     CKD (chronic kidney disease) stage 3, GFR 30-59 ml/min 1/9/2019    Diverticulosis     Diverticulosis     DVT (deep venous thrombosis)     one time 5 years ago    Essential hypertension 11/24/2013    GERD (gastroesophageal reflux disease)     Glaucoma     sees Dr. Gillette    Hyperlipidemia     Hypertension     Iron deficiency     Iron deficiency anemia 10/21/2016     IMO LOAD Q4    Long-term use of immunosuppressant medication 7/27/2015    Mitral regurgitation 12/20/2013 12/13 mild     Osteoporosis 7/9/2014    Pacemaker 10/26/2016    left chest [employer]ER [employer] [address] [employer]  [id number] Bonifacio Fuller 9/9/2016 - current   right atrium LEAD [employer] [address] [employer] MRI [id number] Bonifacio Fuller 9/9/2016 - current   right ventricle LEAD [employer] [address] [employer] MRI [id number] Bonifacio Fuller 9/9/2016 - current      PONV (postoperative nausea and vomiting)     Rheumatoid arthritis involving multiple sites with positive rheumatoid factor 11/24/2013    Vitamin B12 deficiency 2/28/2015     Family History   Problem Relation Age of Onset    Heart disease Mother         CHF    Hypertension Mother     Heart disease Father     Stroke Father     Ovarian cancer Maternal Aunt 34    Heart disease Brother     Stroke Brother     Heart disease Brother     Scleroderma Brother     Breast cancer Neg Hx      Social History     Tobacco Use    Smoking status: Never    Smokeless  tobacco: Never   Substance Use Topics    Alcohol use: Yes     Alcohol/week: 0.8 standard drinks     Types: 1 Standard drinks or equivalent per week     Comment: occ social    Drug use: No         Current Outpatient Medications on File Prior to Visit   Medication Sig Dispense Refill    B-complex with vitamin C (Z-BEC OR EQUIV) tablet Take 1 tablet by mouth once daily.      brimonidine-timoloL (COMBIGAN) 0.2-0.5 % Drop Place 1 drop into both eyes 2 (two) times daily.      candesartan (ATACAND) 16 MG tablet Take 1 tablet (16 mg total) by mouth once daily. 90 tablet 3    denosumab (PROLIA) 60 mg/mL Syrg Inject 60 mg into the skin every 6 (six) months.      estradioL (ESTRACE) 0.01 % (0.1 mg/gram) vaginal cream PLACE 1 GRAM VAGINALLY ONCEDAILY (Patient taking differently: every 7 days. PLACE 1 GRAM VAGINALLY ONCEDAILY) 127.5 g 2    folic acid (FOLVITE) 1 MG tablet TAKE 2 TABLETS ONCE DAILY 180 tablet 3    furosemide (LASIX) 20 MG tablet TAKE 1 TABLET BY MOUTH DAILY AS NEEDED FOR EDEMA.  Strength: 20 mg 90 tablet 4    hydrOXYchloroQUINE (PLAQUENIL) 200 mg tablet TAKE 1 TABLET ONCE DAILY 90 tablet 3    latanoprost 0.005 % ophthalmic solution Place 1 drop into both eyes every evening.      magnesium 200 mg Tab Take 400 mg by mouth once daily. Magnesium 400mg with Chelated Zinc 15mg.      methotrexate 2.5 MG Tab TAKE 5 TABLETS EVERY 7 DAYS 60 tablet 3    metoprolol succinate (TOPROL-XL) 50 MG 24 hr tablet Take 1 tablet (50 mg total) by mouth once daily. 90 tablet 4    pantoprazole (PROTONIX) 40 MG tablet Take 1 tablet (40 mg total) by mouth once daily. 90 tablet 2    tixagevimab/cilgavimab (EVUSHELD, EUA, IM) Inject into the muscle every 6 (six) months.      XIIDRA 5 % Dpet Place 1 drop into both eyes 2 (two) times daily.        No current facility-administered medications on file prior to visit.       Vitals:    09/28/22 1518   BP: (!) 191/84   Pulse: 67       Physical Exam:    Physical Exam  Constitutional:        Appearance: Normal appearance. She is well-developed.   HENT:      Nose: No septal deviation.      Mouth/Throat:      Mouth: No oral lesions.   Eyes:      Conjunctiva/sclera:      Right eye: Right conjunctiva is not injected.      Left eye: Left conjunctiva is not injected.      Pupils: Pupils are equal, round, and reactive to light.   Neck:      Thyroid: No thyroid mass or thyromegaly.      Vascular: No JVD.   Cardiovascular:      Rate and Rhythm: Normal rate and regular rhythm.      Pulses: Normal pulses.      Comments: No edema  Pulmonary:      Effort: Pulmonary effort is normal.      Breath sounds: Examination of the right-middle field reveals wheezing. Examination of the left-middle field reveals wheezing. Wheezing present.   Abdominal:      Palpations: Abdomen is soft.   Musculoskeletal:      Right shoulder: No swelling or tenderness. Normal range of motion.      Left shoulder: No swelling or tenderness. Normal range of motion.      Right elbow: No swelling. Normal range of motion. No tenderness.      Left elbow: No swelling. Normal range of motion. No tenderness.      Right wrist: Tenderness present. No swelling. Decreased range of motion.      Left wrist: No swelling or tenderness. Normal range of motion.      Right hand: Tenderness present. Decreased range of motion.      Left hand: Tenderness present.      Right hip: Normal range of motion. Normal strength.      Left hip: No tenderness. Normal range of motion.      Right knee: No swelling. Decreased range of motion. Tenderness present over the medial joint line and lateral joint line.      Left knee: No swelling. Normal range of motion. No tenderness.      Right ankle: No swelling. No tenderness. Normal range of motion.      Left ankle: No swelling. No tenderness. Normal range of motion.      Comments: 1+ synovitis MCP and PIP with heberden nodes all DIP 2-5. Left wrist with tenderness and swelling about the TFCC. No nodules. No flexion  contractures.      Cervical with left sided point tenderness and the occiput (nuchal ridge) lesser tenderness at the cervical paraspinals. Decreased ROM in cervical spine   Lymphadenopathy:      Cervical: No cervical adenopathy.   Skin:     General: Skin is dry.   Neurological:      Deep Tendon Reflexes: Reflexes are normal and symmetric.             Assessment:       Encounter Diagnoses   Name Primary?    Rheumatoid arthritis involving multiple sites with positive rheumatoid factor Yes    Immunocompromised     Stage 3 chronic kidney disease, unspecified whether stage 3a or 3b CKD     Effusion of proximal interphalangeal (PIP) joint of finger     Rheumatoid arthritis of proximal interphalangeal (PIP) joint of finger             Plan:        Rheumatoid arthritis involving multiple sites with positive rheumatoid factor  -     CBC Auto Differential; Standing; Expected date: 09/28/2022  -     Comprehensive Metabolic Panel; Standing; Expected date: 09/28/2022  -     Sedimentation rate; Standing; Expected date: 09/28/2022  -     C-Reactive Protein; Standing; Expected date: 09/28/2022    Immunocompromised  -     CBC Auto Differential; Standing; Expected date: 09/28/2022  -     Comprehensive Metabolic Panel; Standing; Expected date: 09/28/2022  -     Sedimentation rate; Standing; Expected date: 09/28/2022  -     C-Reactive Protein; Standing; Expected date: 09/28/2022    Stage 3 chronic kidney disease, unspecified whether stage 3a or 3b CKD    Effusion of proximal interphalangeal (PIP) joint of finger  -     Small Joint Aspiration/Injection: L long PIP    Rheumatoid arthritis of proximal interphalangeal (PIP) joint of finger  -     Small Joint Aspiration/Injection: L ring PIP         RA:    -MTX  5 tabs weekly- 90 day supply   HCQ 200mg once dialy 90 day   -continue folic acid 2mg dialy   -OFF Enbrel   -voltaren gel PRN fingers/hands-using sparingly   -Labs q 3 months.    Neck Pain:    -seems to be stable and not problematic  as of last 6 months. Great.    Osteoporosis   ASE  actonel   -doing great on Prolia. Last dose 3/26825nz and 4th left PIP joints swollen : injected each today see procedure note.            No follow-ups on file.  F/u in 4 months         30min consultation with greater than 50% spent in counseling, chart review and coordination of care. All questions answered.

## 2022-09-28 NOTE — PATIENT INSTRUCTIONS
Rheumatology Patients,    The new formulation for Moderna and Pfizer booster vaccine that will cover for Omicron is now available.     You can schedule:   [facility] by calling [phone]     [facility] to schedule vaccine call [phone] and select option 1

## 2022-10-26 ENCOUNTER — IMMUNIZATION (OUTPATIENT)
Dept: FAMILY MEDICINE | Facility: CLINIC | Age: 82
End: 2022-10-26
Payer: COMMERCIAL

## 2022-10-26 DIAGNOSIS — Z23 NEED FOR VACCINATION: Primary | ICD-10-CM

## 2022-10-26 PROCEDURE — 0124A COVID-19, MRNA, LNP-S, BIVALENT BOOSTER, PF, 30 MCG/0.3 ML DOSE: CPT | Mod: PBBFAC | Performed by: FAMILY MEDICINE

## 2022-10-26 PROCEDURE — 90471 IMMUNIZATION ADMIN: CPT | Mod: S$GLB,,, | Performed by: FAMILY MEDICINE

## 2022-10-26 PROCEDURE — 90694 FLU VACCINE - QUADRIVALENT - ADJUVANTED: ICD-10-PCS | Mod: S$GLB,,, | Performed by: FAMILY MEDICINE

## 2022-10-26 PROCEDURE — 91312 COVID-19, MRNA, LNP-S, BIVALENT BOOSTER, PF, 30 MCG/0.3 ML DOSE: CPT | Mod: S$GLB,,, | Performed by: FAMILY MEDICINE

## 2022-10-26 PROCEDURE — 91312 COVID-19, MRNA, LNP-S, BIVALENT BOOSTER, PF, 30 MCG/0.3 ML DOSE: ICD-10-PCS | Mod: S$GLB,,, | Performed by: FAMILY MEDICINE

## 2022-10-26 PROCEDURE — 90471 FLU VACCINE - QUADRIVALENT - ADJUVANTED: ICD-10-PCS | Mod: S$GLB,,, | Performed by: FAMILY MEDICINE

## 2022-10-26 PROCEDURE — 90694 VACC AIIV4 NO PRSRV 0.5ML IM: CPT | Mod: S$GLB,,, | Performed by: FAMILY MEDICINE

## 2022-10-27 ENCOUNTER — TELEPHONE (OUTPATIENT)
Dept: INFUSION THERAPY | Facility: HOSPITAL | Age: 82
End: 2022-10-27
Payer: COMMERCIAL

## 2022-10-27 NOTE — TELEPHONE ENCOUNTER
----- Message from Alyson Valdovinos sent at 10/27/2022  8:36 AM CDT -----  Type: Needs Medical Advice  Who Called:  Patient   Symptoms (please be specific):    How long has patient had these symptoms:    Pharmacy name and phone #:    Best Call Back Number: [phone]  Additional Information: Patient is requesting a call back to reschedule her Evushield injection on 10/31 due to other shots being taken.

## 2022-10-31 RX ORDER — METHYLPREDNISOLONE ACETATE 40 MG/ML
40 INJECTION, SUSPENSION INTRA-ARTICULAR; INTRALESIONAL; INTRAMUSCULAR; SOFT TISSUE
Status: DISCONTINUED | OUTPATIENT
Start: 2022-09-28 | End: 2022-10-31 | Stop reason: HOSPADM

## 2022-10-31 NOTE — PROCEDURES
Small Joint Aspiration/Injection: L ring PIP    Date/Time: 9/28/2022 3:00 PM  Performed by: Christina Cruz DO  Authorized by: Christina Cruz, DO     Consent Done?:  Yes (Verbal)  Indications:  Arthritis  Site marked: the procedure site was marked    Timeout: prior to procedure the correct patient, procedure, and site was verified    Prep: patient was prepped and draped in usual sterile fashion      Local anesthesia used?: Yes    Anesthesia:  Local infiltration  Local anesthetic:  Lidocaine 1% without epinephrine  Anesthetic total (ml):  0.25    Location:  Ring finger  Site:  L ring PIP  Ultrasonic guidance for needle placement?: No    Needle gauge: 30 gauge 1/2 inch.  Medications:  40 mg methylPREDNISolone acetate 40 mg/mL  Patient tolerance:  Patient tolerated the procedure well with no immediate complications    Additional Comments: Patient informed of the risks, benefits, side effects of corticosteroid injections including but not limited to skin hypopigmentation, atrophy, ineffectiveness and infection.

## 2022-10-31 NOTE — PROCEDURES
February 19, 2025    Patient: Andreas Hassan   Date of Visit: 2/19/2025       To Whom It May Concern:    Andreas Hassan was seen and treated in our emergency department on 2/19/2025. He can return to school anytime after tomorrow, 2/20/25 with no restrictions.    If you have any questions or concerns, please don't hesitate to call.       Encounter Provider(s):    Eugenia Ramos MD         Small Joint Aspiration/Injection: L long PIP    Date/Time: 9/28/2022 3:00 PM  Performed by: Christina Cruz,   Authorized by: Christina Cruz, DO     Consent Done?:  Yes (Verbal)  Indications:  Arthritis and pain  Site marked: the procedure site was marked    Timeout: prior to procedure the correct patient, procedure, and site was verified    Prep: patient was prepped and draped in usual sterile fashion      Local anesthesia used?: No    Local anesthetic:  Lidocaine 1% without epinephrine  Anesthetic total (ml):  0.25    Location:  Long finger (left 3rd PIP joints.)  Site:  L long PIP  Ultrasonic guidance for needle placement?: No    Needle gauge: 30 gauge 1/2 inch.  Medications:  40 mg methylPREDNISolone acetate 40 mg/mL  Patient tolerance:  Patient tolerated the procedure well with no immediate complications    Additional Comments: Patient informed of the risks, benefits, side effects of corticosteroid injections including but not limited to skin hypopigmentation, atrophy, ineffectiveness and infection.

## 2022-11-09 ENCOUNTER — INFUSION (OUTPATIENT)
Dept: INFUSION THERAPY | Facility: HOSPITAL | Age: 82
End: 2022-11-09
Attending: INTERNAL MEDICINE
Payer: COMMERCIAL

## 2022-11-09 VITALS
HEART RATE: 72 BPM | SYSTOLIC BLOOD PRESSURE: 141 MMHG | RESPIRATION RATE: 16 BRPM | TEMPERATURE: 97 F | DIASTOLIC BLOOD PRESSURE: 65 MMHG

## 2022-11-09 DIAGNOSIS — D84.9 IMMUNOSUPPRESSED STATUS: Primary | ICD-10-CM

## 2022-11-09 DIAGNOSIS — M47.812 SPONDYLOSIS OF CERVICAL REGION WITHOUT MYELOPATHY OR RADICULOPATHY: ICD-10-CM

## 2022-11-09 PROCEDURE — M0220 HC INJECTION ADMIN, TIXAGEVIMAB-CILGAVIMAB, INCL POST ADMIN MONIT: HCPCS | Performed by: INTERNAL MEDICINE

## 2022-11-09 PROCEDURE — 63600175 PHARM REV CODE 636 W HCPCS: Mod: PN | Performed by: INTERNAL MEDICINE

## 2022-11-09 RX ORDER — ONDANSETRON 4 MG/1
4 TABLET, ORALLY DISINTEGRATING ORAL ONCE
Status: CANCELLED | OUTPATIENT
Start: 2022-11-09 | End: 2022-11-09

## 2022-11-09 RX ORDER — DIPHENHYDRAMINE HCL 25 MG
25 CAPSULE ORAL ONCE
Status: CANCELLED | OUTPATIENT
Start: 2022-11-09 | End: 2022-11-09

## 2022-11-09 RX ORDER — EPINEPHRINE 0.3 MG/.3ML
0.3 INJECTION SUBCUTANEOUS
Status: CANCELLED | OUTPATIENT
Start: 2022-11-09

## 2022-11-09 RX ORDER — ALBUTEROL SULFATE 90 UG/1
2 AEROSOL, METERED RESPIRATORY (INHALATION) EVERY 4 HOURS PRN
Status: CANCELLED | OUTPATIENT
Start: 2022-11-09

## 2022-11-09 RX ORDER — ACETAMINOPHEN 325 MG/1
650 TABLET ORAL ONCE
Status: CANCELLED | OUTPATIENT
Start: 2022-11-09 | End: 2022-11-09

## 2022-11-09 RX ORDER — PREDNISONE 20 MG/1
40 TABLET ORAL ONCE
Status: CANCELLED | OUTPATIENT
Start: 2022-11-09 | End: 2022-11-09

## 2022-11-09 RX ADMIN — Medication 300 MG: at 09:11

## 2022-11-09 NOTE — PLAN OF CARE
.Patient received two gluteal intramuscular injections (left and right) of tixagevimab/cilgavimab (EVUSHELD) with no difficulties tolerating the medication

## 2022-12-21 ENCOUNTER — LAB VISIT (OUTPATIENT)
Dept: FAMILY MEDICINE | Facility: CLINIC | Age: 82
End: 2022-12-21
Payer: COMMERCIAL

## 2022-12-21 DIAGNOSIS — D84.9 IMMUNOCOMPROMISED: ICD-10-CM

## 2022-12-21 DIAGNOSIS — M05.79 RHEUMATOID ARTHRITIS INVOLVING MULTIPLE SITES WITH POSITIVE RHEUMATOID FACTOR: ICD-10-CM

## 2022-12-21 LAB
ALBUMIN SERPL BCP-MCNC: 4 G/DL (ref 3.5–5.2)
ALP SERPL-CCNC: 41 U/L (ref 55–135)
ALT SERPL W/O P-5'-P-CCNC: 11 U/L (ref 10–44)
ANION GAP SERPL CALC-SCNC: 11 MMOL/L (ref 8–16)
AST SERPL-CCNC: 30 U/L (ref 10–40)
BASOPHILS # BLD AUTO: 0.09 K/UL (ref 0–0.2)
BASOPHILS NFR BLD: 1.2 % (ref 0–1.9)
BILIRUB SERPL-MCNC: 0.7 MG/DL (ref 0.1–1)
BUN SERPL-MCNC: 17 MG/DL (ref 8–23)
CALCIUM SERPL-MCNC: 10 MG/DL (ref 8.7–10.5)
CHLORIDE SERPL-SCNC: 105 MMOL/L (ref 95–110)
CO2 SERPL-SCNC: 25 MMOL/L (ref 23–29)
CREAT SERPL-MCNC: 1.6 MG/DL (ref 0.5–1.4)
CRP SERPL-MCNC: 5.4 MG/L (ref 0–8.2)
DIFFERENTIAL METHOD: ABNORMAL
EOSINOPHIL # BLD AUTO: 0.3 K/UL (ref 0–0.5)
EOSINOPHIL NFR BLD: 3.9 % (ref 0–8)
ERYTHROCYTE [DISTWIDTH] IN BLOOD BY AUTOMATED COUNT: 13.8 % (ref 11.5–14.5)
ERYTHROCYTE [SEDIMENTATION RATE] IN BLOOD BY PHOTOMETRIC METHOD: 22 MM/HR (ref 0–36)
EST. GFR  (NO RACE VARIABLE): 31.8 ML/MIN/1.73 M^2
GLUCOSE SERPL-MCNC: 124 MG/DL (ref 70–110)
HCT VFR BLD AUTO: 40.9 % (ref 37–48.5)
HGB BLD-MCNC: 13.2 G/DL (ref 12–16)
IMM GRANULOCYTES # BLD AUTO: 0.02 K/UL (ref 0–0.04)
IMM GRANULOCYTES NFR BLD AUTO: 0.3 % (ref 0–0.5)
LYMPHOCYTES # BLD AUTO: 1.6 K/UL (ref 1–4.8)
LYMPHOCYTES NFR BLD: 22.2 % (ref 18–48)
MCH RBC QN AUTO: 34.2 PG (ref 27–31)
MCHC RBC AUTO-ENTMCNC: 32.3 G/DL (ref 32–36)
MCV RBC AUTO: 106 FL (ref 82–98)
MONOCYTES # BLD AUTO: 1.1 K/UL (ref 0.3–1)
MONOCYTES NFR BLD: 14.5 % (ref 4–15)
NEUTROPHILS # BLD AUTO: 4.3 K/UL (ref 1.8–7.7)
NEUTROPHILS NFR BLD: 57.9 % (ref 38–73)
NRBC BLD-RTO: 0 /100 WBC
PLATELET # BLD AUTO: 205 K/UL (ref 150–450)
PMV BLD AUTO: 13 FL (ref 9.2–12.9)
POTASSIUM SERPL-SCNC: 4.5 MMOL/L (ref 3.5–5.1)
PROT SERPL-MCNC: 6.9 G/DL (ref 6–8.4)
RBC # BLD AUTO: 3.86 M/UL (ref 4–5.4)
SODIUM SERPL-SCNC: 141 MMOL/L (ref 136–145)
WBC # BLD AUTO: 7.4 K/UL (ref 3.9–12.7)

## 2022-12-21 PROCEDURE — 36415 PR COLLECTION VENOUS BLOOD,VENIPUNCTURE: ICD-10-PCS | Mod: S$GLB,,, | Performed by: INTERNAL MEDICINE

## 2022-12-21 PROCEDURE — 85025 COMPLETE CBC W/AUTO DIFF WBC: CPT | Performed by: INTERNAL MEDICINE

## 2022-12-21 PROCEDURE — 80053 COMPREHEN METABOLIC PANEL: CPT | Performed by: INTERNAL MEDICINE

## 2022-12-21 PROCEDURE — 85652 RBC SED RATE AUTOMATED: CPT | Performed by: INTERNAL MEDICINE

## 2022-12-21 PROCEDURE — 86140 C-REACTIVE PROTEIN: CPT | Performed by: INTERNAL MEDICINE

## 2022-12-21 PROCEDURE — 36415 COLL VENOUS BLD VENIPUNCTURE: CPT | Mod: S$GLB,,, | Performed by: INTERNAL MEDICINE

## 2023-02-13 ENCOUNTER — OFFICE VISIT (OUTPATIENT)
Dept: RHEUMATOLOGY | Facility: CLINIC | Age: 83
End: 2023-02-13
Payer: COMMERCIAL

## 2023-02-13 VITALS
DIASTOLIC BLOOD PRESSURE: 89 MMHG | WEIGHT: 177.69 LBS | BODY MASS INDEX: 33.55 KG/M2 | HEIGHT: 61 IN | SYSTOLIC BLOOD PRESSURE: 165 MMHG | HEART RATE: 74 BPM

## 2023-02-13 DIAGNOSIS — M25.449 EFFUSION OF PROXIMAL INTERPHALANGEAL (PIP) JOINT OF FINGER: ICD-10-CM

## 2023-02-13 DIAGNOSIS — M47.812 SPONDYLOSIS OF CERVICAL REGION WITHOUT MYELOPATHY OR RADICULOPATHY: ICD-10-CM

## 2023-02-13 DIAGNOSIS — N18.30 STAGE 3 CHRONIC KIDNEY DISEASE, UNSPECIFIED WHETHER STAGE 3A OR 3B CKD: ICD-10-CM

## 2023-02-13 DIAGNOSIS — E53.8 VITAMIN B12 DEFICIENCY: ICD-10-CM

## 2023-02-13 DIAGNOSIS — Z79.899 HIGH RISK MEDICATIONS (NOT ANTICOAGULANTS) LONG-TERM USE: ICD-10-CM

## 2023-02-13 DIAGNOSIS — M05.79 RHEUMATOID ARTHRITIS INVOLVING MULTIPLE SITES WITH POSITIVE RHEUMATOID FACTOR: Primary | ICD-10-CM

## 2023-02-13 PROCEDURE — 99999 PR PBB SHADOW E&M-EST. PATIENT-LVL I: ICD-10-PCS | Mod: PBBFAC,,, | Performed by: INTERNAL MEDICINE

## 2023-02-13 PROCEDURE — 99214 OFFICE O/P EST MOD 30 MIN: CPT | Mod: S$GLB,,, | Performed by: INTERNAL MEDICINE

## 2023-02-13 PROCEDURE — 99214 PR OFFICE/OUTPT VISIT, EST, LEVL IV, 30-39 MIN: ICD-10-PCS | Mod: S$GLB,,, | Performed by: INTERNAL MEDICINE

## 2023-02-13 PROCEDURE — 1157F ADVNC CARE PLAN IN RCRD: CPT | Mod: CPTII,S$GLB,, | Performed by: INTERNAL MEDICINE

## 2023-02-13 PROCEDURE — 99999 PR PBB SHADOW E&M-EST. PATIENT-LVL I: CPT | Mod: PBBFAC,,, | Performed by: INTERNAL MEDICINE

## 2023-02-13 PROCEDURE — 1157F PR ADVANCE CARE PLAN OR EQUIV PRESENT IN MEDICAL RECORD: ICD-10-PCS | Mod: CPTII,S$GLB,, | Performed by: INTERNAL MEDICINE

## 2023-02-13 RX ORDER — METHOTREXATE 2.5 MG/1
TABLET ORAL
Qty: 60 TABLET | Refills: 3 | Status: SHIPPED | OUTPATIENT
Start: 2023-02-13 | End: 2023-11-07

## 2023-02-13 RX ORDER — FOLIC ACID 1 MG/1
2000 TABLET ORAL DAILY
Qty: 180 TABLET | Refills: 3 | Status: SHIPPED | OUTPATIENT
Start: 2023-02-13 | End: 2024-03-08 | Stop reason: SDUPTHER

## 2023-02-13 RX ORDER — HYDROXYCHLOROQUINE SULFATE 200 MG/1
200 TABLET, FILM COATED ORAL 2 TIMES DAILY
Qty: 180 TABLET | Refills: 6 | Status: SHIPPED | OUTPATIENT
Start: 2023-02-13 | End: 2024-03-08 | Stop reason: SDUPTHER

## 2023-02-13 NOTE — PATIENT INSTRUCTIONS
Increase Hydroxychloroquine 200mg twice dialy  Keep Methotrexate  Labs every 3 months  Monitor hands and the ankles for pain at night and in the morning.

## 2023-02-13 NOTE — PROGRESS NOTES
AS Subjective:          Chief Complaint: Juanis Mclaughlin is a 82 y.o. female who had no chief complaint listed for this encounter.    HPI:      Rheumatoid Arthritis   81y/o  female with h/o HTN, HLD was diagnosed with sero +  Rheumatoid arthritis in 1998. Off prednisone now.      Enbrel 50mg weekly on hold since March 2020. - and no worsening.   MTX 5 tabs weekly.   Started HCQ 200mg once daily and feels no worse. Joints are stable NEEDS #90 day supply.       Pain in the DIP and PIP joints. Most painful is the left 3rd and 4th PIP with swelling this visit.    No ASE of stomatitis,  infections or injection site reaction.   She was also diagnosed with secondary Sjogren's. Currently on restasis.  PPM for bradycardia.     Past 12 months slow decline in renal function from prior year- on PO bisphos and ACEI/HCTZ can switch this to Prolia.  Started Prolia 02/29/2019 she had another injection as of 07/20/2019 tolerated well no adverse side effects renal function stable.    She denies fever, weight loss, photosensitivity, rash, ulcer, raynaud's phenomenon, alopecia, dysphagia, diarrhea or blood in the stools.  + hx of GERD. On PPI. Hx of mild MR. No pedal edema.     Doing well with Dr. Madison with Breo and Gabapentin to relax cough impulse.  As of this visit 02/29/2019 patient was having upper respiratory tract infection x3 weeks seem to be feeling better within 10 days ago returned with a more productive cough postnasal drip rhinitis    REVIEW OF SYSTEMS:    Review of Systems   Constitutional:  Negative for fever, malaise/fatigue and weight loss.   HENT:  Negative for sore throat.    Eyes:  Negative for double vision, photophobia and redness.   Respiratory:  Positive for cough and sputum production. Negative for hemoptysis, shortness of breath and wheezing.    Cardiovascular:  Negative for chest pain, palpitations and orthopnea.   Gastrointestinal:  Negative for abdominal pain, constipation and diarrhea.   Genitourinary:   Negative for dysuria, hematuria and urgency.   Musculoskeletal:  Positive for joint pain. Negative for back pain and myalgias.   Skin:  Negative for rash.   Neurological:  Negative for dizziness, tingling, focal weakness and headaches.   Endo/Heme/Allergies:  Does not bruise/bleed easily.   Psychiatric/Behavioral:  Negative for depression, hallucinations and suicidal ideas.               Objective:            Past Medical History:   Diagnosis Date    Arthritis     rheumatoid arthritis    Cardiomyopathy     CKD (chronic kidney disease) stage 3, GFR 30-59 ml/min 1/9/2019    Diverticulosis     Diverticulosis     DVT (deep venous thrombosis)     one time 5 years ago    Essential hypertension 11/24/2013    GERD (gastroesophageal reflux disease)     Glaucoma     sees Dr. Gillette    Hyperlipidemia     Hypertension     Iron deficiency     Iron deficiency anemia 10/21/2016     IMO LOAD Q4    Long-term use of immunosuppressant medication 7/27/2015    Mitral regurgitation 12/20/2013 12/13 mild     Osteoporosis 7/9/2014    Pacemaker 10/26/2016    left chest PACEMAKER [employer] [address] [facility]  S/N:[id number] Bonifacio Fuller 9/9/2016 - current   right atrium LEAD [employer] [address] [employer] [facility] [id number] Bonifacio Fuller 9/9/2016 - current   right ventricle LEAD [employer] [address] [employer] [facility] [id number] Bonifacio Fuller 9/9/2016 - current      PONV (postoperative nausea and vomiting)     Rheumatoid arthritis involving multiple sites with positive rheumatoid factor 11/24/2013    Vitamin B12 deficiency 2/28/2015     Family History   Problem Relation Age of Onset    Heart disease Mother         CHF    Hypertension Mother     Heart disease Father     Stroke Father     Ovarian cancer Maternal Aunt 34    Heart disease Brother     Stroke Brother     Heart disease Brother     Scleroderma Brother     Breast cancer Neg Hx      Social History     Tobacco Use    Smoking status: Never    Smokeless  tobacco: Never   Substance Use Topics    Alcohol use: Yes     Alcohol/week: 0.8 standard drinks     Types: 1 Standard drinks or equivalent per week     Comment: occ social    Drug use: No         Current Outpatient Medications on File Prior to Visit   Medication Sig Dispense Refill    B-complex with vitamin C (Z-BEC OR EQUIV) tablet Take 1 tablet by mouth once daily.      brimonidine-timoloL (COMBIGAN) 0.2-0.5 % Drop Place 1 drop into both eyes 2 (two) times daily.      candesartan (ATACAND) 16 MG tablet Take 1 tablet (16 mg total) by mouth once daily. 90 tablet 3    denosumab (PROLIA) 60 mg/mL Syrg Inject 60 mg into the skin every 6 (six) months.      folic acid (FOLVITE) 1 MG tablet TAKE 2 TABLETS ONCE DAILY 180 tablet 3    furosemide (LASIX) 20 MG tablet TAKE 1 TABLET BY MOUTH DAILY AS NEEDED FOR EDEMA.  Strength: 20 mg (Patient taking differently: Take 20 mg by mouth twice a week. TAKE 1 TABLET BY MOUTH DAILY AS NEEDED FOR EDEMA.  Strength: 20 mg) 90 tablet 4    hydrOXYchloroQUINE (PLAQUENIL) 200 mg tablet TAKE 1 TABLET ONCE DAILY 90 tablet 3    latanoprost 0.005 % ophthalmic solution Place 1 drop into both eyes every evening.      magnesium 200 mg Tab Take 400 mg by mouth once daily. Magnesium 400mg with Chelated Zinc 15mg.      methotrexate 2.5 MG Tab TAKE 5 TABLETS EVERY 7 DAYS 60 tablet 3    metoprolol succinate (TOPROL-XL) 50 MG 24 hr tablet Take 1 tablet (50 mg total) by mouth once daily. 90 tablet 4    pantoprazole (PROTONIX) 40 MG tablet Take 1 tablet (40 mg total) by mouth once daily. 90 tablet 2    potassium chloride SA (K-DUR,KLOR-CON) 20 MEQ tablet TAKE 1 TABLET BY MOUTH DAILY AS NEEDED FOR EDEMA. (Patient taking differently: Take 20 mEq by mouth twice a week.) 90 tablet 3    tixagevimab/cilgavimab (EVUSHELD, EUA, IM) Inject into the muscle every 6 (six) months.      XIIDRA 5 % Dpet Place 1 drop into both eyes 2 (two) times daily.        No current facility-administered medications on file prior to  visit.       There were no vitals filed for this visit.      Physical Exam:    Physical Exam  Constitutional:       Appearance: Normal appearance. She is well-developed.   HENT:      Nose: No septal deviation.      Mouth/Throat:      Mouth: No oral lesions.   Eyes:      Conjunctiva/sclera:      Right eye: Right conjunctiva is not injected.      Left eye: Left conjunctiva is not injected.      Pupils: Pupils are equal, round, and reactive to light.   Neck:      Thyroid: No thyroid mass or thyromegaly.      Vascular: No JVD.   Cardiovascular:      Rate and Rhythm: Normal rate and regular rhythm.      Pulses: Normal pulses.      Comments: No edema  Pulmonary:      Effort: Pulmonary effort is normal.      Breath sounds: Examination of the right-middle field reveals wheezing. Examination of the left-middle field reveals wheezing. Wheezing present.   Abdominal:      Palpations: Abdomen is soft.   Musculoskeletal:      Right shoulder: No swelling or tenderness. Normal range of motion.      Left shoulder: No swelling or tenderness. Normal range of motion.      Right elbow: No swelling. Normal range of motion. No tenderness.      Left elbow: No swelling. Normal range of motion. No tenderness.      Right wrist: Tenderness present. No swelling. Decreased range of motion.      Left wrist: No swelling or tenderness. Normal range of motion.      Right hand: Tenderness present. Decreased range of motion.      Left hand: Tenderness present.      Right hip: Normal range of motion. Normal strength.      Left hip: No tenderness. Normal range of motion.      Right knee: No swelling. Decreased range of motion. Tenderness present over the medial joint line and lateral joint line.      Left knee: No swelling. Normal range of motion. No tenderness.      Right ankle: No swelling. No tenderness. Normal range of motion.      Left ankle: No swelling. No tenderness. Normal range of motion.      Comments: 1+ synovitis MCP and PIP with heberden  nodes all DIP 2-5. Left wrist with tenderness and swelling about the TFCC. No nodules. No flexion contractures.      Cervical with left sided point tenderness and the occiput (nuchal ridge) lesser tenderness at the cervical paraspinals. Decreased ROM in cervical spine   Lymphadenopathy:      Cervical: No cervical adenopathy.   Skin:     General: Skin is dry.   Neurological:      Deep Tendon Reflexes: Reflexes are normal and symmetric.             Assessment:       Encounter Diagnoses   Name Primary?    Rheumatoid arthritis involving multiple sites with positive rheumatoid factor Yes    Stage 3 chronic kidney disease, unspecified whether stage 3a or 3b CKD     Effusion of proximal interphalangeal (PIP) joint of finger               Plan:        Rheumatoid arthritis involving multiple sites with positive rheumatoid factor  -     hydrOXYchloroQUINE (PLAQUENIL) 200 mg tablet; Take 1 tablet (200 mg total) by mouth 2 (two) times daily.  Dispense: 180 tablet; Refill: 6  -     CBC Auto Differential; Standing; Expected date: 02/13/2023  -     Comprehensive Metabolic Panel; Standing; Expected date: 02/13/2023  -     Sedimentation rate; Standing; Expected date: 02/13/2023  -     C-Reactive Protein; Standing; Expected date: 02/13/2023  -     methotrexate 2.5 MG Tab; TAKE 5 TABLETS EVERY 7 DAYS  Dispense: 60 tablet; Refill: 3  -     folic acid (FOLVITE) 1 MG tablet; Take 2 tablets (2,000 mcg total) by mouth once daily.  Dispense: 180 tablet; Refill: 3    Stage 3 chronic kidney disease, unspecified whether stage 3a or 3b CKD  -     hydrOXYchloroQUINE (PLAQUENIL) 200 mg tablet; Take 1 tablet (200 mg total) by mouth 2 (two) times daily.  Dispense: 180 tablet; Refill: 6  -     CBC Auto Differential; Standing; Expected date: 02/13/2023  -     Comprehensive Metabolic Panel; Standing; Expected date: 02/13/2023  -     Sedimentation rate; Standing; Expected date: 02/13/2023  -     C-Reactive Protein; Standing; Expected date:  02/13/2023  -     folic acid (FOLVITE) 1 MG tablet; Take 2 tablets (2,000 mcg total) by mouth once daily.  Dispense: 180 tablet; Refill: 3    Effusion of proximal interphalangeal (PIP) joint of finger  -     hydrOXYchloroQUINE (PLAQUENIL) 200 mg tablet; Take 1 tablet (200 mg total) by mouth 2 (two) times daily.  Dispense: 180 tablet; Refill: 6    Vitamin B12 deficiency  -     folic acid (FOLVITE) 1 MG tablet; Take 2 tablets (2,000 mcg total) by mouth once daily.  Dispense: 180 tablet; Refill: 3    Spondylosis of cervical region without myelopathy or radiculopathy  -     folic acid (FOLVITE) 1 MG tablet; Take 2 tablets (2,000 mcg total) by mouth once daily.  Dispense: 180 tablet; Refill: 3    High risk medications (not anticoagulants) long-term use  -     methotrexate 2.5 MG Tab; TAKE 5 TABLETS EVERY 7 DAYS  Dispense: 60 tablet; Refill: 3  -     folic acid (FOLVITE) 1 MG tablet; Take 2 tablets (2,000 mcg total) by mouth once daily.  Dispense: 180 tablet; Refill: 3      RA:    -MTX  5 tabs weekly- 90 day supply   HCQ 200mg once dialy 90 day   -continue folic acid 2mg dialy   -OFF Enbrel   -voltaren gel PRN fingers/hands-using sparingly   -Labs q 3 months.    Neck Pain:    -seems to be stable and not problematic as of last 6 months. Great.    Osteoporosis   ASE  actonel   -doing great on Prolia. Last dose [id number] and 4th left PIP joints swollen : injected each today see procedure note.      Follow up in about 4 months (around 6/13/2023).  F/u in 4 months         30min consultation with greater than 50% spent in counseling, chart review and coordination of care. All questions answered.

## 2023-03-09 ENCOUNTER — TELEPHONE (OUTPATIENT)
Dept: RHEUMATOLOGY | Facility: CLINIC | Age: 83
End: 2023-03-09
Payer: COMMERCIAL

## 2023-03-09 NOTE — TELEPHONE ENCOUNTER
Yung 4 due in March will have Calcium then.   Signed the orders a few times but the date is not updating.     Dr. Cruz

## 2023-03-14 ENCOUNTER — LAB VISIT (OUTPATIENT)
Dept: FAMILY MEDICINE | Facility: CLINIC | Age: 83
End: 2023-03-14
Payer: COMMERCIAL

## 2023-03-14 DIAGNOSIS — N18.30 STAGE 3 CHRONIC KIDNEY DISEASE, UNSPECIFIED WHETHER STAGE 3A OR 3B CKD: ICD-10-CM

## 2023-03-14 DIAGNOSIS — M05.79 RHEUMATOID ARTHRITIS INVOLVING MULTIPLE SITES WITH POSITIVE RHEUMATOID FACTOR: ICD-10-CM

## 2023-03-14 PROCEDURE — 80053 COMPREHEN METABOLIC PANEL: CPT | Performed by: INTERNAL MEDICINE

## 2023-03-14 PROCEDURE — 36415 COLL VENOUS BLD VENIPUNCTURE: CPT | Mod: S$GLB,,, | Performed by: INTERNAL MEDICINE

## 2023-03-14 PROCEDURE — 85652 RBC SED RATE AUTOMATED: CPT | Performed by: INTERNAL MEDICINE

## 2023-03-14 PROCEDURE — 85025 COMPLETE CBC W/AUTO DIFF WBC: CPT | Performed by: INTERNAL MEDICINE

## 2023-03-14 PROCEDURE — 86140 C-REACTIVE PROTEIN: CPT | Performed by: INTERNAL MEDICINE

## 2023-03-14 PROCEDURE — 36415 PR COLLECTION VENOUS BLOOD,VENIPUNCTURE: ICD-10-PCS | Mod: S$GLB,,, | Performed by: INTERNAL MEDICINE

## 2023-03-15 ENCOUNTER — INFUSION (OUTPATIENT)
Dept: INFUSION THERAPY | Facility: HOSPITAL | Age: 83
End: 2023-03-15
Attending: INTERNAL MEDICINE
Payer: COMMERCIAL

## 2023-03-15 VITALS
WEIGHT: 177.69 LBS | HEART RATE: 60 BPM | TEMPERATURE: 98 F | BODY MASS INDEX: 34.89 KG/M2 | SYSTOLIC BLOOD PRESSURE: 135 MMHG | OXYGEN SATURATION: 99 % | RESPIRATION RATE: 20 BRPM | DIASTOLIC BLOOD PRESSURE: 67 MMHG | HEIGHT: 60 IN

## 2023-03-15 DIAGNOSIS — M81.0 AGE-RELATED OSTEOPOROSIS WITHOUT CURRENT PATHOLOGICAL FRACTURE: Primary | ICD-10-CM

## 2023-03-15 DIAGNOSIS — K21.9 GASTROESOPHAGEAL REFLUX DISEASE, UNSPECIFIED WHETHER ESOPHAGITIS PRESENT: ICD-10-CM

## 2023-03-15 LAB
ALBUMIN SERPL BCP-MCNC: 4 G/DL (ref 3.5–5.2)
ALP SERPL-CCNC: 38 U/L (ref 55–135)
ALT SERPL W/O P-5'-P-CCNC: 12 U/L (ref 10–44)
ANION GAP SERPL CALC-SCNC: 7 MMOL/L (ref 8–16)
AST SERPL-CCNC: 33 U/L (ref 10–40)
BASOPHILS # BLD AUTO: 0.05 K/UL (ref 0–0.2)
BASOPHILS NFR BLD: 0.8 % (ref 0–1.9)
BILIRUB SERPL-MCNC: 0.7 MG/DL (ref 0.1–1)
BUN SERPL-MCNC: 14 MG/DL (ref 8–23)
CALCIUM SERPL-MCNC: 10.1 MG/DL (ref 8.7–10.5)
CHLORIDE SERPL-SCNC: 102 MMOL/L (ref 95–110)
CO2 SERPL-SCNC: 29 MMOL/L (ref 23–29)
CREAT SERPL-MCNC: 1.3 MG/DL (ref 0.5–1.4)
CRP SERPL-MCNC: 2.3 MG/L (ref 0–8.2)
DIFFERENTIAL METHOD: ABNORMAL
EOSINOPHIL # BLD AUTO: 0.2 K/UL (ref 0–0.5)
EOSINOPHIL NFR BLD: 3.1 % (ref 0–8)
ERYTHROCYTE [DISTWIDTH] IN BLOOD BY AUTOMATED COUNT: 14 % (ref 11.5–14.5)
ERYTHROCYTE [SEDIMENTATION RATE] IN BLOOD BY PHOTOMETRIC METHOD: 11 MM/HR (ref 0–36)
EST. GFR  (NO RACE VARIABLE): 40.8 ML/MIN/1.73 M^2
GLUCOSE SERPL-MCNC: 102 MG/DL (ref 70–110)
HCT VFR BLD AUTO: 41.5 % (ref 37–48.5)
HGB BLD-MCNC: 13 G/DL (ref 12–16)
IMM GRANULOCYTES # BLD AUTO: 0.01 K/UL (ref 0–0.04)
IMM GRANULOCYTES NFR BLD AUTO: 0.2 % (ref 0–0.5)
LYMPHOCYTES # BLD AUTO: 1.2 K/UL (ref 1–4.8)
LYMPHOCYTES NFR BLD: 20 % (ref 18–48)
MCH RBC QN AUTO: 33.2 PG (ref 27–31)
MCHC RBC AUTO-ENTMCNC: 31.3 G/DL (ref 32–36)
MCV RBC AUTO: 106 FL (ref 82–98)
MONOCYTES # BLD AUTO: 0.7 K/UL (ref 0.3–1)
MONOCYTES NFR BLD: 12.1 % (ref 4–15)
NEUTROPHILS # BLD AUTO: 3.8 K/UL (ref 1.8–7.7)
NEUTROPHILS NFR BLD: 63.8 % (ref 38–73)
NRBC BLD-RTO: 0 /100 WBC
PLATELET # BLD AUTO: 210 K/UL (ref 150–450)
PMV BLD AUTO: 13.8 FL (ref 9.2–12.9)
POTASSIUM SERPL-SCNC: 5.1 MMOL/L (ref 3.5–5.1)
PROT SERPL-MCNC: 6.7 G/DL (ref 6–8.4)
RBC # BLD AUTO: 3.92 M/UL (ref 4–5.4)
SODIUM SERPL-SCNC: 138 MMOL/L (ref 136–145)
WBC # BLD AUTO: 5.89 K/UL (ref 3.9–12.7)

## 2023-03-15 PROCEDURE — 96372 THER/PROPH/DIAG INJ SC/IM: CPT | Mod: PN

## 2023-03-15 PROCEDURE — 63600175 PHARM REV CODE 636 W HCPCS: Mod: JZ,JG,PN | Performed by: INTERNAL MEDICINE

## 2023-03-15 RX ADMIN — DENOSUMAB 60 MG: 60 INJECTION SUBCUTANEOUS at 11:03

## 2023-04-28 ENCOUNTER — TELEPHONE (OUTPATIENT)
Dept: INFUSION THERAPY | Facility: HOSPITAL | Age: 83
End: 2023-04-28

## 2023-07-05 ENCOUNTER — OFFICE VISIT (OUTPATIENT)
Dept: RHEUMATOLOGY | Facility: CLINIC | Age: 83
End: 2023-07-05
Payer: COMMERCIAL

## 2023-07-05 ENCOUNTER — LAB VISIT (OUTPATIENT)
Dept: LAB | Facility: HOSPITAL | Age: 83
End: 2023-07-05
Attending: INTERNAL MEDICINE
Payer: COMMERCIAL

## 2023-07-05 VITALS
WEIGHT: 170.44 LBS | HEIGHT: 60 IN | DIASTOLIC BLOOD PRESSURE: 79 MMHG | BODY MASS INDEX: 33.46 KG/M2 | HEART RATE: 64 BPM | SYSTOLIC BLOOD PRESSURE: 142 MMHG

## 2023-07-05 DIAGNOSIS — E53.8 VITAMIN B12 DEFICIENCY: ICD-10-CM

## 2023-07-05 DIAGNOSIS — N18.30 STAGE 3 CHRONIC KIDNEY DISEASE, UNSPECIFIED WHETHER STAGE 3A OR 3B CKD: ICD-10-CM

## 2023-07-05 DIAGNOSIS — Z79.899 HIGH RISK MEDICATIONS (NOT ANTICOAGULANTS) LONG-TERM USE: ICD-10-CM

## 2023-07-05 DIAGNOSIS — M05.79 RHEUMATOID ARTHRITIS INVOLVING MULTIPLE SITES WITH POSITIVE RHEUMATOID FACTOR: ICD-10-CM

## 2023-07-05 DIAGNOSIS — Z79.60 LONG-TERM USE OF IMMUNOSUPPRESSANT MEDICATION: ICD-10-CM

## 2023-07-05 DIAGNOSIS — M47.812 CERVICAL SPONDYLOSIS: ICD-10-CM

## 2023-07-05 DIAGNOSIS — M05.79 RHEUMATOID ARTHRITIS INVOLVING MULTIPLE SITES WITH POSITIVE RHEUMATOID FACTOR: Primary | ICD-10-CM

## 2023-07-05 DIAGNOSIS — M47.812 SPONDYLOSIS OF CERVICAL REGION WITHOUT MYELOPATHY OR RADICULOPATHY: ICD-10-CM

## 2023-07-05 DIAGNOSIS — M25.449 EFFUSION OF PROXIMAL INTERPHALANGEAL (PIP) JOINT OF FINGER: ICD-10-CM

## 2023-07-05 LAB
ALBUMIN SERPL BCP-MCNC: 3.9 G/DL (ref 3.5–5.2)
ALP SERPL-CCNC: 33 U/L (ref 55–135)
ALT SERPL W/O P-5'-P-CCNC: 10 U/L (ref 10–44)
ANION GAP SERPL CALC-SCNC: 10 MMOL/L (ref 8–16)
AST SERPL-CCNC: 30 U/L (ref 10–40)
BASOPHILS # BLD AUTO: 0.08 K/UL (ref 0–0.2)
BASOPHILS NFR BLD: 1.1 % (ref 0–1.9)
BILIRUB SERPL-MCNC: 0.6 MG/DL (ref 0.1–1)
BUN SERPL-MCNC: 19 MG/DL (ref 8–23)
CALCIUM SERPL-MCNC: 9.7 MG/DL (ref 8.7–10.5)
CHLORIDE SERPL-SCNC: 104 MMOL/L (ref 95–110)
CO2 SERPL-SCNC: 27 MMOL/L (ref 23–29)
CREAT SERPL-MCNC: 1.3 MG/DL (ref 0.5–1.4)
CRP SERPL-MCNC: 1.3 MG/L (ref 0–8.2)
DIFFERENTIAL METHOD: ABNORMAL
EOSINOPHIL # BLD AUTO: 0.2 K/UL (ref 0–0.5)
EOSINOPHIL NFR BLD: 3.2 % (ref 0–8)
ERYTHROCYTE [DISTWIDTH] IN BLOOD BY AUTOMATED COUNT: 14.5 % (ref 11.5–14.5)
ERYTHROCYTE [SEDIMENTATION RATE] IN BLOOD BY PHOTOMETRIC METHOD: 15 MM/HR (ref 0–36)
EST. GFR  (NO RACE VARIABLE): 40.8 ML/MIN/1.73 M^2
GLUCOSE SERPL-MCNC: 126 MG/DL (ref 70–110)
HCT VFR BLD AUTO: 40.9 % (ref 37–48.5)
HGB BLD-MCNC: 13.1 G/DL (ref 12–16)
IMM GRANULOCYTES # BLD AUTO: 0.03 K/UL (ref 0–0.04)
IMM GRANULOCYTES NFR BLD AUTO: 0.4 % (ref 0–0.5)
LYMPHOCYTES # BLD AUTO: 1.4 K/UL (ref 1–4.8)
LYMPHOCYTES NFR BLD: 18.3 % (ref 18–48)
MCH RBC QN AUTO: 34.2 PG (ref 27–31)
MCHC RBC AUTO-ENTMCNC: 32 G/DL (ref 32–36)
MCV RBC AUTO: 107 FL (ref 82–98)
MONOCYTES # BLD AUTO: 1 K/UL (ref 0.3–1)
MONOCYTES NFR BLD: 12.8 % (ref 4–15)
NEUTROPHILS # BLD AUTO: 4.8 K/UL (ref 1.8–7.7)
NEUTROPHILS NFR BLD: 64.2 % (ref 38–73)
NRBC BLD-RTO: 0 /100 WBC
PLATELET # BLD AUTO: 203 K/UL (ref 150–450)
PMV BLD AUTO: 13.5 FL (ref 9.2–12.9)
POTASSIUM SERPL-SCNC: 4.9 MMOL/L (ref 3.5–5.1)
PROT SERPL-MCNC: 6.8 G/DL (ref 6–8.4)
RBC # BLD AUTO: 3.83 M/UL (ref 4–5.4)
SODIUM SERPL-SCNC: 141 MMOL/L (ref 136–145)
WBC # BLD AUTO: 7.43 K/UL (ref 3.9–12.7)

## 2023-07-05 PROCEDURE — 3078F DIAST BP <80 MM HG: CPT | Mod: CPTII,S$GLB,, | Performed by: INTERNAL MEDICINE

## 2023-07-05 PROCEDURE — 1101F PR PT FALLS ASSESS DOC 0-1 FALLS W/OUT INJ PAST YR: ICD-10-PCS | Mod: CPTII,S$GLB,, | Performed by: INTERNAL MEDICINE

## 2023-07-05 PROCEDURE — 99214 PR OFFICE/OUTPT VISIT, EST, LEVL IV, 30-39 MIN: ICD-10-PCS | Mod: S$GLB,,, | Performed by: INTERNAL MEDICINE

## 2023-07-05 PROCEDURE — 3077F PR MOST RECENT SYSTOLIC BLOOD PRESSURE >= 140 MM HG: ICD-10-PCS | Mod: CPTII,S$GLB,, | Performed by: INTERNAL MEDICINE

## 2023-07-05 PROCEDURE — 1159F MED LIST DOCD IN RCRD: CPT | Mod: CPTII,S$GLB,, | Performed by: INTERNAL MEDICINE

## 2023-07-05 PROCEDURE — 85025 COMPLETE CBC W/AUTO DIFF WBC: CPT | Performed by: INTERNAL MEDICINE

## 2023-07-05 PROCEDURE — 3288F PR FALLS RISK ASSESSMENT DOCUMENTED: ICD-10-PCS | Mod: CPTII,S$GLB,, | Performed by: INTERNAL MEDICINE

## 2023-07-05 PROCEDURE — 80053 COMPREHEN METABOLIC PANEL: CPT | Performed by: INTERNAL MEDICINE

## 2023-07-05 PROCEDURE — 1157F ADVNC CARE PLAN IN RCRD: CPT | Mod: CPTII,S$GLB,, | Performed by: INTERNAL MEDICINE

## 2023-07-05 PROCEDURE — 99999 PR PBB SHADOW E&M-EST. PATIENT-LVL III: CPT | Mod: PBBFAC,,, | Performed by: INTERNAL MEDICINE

## 2023-07-05 PROCEDURE — 99214 OFFICE O/P EST MOD 30 MIN: CPT | Mod: S$GLB,,, | Performed by: INTERNAL MEDICINE

## 2023-07-05 PROCEDURE — 1159F PR MEDICATION LIST DOCUMENTED IN MEDICAL RECORD: ICD-10-PCS | Mod: CPTII,S$GLB,, | Performed by: INTERNAL MEDICINE

## 2023-07-05 PROCEDURE — 1157F PR ADVANCE CARE PLAN OR EQUIV PRESENT IN MEDICAL RECORD: ICD-10-PCS | Mod: CPTII,S$GLB,, | Performed by: INTERNAL MEDICINE

## 2023-07-05 PROCEDURE — 85652 RBC SED RATE AUTOMATED: CPT | Performed by: INTERNAL MEDICINE

## 2023-07-05 PROCEDURE — 99999 PR PBB SHADOW E&M-EST. PATIENT-LVL III: ICD-10-PCS | Mod: PBBFAC,,, | Performed by: INTERNAL MEDICINE

## 2023-07-05 PROCEDURE — 36415 COLL VENOUS BLD VENIPUNCTURE: CPT | Mod: PO | Performed by: INTERNAL MEDICINE

## 2023-07-05 PROCEDURE — 86140 C-REACTIVE PROTEIN: CPT | Performed by: INTERNAL MEDICINE

## 2023-07-05 PROCEDURE — 3288F FALL RISK ASSESSMENT DOCD: CPT | Mod: CPTII,S$GLB,, | Performed by: INTERNAL MEDICINE

## 2023-07-05 PROCEDURE — 1125F AMNT PAIN NOTED PAIN PRSNT: CPT | Mod: CPTII,S$GLB,, | Performed by: INTERNAL MEDICINE

## 2023-07-05 PROCEDURE — 1125F PR PAIN SEVERITY QUANTIFIED, PAIN PRESENT: ICD-10-PCS | Mod: CPTII,S$GLB,, | Performed by: INTERNAL MEDICINE

## 2023-07-05 PROCEDURE — 1101F PT FALLS ASSESS-DOCD LE1/YR: CPT | Mod: CPTII,S$GLB,, | Performed by: INTERNAL MEDICINE

## 2023-07-05 PROCEDURE — 3078F PR MOST RECENT DIASTOLIC BLOOD PRESSURE < 80 MM HG: ICD-10-PCS | Mod: CPTII,S$GLB,, | Performed by: INTERNAL MEDICINE

## 2023-07-05 PROCEDURE — 3077F SYST BP >= 140 MM HG: CPT | Mod: CPTII,S$GLB,, | Performed by: INTERNAL MEDICINE

## 2023-07-05 RX ORDER — TRAMADOL HYDROCHLORIDE 50 MG/1
50-100 TABLET ORAL EVERY 12 HOURS PRN
Qty: 60 TABLET | Refills: 3 | Status: SHIPPED | OUTPATIENT
Start: 2023-07-05

## 2023-07-05 ASSESSMENT — ROUTINE ASSESSMENT OF PATIENT INDEX DATA (RAPID3)
MDHAQ FUNCTION SCORE: 1.2
PATIENT GLOBAL ASSESSMENT SCORE: 2.5
PSYCHOLOGICAL DISTRESS SCORE: 0
PAIN SCORE: 3.5
FATIGUE SCORE: 0
TOTAL RAPID3 SCORE: 3.33

## 2023-07-05 NOTE — PROGRESS NOTES
AS Subjective:          Chief Complaint: Juanis Mclaughlin is a 82 y.o. female who had concerns including Disease Management.    HPI:      Rheumatoid Arthritis   81y/o  female with h/o HTN, HLD was diagnosed with sero +  Rheumatoid arthritis in 1998. Off prednisone now.      Enbrel 50mg weekly on hold since March 2020. - and no worsening.   MTX 5 tabs weekly.   Started HCQ 200mg once daily and feels no worse. Joints are stable NEEDS #90 day supply.       Pain in the DIP and PIP joints. Most painful is the left 3rd and 4th PIP with swelling this visit.    No ASE of stomatitis,  infections or injection site reaction.   She was also diagnosed with secondary Sjogren's. Currently on restasis.  PPM for bradycardia.     Past 12 months slow decline in renal function from prior year- on PO bisphos and ACEI/HCTZ can switch this to Prolia.  Started Prolia 02/29/2019 she had another injection as of 07/20/2019 tolerated well no adverse side effects renal function stable.    She denies fever, weight loss, photosensitivity, rash, ulcer, raynaud's phenomenon, alopecia, dysphagia, diarrhea or blood in the stools.  + hx of GERD. On PPI. Hx of mild MR. No pedal edema.     Doing well with Dr. Madison with Breo and Gabapentin to relax cough impulse.  As of this visit 02/29/2019 patient was having upper respiratory tract infection x3 weeks seem to be feeling better within 10 days ago returned with a more productive cough postnasal drip rhinitis    REVIEW OF SYSTEMS:    Review of Systems   Constitutional:  Negative for fever, malaise/fatigue and weight loss.   HENT:  Negative for sore throat.    Eyes:  Negative for double vision, photophobia and redness.   Respiratory:  Positive for cough and sputum production. Negative for hemoptysis, shortness of breath and wheezing.    Cardiovascular:  Negative for chest pain, palpitations and orthopnea.   Gastrointestinal:  Negative for abdominal pain, constipation and diarrhea.   Genitourinary:   Negative for dysuria, hematuria and urgency.   Musculoskeletal:  Positive for joint pain. Negative for back pain and myalgias.   Skin:  Negative for rash.   Neurological:  Negative for dizziness, tingling, focal weakness and headaches.   Endo/Heme/Allergies:  Does not bruise/bleed easily.   Psychiatric/Behavioral:  Negative for depression, hallucinations and suicidal ideas.               Objective:            Past Medical History:   Diagnosis Date    Arthritis     rheumatoid arthritis    Cardiomyopathy     CKD (chronic kidney disease) stage 3, GFR 30-59 ml/min 1/9/2019    Diverticulosis     Diverticulosis     DVT (deep venous thrombosis)     one time 5 years ago    Essential hypertension 11/24/2013    GERD (gastroesophageal reflux disease)     Glaucoma     sees Dr. Gillette    Hyperlipidemia     Hypertension     Iron deficiency     Iron deficiency anemia 10/21/2016     IMO LOAD Q4    Long-term use of immunosuppressant medication 7/27/2015    Mitral regurgitation 12/20/2013 12/13 mild     Osteoporosis 7/9/2014    Pacemaker 10/26/2016    left chest [employer] [address] [employer] [facility]  S/[id number] Bonifacio Fuller 9/9/2016 - current   right atrium LEAD [employer] [address] [employer] S/N:[id number] Bonifacio Fuller 9/9/2016 - current   right ventricle LEAD [employer] [address] [employer] [id number]/N:[id number] Bonifacio Fuller 9/9/2016 - current      PONV (postoperative nausea and vomiting)     Rheumatoid arthritis involving multiple sites with positive rheumatoid factor 11/24/2013    Vitamin B12 deficiency 2/28/2015     Family History   Problem Relation Age of Onset    Heart disease Mother         CHF    Hypertension Mother     Heart disease Father     Stroke Father     Ovarian cancer Maternal Aunt 34    Heart disease Brother     Stroke Brother     Heart disease Brother     Scleroderma Brother     Breast cancer Neg Hx      Social History     Tobacco Use    Smoking status: Never    Smokeless  tobacco: Never   Substance Use Topics    Alcohol use: Yes     Alcohol/week: 0.8 standard drinks     Types: 1 Standard drinks or equivalent per week     Comment: occ social    Drug use: No         Current Outpatient Medications on File Prior to Visit   Medication Sig Dispense Refill    B-complex with vitamin C (Z-BEC OR EQUIV) tablet Take 1 tablet by mouth once daily.      brimonidine-timoloL (COMBIGAN) 0.2-0.5 % Drop Place 1 drop into both eyes 2 (two) times daily.      candesartan (ATACAND) 16 MG tablet Take 1 tablet (16 mg total) by mouth once daily. 90 tablet 3    denosumab (PROLIA) 60 mg/mL Syrg Inject 60 mg into the skin every 6 (six) months.      folic acid (FOLVITE) 1 MG tablet Take 2 tablets (2,000 mcg total) by mouth once daily. 180 tablet 3    furosemide (LASIX) 20 MG tablet TAKE 1 TABLET BY MOUTH DAILY AS NEEDED FOR EDEMA.  Strength: 20 mg (Patient taking differently: Take 20 mg by mouth twice a week. TAKE 1 TABLET BY MOUTH DAILY AS NEEDED FOR EDEMA.  Strength: 20 mg) 90 tablet 4    hydrOXYchloroQUINE (PLAQUENIL) 200 mg tablet Take 1 tablet (200 mg total) by mouth 2 (two) times daily. 180 tablet 6    latanoprost 0.005 % ophthalmic solution Place 1 drop into both eyes every evening.      magnesium 200 mg Tab Take 400 mg by mouth once daily. Magnesium 400mg with Chelated Zinc 15mg.      methotrexate 2.5 MG Tab TAKE 5 TABLETS EVERY 7 DAYS 60 tablet 3    metoprolol succinate (TOPROL-XL) 50 MG 24 hr tablet Take 1 tablet (50 mg total) by mouth once daily. 90 tablet 4    pantoprazole (PROTONIX) 40 MG tablet Take 1 tablet (40 mg total) by mouth once daily. 90 tablet 2    potassium chloride SA (K-DUR,KLOR-CON) 20 MEQ tablet TAKE 1 TABLET BY MOUTH DAILY AS NEEDED FOR EDEMA. (Patient taking differently: Take 20 mEq by mouth twice a week.) 90 tablet 3    tixagevimab/cilgavimab (EVUSHELD, EUA, IM) Inject into the muscle every 6 (six) months.      XIIDRA 5 % Dpet Place 1 drop into both eyes 2 (two) times daily.         No current facility-administered medications on file prior to visit.       Vitals:    07/05/23 0819   BP: (!) 142/79   Pulse: 64         Physical Exam:    Physical Exam  Constitutional:       Appearance: Normal appearance. She is well-developed.   HENT:      Nose: No septal deviation.      Mouth/Throat:      Mouth: No oral lesions.   Eyes:      Conjunctiva/sclera:      Right eye: Right conjunctiva is not injected.      Left eye: Left conjunctiva is not injected.      Pupils: Pupils are equal, round, and reactive to light.   Neck:      Thyroid: No thyroid mass or thyromegaly.      Vascular: No JVD.   Cardiovascular:      Rate and Rhythm: Normal rate and regular rhythm.      Pulses: Normal pulses.      Comments: No edema  Pulmonary:      Effort: Pulmonary effort is normal.      Breath sounds: Examination of the right-middle field reveals wheezing. Examination of the left-middle field reveals wheezing. Wheezing present.   Abdominal:      Palpations: Abdomen is soft.   Musculoskeletal:      Right shoulder: No swelling or tenderness. Normal range of motion.      Left shoulder: No swelling or tenderness. Normal range of motion.      Right elbow: No swelling. Normal range of motion. No tenderness.      Left elbow: No swelling. Normal range of motion. No tenderness.      Right wrist: Tenderness present. No swelling. Decreased range of motion.      Left wrist: No swelling or tenderness. Normal range of motion.      Right hand: Tenderness present. Decreased range of motion.      Left hand: Tenderness present.      Right hip: Normal range of motion. Normal strength.      Left hip: No tenderness. Normal range of motion.      Right knee: No swelling. Decreased range of motion. Tenderness present over the medial joint line and lateral joint line.      Left knee: No swelling. Normal range of motion. No tenderness.      Right ankle: No swelling. No tenderness. Normal range of motion.      Left ankle: No swelling. No tenderness.  Normal range of motion.      Comments: 1+ synovitis MCP and PIP with heberden nodes all DIP 2-5. Left wrist with tenderness and swelling about the TFCC. No nodules. No flexion contractures.      Cervical with left sided point tenderness and the occiput (nuchal ridge) lesser tenderness at the cervical paraspinals. Decreased ROM in cervical spine   Lymphadenopathy:      Cervical: No cervical adenopathy.   Skin:     General: Skin is dry.   Neurological:      Deep Tendon Reflexes: Reflexes are normal and symmetric.             Assessment:       Encounter Diagnoses   Name Primary?    Rheumatoid arthritis involving multiple sites with positive rheumatoid factor Yes    Long-term use of immunosuppressant medication     Cervical spondylosis     High risk medications (not anticoagulants) long-term use     Stage 3 chronic kidney disease, unspecified whether stage 3a or 3b CKD     Effusion of proximal interphalangeal (PIP) joint of finger     Vitamin B12 deficiency     Spondylosis of cervical region without myelopathy or radiculopathy               Plan:        Rheumatoid arthritis involving multiple sites with positive rheumatoid factor  -     CBC Auto Differential; Standing; Expected date: 07/05/2023  -     Comprehensive Metabolic Panel; Standing; Expected date: 07/05/2023  -     Sedimentation rate; Standing; Expected date: 07/05/2023  -     C-Reactive Protein; Standing; Expected date: 07/05/2023  -     traMADoL (ULTRAM) 50 mg tablet; Take 1-2 tablets ( mg total) by mouth every 12 (twelve) hours as needed for Pain (rheumatoid arthritis).  Dispense: 60 tablet; Refill: 3    Long-term use of immunosuppressant medication  -     CBC Auto Differential; Standing; Expected date: 07/05/2023  -     Comprehensive Metabolic Panel; Standing; Expected date: 07/05/2023  -     Sedimentation rate; Standing; Expected date: 07/05/2023  -     C-Reactive Protein; Standing; Expected date: 07/05/2023    Cervical spondylosis  -     traMADoL  (ULTRAM) 50 mg tablet; Take 1-2 tablets ( mg total) by mouth every 12 (twelve) hours as needed for Pain (rheumatoid arthritis).  Dispense: 60 tablet; Refill: 3    High risk medications (not anticoagulants) long-term use  -     CBC Auto Differential; Standing; Expected date: 07/05/2023  -     Comprehensive Metabolic Panel; Standing; Expected date: 07/05/2023  -     Sedimentation rate; Standing; Expected date: 07/05/2023  -     C-Reactive Protein; Standing; Expected date: 07/05/2023    Stage 3 chronic kidney disease, unspecified whether stage 3a or 3b CKD    Effusion of proximal interphalangeal (PIP) joint of finger    Vitamin B12 deficiency    Spondylosis of cervical region without myelopathy or radiculopathy      RA:    -MTX  5 tabs weekly- 90 day supply   HCQ 200mg once dialy 90 day   -continue folic acid 2mg dialy   -OFF Enbrel   -voltaren gel PRN fingers/hands-using sparingly   -Labs q 3 months.    Neck Pain:    -seems to be stable and not problematic as of last 6 months. Great.    Osteoporosis   ASE  actonel   -doing great on Prolia. Last dose [id number] and 4th left PIP joints swollen : injected each today see procedure note.      Follow up in about 4 months (around 11/5/2023).  F/u in 4 months         30min consultation with greater than 50% spent in counseling, chart review and coordination of care. All questions answered.

## 2023-07-24 ENCOUNTER — TELEPHONE (OUTPATIENT)
Dept: OBSTETRICS AND GYNECOLOGY | Facility: CLINIC | Age: 83
End: 2023-07-24
Payer: COMMERCIAL

## 2023-07-24 DIAGNOSIS — Z12.31 VISIT FOR SCREENING MAMMOGRAM: Primary | ICD-10-CM

## 2023-07-24 NOTE — TELEPHONE ENCOUNTER
----- Message from Lidia Malone sent at 7/24/2023 10:09 AM CDT -----  Regarding: order for mammogram  Contact: patient  Type:  Mammogram    Caller is requesting to schedule their annual mammogram appointment.  Order is not listed in EPIC.  Please enter order and contact patient to schedule.    Name of Caller:  patient  Where would they like the mammogram performed?  [facility] Call Back Number:  [phone] (home)   Additional Information: patient needs a order for her mammogram. She has an appt with doctor on 07/31/23 at 10:30 and would like mammogram the same day and same location. Please call patient to advise.Thanks!

## 2023-07-31 ENCOUNTER — OFFICE VISIT (OUTPATIENT)
Dept: OBSTETRICS AND GYNECOLOGY | Facility: CLINIC | Age: 83
End: 2023-07-31
Payer: COMMERCIAL

## 2023-07-31 ENCOUNTER — HOSPITAL ENCOUNTER (OUTPATIENT)
Dept: RADIOLOGY | Facility: HOSPITAL | Age: 83
Discharge: HOME | End: 2023-07-31
Attending: SPECIALIST
Payer: COMMERCIAL

## 2023-07-31 VITALS
BODY MASS INDEX: 33.15 KG/M2 | WEIGHT: 168.88 LBS | HEIGHT: 60 IN | SYSTOLIC BLOOD PRESSURE: 122 MMHG | RESPIRATION RATE: 18 BRPM | DIASTOLIC BLOOD PRESSURE: 76 MMHG

## 2023-07-31 DIAGNOSIS — Z91.89 GYN EXAM FOR HIGH-RISK MEDICARE PATIENT: Primary | ICD-10-CM

## 2023-07-31 DIAGNOSIS — Z12.31 VISIT FOR SCREENING MAMMOGRAM: ICD-10-CM

## 2023-07-31 PROCEDURE — 1159F PR MEDICATION LIST DOCUMENTED IN MEDICAL RECORD: ICD-10-PCS | Mod: CPTII,S$GLB,, | Performed by: SPECIALIST

## 2023-07-31 PROCEDURE — 1157F PR ADVANCE CARE PLAN OR EQUIV PRESENT IN MEDICAL RECORD: ICD-10-PCS | Mod: CPTII,S$GLB,, | Performed by: SPECIALIST

## 2023-07-31 PROCEDURE — 1157F ADVNC CARE PLAN IN RCRD: CPT | Mod: CPTII,S$GLB,, | Performed by: SPECIALIST

## 2023-07-31 PROCEDURE — 1126F PR PAIN SEVERITY QUANTIFIED, NO PAIN PRESENT: ICD-10-PCS | Mod: CPTII,S$GLB,, | Performed by: SPECIALIST

## 2023-07-31 PROCEDURE — 77067 SCR MAMMO BI INCL CAD: CPT | Mod: 26,,, | Performed by: RADIOLOGY

## 2023-07-31 PROCEDURE — 3288F PR FALLS RISK ASSESSMENT DOCUMENTED: ICD-10-PCS | Mod: CPTII,S$GLB,, | Performed by: SPECIALIST

## 2023-07-31 PROCEDURE — 1159F MED LIST DOCD IN RCRD: CPT | Mod: CPTII,S$GLB,, | Performed by: SPECIALIST

## 2023-07-31 PROCEDURE — 1126F AMNT PAIN NOTED NONE PRSNT: CPT | Mod: CPTII,S$GLB,, | Performed by: SPECIALIST

## 2023-07-31 PROCEDURE — 3078F PR MOST RECENT DIASTOLIC BLOOD PRESSURE < 80 MM HG: ICD-10-PCS | Mod: CPTII,S$GLB,, | Performed by: SPECIALIST

## 2023-07-31 PROCEDURE — 99999 PR PBB SHADOW E&M-EST. PATIENT-LVL IV: CPT | Mod: PBBFAC,,, | Performed by: SPECIALIST

## 2023-07-31 PROCEDURE — 1101F PR PT FALLS ASSESS DOC 0-1 FALLS W/OUT INJ PAST YR: ICD-10-PCS | Mod: CPTII,S$GLB,, | Performed by: SPECIALIST

## 2023-07-31 PROCEDURE — 77067 MAMMO DIGITAL SCREENING BILAT WITH TOMO: ICD-10-PCS | Mod: 26,,, | Performed by: RADIOLOGY

## 2023-07-31 PROCEDURE — 99397 PR PREVENTIVE VISIT,EST,65 & OVER: ICD-10-PCS | Mod: S$GLB,,, | Performed by: SPECIALIST

## 2023-07-31 PROCEDURE — 99397 PER PM REEVAL EST PAT 65+ YR: CPT | Mod: S$GLB,,, | Performed by: SPECIALIST

## 2023-07-31 PROCEDURE — 77063 MAMMO DIGITAL SCREENING BILAT WITH TOMO: ICD-10-PCS | Mod: 26,,, | Performed by: RADIOLOGY

## 2023-07-31 PROCEDURE — 1101F PT FALLS ASSESS-DOCD LE1/YR: CPT | Mod: CPTII,S$GLB,, | Performed by: SPECIALIST

## 2023-07-31 PROCEDURE — 77067 SCR MAMMO BI INCL CAD: CPT | Mod: TC,PN

## 2023-07-31 PROCEDURE — 3074F SYST BP LT 130 MM HG: CPT | Mod: CPTII,S$GLB,, | Performed by: SPECIALIST

## 2023-07-31 PROCEDURE — 3288F FALL RISK ASSESSMENT DOCD: CPT | Mod: CPTII,S$GLB,, | Performed by: SPECIALIST

## 2023-07-31 PROCEDURE — 77063 BREAST TOMOSYNTHESIS BI: CPT | Mod: 26,,, | Performed by: RADIOLOGY

## 2023-07-31 PROCEDURE — 3074F PR MOST RECENT SYSTOLIC BLOOD PRESSURE < 130 MM HG: ICD-10-PCS | Mod: CPTII,S$GLB,, | Performed by: SPECIALIST

## 2023-07-31 PROCEDURE — 99999 PR PBB SHADOW E&M-EST. PATIENT-LVL IV: ICD-10-PCS | Mod: PBBFAC,,, | Performed by: SPECIALIST

## 2023-07-31 PROCEDURE — 3078F DIAST BP <80 MM HG: CPT | Mod: CPTII,S$GLB,, | Performed by: SPECIALIST

## 2023-07-31 NOTE — PROGRESS NOTES
82 yo WF presents foer annual gyn eval and screening mammogram  No overt gyn complaints  Past Medical History:   Diagnosis Date    Arthritis     rheumatoid arthritis    Cardiomyopathy     CKD (chronic kidney disease) stage 3, GFR 30-59 ml/min 1/9/2019    Diverticulosis     Diverticulosis     DVT (deep venous thrombosis)     one time 5 years ago    Essential hypertension 11/24/2013    GERD (gastroesophageal reflux disease)     Glaucoma     sees Dr. Gillette    Hyperlipidemia     Hypertension     Iron deficiency     Iron deficiency anemia 10/21/2016     IMO LOAD Q4    Long-term use of immunosuppressant medication 7/27/2015    Mitral regurgitation 12/20/2013 12/13 mild     Osteoporosis 7/9/2014    Pacemaker 10/26/2016    left chest PACEMAKER [employer] [address] [id number] Bonifacio Fuller 9/9/2016 - current   right atrium LEAD [employer] [address] [employer] [id number] Bonifacio Fuller A. 9/9/2016 - current   right ventricle LEAD [employer] [address] [employer] [id number] Bonifacio Fuller 9/9/2016 - current      PONV (postoperative nausea and vomiting)     Rheumatoid arthritis involving multiple sites with positive rheumatoid factor 11/24/2013    Vitamin B12 deficiency 2/28/2015       Past Surgical History:   Procedure Laterality Date    BREAST CYST EXCISION Bilateral     several cysts removed    COLONOSCOPY  2012    EPIDURAL STEROID INJECTION INTO CERVICAL SPINE N/A 10/13/2020    Procedure: Injection-steroid-epidural-cervical;  Surgeon: Bharat Avalos MD;  Location: [facility] OR;  Service: Pain Management;  Laterality: N/A;    FOOT SURGERY      had neuorma and also required hardware    HYSTERECTOMY      SUJATA with BSO    INCONTINENCE SURGERY      INJECTION OF ANESTHETIC AGENT AROUND MEDIAL BRANCH NERVES INNERVATING CERVICAL FACET JOINT Left 11/19/2020    Procedure: Block-nerve-medial branch-cervical C3, C4, C5, C6;  Surgeon: Bharat Avalos MD;  Location: [facility] OR;   Service: Pain Management;  Laterality: Left;    INJECTION OF ANESTHETIC AGENT AROUND MEDIAL BRANCH NERVES INNERVATING CERVICAL FACET JOINT Left 12/7/2020    Procedure: Block-nerve-medial branch-cervical, c3,c4, c5, c6;  Surgeon: Bharat Avalos MD;  Location: [facility] OR;  Service: Pain Management;  Laterality: Left;    KNEE ARTHROSCOPY W/ DEBRIDEMENT      LAPAROSCOPIC CHOLECYSTECTOMY N/A 4/21/2021    Procedure: CHOLECYSTECTOMY, LAPAROSCOPIC;  Surgeon: Marilyn Borrero MD;  Location: [facility] OR;  Service: General;  Laterality: N/A;    LAPAROSCOPIC GASTRIC BANDING      OOPHORECTOMY  2012    pace maker  09/2016       Family History   Problem Relation Age of Onset    Heart disease Mother         CHF    Hypertension Mother     Heart disease Father     Stroke Father     Ovarian cancer Maternal Aunt 34    Heart disease Brother     Stroke Brother     Heart disease Brother     Scleroderma Brother     Breast cancer Neg Hx        Social History     Socioeconomic History    Marital status:     Number of children: 3   Tobacco Use    Smoking status: Never    Smokeless tobacco: Never   Substance and Sexual Activity    Alcohol use: Yes     Alcohol/week: 0.8 standard drinks of alcohol     Types: 1 Standard drinks or equivalent per week     Comment: occ social    Drug use: No    Sexual activity: Never     Birth control/protection: See Surgical Hx       Current Outpatient Medications   Medication Sig Dispense Refill    B-complex with vitamin C (Z-BEC OR EQUIV) tablet Take 1 tablet by mouth once daily.      brimonidine-timoloL (COMBIGAN) 0.2-0.5 % Drop Place 1 drop into both eyes 2 (two) times daily.      candesartan (ATACAND) 16 MG tablet Take 1 tablet (16 mg total) by mouth once daily. 90 tablet 3    denosumab (PROLIA) 60 mg/mL Syrg Inject 60 mg into the skin every 6 (six) months.      folic acid (FOLVITE) 1 MG tablet Take 2 tablets (2,000 mcg total) by mouth once daily. 180 tablet 3    furosemide (LASIX) 20 MG tablet TAKE 1  TABLET BY MOUTH DAILY AS NEEDED FOR EDEMA.  Strength: 20 mg (Patient taking differently: Take 20 mg by mouth twice a week. TAKE 1 TABLET BY MOUTH DAILY AS NEEDED FOR EDEMA.  Strength: 20 mg) 90 tablet 4    hydrOXYchloroQUINE (PLAQUENIL) 200 mg tablet Take 1 tablet (200 mg total) by mouth 2 (two) times daily. 180 tablet 6    latanoprost 0.005 % ophthalmic solution Place 1 drop into both eyes every evening.      magnesium 200 mg Tab Take 400 mg by mouth once daily. Magnesium 400mg with Chelated Zinc 15mg.      methotrexate 2.5 MG Tab TAKE 5 TABLETS EVERY 7 DAYS 60 tablet 3    metoprolol succinate (TOPROL-XL) 50 MG 24 hr tablet Take 1 tablet (50 mg total) by mouth once daily. 90 tablet 4    pantoprazole (PROTONIX) 40 MG tablet Take 1 tablet (40 mg total) by mouth once daily. 90 tablet 2    potassium chloride SA (K-DUR,KLOR-CON) 20 MEQ tablet TAKE 1 TABLET BY MOUTH DAILY AS NEEDED FOR EDEMA. (Patient taking differently: Take 20 mEq by mouth twice a week.) 90 tablet 3    tixagevimab/cilgavimab (EVUSHELD, EUA, IM) Inject into the muscle every 6 (six) months.      traMADoL (ULTRAM) 50 mg tablet Take 1-2 tablets ( mg total) by mouth every 12 (twelve) hours as needed for Pain (rheumatoid arthritis). 60 tablet 3    XIIDRA 5 % Dpet Place 1 drop into both eyes 2 (two) times daily.        No current facility-administered medications for this visit.       Review of patient's allergies indicates:   Allergen Reactions    Ace inhibitors Other (See Comments)     cough       Review of System:   General: no chills, fever, night sweats, weight gain or weight loss  Psychological: no depression or suicidal ideation  Breasts: no new or changing breast lumps, nipple discharge or masses.  Respiratory: no cough, shortness of breath, or wheezing  Cardiovascular: no chest pain or dyspnea on exertion  Gastrointestinal: no abdominal pain, change in bowel habits, or black or bloody stools  Genito-Urinary: no incontinence, urinary  frequency/urgency or vulvar/vaginal symptoms, pelvic pain or abnormal vaginal bleeding.  Musculoskeletal: no gait disturbance or muscular weakness     PE:   APPEARANCE: Well nourished, well developed, in no acute distress.  NECK: Neck symmetric without masses or thyromegaly.  NODES: No inguinal lymph node enlargement.  ABDOMEN: Soft. No tenderness or masses. No hepatosplenomegaly. No hernias.  BREASTS: Symmetrical, no skin changes or visible lesions. No palpable masses, nipple discharge or adenopathy bilaterally.  PELVIC: Normal external female genitalia without lesions. Normal hair distribution. Adequate perineal body, normal urethral meatus. Vagina moist and without lesions or discharge. No significant cystocele or rectocele. Uterus and cervix surgically absent. Bimanual exam revealed no masses, tenderness or abnormality.    NOTE  NURSING PERSONAL PRESENT FOR ENTIRE PHYSICAL EXAM     Plan  Daily calcium intake  BSE monthly  Screening mammogram today and repeat yearly  RTO 2 years/prn    I spent a total of 30 minutes on the day of the visit. This includes face to face time and non-face to face time preparing to see the patient (eg, review of tests), obtaining and/or reviewing separately obtained history, documenting clinical information in the electronic or other health record, independently interpreting results and communicating results to the patient/family/caregiver, or care coordinator.

## 2023-09-15 ENCOUNTER — LAB VISIT (OUTPATIENT)
Dept: LAB | Facility: HOSPITAL | Age: 83
End: 2023-09-15
Attending: INTERNAL MEDICINE
Payer: COMMERCIAL

## 2023-09-15 ENCOUNTER — INFUSION (OUTPATIENT)
Dept: INFUSION THERAPY | Facility: HOSPITAL | Age: 83
End: 2023-09-15
Attending: INTERNAL MEDICINE
Payer: COMMERCIAL

## 2023-09-15 VITALS
HEART RATE: 63 BPM | RESPIRATION RATE: 16 BRPM | SYSTOLIC BLOOD PRESSURE: 152 MMHG | DIASTOLIC BLOOD PRESSURE: 88 MMHG | TEMPERATURE: 98 F

## 2023-09-15 DIAGNOSIS — Z79.60 LONG-TERM USE OF IMMUNOSUPPRESSANT MEDICATION: ICD-10-CM

## 2023-09-15 DIAGNOSIS — K21.9 GASTROESOPHAGEAL REFLUX DISEASE, UNSPECIFIED WHETHER ESOPHAGITIS PRESENT: ICD-10-CM

## 2023-09-15 DIAGNOSIS — M81.0 AGE-RELATED OSTEOPOROSIS WITHOUT CURRENT PATHOLOGICAL FRACTURE: Primary | ICD-10-CM

## 2023-09-15 DIAGNOSIS — M05.79 RHEUMATOID ARTHRITIS INVOLVING MULTIPLE SITES WITH POSITIVE RHEUMATOID FACTOR: ICD-10-CM

## 2023-09-15 DIAGNOSIS — Z79.899 HIGH RISK MEDICATIONS (NOT ANTICOAGULANTS) LONG-TERM USE: ICD-10-CM

## 2023-09-15 LAB
ALBUMIN SERPL BCP-MCNC: 3.7 G/DL (ref 3.5–5.2)
ALP SERPL-CCNC: 29 U/L (ref 55–135)
ALT SERPL W/O P-5'-P-CCNC: 8 U/L (ref 10–44)
ANION GAP SERPL CALC-SCNC: 13 MMOL/L (ref 8–16)
AST SERPL-CCNC: 27 U/L (ref 10–40)
BILIRUB SERPL-MCNC: 0.5 MG/DL (ref 0.1–1)
BUN SERPL-MCNC: 26 MG/DL (ref 8–23)
CALCIUM SERPL-MCNC: 10.2 MG/DL (ref 8.7–10.5)
CHLORIDE SERPL-SCNC: 102 MMOL/L (ref 95–110)
CO2 SERPL-SCNC: 26 MMOL/L (ref 23–29)
CREAT SERPL-MCNC: 1.9 MG/DL (ref 0.5–1.4)
EST. GFR  (NO RACE VARIABLE): 25.7 ML/MIN/1.73 M^2
GLUCOSE SERPL-MCNC: 117 MG/DL (ref 70–110)
POTASSIUM SERPL-SCNC: 4.2 MMOL/L (ref 3.5–5.1)
PROT SERPL-MCNC: 6.7 G/DL (ref 6–8.4)
SODIUM SERPL-SCNC: 141 MMOL/L (ref 136–145)

## 2023-09-15 PROCEDURE — 80053 COMPREHEN METABOLIC PANEL: CPT | Mod: PN | Performed by: INTERNAL MEDICINE

## 2023-09-15 PROCEDURE — 36415 COLL VENOUS BLD VENIPUNCTURE: CPT | Mod: PN | Performed by: INTERNAL MEDICINE

## 2023-09-15 PROCEDURE — 63600175 PHARM REV CODE 636 W HCPCS: Mod: JZ,JG,PN | Performed by: INTERNAL MEDICINE

## 2023-09-15 PROCEDURE — 96372 THER/PROPH/DIAG INJ SC/IM: CPT | Mod: PN

## 2023-09-15 RX ADMIN — DENOSUMAB 60 MG: 60 INJECTION SUBCUTANEOUS at 10:09

## 2023-09-15 NOTE — PLAN OF CARE
Problem: Adult Inpatient Plan of Care  Goal: Plan of Care Review  Outcome: Ongoing, Progressing  Goal: Patient-Specific Goal (Individualized)  Outcome: Ongoing, Progressing     Problem: Fatigue  Goal: Improved Activity Tolerance  Outcome: Ongoing, Progressing    Pt tolerated prolia injection well.   Pt denied invasive dental work.   No adverse reaction noted.  All questions answered.  Pt left clinic in no acute distress.

## 2023-11-07 ENCOUNTER — OFFICE VISIT (OUTPATIENT)
Dept: RHEUMATOLOGY | Facility: CLINIC | Age: 83
End: 2023-11-07
Payer: COMMERCIAL

## 2023-11-07 VITALS
DIASTOLIC BLOOD PRESSURE: 78 MMHG | BODY MASS INDEX: 31.23 KG/M2 | WEIGHT: 159.06 LBS | HEIGHT: 60 IN | HEART RATE: 77 BPM | SYSTOLIC BLOOD PRESSURE: 124 MMHG

## 2023-11-07 DIAGNOSIS — Z79.899 HIGH RISK MEDICATIONS (NOT ANTICOAGULANTS) LONG-TERM USE: ICD-10-CM

## 2023-11-07 DIAGNOSIS — N18.31 STAGE 3A CHRONIC KIDNEY DISEASE: ICD-10-CM

## 2023-11-07 DIAGNOSIS — M81.0 AGE-RELATED OSTEOPOROSIS WITHOUT CURRENT PATHOLOGICAL FRACTURE: ICD-10-CM

## 2023-11-07 DIAGNOSIS — M05.79 RHEUMATOID ARTHRITIS INVOLVING MULTIPLE SITES WITH POSITIVE RHEUMATOID FACTOR: Primary | ICD-10-CM

## 2023-11-07 PROCEDURE — 1101F PT FALLS ASSESS-DOCD LE1/YR: CPT | Mod: CPTII,S$GLB,, | Performed by: INTERNAL MEDICINE

## 2023-11-07 PROCEDURE — 1126F AMNT PAIN NOTED NONE PRSNT: CPT | Mod: CPTII,S$GLB,, | Performed by: INTERNAL MEDICINE

## 2023-11-07 PROCEDURE — 99214 PR OFFICE/OUTPT VISIT, EST, LEVL IV, 30-39 MIN: ICD-10-PCS | Mod: S$GLB,,, | Performed by: INTERNAL MEDICINE

## 2023-11-07 PROCEDURE — 1101F PR PT FALLS ASSESS DOC 0-1 FALLS W/OUT INJ PAST YR: ICD-10-PCS | Mod: CPTII,S$GLB,, | Performed by: INTERNAL MEDICINE

## 2023-11-07 PROCEDURE — 1126F PR PAIN SEVERITY QUANTIFIED, NO PAIN PRESENT: ICD-10-PCS | Mod: CPTII,S$GLB,, | Performed by: INTERNAL MEDICINE

## 2023-11-07 PROCEDURE — 99999 PR PBB SHADOW E&M-EST. PATIENT-LVL IV: CPT | Mod: PBBFAC,,, | Performed by: INTERNAL MEDICINE

## 2023-11-07 PROCEDURE — 3074F PR MOST RECENT SYSTOLIC BLOOD PRESSURE < 130 MM HG: ICD-10-PCS | Mod: CPTII,S$GLB,, | Performed by: INTERNAL MEDICINE

## 2023-11-07 PROCEDURE — 1157F ADVNC CARE PLAN IN RCRD: CPT | Mod: CPTII,S$GLB,, | Performed by: INTERNAL MEDICINE

## 2023-11-07 PROCEDURE — 3074F SYST BP LT 130 MM HG: CPT | Mod: CPTII,S$GLB,, | Performed by: INTERNAL MEDICINE

## 2023-11-07 PROCEDURE — 3288F FALL RISK ASSESSMENT DOCD: CPT | Mod: CPTII,S$GLB,, | Performed by: INTERNAL MEDICINE

## 2023-11-07 PROCEDURE — 3078F DIAST BP <80 MM HG: CPT | Mod: CPTII,S$GLB,, | Performed by: INTERNAL MEDICINE

## 2023-11-07 PROCEDURE — 99999 PR PBB SHADOW E&M-EST. PATIENT-LVL IV: ICD-10-PCS | Mod: PBBFAC,,, | Performed by: INTERNAL MEDICINE

## 2023-11-07 PROCEDURE — 3288F PR FALLS RISK ASSESSMENT DOCUMENTED: ICD-10-PCS | Mod: CPTII,S$GLB,, | Performed by: INTERNAL MEDICINE

## 2023-11-07 PROCEDURE — 3078F PR MOST RECENT DIASTOLIC BLOOD PRESSURE < 80 MM HG: ICD-10-PCS | Mod: CPTII,S$GLB,, | Performed by: INTERNAL MEDICINE

## 2023-11-07 PROCEDURE — 99214 OFFICE O/P EST MOD 30 MIN: CPT | Mod: S$GLB,,, | Performed by: INTERNAL MEDICINE

## 2023-11-07 PROCEDURE — 1157F PR ADVANCE CARE PLAN OR EQUIV PRESENT IN MEDICAL RECORD: ICD-10-PCS | Mod: CPTII,S$GLB,, | Performed by: INTERNAL MEDICINE

## 2023-11-07 RX ORDER — METHOTREXATE 2.5 MG/1
TABLET ORAL
Qty: 48 TABLET | Refills: 3 | Status: SHIPPED | OUTPATIENT
Start: 2023-11-07 | End: 2024-03-08 | Stop reason: SDUPTHER

## 2023-11-07 ASSESSMENT — ROUTINE ASSESSMENT OF PATIENT INDEX DATA (RAPID3)
PATIENT GLOBAL ASSESSMENT SCORE: 0
MDHAQ FUNCTION SCORE: 1.3
TOTAL RAPID3 SCORE: 1.44
PAIN SCORE: 0
FATIGUE SCORE: 0
PSYCHOLOGICAL DISTRESS SCORE: 2.2

## 2023-11-07 NOTE — PROGRESS NOTES
AS Subjective:          Chief Complaint: Juanis Mclaughlin is a 83 y.o. female who had concerns including Disease Management.    HPI:      Rheumatoid Arthritis     82y/o female with h/o HTN, HLD was diagnosed with sero +  Rheumatoid arthritis in 1998. Off prednisone now.    Enbrel 50mg weekly on hold since March 2020. - and no worsening.   MTX 5 tabs weekly.   Started HCQ 200mg once daily and feels no worse. Joints are stable NEEDS #90 day supply.       Pain in the DIP and PIP joints. Most painful is the left 3rd and 4th PIP with swelling this visit.    No ASE of stomatitis,  infections or injection site reaction.   She was also diagnosed with secondary Sjogren's. Currently on restasis.  PPM for bradycardia.     Past 12 months slow decline in renal function from prior year- on PO bisphos and ACEI/HCTZ  switch this to Prolia.  Started Prolia 02/29/2019   In the last 6 months continued decline in renal function - no NSAIDs Cr. 1.9.     She denies fever, weight loss, photosensitivity, rash, ulcer, raynaud's phenomenon, alopecia, dysphagia, diarrhea or blood in the stools.  + hx of GERD. On PPI. Hx of mild MR. No pedal edema.     Doing well with Dr. Madison with Breo and Gabapentin to relax cough impulse.  As of this visit 02/29/2019 patient was having upper respiratory tract infection x3 weeks seem to be feeling better within 10 days ago returned with a more productive cough postnasal drip rhinitis    REVIEW OF SYSTEMS:    Review of Systems   Constitutional:  Negative for fever, malaise/fatigue and weight loss.   HENT:  Negative for sore throat.    Eyes:  Negative for double vision, photophobia and redness.   Respiratory:  Positive for cough and sputum production. Negative for hemoptysis, shortness of breath and wheezing.    Cardiovascular:  Negative for chest pain, palpitations and orthopnea.   Gastrointestinal:  Negative for abdominal pain, constipation and diarrhea.   Genitourinary:  Negative for dysuria, hematuria and  urgency.   Musculoskeletal:  Positive for joint pain. Negative for back pain and myalgias.   Skin:  Negative for rash.   Neurological:  Negative for dizziness, tingling, focal weakness and headaches.   Endo/Heme/Allergies:  Does not bruise/bleed easily.   Psychiatric/Behavioral:  Negative for depression, hallucinations and suicidal ideas.                 Objective:            Past Medical History:   Diagnosis Date    Arthritis     rheumatoid arthritis    Cardiomyopathy     CKD (chronic kidney disease) stage 3, GFR 30-59 ml/min 1/9/2019    Diverticulosis     Diverticulosis     DVT (deep venous thrombosis)     one time 5 years ago    Essential hypertension 11/24/2013    GERD (gastroesophageal reflux disease)     Glaucoma     sees Dr. Gillette    Hyperlipidemia     Hypertension     Iron deficiency     Iron deficiency anemia 10/21/2016     IMO LOAD Q4    Long-term use of immunosuppressant medication 7/27/2015    Mitral regurgitation 12/20/2013 12/13 mild     Osteoporosis 7/9/2014    Pacemaker 10/26/2016    left chest [employer]ER [employer] [address] [employer]  [id number] Bonifacio Fuller 9/9/2016 - current   right atrium LEAD [employer] [address] [employer] [id number] Bonifacio Fuller 9/9/2016 - current   right ventricle LEAD [employer] [address] [employer] [id number] Bonifacio Fuller 9/9/2016 - current      PONV (postoperative nausea and vomiting)     Rheumatoid arthritis involving multiple sites with positive rheumatoid factor 11/24/2013    Vitamin B12 deficiency 2/28/2015     Family History   Problem Relation Age of Onset    Heart disease Mother         CHF    Hypertension Mother     Heart disease Father     Stroke Father     Ovarian cancer Maternal Aunt 34    Heart disease Brother     Stroke Brother     Heart disease Brother     Scleroderma Brother     Breast cancer Neg Hx      Social History     Tobacco Use    Smoking status: Never    Smokeless tobacco: Never   Substance Use  Topics    Alcohol use: Yes     Alcohol/week: 0.8 standard drinks of alcohol     Types: 1 Standard drinks or equivalent per week     Comment: occ social    Drug use: No         Current Outpatient Medications on File Prior to Visit   Medication Sig Dispense Refill    B-complex with vitamin C (Z-BEC OR EQUIV) tablet Take 1 tablet by mouth once daily.      brimonidine-timoloL (COMBIGAN) 0.2-0.5 % Drop Place 1 drop into both eyes 2 (two) times daily.      candesartan (ATACAND) 16 MG tablet Take 1 tablet (16 mg total) by mouth once daily. 90 tablet 3    denosumab (PROLIA) 60 mg/mL Syrg Inject 60 mg into the skin every 6 (six) months.      folic acid (FOLVITE) 1 MG tablet Take 2 tablets (2,000 mcg total) by mouth once daily. 180 tablet 3    furosemide (LASIX) 20 MG tablet TAKE 1 TABLET BY MOUTH DAILY AS NEEDED FOR EDEMA.  Strength: 20 mg (Patient taking differently: Take 20 mg by mouth twice a week. TAKE 1 TABLET BY MOUTH DAILY AS NEEDED FOR EDEMA.  Strength: 20 mg) 90 tablet 4    hydrOXYchloroQUINE (PLAQUENIL) 200 mg tablet Take 1 tablet (200 mg total) by mouth 2 (two) times daily. 180 tablet 6    latanoprost 0.005 % ophthalmic solution Place 1 drop into both eyes every evening.      magnesium 200 mg Tab Take 400 mg by mouth once daily. Magnesium 400mg with Chelated Zinc 15mg.      methotrexate 2.5 MG Tab TAKE 5 TABLETS EVERY 7 DAYS 60 tablet 3    metoprolol succinate (TOPROL-XL) 50 MG 24 hr tablet TAKE 1 TABLET ONCE DAILY 90 tablet 4    potassium chloride SA (K-DUR,KLOR-CON) 20 MEQ tablet TAKE 1 TABLET BY MOUTH DAILY AS NEEDED FOR EDEMA. (Patient taking differently: Take 20 mEq by mouth twice a week.) 90 tablet 3    traMADoL (ULTRAM) 50 mg tablet Take 1-2 tablets ( mg total) by mouth every 12 (twelve) hours as needed for Pain (rheumatoid arthritis). 60 tablet 3    XIIDRA 5 % Dpet Place 1 drop into both eyes 2 (two) times daily.       pantoprazole (PROTONIX) 40 MG tablet Take 1 tablet (40 mg total) by mouth once  daily. (Patient taking differently: Take 40 mg by mouth 4 (four) times a week.) 90 tablet 2     No current facility-administered medications on file prior to visit.       Vitals:    11/07/23 0859   BP: 124/78   Pulse: 77         Physical Exam:    Physical Exam  Constitutional:       Appearance: Normal appearance. She is well-developed.   HENT:      Nose: No septal deviation.      Mouth/Throat:      Mouth: No oral lesions.   Eyes:      Conjunctiva/sclera:      Right eye: Right conjunctiva is not injected.      Left eye: Left conjunctiva is not injected.      Pupils: Pupils are equal, round, and reactive to light.   Neck:      Thyroid: No thyroid mass or thyromegaly.      Vascular: No JVD.   Cardiovascular:      Rate and Rhythm: Normal rate and regular rhythm.      Pulses: Normal pulses.      Comments: No edema  Pulmonary:      Effort: Pulmonary effort is normal.      Breath sounds: No wheezing.   Abdominal:      Palpations: Abdomen is soft.   Musculoskeletal:      Right shoulder: No swelling or tenderness. Normal range of motion.      Left shoulder: No swelling or tenderness. Normal range of motion.      Right elbow: No swelling. Normal range of motion. No tenderness.      Left elbow: No swelling. Normal range of motion. No tenderness.      Right wrist: Tenderness present. No swelling. Decreased range of motion.      Left wrist: No swelling or tenderness. Normal range of motion.      Right hand: Tenderness present. Decreased range of motion.      Left hand: Tenderness present.      Right hip: Normal range of motion. Normal strength.      Left hip: No tenderness. Normal range of motion.      Right knee: No swelling. Decreased range of motion. Tenderness present over the medial joint line and lateral joint line.      Left knee: No swelling. Normal range of motion. No tenderness.      Right ankle: No swelling. No tenderness. Normal range of motion.      Left ankle: No swelling. No tenderness. Normal range of motion.       Comments: 1+ synovitis MCP and PIP with heberden nodes all DIP 2-5. Left wrist with tenderness and swelling about the TFCC. No nodules. No flexion contractures.      Cervical with left sided point tenderness and the occiput (nuchal ridge) lesser tenderness at the cervical paraspinals. Decreased ROM in cervical spine   Lymphadenopathy:      Cervical: No cervical adenopathy.   Skin:     General: Skin is dry.   Neurological:      Deep Tendon Reflexes: Reflexes are normal and symmetric.               Assessment:       Encounter Diagnoses   Name Primary?    Rheumatoid arthritis involving multiple sites with positive rheumatoid factor Yes    High risk medications (not anticoagulants) long-term use     Age-related osteoporosis without current pathological fracture     Stage 3a chronic kidney disease                 Plan:        Rheumatoid arthritis involving multiple sites with positive rheumatoid factor  -     methotrexate 2.5 MG Tab; TAKE 4 TABLETS EVERY 7 DAYS  Dispense: 48 tablet; Refill: 3    High risk medications (not anticoagulants) long-term use  -     methotrexate 2.5 MG Tab; TAKE 4 TABLETS EVERY 7 DAYS  Dispense: 48 tablet; Refill: 3    Age-related osteoporosis without current pathological fracture    Stage 3a chronic kidney disease    RA:    -MTX must decrease dose for declining renal function. 4 tabs weekly   HCQ 200mg once daily 90 day (already dosed for renal function)   -continue folic acid 2mg dialy   -OFF Enbrel   -voltaren gel PRN fingers/hands-using sparingly   -Labs q 3 months.    Neck Pain:    -seems to be stable and not problematic as of last 6 months. Great.    Osteoporosis   ASE  actonel/c/I for bisphosphonates now. Insurance sent EOB, about not paying.    -doing great on Prolia. Last dose 9/15/23    Reached out to Dr. Mccullough and Dr. Greene in Nephrology- he will be seeing her soon for further eval of renal changes. Discussed with her daughter (Dr. Mclaughlin-nephro as well will try to push H20 which is  does not normally drink much)  Follow up in about 4 months (around 3/7/2024).  F/u in 4 months         30min consultation with greater than 50% spent in counseling, chart review and coordination of care. All questions answered.

## 2023-11-07 NOTE — Clinical Note
Would you check into Prolia at the office for her- she is noting recent rise in copays. May have to check with infusion last was 9/2023. Thanks CHANDRIKA

## 2023-11-07 NOTE — PATIENT INSTRUCTIONS
To Do:     Decrease Methotrexate to 4 tabs weekly   Keep Hydroxychloroquine to 200mg twice daily for now  I will copy the Prolia insurance info-   Labs again in late November.

## 2023-11-09 ENCOUNTER — OFFICE VISIT (OUTPATIENT)
Dept: NEPHROLOGY | Facility: CLINIC | Age: 83
End: 2023-11-09
Payer: COMMERCIAL

## 2023-11-09 VITALS — HEIGHT: 60 IN | BODY MASS INDEX: 31.06 KG/M2 | SYSTOLIC BLOOD PRESSURE: 138 MMHG | DIASTOLIC BLOOD PRESSURE: 76 MMHG

## 2023-11-09 DIAGNOSIS — N28.1 KIDNEY CYST, ACQUIRED: ICD-10-CM

## 2023-11-09 DIAGNOSIS — N18.2 CKD (CHRONIC KIDNEY DISEASE) STAGE 2, GFR 60-89 ML/MIN: Primary | ICD-10-CM

## 2023-11-09 DIAGNOSIS — I10 PRIMARY HYPERTENSION: ICD-10-CM

## 2023-11-09 DIAGNOSIS — N17.9 AKI (ACUTE KIDNEY INJURY): ICD-10-CM

## 2023-11-09 PROCEDURE — 99999 PR PBB SHADOW E&M-EST. PATIENT-LVL III: CPT | Mod: PBBFAC,,, | Performed by: INTERNAL MEDICINE

## 2023-11-09 PROCEDURE — 99999 PR PBB SHADOW E&M-EST. PATIENT-LVL III: ICD-10-PCS | Mod: PBBFAC,,, | Performed by: INTERNAL MEDICINE

## 2023-11-09 PROCEDURE — 99499 NO LOS: ICD-10-PCS | Mod: S$GLB,,, | Performed by: INTERNAL MEDICINE

## 2023-11-09 PROCEDURE — 99499 UNLISTED E&M SERVICE: CPT | Mod: S$GLB,,, | Performed by: INTERNAL MEDICINE

## 2023-11-09 NOTE — PROGRESS NOTES
Subjective:       Patient ID: Juanis Mclaughlin is a 83 y.o. White female who presents for new patient evaluation for acute renal failure.    Juanis Mclaughlin is referred by Joe Mccullough MD to be evaluated for acute renal failure.  She is the mother of Dr. Josefina Mclaughlin.  She reports she has always had some edema but this worsened last summer.  She was then placed on lasix to take two or three times a week along with potassium daily (according to the prescription).  Her edema improved but she recently was taken off of the lasix and potassium due to an elevated creatinine in September of 1.9.    She has been wearing compression stockings for her legs which has helped but it is hard for her to get them on and off due to her arthritis.  She has had some balance issues which is somewhat chronic and she has had three falls iin the past  three years without major injury.  She sees Dr. Cruz for her arthritis.  She rarely takes advil once every three months when she has a headache.  She has problems with reflux but recently stopped her PPI.  She does admit to not being much of a water drinker at times.      Review of Systems   Constitutional:  Negative for fever.   HENT:  Negative for congestion.    Eyes:  Positive for visual disturbance (glaucoma).   Respiratory:  Positive for cough (from reflux).    Cardiovascular:  Positive for leg swelling.   Gastrointestinal:  Positive for abdominal pain (reflux).   Endocrine: Positive for cold intolerance (feet are cold).   Genitourinary:  Negative for difficulty urinating, dysuria and hematuria.   Musculoskeletal:  Positive for arthralgias (hands) and gait problem (uses walker).   Neurological:  Positive for numbness (in feet at night).        Balance   Hematological:  Bruises/bleeds easily.         The past medical, family and social histories were reviewed for this encounter.     Past Medical History:   Diagnosis Date    Arthritis     rheumatoid arthritis    Cardiomyopathy      CKD (chronic kidney disease) stage 3, GFR 30-59 ml/min 1/9/2019    Diverticulosis     Diverticulosis     DVT (deep venous thrombosis)     one time 5 years ago    Essential hypertension 11/24/2013    GERD (gastroesophageal reflux disease)     Glaucoma     sees Dr. Gillette    Hyperlipidemia     Hypertension     Iron deficiency     Iron deficiency anemia 10/21/2016     IMO LOAD Q4    Long-term use of immunosuppressant medication 7/27/2015    Mitral regurgitation 12/20/2013 12/13 mild     Osteoporosis 7/9/2014    Pacemaker 10/26/2016    left chest PACEMAKER [employer] [address] [id number] Bonifacio Fuller A. 9/9/2016 - current   right atrium LEAD [employer] [address] [id number] Bonifacio Fuller A. 9/9/2016 - current   right ventricle LEAD [employer] [address] [id number] Bonifacio Fuller A. 9/9/2016 - current      PONV (postoperative nausea and vomiting)     Rheumatoid arthritis involving multiple sites with positive rheumatoid factor 11/24/2013    Vitamin B12 deficiency 2/28/2015     Past Surgical History:   Procedure Laterality Date    BREAST CYST EXCISION Bilateral     several cysts removed    COLONOSCOPY  2012    EPIDURAL STEROID INJECTION INTO CERVICAL SPINE N/A 10/13/2020    Procedure: Injection-steroid-epidural-cervical;  Surgeon: Bharat Avalos MD;  Location: [facility] OR;  Service: Pain Management;  Laterality: N/A;    FOOT SURGERY      had neuorma and also required hardware    HYSTERECTOMY      SUJATA with BSO    INCONTINENCE SURGERY      INJECTION OF ANESTHETIC AGENT AROUND MEDIAL BRANCH NERVES INNERVATING CERVICAL FACET JOINT Left 11/19/2020    Procedure: Block-nerve-medial branch-cervical C3, C4, C5, C6;  Surgeon: Bharat Avalos MD;  Location: [facility] OR;  Service: Pain Management;  Laterality: Left;    INJECTION OF ANESTHETIC AGENT AROUND MEDIAL BRANCH NERVES INNERVATING CERVICAL FACET JOINT Left 12/7/2020    Procedure: Block-nerve-medial  branch-cervical, c3,c4, c5, c6;  Surgeon: Bharat Avalos MD;  Location: [facility] OR;  Service: Pain Management;  Laterality: Left;    KNEE ARTHROSCOPY W/ DEBRIDEMENT      LAPAROSCOPIC CHOLECYSTECTOMY N/A 4/21/2021    Procedure: CHOLECYSTECTOMY, LAPAROSCOPIC;  Surgeon: Marilyn Borrero MD;  Location: [facility] OR;  Service: General;  Laterality: N/A;    LAPAROSCOPIC GASTRIC BANDING      OOPHORECTOMY  2012    pace maker  09/2016     Social History     Socioeconomic History    Marital status:     Number of children: 3   Tobacco Use    Smoking status: Never    Smokeless tobacco: Never   Substance and Sexual Activity    Alcohol use: Yes     Alcohol/week: 0.8 standard drinks of alcohol     Types: 1 Standard drinks or equivalent per week     Comment: occ social    Drug use: No    Sexual activity: Never     Birth control/protection: See Surgical Hx     Current Outpatient Medications   Medication Sig    B-complex with vitamin C (Z-BEC OR EQUIV) tablet Take 1 tablet by mouth once daily.    brimonidine-timoloL (COMBIGAN) 0.2-0.5 % Drop Place 1 drop into both eyes 2 (two) times daily.    candesartan (ATACAND) 16 MG tablet Take 1 tablet (16 mg total) by mouth once daily.    denosumab (PROLIA) 60 mg/mL Syrg Inject 60 mg into the skin every 6 (six) months.    folic acid (FOLVITE) 1 MG tablet Take 2 tablets (2,000 mcg total) by mouth once daily.    hydrOXYchloroQUINE (PLAQUENIL) 200 mg tablet Take 1 tablet (200 mg total) by mouth 2 (two) times daily.    latanoprost 0.005 % ophthalmic solution Place 1 drop into both eyes every evening.    magnesium 200 mg Tab Take 400 mg by mouth once daily. Magnesium 400mg with Chelated Zinc 15mg.    methotrexate 2.5 MG Tab TAKE 4 TABLETS EVERY 7 DAYS    metoprolol succinate (TOPROL-XL) 50 MG 24 hr tablet TAKE 1 TABLET ONCE DAILY    traMADoL (ULTRAM) 50 mg tablet Take 1-2 tablets ( mg total) by mouth every 12 (twelve) hours as needed for Pain (rheumatoid arthritis).    XIIDRA 5 % Dpet  Place 1 drop into both eyes 2 (two) times daily.     furosemide (LASIX) 20 MG tablet TAKE 1 TABLET BY MOUTH DAILY AS NEEDED FOR EDEMA.  Strength: 20 mg (Patient taking differently: Take 20 mg by mouth twice a week. TAKE 1 TABLET BY MOUTH DAILY AS NEEDED FOR EDEMA.  Strength: 20 mg)    pantoprazole (PROTONIX) 40 MG tablet Take 1 tablet (40 mg total) by mouth once daily. (Patient taking differently: Take 40 mg by mouth 4 (four) times a week.)    potassium chloride SA (K-DUR,KLOR-CON) 20 MEQ tablet TAKE 1 TABLET BY MOUTH DAILY AS NEEDED FOR EDEMA. (Patient taking differently: Take 20 mEq by mouth twice a week.)     No current facility-administered medications for this visit.     /76 (BP Location: Right arm, Patient Position: Sitting, BP Method: Large (Manual))   Ht 5' (1.524 m)   BMI 31.06 kg/m²     Objective:      Physical Exam  Vitals reviewed.   Constitutional:       General: She is not in acute distress.     Appearance: She is not ill-appearing.      Comments: Delightful female   HENT:      Head: Normocephalic and atraumatic.   Eyes:      General: No scleral icterus.  Cardiovascular:      Rate and Rhythm: Normal rate.      Heart sounds: No murmur heard.     No friction rub.   Pulmonary:      Effort: Pulmonary effort is normal. No respiratory distress.      Breath sounds: No wheezing or rales.   Abdominal:      General: Abdomen is flat.      Palpations: Abdomen is soft.   Musculoskeletal:         General: Deformity (MCP and PIP [place] nodes B) present.      Right lower leg: Edema (trace) present.      Left lower leg: Edema (trace) present.   Skin:     General: Skin is warm and dry.   Neurological:      Mental Status: She is alert and oriented to person, place, and time.   Psychiatric:         Mood and Affect: Mood normal.         Assessment:       1. CKD (chronic kidney disease) stage 2, GFR 60-89 ml/min    2. MIGUELINA (acute kidney injury)    3. Primary hypertension    4. Kidney cyst, acquired        Lab  "Results   Component Value Date    CREATININE 1.9 (H) 09/15/2023    BUN 26 (H) 09/15/2023     09/15/2023    K 4.2 09/15/2023     09/15/2023    CO2 26 09/15/2023     Lab Results   Component Value Date    CALCIUM 10.2 09/15/2023     Lab Results   Component Value Date    HCT 40.9 07/05/2023     No results found for: "UTPCR"  Plan:   Return to clinic in 3 months.  Labs for next visit include rp pth ohd.  RP ua micro upc US this week.  We will contact her with results.  Baseline creatinine is 1.0-1.2 since 2013.  Her baseline became labile in September 2022 when it increased to 1.5.  Renal US in 2021 showed R 9.7 cm L 10.3 cm with a large cyst of 6.5 cm.  Please avoid or minimize all NSAIDS (ibuprofen, motrin, aleve, indocin, naprosyn) to minimize the risk to your kidneys.  Aspirin in a dose of 325 mg or less a day is likely the safest with regards to kidney function.  If you are able to take aspirin and do not have any bleeding problems or ulcers, this may be your best therapy.  Alternatively, acetaminophen (Tylenol) is the safer than NSAIDs with regards to kidney function.  I would ask you take this as directed on the bottle.  It is best to stay under 2 grams a day (4-500 mg tablets a day maximum).  We discussed kidney cysts and that they are common (occur in 50% of patients over 50 years old).  We do like to monitor them with repeat ultrasound every 1-2 years to document stability.  It is possible that she is a bit volume depleted from the diuretic which has now been stopped but I am concerned there may be something else going on here.  I will get a repeat RP and study the urine along with a repeat US since she had a large cyst in 2021.  She rarely takes NSAIDs so I do not suspect this is playing a role.  Her CBC shows she is a bit macrocytic but is not anemic and has no peripheral eosinophilia as of July.  PPIs uncommonly can cause AIN but she is now off of her PPI apparently.  I am not suspicious of the " ARB as she has been tolerating that and her US did not show a significant size discrepancy to suggest ROBERT.  It does not sound as if she has had an illness or new medication to affect his kidney function either.  She did give me verbal consent to communicate with her daughter Dr. Mclaughlin about my findings.  I will do so via Epic and by phone.

## 2023-11-13 ENCOUNTER — HOSPITAL ENCOUNTER (OUTPATIENT)
Dept: RADIOLOGY | Facility: HOSPITAL | Age: 83
Discharge: HOME | End: 2023-11-13
Attending: INTERNAL MEDICINE
Payer: COMMERCIAL

## 2023-11-13 DIAGNOSIS — N17.9 AKI (ACUTE KIDNEY INJURY): ICD-10-CM

## 2023-11-13 PROCEDURE — 76770 US EXAM ABDO BACK WALL COMP: CPT | Mod: 26,,, | Performed by: RADIOLOGY

## 2023-11-13 PROCEDURE — 76770 US EXAM ABDO BACK WALL COMP: CPT | Mod: TC,PO

## 2023-11-13 PROCEDURE — 76770 US RETROPERITONEAL COMPLETE: ICD-10-PCS | Mod: 26,,, | Performed by: RADIOLOGY

## 2023-12-07 ENCOUNTER — TELEPHONE (OUTPATIENT)
Dept: RHEUMATOLOGY | Facility: CLINIC | Age: 83
End: 2023-12-07
Payer: COMMERCIAL

## 2023-12-07 DIAGNOSIS — M81.0 AGE-RELATED OSTEOPOROSIS WITHOUT CURRENT PATHOLOGICAL FRACTURE: ICD-10-CM

## 2023-12-07 DIAGNOSIS — Z91.81 AT HIGH RISK FOR INJURY RELATED TO FALL: Primary | ICD-10-CM

## 2023-12-07 NOTE — TELEPHONE ENCOUNTER
----- Message from Christina Cruz DO sent at 11/29/2023  6:24 PM CST -----  Would you check into Prolia at the office for her- she is noting recent rise in copays. May have to check with infusion last was 9/2023. Thanks JM

## 2023-12-07 NOTE — TELEPHONE ENCOUNTER
Looks like patient has BCBS FEP. If so, she may be eligible for the Prolia Copay card or other FA options through . The Copay card can decrease the cost to as little as $25 for each dose and can be used for deductible, co-insurance, or co-pay          Logged into the [employer] portal and submitted request for them to send benefits verifications to help see what patient may be eligibile for. Will follow

## 2024-01-04 ENCOUNTER — PATIENT MESSAGE (OUTPATIENT)
Dept: RHEUMATOLOGY | Facility: CLINIC | Age: 84
End: 2024-01-04
Payer: COMMERCIAL

## 2024-01-04 NOTE — TELEPHONE ENCOUNTER
Checked [employer] portal and received a response about potential cost but limited info. Pasted below.          Patient can sign up for copay card online to see if she is eligible by going to [employer] ([email or web address]) or calling [phone]. Sent [employer] message with information

## 2024-01-12 NOTE — TELEPHONE ENCOUNTER
Called patient. Provided her with the number to call [employer] to check into possible FA options to help with cost. Also, provided [facility] number to discuss cost and if she can get assistance - [phone]    Patient has f/u appointment with Dr. Cruz on 3/8/24 and will plan to complete Prolia at that appointment depending on price. Sent rx to [facility] to see if it can be covered through pharmacy benefits

## 2024-01-16 ENCOUNTER — TELEPHONE (OUTPATIENT)
Dept: RHEUMATOLOGY | Facility: CLINIC | Age: 84
End: 2024-01-16
Payer: COMMERCIAL

## 2024-01-16 NOTE — TELEPHONE ENCOUNTER
----- Message from Katya Michel sent at 1/16/2024  2:05 PM CST -----  Contact: Self  Pt is calling in regards to her BC/BS Health Insurance, stated they are no longer covering her Prolia injections and she needs to go ahead and cancel those that are scheduled and is wanting Dr Cruz to please look into an alternative option that they may cover. Can we please check on this and call pt back to advise [phone]. Thank You.

## 2024-01-16 NOTE — TELEPHONE ENCOUNTER
Patient states BCBS will no longer cover the Prolia injections.   Nurse left voicemail that her message would be sent to Dr Senia ROBLERO to review and reach out to patient to discuss other alteratives.

## 2024-02-01 DIAGNOSIS — M81.0 AGE-RELATED OSTEOPOROSIS WITHOUT CURRENT PATHOLOGICAL FRACTURE: ICD-10-CM

## 2024-02-01 DIAGNOSIS — Z91.81 AT HIGH RISK FOR INJURY RELATED TO FALL: ICD-10-CM

## 2024-02-01 NOTE — TELEPHONE ENCOUNTER
----- Message from Shyla Stoddard LPN sent at 1/29/2024  4:51 PM CST -----  Contact: Self    ----- Message -----  From: Chato Felix  Sent: 1/29/2024   4:41 PM CST  To: Anthony KILGORE Staff    Type:  Pharmacy Calling to Clarify an RX    Name of Caller:  Kira  Pharmacy Name:        [facility] - Phone: [phone]        Prescription Name:  denosumab (PROLIA) 60 mg/mL Syrg  What do they need to clarify?:  need new rx  Best Call Back Number:  [phone]  Additional Information:

## 2024-02-02 ENCOUNTER — TELEPHONE (OUTPATIENT)
Dept: RHEUMATOLOGY | Facility: CLINIC | Age: 84
End: 2024-02-02
Payer: COMMERCIAL

## 2024-02-02 DIAGNOSIS — M81.0 AGE-RELATED OSTEOPOROSIS WITHOUT CURRENT PATHOLOGICAL FRACTURE: Primary | ICD-10-CM

## 2024-02-03 NOTE — TELEPHONE ENCOUNTER
Will try Prolia through medical benefit. As [facility] has to use [facility] and office not allowed to accept and administer injectable from [facility]. Unpended rx

## 2024-02-03 NOTE — TELEPHONE ENCOUNTER
Will try Prolia through medical benefit. As [facility] to [facility] and office not allowed to accept and administer inject[facility] from [facility]     Entered [id number] for prolia auth. Last dose 9/15/23 at infusion center. CMP and vitamin D needed previously ordered. Vitamin D already attached to upcoming lab appointment     Patient to get prolia at next OV 3/8/24 with Dr. Cruz at 10am. Standing orders for CBC, ESR, and CRP previously ordered      Staff,  Please attached the [id number] to pt's appointment on 3/8/24 so the pre-service dept can work on the auth. Please also attach CMP, CBC, ESR, and CRP labs to lab visit on 2/8/24. Thanks!    Lab Results   Component Value Date    CALCIUM 9.6 11/28/2023    ALBUMIN 3.9 11/28/2023    [id number] 55 03/04/2016    MG 2.0 08/26/2016    PHOS 3.3 11/13/2023    CREATININE 1.17 11/28/2023

## 2024-02-05 NOTE — TELEPHONE ENCOUNTER
Pt's lab appt is 2/8/24, is that the appt you want us to attach labs to?  Also, I do not know what [id number] is or where to find it or how to attach it.    Jacque Mendiola MA  [facility]  2/5/2024

## 2024-02-06 NOTE — TELEPHONE ENCOUNTER
Yes, attach needed labs to lab appointment on 2/8/24    Sera,  Can you show Jacque what the [id number] is and how to attach it? MCP450 needs to be attached to office appointment on 3/8/24. Thanks!

## 2024-02-07 NOTE — TELEPHONE ENCOUNTER
Attached referral as requested   patient from triage a&ox4 with complaints of intermittent dizziness x 8 days. denies chest pain/SOB. otherwise neuro intact moving all extremities equally - speech clear and logical. denies pain. denies any signs of active bleeding. slightly pale and c/o increased weakness. has hx of blood transfusion r/t anemia. ekg obtained. evaluated by ED provider. was taking meclizine without relief.

## 2024-02-08 ENCOUNTER — LAB VISIT (OUTPATIENT)
Dept: LAB | Facility: HOSPITAL | Age: 84
End: 2024-02-08
Attending: INTERNAL MEDICINE
Payer: COMMERCIAL

## 2024-02-08 DIAGNOSIS — N17.9 AKI (ACUTE KIDNEY INJURY): ICD-10-CM

## 2024-02-08 LAB
ALBUMIN SERPL BCP-MCNC: 3.7 G/DL (ref 3.5–5.2)
ANION GAP SERPL CALC-SCNC: 10 MMOL/L (ref 8–16)
BUN SERPL-MCNC: 19 MG/DL (ref 8–23)
CALCIUM SERPL-MCNC: 10.4 MG/DL (ref 8.7–10.5)
CHLORIDE SERPL-SCNC: 105 MMOL/L (ref 95–110)
CO2 SERPL-SCNC: 23 MMOL/L (ref 23–29)
CREAT SERPL-MCNC: 1.3 MG/DL (ref 0.5–1.4)
EST. GFR  (NO RACE VARIABLE): 40.5 ML/MIN/1.73 M^2
GLUCOSE SERPL-MCNC: 140 MG/DL (ref 70–110)
PHOSPHATE SERPL-MCNC: 4.4 MG/DL (ref 2.7–4.5)
POTASSIUM SERPL-SCNC: 5 MMOL/L (ref 3.5–5.1)
PTH-INTACT SERPL-MCNC: 59 PG/ML (ref 9–77)
SODIUM SERPL-SCNC: 138 MMOL/L (ref 136–145)

## 2024-02-08 PROCEDURE — 82306 VITAMIN D 25 HYDROXY: CPT | Performed by: INTERNAL MEDICINE

## 2024-02-08 PROCEDURE — 36415 COLL VENOUS BLD VENIPUNCTURE: CPT | Mod: PO | Performed by: INTERNAL MEDICINE

## 2024-02-08 PROCEDURE — 83970 ASSAY OF PARATHORMONE: CPT | Performed by: INTERNAL MEDICINE

## 2024-02-08 PROCEDURE — 80069 RENAL FUNCTION PANEL: CPT | Performed by: INTERNAL MEDICINE

## 2024-02-09 LAB — 25(OH)D3+25(OH)D2 SERPL-MCNC: 42 NG/ML (ref 30–96)

## 2024-02-15 ENCOUNTER — OFFICE VISIT (OUTPATIENT)
Dept: NEPHROLOGY | Facility: CLINIC | Age: 84
End: 2024-02-15
Payer: COMMERCIAL

## 2024-02-15 VITALS
WEIGHT: 164.25 LBS | HEART RATE: 62 BPM | BODY MASS INDEX: 32.25 KG/M2 | OXYGEN SATURATION: 99 % | SYSTOLIC BLOOD PRESSURE: 124 MMHG | HEIGHT: 60 IN | DIASTOLIC BLOOD PRESSURE: 78 MMHG

## 2024-02-15 DIAGNOSIS — N18.31 STAGE 3A CHRONIC KIDNEY DISEASE: Primary | ICD-10-CM

## 2024-02-15 DIAGNOSIS — N28.1 KIDNEY CYST, ACQUIRED: ICD-10-CM

## 2024-02-15 DIAGNOSIS — I10 PRIMARY HYPERTENSION: ICD-10-CM

## 2024-02-15 PROBLEM — N18.2 CKD (CHRONIC KIDNEY DISEASE) STAGE 2, GFR 60-89 ML/MIN: Status: ACTIVE | Noted: 2024-02-15

## 2024-02-15 PROCEDURE — 99499 UNLISTED E&M SERVICE: CPT | Mod: S$GLB,,, | Performed by: INTERNAL MEDICINE

## 2024-02-15 PROCEDURE — 1160F RVW MEDS BY RX/DR IN RCRD: CPT | Mod: CPTII,S$GLB,, | Performed by: INTERNAL MEDICINE

## 2024-02-15 PROCEDURE — 1159F MED LIST DOCD IN RCRD: CPT | Mod: CPTII,S$GLB,, | Performed by: INTERNAL MEDICINE

## 2024-02-15 PROCEDURE — 1101F PT FALLS ASSESS-DOCD LE1/YR: CPT | Mod: CPTII,S$GLB,, | Performed by: INTERNAL MEDICINE

## 2024-02-15 PROCEDURE — 99999 PR PBB SHADOW E&M-EST. PATIENT-LVL III: CPT | Mod: PBBFAC,,, | Performed by: INTERNAL MEDICINE

## 2024-02-15 PROCEDURE — 3074F SYST BP LT 130 MM HG: CPT | Mod: CPTII,S$GLB,, | Performed by: INTERNAL MEDICINE

## 2024-02-15 PROCEDURE — 1157F ADVNC CARE PLAN IN RCRD: CPT | Mod: CPTII,S$GLB,, | Performed by: INTERNAL MEDICINE

## 2024-02-15 PROCEDURE — 3288F FALL RISK ASSESSMENT DOCD: CPT | Mod: CPTII,S$GLB,, | Performed by: INTERNAL MEDICINE

## 2024-02-15 PROCEDURE — 3078F DIAST BP <80 MM HG: CPT | Mod: CPTII,S$GLB,, | Performed by: INTERNAL MEDICINE

## 2024-02-15 NOTE — PROGRESS NOTES
Subjective:        Patient ID: Juanis Mclaughlin is a 83 y.o. White female who presents for return patient evaluation for chronic renal failure.      She has no uremic or urinary symptoms and is in her usual state of health.  There have been no recent illnesses, hospitalizations or procedures.  She is no longer using compressions stockings.  She does use a walker but has not fallen lately.      Review of Systems   Constitutional:  Negative for fever.   HENT:  Negative for congestion.    Eyes:  Positive for visual disturbance (glaucoma).   Respiratory:  Positive for cough (from reflux).    Cardiovascular:  Negative for leg swelling.   Gastrointestinal:  Positive for abdominal pain (reflux).   Endocrine: Positive for cold intolerance (feet are cold).   Genitourinary:  Negative for difficulty urinating, dysuria and hematuria.   Musculoskeletal:  Positive for arthralgias (hands) and gait problem (uses walker).   Neurological:  Negative for numbness.        Balance   Hematological:  Bruises/bleeds easily.       The past medical, family and social histories were reviewed for this encounter.     /78 (BP Location: Right arm, Patient Position: Sitting, BP Method: Large (Manual))   Pulse 62   Ht 5' (1.524 m)   Wt 74.5 kg (164 lb 3.9 oz)   SpO2 99%   BMI 32.08 kg/m²     Objective:      Physical Exam  Vitals reviewed.   Constitutional:       General: She is not in acute distress.     Appearance: She is not ill-appearing.      Comments: Delightful female   HENT:      Head: Normocephalic and atraumatic.   Eyes:      General: No scleral icterus.  Cardiovascular:      Rate and Rhythm: Normal rate.      Heart sounds: No murmur heard.     No friction rub.   Pulmonary:      Effort: Pulmonary effort is normal. No respiratory distress.      Breath sounds: No wheezing or rales.   Abdominal:      General: Abdomen is flat.      Palpations: Abdomen is soft.   Musculoskeletal:         General: Deformity (MCP and PIP [place] nodes  B) present.      Right lower leg: Edema (trace) present.      Left lower leg: Edema (trace) present.   Skin:     General: Skin is warm and dry.   Neurological:      Mental Status: She is alert and oriented to person, place, and time.   Psychiatric:         Mood and Affect: Mood normal.         Assessment:       1. Stage 3a chronic kidney disease    2. Primary hypertension    3. Kidney cyst, acquired          Lab Results   Component Value Date    CREATININE 1.3 02/08/2024    BUN 19 02/08/2024     02/08/2024    K 5.0 02/08/2024     02/08/2024    CO2 23 02/08/2024     Lab Results   Component Value Date    PTH 59.0 02/08/2024    CALCIUM 10.4 02/08/2024    PHOS 4.4 02/08/2024     Lab Results   Component Value Date    HCT 39.1 11/28/2023     Prot/Creat Ratio, Urine   Date Value Ref Range Status   11/13/2023 0.49 (H) 0.00 - 0.20 Final      Plan:   Return to clinic in 4 months.  Labs for next visit include rp pth upc.  Baseline creatinine is 1.0-1.3 since 2013.  Her baseline became labile in September 2022 when it increased to 1.5.  Renal US in 2021 showed R 9.7 cm L 10.3 cm with a large cyst of 6.5 cm.  Repeat US shows the cyst is unchanged.  Please avoid or minimize all NSAIDS (ibuprofen, motrin, aleve, indocin, naprosyn) to minimize the risk to your kidneys.  Aspirin in a dose of 325 mg or less a day is likely the safest with regards to kidney function.  If you are able to take aspirin and do not have any bleeding problems or ulcers, this may be your best therapy.  Alternatively, acetaminophen (Tylenol) is the safer than NSAIDs with regards to kidney function.  I would ask you take this as directed on the bottle.  It is best to stay under 2 grams a day (4-500 mg tablets a day maximum).  We discussed kidney cysts and that they are common (occur in 50% of patients over 50 years old).  We do like to monitor them with repeat ultrasound every 1-2 years to document stability.    KFRE 2-Year: 0.2% at 11/28/2023   9:09 AM  Calculated from:  Serum Creatinine: 1.17 mg/dL at 11/28/2023  9:09 AM  Urine Albumin Creatinine Ratio: 27.5 ug/mg at 12/19/2022 11:02 AM  Age: 83 years  Sex: Female at 11/28/2023  9:09 AM  Has CKD-3 to CKD-5: Yes    KFRE 5-Year: 0.7% at 11/28/2023  9:09 AM  Calculated from:  Serum Creatinine: 1.17 mg/dL at 11/28/2023  9:09 AM  Urine Albumin Creatinine Ratio: 27.5 ug/mg at 12/19/2022 11:02 AM  Age: 83 years  Sex: Female at 11/28/2023  9:09 AM  Has CKD-3 to CKD-5: Yes

## 2024-02-16 NOTE — TELEPHONE ENCOUNTER
Checked to see if auth approved for B&B Prolia on 3/8. Not approved yet. Last updated by pre-service rep today and it is still pending. Will follow. Once approved will request cost estimate for price

## 2024-02-24 NOTE — TELEPHONE ENCOUNTER
Checked and Prolia BB still not approved for 3/8/24 appointment. Checked referral. Pre-service repNkechi checked status on 2/21 and it was still pending approval through insurance. Will continue to follow

## 2024-03-04 NOTE — TELEPHONE ENCOUNTER
Prolia is approved. Sent teams message to Meghan LEONARDO for cost estimate for Prolia. Awaiting response

## 2024-03-04 NOTE — TELEPHONE ENCOUNTER
Received response. Cost would be $278.82    Will reach out to patient to see if she signed up for the prolia copay card which could decrease cost down to about $25

## 2024-03-07 ENCOUNTER — PATIENT MESSAGE (OUTPATIENT)
Dept: RHEUMATOLOGY | Facility: CLINIC | Age: 84
End: 2024-03-07
Payer: COMMERCIAL

## 2024-03-08 ENCOUNTER — OFFICE VISIT (OUTPATIENT)
Dept: RHEUMATOLOGY | Facility: CLINIC | Age: 84
End: 2024-03-08
Payer: COMMERCIAL

## 2024-03-08 VITALS
HEIGHT: 62 IN | RESPIRATION RATE: 17 BRPM | DIASTOLIC BLOOD PRESSURE: 85 MMHG | SYSTOLIC BLOOD PRESSURE: 143 MMHG | BODY MASS INDEX: 28.56 KG/M2 | HEART RATE: 73 BPM | WEIGHT: 155.19 LBS

## 2024-03-08 DIAGNOSIS — M47.812 SPONDYLOSIS OF CERVICAL REGION WITHOUT MYELOPATHY OR RADICULOPATHY: ICD-10-CM

## 2024-03-08 DIAGNOSIS — Z79.60 LONG-TERM USE OF IMMUNOSUPPRESSANT MEDICATION: ICD-10-CM

## 2024-03-08 DIAGNOSIS — E53.8 VITAMIN B12 DEFICIENCY: ICD-10-CM

## 2024-03-08 DIAGNOSIS — N18.30 STAGE 3 CHRONIC KIDNEY DISEASE, UNSPECIFIED WHETHER STAGE 3A OR 3B CKD: ICD-10-CM

## 2024-03-08 DIAGNOSIS — M81.0 AGE-RELATED OSTEOPOROSIS WITHOUT CURRENT PATHOLOGICAL FRACTURE: ICD-10-CM

## 2024-03-08 DIAGNOSIS — M25.449 EFFUSION OF PROXIMAL INTERPHALANGEAL (PIP) JOINT OF FINGER: ICD-10-CM

## 2024-03-08 DIAGNOSIS — Z79.899 HIGH RISK MEDICATIONS (NOT ANTICOAGULANTS) LONG-TERM USE: ICD-10-CM

## 2024-03-08 DIAGNOSIS — M05.79 RHEUMATOID ARTHRITIS INVOLVING MULTIPLE SITES WITH POSITIVE RHEUMATOID FACTOR: Primary | ICD-10-CM

## 2024-03-08 PROCEDURE — 3079F DIAST BP 80-89 MM HG: CPT | Mod: CPTII,S$GLB,, | Performed by: INTERNAL MEDICINE

## 2024-03-08 PROCEDURE — 99214 OFFICE O/P EST MOD 30 MIN: CPT | Mod: 25,S$GLB,, | Performed by: INTERNAL MEDICINE

## 2024-03-08 PROCEDURE — 1125F AMNT PAIN NOTED PAIN PRSNT: CPT | Mod: CPTII,S$GLB,, | Performed by: INTERNAL MEDICINE

## 2024-03-08 PROCEDURE — 1159F MED LIST DOCD IN RCRD: CPT | Mod: CPTII,S$GLB,, | Performed by: INTERNAL MEDICINE

## 2024-03-08 PROCEDURE — 96372 THER/PROPH/DIAG INJ SC/IM: CPT | Mod: S$GLB,,, | Performed by: INTERNAL MEDICINE

## 2024-03-08 PROCEDURE — 3288F FALL RISK ASSESSMENT DOCD: CPT | Mod: CPTII,S$GLB,, | Performed by: INTERNAL MEDICINE

## 2024-03-08 PROCEDURE — 99999 PR PBB SHADOW E&M-EST. PATIENT-LVL V: CPT | Mod: PBBFAC,,, | Performed by: INTERNAL MEDICINE

## 2024-03-08 PROCEDURE — 1101F PT FALLS ASSESS-DOCD LE1/YR: CPT | Mod: CPTII,S$GLB,, | Performed by: INTERNAL MEDICINE

## 2024-03-08 PROCEDURE — 3077F SYST BP >= 140 MM HG: CPT | Mod: CPTII,S$GLB,, | Performed by: INTERNAL MEDICINE

## 2024-03-08 PROCEDURE — 1157F ADVNC CARE PLAN IN RCRD: CPT | Mod: CPTII,S$GLB,, | Performed by: INTERNAL MEDICINE

## 2024-03-08 PROCEDURE — 1160F RVW MEDS BY RX/DR IN RCRD: CPT | Mod: CPTII,S$GLB,, | Performed by: INTERNAL MEDICINE

## 2024-03-08 RX ORDER — HYDROXYCHLOROQUINE SULFATE 200 MG/1
200 TABLET, FILM COATED ORAL DAILY
Qty: 90 TABLET | Refills: 3 | Status: SHIPPED | OUTPATIENT
Start: 2024-03-08 | End: 2025-03-08

## 2024-03-08 RX ORDER — METHOTREXATE 2.5 MG/1
TABLET ORAL
Qty: 48 TABLET | Refills: 3 | Status: SHIPPED | OUTPATIENT
Start: 2024-03-08

## 2024-03-08 RX ORDER — FOLIC ACID 1 MG/1
2000 TABLET ORAL DAILY
Qty: 180 TABLET | Refills: 3 | Status: SHIPPED | OUTPATIENT
Start: 2024-03-08 | End: 2025-03-08

## 2024-03-08 ASSESSMENT — ROUTINE ASSESSMENT OF PATIENT INDEX DATA (RAPID3)
PATIENT GLOBAL ASSESSMENT SCORE: 1.5
PSYCHOLOGICAL DISTRESS SCORE: 1.1
FATIGUE SCORE: 0
PAIN SCORE: 2.5
MDHAQ FUNCTION SCORE: 0.1
TOTAL RAPID3 SCORE: 1.44

## 2024-03-08 NOTE — PROGRESS NOTES
AS Subjective:          Chief Complaint: Juanis Mclaughlin is a 83 y.o. female who had concerns including Rheumatoid arthritis involving multiple sites with positive (Follow up/).    HPI:      Rheumatoid Arthritis    84y/o female with h/o HTN, HLD was diagnosed with sero +  Rheumatoid arthritis in 1998. Off prednisone now.  Enbrel 50mg weekly on hold since March 2020. - and no worsening.   MTX 4 tabs weekly.   Started HCQ 200mg twice daily       Pain in the DIP and PIP joints. Most painful is the left 3rd and 4th PIP with swelling this visit.   No ASE of stomatitis,  infections or injection site reaction.   She was also diagnosed with secondary Sjogren's. Currently on restasis.  PPM for bradycardia.     Past 12 months slow decline in renal function from prior year- on PO bisphos and ACEI/HCTZ  switch this to Prolia.  Started Prolia 02/29/2019   In the last 6 months continued decline in renal function -     She denies fever, weight loss, photosensitivity, rash, ulcer, raynaud's phenomenon, alopecia, dysphagia, diarrhea or blood in the stools.  + hx of GERD. On PPI. Hx of mild MR. No pedal edema.     Doing well with Dr. Madison with Breo and Gabapentin to relax cough impulse.  As of this visit 02/29/2019 patient was having upper respiratory tract infection x3 weeks seem to be feeling better within 10 days ago returned with a more productive cough postnasal drip rhinitis    REVIEW OF SYSTEMS:    Review of Systems   Constitutional:  Negative for fever, malaise/fatigue and weight loss.   HENT:  Negative for sore throat.    Eyes:  Negative for double vision, photophobia and redness.   Respiratory:  Positive for cough and sputum production. Negative for hemoptysis, shortness of breath and wheezing.    Cardiovascular:  Negative for chest pain, palpitations and orthopnea.   Gastrointestinal:  Negative for abdominal pain, constipation and diarrhea.   Genitourinary:  Negative for dysuria, hematuria and urgency.   Musculoskeletal:   Positive for joint pain. Negative for back pain and myalgias.   Skin:  Negative for rash.   Neurological:  Negative for dizziness, tingling, focal weakness and headaches.   Endo/Heme/Allergies:  Does not bruise/bleed easily.   Psychiatric/Behavioral:  Negative for depression, hallucinations and suicidal ideas.                 Objective:            Past Medical History:   Diagnosis Date    Arthritis     rheumatoid arthritis    Cardiomyopathy     CKD (chronic kidney disease) stage 3, GFR 30-59 ml/min 1/9/2019    Diverticulosis     Diverticulosis     DVT (deep venous thrombosis)     one time 5 years ago    Essential hypertension 11/24/2013    GERD (gastroesophageal reflux disease)     Glaucoma     sees Dr. Gillette    Hyperlipidemia     Hypertension     Iron deficiency     Iron deficiency anemia 10/21/2016     IMO LOAD Q4    Long-term use of immunosuppressant medication 7/27/2015    Mitral regurgitation 12/20/2013 12/13 mild     Osteoporosis 7/9/2014    Pacemaker 10/26/2016    left chest [employer]EM[employer]ER [employer] [address]  [id number] Bonifacio Fuller 9/9/2016 - current   right atrium LEAD [employer] [address] [employer] [id number] Bonifacio Fuller 9/9/2016 - current   right ventricle LEAD [employer] [id number] Bonifacio Fuller 9/9/2016 - current      PONV (postoperative nausea and vomiting)     Rheumatoid arthritis involving multiple sites with positive rheumatoid factor 11/24/2013    Vitamin B12 deficiency 2/28/2015     Family History   Problem Relation Age of Onset    Heart disease Mother         CHF    Hypertension Mother     Heart disease Father     Stroke Father     Ovarian cancer Maternal Aunt 34    Heart disease Brother     Stroke Brother     Heart disease Brother     Scleroderma Brother     Breast cancer Neg Hx      Social History     Tobacco Use    Smoking status: Never    Smokeless tobacco: Never   Substance Use Topics    Alcohol use: Yes      Alcohol/week: 0.8 standard drinks of alcohol     Types: 1 Standard drinks or equivalent per week     Comment: occ social    Drug use: No         Current Outpatient Medications on File Prior to Visit   Medication Sig Dispense Refill    B-complex with vitamin C (Z-BEC OR EQUIV) tablet Take 1 tablet by mouth once daily.      brimonidine-timoloL (COMBIGAN) 0.2-0.5 % Drop Place 1 drop into both eyes 2 (two) times daily.      candesartan (ATACAND) 16 MG tablet Take 1 tablet (16 mg total) by mouth once daily. 90 tablet 4    denosumab (PROLIA) 60 mg/mL Syrg Inject 60 mg into the skin every 6 (six) months.      denosumab (PROLIA) 60 mg/mL Syrg Inject 1 mL (60 mg total) into the skin every 6 (six) months. 1 mL 1    hydrOXYchloroQUINE (PLAQUENIL) 200 mg tablet Take 1 tablet (200 mg total) by mouth 2 (two) times daily. 180 tablet 6    latanoprost 0.005 % ophthalmic solution Place 1 drop into both eyes every evening.      magnesium 200 mg Tab Take 400 mg by mouth once daily. Magnesium 400mg with Chelated Zinc 15mg.      methotrexate 2.5 MG Tab TAKE 4 TABLETS EVERY 7 DAYS 48 tablet 3    metoprolol succinate (TOPROL-XL) 50 MG 24 hr tablet TAKE 1 TABLET ONCE DAILY 90 tablet 4    traMADoL (ULTRAM) 50 mg tablet Take 1-2 tablets ( mg total) by mouth every 12 (twelve) hours as needed for Pain (rheumatoid arthritis). 60 tablet 3    XIIDRA 5 % Dpet Place 1 drop into both eyes 2 (two) times daily.       folic acid (FOLVITE) 1 MG tablet Take 2 tablets (2,000 mcg total) by mouth once daily. 180 tablet 3     No current facility-administered medications on file prior to visit.       Vitals:    03/08/24 1006   BP: (!) 143/85   Pulse: 73   Resp: 17         Physical Exam:    Physical Exam  Constitutional:       Appearance: Normal appearance. She is well-developed.   HENT:      Nose: No septal deviation.      Mouth/Throat:      Mouth: No oral lesions.   Eyes:      Conjunctiva/sclera:      Right eye: Right conjunctiva is not injected.       Left eye: Left conjunctiva is not injected.      Pupils: Pupils are equal, round, and reactive to light.   Neck:      Thyroid: No thyroid mass or thyromegaly.      Vascular: No JVD.   Cardiovascular:      Rate and Rhythm: Normal rate and regular rhythm.      Pulses: Normal pulses.      Comments: No edema  Pulmonary:      Effort: Pulmonary effort is normal.      Breath sounds: No wheezing.   Abdominal:      Palpations: Abdomen is soft.   Musculoskeletal:      Right shoulder: No swelling or tenderness. Normal range of motion.      Left shoulder: No swelling or tenderness. Normal range of motion.      Right elbow: No swelling. Normal range of motion. No tenderness.      Left elbow: No swelling. Normal range of motion. No tenderness.      Right wrist: Tenderness present. No swelling. Decreased range of motion.      Left wrist: No swelling or tenderness. Normal range of motion.      Right hand: Tenderness present. Decreased range of motion.      Left hand: Tenderness present.      Right hip: Normal range of motion. Normal strength.      Left hip: No tenderness. Normal range of motion.      Right knee: No swelling. Decreased range of motion. Tenderness present over the medial joint line and lateral joint line.      Left knee: No swelling. Normal range of motion. No tenderness.      Right ankle: No swelling. No tenderness. Normal range of motion.      Left ankle: No swelling. No tenderness. Normal range of motion.      Comments: 1+ synovitis MCP and PIP with heberden nodes all DIP 2-5. Left wrist with tenderness and swelling about the TFCC. No nodules. No flexion contractures.      Cervical with left sided point tenderness and the occiput (nuchal ridge) lesser tenderness at the cervical paraspinals. Decreased ROM in cervical spine   Lymphadenopathy:      Cervical: No cervical adenopathy.   Skin:     General: Skin is dry.   Neurological:      Deep Tendon Reflexes: Reflexes are normal and symmetric.               Assessment:        Encounter Diagnoses   Name Primary?    Rheumatoid arthritis involving multiple sites with positive rheumatoid factor Yes    Long-term use of immunosuppressant medication     Stage 3 chronic kidney disease, unspecified whether stage 3a or 3b CKD     Effusion of proximal interphalangeal (PIP) joint of finger     High risk medications (not anticoagulants) long-term use     Vitamin B12 deficiency     Spondylosis of cervical region without myelopathy or radiculopathy                 Plan:        Rheumatoid arthritis involving multiple sites with positive rheumatoid factor  -     hydroxychloroquine (PLAQUENIL) 200 mg tablet; Take 1 tablet (200 mg total) by mouth once daily.  Dispense: 90 tablet; Refill: 3  -     methotrexate 2.5 MG Tab; TAKE 4 TABLETS EVERY 7 DAYS  Dispense: 48 tablet; Refill: 3  -     folic acid (FOLVITE) 1 MG tablet; Take 2 tablets (2,000 mcg total) by mouth once daily.  Dispense: 180 tablet; Refill: 3  -     CBC Auto Differential; Standing  -     Comprehensive Metabolic Panel; Standing  -     Sedimentation rate; Standing  -     C-Reactive Protein; Standing    Long-term use of immunosuppressant medication  -     hydroxychloroquine (PLAQUENIL) 200 mg tablet; Take 1 tablet (200 mg total) by mouth once daily.  Dispense: 90 tablet; Refill: 3  -     methotrexate 2.5 MG Tab; TAKE 4 TABLETS EVERY 7 DAYS  Dispense: 48 tablet; Refill: 3  -     folic acid (FOLVITE) 1 MG tablet; Take 2 tablets (2,000 mcg total) by mouth once daily.  Dispense: 180 tablet; Refill: 3  -     CBC Auto Differential; Standing  -     Comprehensive Metabolic Panel; Standing  -     Sedimentation rate; Standing  -     C-Reactive Protein; Standing    Stage 3 chronic kidney disease, unspecified whether stage 3a or 3b CKD  -     hydroxychloroquine (PLAQUENIL) 200 mg tablet; Take 1 tablet (200 mg total) by mouth once daily.  Dispense: 90 tablet; Refill: 3  -     folic acid (FOLVITE) 1 MG tablet; Take 2 tablets (2,000 mcg total) by mouth  once daily.  Dispense: 180 tablet; Refill: 3    Effusion of proximal interphalangeal (PIP) joint of finger  -     hydroxychloroquine (PLAQUENIL) 200 mg tablet; Take 1 tablet (200 mg total) by mouth once daily.  Dispense: 90 tablet; Refill: 3    High risk medications (not anticoagulants) long-term use  -     hydroxychloroquine (PLAQUENIL) 200 mg tablet; Take 1 tablet (200 mg total) by mouth once daily.  Dispense: 90 tablet; Refill: 3  -     methotrexate 2.5 MG Tab; TAKE 4 TABLETS EVERY 7 DAYS  Dispense: 48 tablet; Refill: 3  -     folic acid (FOLVITE) 1 MG tablet; Take 2 tablets (2,000 mcg total) by mouth once daily.  Dispense: 180 tablet; Refill: 3  -     CBC Auto Differential; Standing  -     Comprehensive Metabolic Panel; Standing  -     Sedimentation rate; Standing  -     C-Reactive Protein; Standing    Vitamin B12 deficiency  -     folic acid (FOLVITE) 1 MG tablet; Take 2 tablets (2,000 mcg total) by mouth once daily.  Dispense: 180 tablet; Refill: 3    Spondylosis of cervical region without myelopathy or radiculopathy  -     folic acid (FOLVITE) 1 MG tablet; Take 2 tablets (2,000 mcg total) by mouth once daily.  Dispense: 180 tablet; Refill: 3    RA:    -MTX must decrease dose for declining renal function. 4 tabs weekly   HCQ 200mg once daily 90 day (already dosed for renal function)   -continue folic acid 2mg dialy   -OFF Enbrel   -voltaren gel PRN fingers/hands-using sparingly   -Labs q 3 months.    Neck Pain:    -seems to be stable and not problematic as of last 6 months. Great.    Osteoporosis   ASE  actonel/c/I for bisphosphonates now. Insurance sent EOB, about not paying.    -doing great on Prolia. Last dose 9/15/23    Reached out to Dr. Mccullough and Dr. Greene in Nephrology- he will be seeing her soon for further eval of renal changes. Discussed with her daughter (Dr. Mclaughlin-nephro as well will try to push H20 which is does not normally drink much)  No follow-ups on file.  F/u in 4 months         30min  consultation with greater than 50% spent in counseling, chart review and coordination of care. All questions answered.

## 2024-03-12 ENCOUNTER — TELEPHONE (OUTPATIENT)
Dept: RHEUMATOLOGY | Facility: CLINIC | Age: 84
End: 2024-03-12
Payer: COMMERCIAL

## 2024-03-12 NOTE — PROGRESS NOTES
"Subjective    Reason for visit: Osteoporosis Management    Rheumatology Provider: Dr. Cruz    HPI: Juanis Mclaughlin is a 83 y.o. female who presents to the clinic for follow-up with Dr. Cruz and Prolia injection.She is a new patient to me but has been following with Dr. Cruz for management of her rheumatic disease. Pt is presenting with RA, osteoporosis, GERD, CKD stage 3, HTN, and HLD.      Prior Osteoporosis Therapies:   Atelvia  Actonel    Osteoporosis:  Current treatment: Prolia via [employer]  Last Prolia on 9/15/23; last DXA on 7/3/2018  Received cost estimate for patient that Prolia injection would cost $278.82  Patient stated that she is ok with cost and hasn't signed up for Prolia copay card which could decrease cost to $25  Stated that she had it before a while back  Notified her that I would step out to call [facility] to see if it could be reactivated  Copay card reactivated  Any fractures since your last visit: no  Any falls since your last visit: no    Objective    Vitals:    03/08/24 1006   BP: (!) 143/85   Pulse: 73   Resp: 17   Weight: 70.4 kg (155 lb 3.3 oz)   Height: 5' 1.5" (1.562 m)   PainSc:   5   PainLoc: Finger     Lab Results   Component Value Date    CALCIUM 10.4 02/08/2024    ALBUMIN 3.7 02/08/2024    [id number] 42 02/08/2024    MG 2.0 08/26/2016    PHOS 4.4 02/08/2024    CREATININE 1.3 02/08/2024     DXA Bone Density Spine And Hip  Details    Reading Physician Reading Date Result Priority   Salomon Summers MD  [phone] 7/3/2018      Narrative & Impression  EXAMINATION:  DEXA BONE DENSITY SPINE HIP     CLINICAL HISTORY:  Other specified disorders of bone density and structure, unspecified site advanced age, , previous soup parenchyma, postmenopausal, rheumatoid arthritis     TECHNIQUE:  DXA scanning was performed over the both hip and lumbar spine.  Review of the images confirms satisfactory positioning and technique.     COMPARISON:  03/02/2016 performed on a " different system with different standards     FINDINGS:  The L1 to L4 vertebral bone mineral density is equal to 1.039 g/cm squared with a T score of -1.2.  There has been a mild decrease relative to the prior study.     The total hip bone mineral density is equal to 0.697 g/cm squared with a T score of -2.5.  There has been a mild decrease in bone density relative to the prior study.     There is a 21.7% risk of a major osteoporotic fracture and a 7.5% risk of hip fracture in the next 10 years (FRAX).     IMPRESSION:      Changes consistent with osteopenia in the spine and borderline osteoporosis in the hips.     Consider FDA approved medical therapies in postmenopausal women and men aged 50 years and older, based on the following:     *A hip or vertebral (clinical or morphometric) fracture  *T score less than or equal to -2.5 at the femoral neck or spine after appropriate evaluation to exclude secondary causes.  *Low bone mass -- also known as osteopenia (T score between -1.0 and -2.5 at the femoral neck or spine) and a 10 year probability of hip fracture greater than or equal to 3% or a 10 year probability of major osteoporosis-related fracture greater than or equal to 20% based on the [place]-adapted WHO algorithm.  *Clinician's judgment and/or patient preference may indicate treatment for people with 10 year fracture probabilities is above or below these levels.        Electronically signed by: Salomon Summers MD  Date:                                            07/03/2018  Time:                                           10:20     Assessment/Plan    1. Osteoporosis: Pt was referred to the Rheumatology pharmacist for Prolia injection. Prolia via buy and bill (approved).   Prolia administered in pt's right arm appropriately and left office in stable condition.    ND: 9/9/24 with me  Next DXA due now now. Ordered      Follow-up scheduled on 7/10/24 [facility] Dr[facility] Conn[facility]  Rheumatology  Clinical Pharmacist  [facility]

## 2024-03-12 NOTE — TELEPHONE ENCOUNTER
Patient had appointment with provider on 3/8/24    Stated that she previously had a prolia copay card. Stepped out of the room to call [facility]    Talked with Soraya Lopez who stated that patient needed a new credit card number and member ID. Card has $1500 and will automatically re-load on 1/2025. Patient will need to re-enroll into the program by calling [employer] on 4/1/2025     Provided card number and member ID. Member ID: [id number] and card expiration: 10/31/2027. Sent info and credit card number to billing dept    Enrollment into program backed dated for last 180 days and card effective since 4/5/2019

## 2024-04-01 ENCOUNTER — TELEPHONE (OUTPATIENT)
Dept: RHEUMATOLOGY | Facility: CLINIC | Age: 84
End: 2024-04-01
Payer: COMMERCIAL

## 2024-04-01 NOTE — TELEPHONE ENCOUNTER
----- Message from Christina Cruz DO sent at 3/31/2024 10:25 PM CDT -----  Call patient labs ok no change in therapy. Kidney function is improved. Dr. Cruz

## 2024-04-01 NOTE — TELEPHONE ENCOUNTER
"Left voicemail with below message per Dr Cruz  "Call patient labs ok no change in therapy. Kidney function is improved. Dr. Cruz"   "

## 2024-05-08 ENCOUNTER — TELEPHONE (OUTPATIENT)
Dept: RHEUMATOLOGY | Facility: CLINIC | Age: 84
End: 2024-05-08
Payer: COMMERCIAL

## 2024-05-08 NOTE — TELEPHONE ENCOUNTER
----- Message from Chato Isidro sent at 5/8/2024 10:31 AM CDT -----  Contact: self  Type: Needs Medical Advice  Who Called:  PT    Pharmacy name and phone #:    [facility] [place] [address] - [facility] AT [facility] to [facility]  [address] [place] [address]  Phone: [phone] Fax: [phone]    Best Call Back Number: [phone]   Additional Information: Please call PT regarding hydroxychloroquine (PLAQUENIL) 200 mg tablet she states the dosage on the bottle is wrong and she is about to run out.

## 2024-05-21 NOTE — TELEPHONE ENCOUNTER
Dr. Gutierrez,       The dose of her valsartan is 80-12.5mg and she will come in Friday the 21st at 11:30   English negative - no pain, no limited range of motion

## 2024-06-07 ENCOUNTER — TELEPHONE (OUTPATIENT)
Dept: NEPHROLOGY | Facility: CLINIC | Age: 84
End: 2024-06-07
Payer: COMMERCIAL

## 2024-06-07 NOTE — TELEPHONE ENCOUNTER
Patient is going to get labs drawn next week. She needs to scheduled to see .She was due this month. She does have two appointments scheduled in July maybe we can schedule  one of those days.I will send this message to Sharee for scheduling.

## 2024-07-03 ENCOUNTER — OFFICE VISIT (OUTPATIENT)
Dept: NEPHROLOGY | Facility: CLINIC | Age: 84
End: 2024-07-03
Payer: COMMERCIAL

## 2024-07-03 VITALS
DIASTOLIC BLOOD PRESSURE: 84 MMHG | OXYGEN SATURATION: 96 % | HEART RATE: 63 BPM | WEIGHT: 151.25 LBS | SYSTOLIC BLOOD PRESSURE: 152 MMHG | HEIGHT: 61 IN | BODY MASS INDEX: 28.56 KG/M2

## 2024-07-03 DIAGNOSIS — I10 PRIMARY HYPERTENSION: ICD-10-CM

## 2024-07-03 DIAGNOSIS — N18.31 STAGE 3A CHRONIC KIDNEY DISEASE: Primary | ICD-10-CM

## 2024-07-03 DIAGNOSIS — N28.1 KIDNEY CYST, ACQUIRED: ICD-10-CM

## 2024-07-03 PROCEDURE — 1101F PT FALLS ASSESS-DOCD LE1/YR: CPT | Mod: CPTII,S$GLB,, | Performed by: INTERNAL MEDICINE

## 2024-07-03 PROCEDURE — 99999 PR PBB SHADOW E&M-EST. PATIENT-LVL IV: CPT | Mod: PBBFAC,,, | Performed by: INTERNAL MEDICINE

## 2024-07-03 PROCEDURE — 3077F SYST BP >= 140 MM HG: CPT | Mod: CPTII,S$GLB,, | Performed by: INTERNAL MEDICINE

## 2024-07-03 PROCEDURE — 1159F MED LIST DOCD IN RCRD: CPT | Mod: CPTII,S$GLB,, | Performed by: INTERNAL MEDICINE

## 2024-07-03 PROCEDURE — 99213 OFFICE O/P EST LOW 20 MIN: CPT | Mod: S$GLB,,, | Performed by: INTERNAL MEDICINE

## 2024-07-03 PROCEDURE — 3079F DIAST BP 80-89 MM HG: CPT | Mod: CPTII,S$GLB,, | Performed by: INTERNAL MEDICINE

## 2024-07-03 PROCEDURE — 3288F FALL RISK ASSESSMENT DOCD: CPT | Mod: CPTII,S$GLB,, | Performed by: INTERNAL MEDICINE

## 2024-07-03 PROCEDURE — 1157F ADVNC CARE PLAN IN RCRD: CPT | Mod: CPTII,S$GLB,, | Performed by: INTERNAL MEDICINE

## 2024-07-03 PROCEDURE — 1160F RVW MEDS BY RX/DR IN RCRD: CPT | Mod: CPTII,S$GLB,, | Performed by: INTERNAL MEDICINE

## 2024-07-03 NOTE — PROGRESS NOTES
"  Subjective:        Patient ID: Juanis Mclaughlin is a 83 y.o. White female who presents for return patient evaluation for chronic renal failure.      She has no uremic or urinary symptoms and is in her usual state of health.  There have been no recent illnesses, hospitalizations or procedures.  She is no longer using compressions stockings.  She does use a walker but has not fallen lately.  She has hand pain but otherwise has been doing quite well.  We discussed her cyst again.      Review of Systems   Constitutional:  Negative for fever.   HENT:  Negative for congestion.    Eyes:  Positive for visual disturbance (glaucoma).   Respiratory:  Positive for cough (from reflux).    Cardiovascular:  Positive for leg swelling.   Gastrointestinal:  Positive for abdominal pain (reflux).   Endocrine: Positive for cold intolerance (feet are cold).   Genitourinary:  Negative for difficulty urinating, dysuria and hematuria.   Musculoskeletal:  Positive for arthralgias (hands) and gait problem (uses walker).   Neurological:  Positive for numbness (mild in her legs).        Balance   Hematological:  Bruises/bleeds easily.       The past medical, family and social histories were reviewed for this encounter.     Pulse 63   Ht 5' 1" (1.549 m)   Wt 68.6 kg (151 lb 3.8 oz)   SpO2 96%   BMI 28.58 kg/m²     Objective:      Physical Exam  Vitals reviewed.   Constitutional:       General: She is not in acute distress.     Appearance: She is not ill-appearing.      Comments: Delightful female   HENT:      Head: Normocephalic and atraumatic.   Eyes:      General: No scleral icterus.  Cardiovascular:      Rate and Rhythm: Normal rate.      Heart sounds: No murmur heard.     No friction rub.   Pulmonary:      Effort: Pulmonary effort is normal. No respiratory distress.      Breath sounds: No wheezing or rales.   Abdominal:      General: Abdomen is flat.      Palpations: Abdomen is soft.   Musculoskeletal:         General: Deformity (MCP " and PIP Heberdon nodes B) present.      Right lower leg: Edema (trace) present.      Left lower leg: Edema (trace) present.   Skin:     General: Skin is warm and dry.   Neurological:      Mental Status: She is alert and oriented to person, place, and time.   Psychiatric:         Mood and Affect: Mood normal.         Assessment:       1. Stage 3a chronic kidney disease    2. Primary hypertension    3. Kidney cyst, acquired          Lab Results   Component Value Date    CREATININE 1.11 06/10/2024    BUN 18 06/10/2024     06/10/2024    K 4.5 06/10/2024     06/10/2024    CO2 26 06/10/2024     Lab Results   Component Value Date    PTH 21.2 06/10/2024    CALCIUM 10.1 06/10/2024    PHOS 4.2 06/10/2024     Lab Results   Component Value Date    HCT 41.4 03/12/2024     Prot/Creat Ratio, Urine   Date Value Ref Range Status   06/10/2024 305.1 (H) 10.0 - 107.0 mg/g Final   11/13/2023 0.49 (H) 0.00 - 0.20 Final      Plan:   Return to clinic in 6 months.  Labs for next visit include rp pth upc q 3 months.  US in 3 months.  Baseline creatinine is 1.0-1.3 since 2013.  Her baseline became labile in September 2022 when it increased to 1.5.  Renal US in 2021 showed R 9.7 cm L 10.3 cm with a large cyst of 6.5 cm.  Repeat US shows the cyst is unchanged and is 6.3 cm.  We will repeat a renal US later this year.  Please avoid or minimize all NSAIDS (ibuprofen, motrin, aleve, indocin, naprosyn) to minimize the risk to your kidneys.  Aspirin in a dose of 325 mg or less a day is likely the safest with regards to kidney function.  If you are able to take aspirin and do not have any bleeding problems or ulcers, this may be your best therapy.  Alternatively, acetaminophen (Tylenol) is the safer than NSAIDs with regards to kidney function.  I would ask you take this as directed on the bottle.  It is best to stay under 2 grams a day (4-500 mg tablets a day maximum).  We discussed kidney cysts and that they are common (occur in 50% of  patients over 50 years old).  We do like to monitor them with repeat ultrasound every 1-2 years to document stability.    KFRE 2-Year: 0.2% at 11/28/2023  9:09 AM  Calculated from:  Serum Creatinine: 1.17 mg/dL at 11/28/2023  9:09 AM  Urine Albumin Creatinine Ratio: 27.5 ug/mg at 12/19/2022 11:02 AM  Age: 83 years  Sex: Female at 11/28/2023  9:09 AM  Has CKD-3 to CKD-5: Yes    KFRE 5-Year: 0.7% at 11/28/2023  9:09 AM  Calculated from:  Serum Creatinine: 1.17 mg/dL at 11/28/2023  9:09 AM  Urine Albumin Creatinine Ratio: 27.5 ug/mg at 12/19/2022 11:02 AM  Age: 83 years  Sex: Female at 11/28/2023  9:09 AM  Has CKD-3 to CKD-5: Yes

## 2024-07-10 ENCOUNTER — OFFICE VISIT (OUTPATIENT)
Dept: RHEUMATOLOGY | Facility: CLINIC | Age: 84
End: 2024-07-10
Payer: COMMERCIAL

## 2024-07-10 VITALS
WEIGHT: 162.5 LBS | BODY MASS INDEX: 30.68 KG/M2 | HEART RATE: 59 BPM | HEIGHT: 61 IN | DIASTOLIC BLOOD PRESSURE: 76 MMHG | SYSTOLIC BLOOD PRESSURE: 142 MMHG

## 2024-07-10 DIAGNOSIS — N18.30 STAGE 3 CHRONIC KIDNEY DISEASE, UNSPECIFIED WHETHER STAGE 3A OR 3B CKD: ICD-10-CM

## 2024-07-10 DIAGNOSIS — Z79.899 HIGH RISK MEDICATIONS (NOT ANTICOAGULANTS) LONG-TERM USE: ICD-10-CM

## 2024-07-10 DIAGNOSIS — M47.812 SPONDYLOSIS OF CERVICAL REGION WITHOUT MYELOPATHY OR RADICULOPATHY: ICD-10-CM

## 2024-07-10 DIAGNOSIS — M25.449 EFFUSION OF PROXIMAL INTERPHALANGEAL (PIP) JOINT OF FINGER: ICD-10-CM

## 2024-07-10 DIAGNOSIS — M81.0 POSTMENOPAUSAL OSTEOPOROSIS: ICD-10-CM

## 2024-07-10 DIAGNOSIS — M05.79 RHEUMATOID ARTHRITIS INVOLVING MULTIPLE SITES WITH POSITIVE RHEUMATOID FACTOR: Primary | ICD-10-CM

## 2024-07-10 DIAGNOSIS — E53.8 VITAMIN B12 DEFICIENCY: ICD-10-CM

## 2024-07-10 DIAGNOSIS — M47.812 CERVICAL SPONDYLOSIS: ICD-10-CM

## 2024-07-10 DIAGNOSIS — Z79.60 LONG-TERM USE OF IMMUNOSUPPRESSANT MEDICATION: ICD-10-CM

## 2024-07-10 PROCEDURE — 3288F FALL RISK ASSESSMENT DOCD: CPT | Mod: CPTII,S$GLB,, | Performed by: INTERNAL MEDICINE

## 2024-07-10 PROCEDURE — 1101F PT FALLS ASSESS-DOCD LE1/YR: CPT | Mod: CPTII,S$GLB,, | Performed by: INTERNAL MEDICINE

## 2024-07-10 PROCEDURE — 99214 OFFICE O/P EST MOD 30 MIN: CPT | Mod: S$GLB,,, | Performed by: INTERNAL MEDICINE

## 2024-07-10 PROCEDURE — 1159F MED LIST DOCD IN RCRD: CPT | Mod: CPTII,S$GLB,, | Performed by: INTERNAL MEDICINE

## 2024-07-10 PROCEDURE — 1125F AMNT PAIN NOTED PAIN PRSNT: CPT | Mod: CPTII,S$GLB,, | Performed by: INTERNAL MEDICINE

## 2024-07-10 PROCEDURE — 1157F ADVNC CARE PLAN IN RCRD: CPT | Mod: CPTII,S$GLB,, | Performed by: INTERNAL MEDICINE

## 2024-07-10 PROCEDURE — 3078F DIAST BP <80 MM HG: CPT | Mod: CPTII,S$GLB,, | Performed by: INTERNAL MEDICINE

## 2024-07-10 PROCEDURE — 3077F SYST BP >= 140 MM HG: CPT | Mod: CPTII,S$GLB,, | Performed by: INTERNAL MEDICINE

## 2024-07-10 PROCEDURE — 99999 PR PBB SHADOW E&M-EST. PATIENT-LVL III: CPT | Mod: PBBFAC,,, | Performed by: INTERNAL MEDICINE

## 2024-07-10 RX ORDER — FOLIC ACID 1 MG/1
2000 TABLET ORAL DAILY
Qty: 180 TABLET | Refills: 3 | Status: SHIPPED | OUTPATIENT
Start: 2024-07-10 | End: 2025-07-10

## 2024-07-10 RX ORDER — TRAMADOL HYDROCHLORIDE 50 MG/1
50-100 TABLET ORAL EVERY 12 HOURS PRN
Qty: 60 TABLET | Refills: 3 | Status: SHIPPED | OUTPATIENT
Start: 2024-07-10

## 2024-07-10 RX ORDER — METHOTREXATE 2.5 MG/1
TABLET ORAL
Qty: 48 TABLET | Refills: 3 | Status: SHIPPED | OUTPATIENT
Start: 2024-07-10

## 2024-07-10 RX ORDER — HYDROXYCHLOROQUINE SULFATE 200 MG/1
200 TABLET, FILM COATED ORAL DAILY
Qty: 90 TABLET | Refills: 3 | Status: SHIPPED | OUTPATIENT
Start: 2024-07-10 | End: 2025-07-10

## 2024-07-10 NOTE — PROGRESS NOTES
Chief Complaint: Juanis Mclaughlin is a 83 y.o. female who had no chief complaint listed for this encounter.    HPI:      Rheumatoid Arthritis    82y/o female with h/o HTN, HLD was diagnosed with sero +  Rheumatoid arthritis in 1998. Off prednisone now.  Enbrel 50mg weekly on hold since March 2020. - and no worsening.   MTX 4 tabs weekly.   Started HCQ 200mg once daily.     Pain in the DIP and PIP joints. Most painful is the left 3rd and 4th PIP with swelling this visit.   No ASE of stomatitis,  infections or injection site reaction.   She was also diagnosed with secondary Sjogren's. Currently on restasis.  PPM for bradycardia.     Past 12 months slow decline in renal function from prior year- on PO bisphos and ACEI/HCTZ  switch this to Prolia.  Started Prolia 02/29/2019   In the last 6 months continued decline in renal function -     She denies fever, weight loss, photosensitivity, rash, ulcer, raynaud's phenomenon, alopecia, dysphagia, diarrhea or blood in the stools.  + hx of GERD. On PPI. Hx of mild MR. No pedal edema.     Doing well with Dr. Madison with Breo and Gabapentin to relax cough impulse.  As of this visit 02/29/2019 patient was having upper respiratory tract infection x3 weeks seem to be feeling better within 10 days ago returned with a more productive cough postnasal drip rhinitis    REVIEW OF SYSTEMS:    Review of Systems   Constitutional:  Negative for fever, malaise/fatigue and weight loss.   HENT:  Negative for sore throat.    Eyes:  Negative for double vision, photophobia and redness.   Respiratory:  Positive for cough and sputum production. Negative for hemoptysis, shortness of breath and wheezing.    Cardiovascular:  Negative for chest pain, palpitations and orthopnea.   Gastrointestinal:  Negative for abdominal pain, constipation and diarrhea.   Genitourinary:  Negative for dysuria, hematuria and urgency.   Musculoskeletal:  Positive for joint pain. Negative for back pain and myalgias.    Skin:  Negative for rash.   Neurological:  Negative for dizziness, tingling, focal weakness and headaches.   Endo/Heme/Allergies:  Does not bruise/bleed easily.   Psychiatric/Behavioral:  Negative for depression, hallucinations and suicidal ideas.                 Objective:            Past Medical History:   Diagnosis Date    Arthritis     rheumatoid arthritis    Cardiomyopathy     CKD (chronic kidney disease) stage 3, GFR 30-59 ml/min 1/9/2019    Diverticulosis     Diverticulosis     DVT (deep venous thrombosis)     one time 5 years ago    Essential hypertension 11/24/2013    GERD (gastroesophageal reflux disease)     Glaucoma     sees Dr. Gillette    Hyperlipidemia     Hypertension     Iron deficiency     Iron deficiency anemia 10/21/2016     IMO LOAD Q4    Long-term use of immunosuppressant medication 7/27/2015    Mitral regurgitation 12/20/2013 12/13 mild     Osteoporosis 7/9/2014    Pacemaker 10/26/2016    left chest PACEMAKER [employer] [address]  [id number] Bonifacio Fuller 9/9/2016 - current   right atrium LEAD [employer] [address] [employer] [id number] Bonifacio Fuller 9/9/2016 - current   right ventricle LEAD [employer] [id number] Bonifacio Fuller 9/9/2016 - current      PONV (postoperative nausea and vomiting)     Rheumatoid arthritis involving multiple sites with positive rheumatoid factor 11/24/2013    Vitamin B12 deficiency 2/28/2015     Family History   Problem Relation Name Age of Onset    Heart disease Mother          CHF    Hypertension Mother      Heart disease Father      Stroke Father      Ovarian cancer Maternal Aunt  34    Heart disease Brother      Stroke Brother      Heart disease Brother      Scleroderma Brother      Breast cancer Neg Hx       Social History     Tobacco Use    Smoking status: Never    Smokeless tobacco: Never   Substance Use Topics    Alcohol use: Yes     Alcohol/week: 0.8 standard drinks of alcohol      Types: 1 Standard drinks or equivalent per week     Comment: occ social    Drug use: No         Current Outpatient Medications on File Prior to Visit   Medication Sig Dispense Refill    B-complex with vitamin C (Z-BEC OR EQUIV) tablet Take 1 tablet by mouth once daily.      brimonidine-timoloL (COMBIGAN) 0.2-0.5 % Drop Place 1 drop into both eyes 2 (two) times daily.      candesartan (ATACAND) 16 MG tablet Take 1 tablet (16 mg total) by mouth once daily. 90 tablet 4    denosumab (PROLIA) 60 mg/mL Syrg Inject 60 mg into the skin every 6 (six) months.      denosumab (PROLIA) 60 mg/mL Syrg Inject 1 mL (60 mg total) into the skin every 6 (six) months. 1 mL 1    folic acid (FOLVITE) 1 MG tablet Take 2 tablets (2,000 mcg total) by mouth once daily. 180 tablet 3    hydroxychloroquine (PLAQUENIL) 200 mg tablet Take 1 tablet (200 mg total) by mouth once daily. 90 tablet 3    latanoprost 0.005 % ophthalmic solution Place 1 drop into both eyes every evening.      magnesium 200 mg Tab Take 400 mg by mouth once daily. Magnesium 400mg with Chelated Zinc 15mg.      methotrexate 2.5 MG Tab TAKE 4 TABLETS EVERY 7 DAYS 48 tablet 3    metoprolol succinate (TOPROL-XL) 50 MG 24 hr tablet TAKE 1 TABLET ONCE DAILY 90 tablet 4    pantoprazole (PROTONIX) 40 MG tablet Take 1 tablet (40 mg total) by mouth once daily. 90 tablet 3    traMADoL (ULTRAM) 50 mg tablet Take 1-2 tablets ( mg total) by mouth every 12 (twelve) hours as needed for Pain (rheumatoid arthritis). 60 tablet 3    XIIDRA 5 % Dpet Place 1 drop into both eyes 2 (two) times daily.        No current facility-administered medications on file prior to visit.       Vitals:    07/10/24 1122   BP: (!) 142/76   Pulse: (!) 59         Physical Exam:    Physical Exam  Constitutional:       Appearance: Normal appearance. She is well-developed.   HENT:      Nose: No septal deviation.      Mouth/Throat:      Mouth: No oral lesions.   Eyes:      Conjunctiva/sclera:      Right eye: Right  conjunctiva is not injected.      Left eye: Left conjunctiva is not injected.      Pupils: Pupils are equal, round, and reactive to light.   Neck:      Thyroid: No thyroid mass or thyromegaly.      Vascular: No JVD.   Cardiovascular:      Rate and Rhythm: Normal rate and regular rhythm.      Pulses: Normal pulses.      Comments: No edema  Pulmonary:      Effort: Pulmonary effort is normal.      Breath sounds: No wheezing.   Abdominal:      Palpations: Abdomen is soft.   Musculoskeletal:      Right shoulder: No swelling or tenderness. Normal range of motion.      Left shoulder: No swelling or tenderness. Normal range of motion.      Right elbow: No swelling. Normal range of motion. No tenderness.      Left elbow: No swelling. Normal range of motion. No tenderness.      Right wrist: Tenderness present. No swelling. Decreased range of motion.      Left wrist: No swelling or tenderness. Normal range of motion.      Right hand: Tenderness present. Decreased range of motion.      Left hand: Tenderness present.      Right hip: Normal range of motion. Normal strength.      Left hip: No tenderness. Normal range of motion.      Right knee: No swelling. Decreased range of motion. Tenderness present over the medial joint line and lateral joint line.      Left knee: No swelling. Normal range of motion. No tenderness.      Right ankle: No swelling. No tenderness. Normal range of motion.      Left ankle: No swelling. No tenderness. Normal range of motion.      Comments: 1+ synovitis MCP and PIP with heberden nodes all DIP 2-5. Left wrist with tenderness and swelling about the TFCC. No nodules. No flexion contractures.      Cervical with left sided point tenderness and the occiput (nuchal ridge) lesser tenderness at the cervical paraspinals. Decreased ROM in cervical spine   Lymphadenopathy:      Cervical: No cervical adenopathy.   Skin:     General: Skin is dry.   Neurological:      Deep Tendon Reflexes: Reflexes are normal and  symmetric.               Assessment:       Encounter Diagnoses   Name Primary?    Rheumatoid arthritis involving multiple sites with positive rheumatoid factor Yes    Postmenopausal osteoporosis     Long-term use of immunosuppressant medication     High risk medications (not anticoagulants) long-term use     Stage 3 chronic kidney disease, unspecified whether stage 3a or 3b CKD     Effusion of proximal interphalangeal (PIP) joint of finger     Vitamin B12 deficiency     Spondylosis of cervical region without myelopathy or radiculopathy     Cervical spondylosis               Plan:        Rheumatoid arthritis involving multiple sites with positive rheumatoid factor  -     methotrexate 2.5 MG Tab; TAKE 4 TABLETS EVERY 7 DAYS  Dispense: 48 tablet; Refill: 3  -     hydroxychloroquine (PLAQUENIL) 200 mg tablet; Take 1 tablet (200 mg total) by mouth once daily.  Dispense: 90 tablet; Refill: 3  -     folic acid (FOLVITE) 1 MG tablet; Take 2 tablets (2,000 mcg total) by mouth once daily.  Dispense: 180 tablet; Refill: 3  -     traMADoL (ULTRAM) 50 mg tablet; Take 1-2 tablets ( mg total) by mouth every 12 (twelve) hours as needed for Pain (rheumatoid arthritis).  Dispense: 60 tablet; Refill: 3    Postmenopausal osteoporosis    Long-term use of immunosuppressant medication  -     methotrexate 2.5 MG Tab; TAKE 4 TABLETS EVERY 7 DAYS  Dispense: 48 tablet; Refill: 3  -     hydroxychloroquine (PLAQUENIL) 200 mg tablet; Take 1 tablet (200 mg total) by mouth once daily.  Dispense: 90 tablet; Refill: 3  -     folic acid (FOLVITE) 1 MG tablet; Take 2 tablets (2,000 mcg total) by mouth once daily.  Dispense: 180 tablet; Refill: 3    High risk medications (not anticoagulants) long-term use  -     methotrexate 2.5 MG Tab; TAKE 4 TABLETS EVERY 7 DAYS  Dispense: 48 tablet; Refill: 3  -     hydroxychloroquine (PLAQUENIL) 200 mg tablet; Take 1 tablet (200 mg total) by mouth once daily.  Dispense: 90 tablet; Refill: 3  -     folic acid  (FOLVITE) 1 MG tablet; Take 2 tablets (2,000 mcg total) by mouth once daily.  Dispense: 180 tablet; Refill: 3    Stage 3 chronic kidney disease, unspecified whether stage 3a or 3b CKD  -     hydroxychloroquine (PLAQUENIL) 200 mg tablet; Take 1 tablet (200 mg total) by mouth once daily.  Dispense: 90 tablet; Refill: 3  -     folic acid (FOLVITE) 1 MG tablet; Take 2 tablets (2,000 mcg total) by mouth once daily.  Dispense: 180 tablet; Refill: 3    Effusion of proximal interphalangeal (PIP) joint of finger  -     hydroxychloroquine (PLAQUENIL) 200 mg tablet; Take 1 tablet (200 mg total) by mouth once daily.  Dispense: 90 tablet; Refill: 3    Vitamin B12 deficiency  -     folic acid (FOLVITE) 1 MG tablet; Take 2 tablets (2,000 mcg total) by mouth once daily.  Dispense: 180 tablet; Refill: 3    Spondylosis of cervical region without myelopathy or radiculopathy  -     folic acid (FOLVITE) 1 MG tablet; Take 2 tablets (2,000 mcg total) by mouth once daily.  Dispense: 180 tablet; Refill: 3    Cervical spondylosis  -     traMADoL (ULTRAM) 50 mg tablet; Take 1-2 tablets ( mg total) by mouth every 12 (twelve) hours as needed for Pain (rheumatoid arthritis).  Dispense: 60 tablet; Refill: 3      RA:    -MTX must decrease dose for declining renal function. 4 tabs weekly   HCQ 200mg once daily 90 day (already dosed for renal function)   -continue folic acid 2mg dialy   -OFF Enbrel   -voltaren gel PRN fingers/hands-using sparingly   -Labs q 3 months.    Neck Pain:    -seems to be stable and not problematic as of last 6 months. Great.    Osteoporosis   ASE  actonel/c/I for bisphosphonates now. Insurance sent EOB, about not paying.    -doing great on Prolia. Last dose 9/15/23    Reached out to Dr. Mccullough and Dr. Greene in Nephrology- he will be seeing her soon for further eval of renal changes. Discussed with her daughter (Dr. Mclaughlin-nephro as well will try to push H20 which is does not normally drink much)  No follow-ups on  file.    F/u in 4 months         30min consultation with greater than 50% spent in counseling, chart review and coordination of care. All questions answered.

## 2024-07-26 DIAGNOSIS — N18.30 STAGE 3 CHRONIC KIDNEY DISEASE, UNSPECIFIED WHETHER STAGE 3A OR 3B CKD: ICD-10-CM

## 2024-07-26 DIAGNOSIS — M81.0 AGE-RELATED OSTEOPOROSIS WITHOUT CURRENT PATHOLOGICAL FRACTURE: ICD-10-CM

## 2024-07-26 DIAGNOSIS — M81.0 POSTMENOPAUSAL OSTEOPOROSIS: Primary | ICD-10-CM

## 2024-07-29 NOTE — TELEPHONE ENCOUNTER
Use prescriptions as directed. Alternate Tylenol with Toradol as needed for pain. Avoid the use of other NSAIDs such as Ibuprofen, Motrin, Aleve, Naproxen etc. While taking Toradol. Ensure you are drinking plenty of fluids. Rest, warm compress in short intervals as needed for comfort. Follow up with your primary care provider if no improvement or otherwise as needed. Return to the ED for worsening signs and symptoms or otherwise as needed.      medcard updated

## 2024-07-30 ENCOUNTER — TELEPHONE (OUTPATIENT)
Dept: OBSTETRICS AND GYNECOLOGY | Facility: CLINIC | Age: 84
End: 2024-07-30
Payer: COMMERCIAL

## 2024-07-30 DIAGNOSIS — Z12.31 VISIT FOR SCREENING MAMMOGRAM: Primary | ICD-10-CM

## 2024-07-30 NOTE — TELEPHONE ENCOUNTER
----- Message from Zuleima Allison sent at 7/30/2024 10:17 AM CDT -----  Regarding: mammo orders  Name of who is calling:   Juanis      What is the request in detail: Pt is requesting a call back in ref to getting mammo orders sent      Can the clinic reply by [facility]:yes      What number to call back if not [facility]: [phone]

## 2024-08-12 ENCOUNTER — HOSPITAL ENCOUNTER (OUTPATIENT)
Dept: RADIOLOGY | Facility: HOSPITAL | Age: 84
Discharge: HOME | End: 2024-08-12
Attending: SPECIALIST
Payer: COMMERCIAL

## 2024-08-12 DIAGNOSIS — Z12.31 VISIT FOR SCREENING MAMMOGRAM: ICD-10-CM

## 2024-08-12 PROCEDURE — 77067 SCR MAMMO BI INCL CAD: CPT | Mod: 26,,, | Performed by: RADIOLOGY

## 2024-08-12 PROCEDURE — 77063 BREAST TOMOSYNTHESIS BI: CPT | Mod: 26,,, | Performed by: RADIOLOGY

## 2024-08-12 PROCEDURE — 77063 BREAST TOMOSYNTHESIS BI: CPT | Mod: TC,PN

## 2024-08-12 PROCEDURE — 77067 SCR MAMMO BI INCL CAD: CPT | Mod: TC,PN

## 2024-09-12 ENCOUNTER — OFFICE VISIT (OUTPATIENT)
Dept: RHEUMATOLOGY | Facility: CLINIC | Age: 84
End: 2024-09-12
Payer: COMMERCIAL

## 2024-09-12 VITALS — BODY MASS INDEX: 32.47 KG/M2 | WEIGHT: 172 LBS | HEART RATE: 69 BPM | HEIGHT: 61 IN

## 2024-09-12 DIAGNOSIS — Z91.89 FRACTURE RISK ASSESSMENT SCORE (FRAX) INDICATING GREATER THAN 3% RISK FOR HIP FRACTURE: ICD-10-CM

## 2024-09-12 DIAGNOSIS — Z71.85 IMMUNIZATION COUNSELING: ICD-10-CM

## 2024-09-12 DIAGNOSIS — M81.0 AGE-RELATED OSTEOPOROSIS WITHOUT CURRENT PATHOLOGICAL FRACTURE: Primary | ICD-10-CM

## 2024-09-12 DIAGNOSIS — Z91.81 AT RISK FOR FALLS: ICD-10-CM

## 2024-09-12 DIAGNOSIS — Z91.89 FRACTURE RISK ASSESSMENT SCORE (FRAX) INDICATING GREATER THAN 20% RISK FOR MAJOR OSTEOPOROSIS-RELATED FRACTURE: ICD-10-CM

## 2024-09-12 DIAGNOSIS — N18.30 STAGE 3 CHRONIC KIDNEY DISEASE, UNSPECIFIED WHETHER STAGE 3A OR 3B CKD: ICD-10-CM

## 2024-09-12 DIAGNOSIS — Z51.81 MEDICATION MONITORING ENCOUNTER: ICD-10-CM

## 2024-09-12 PROCEDURE — 99999 PR PBB SHADOW E&M-EST. PATIENT-LVL III: CPT | Mod: PBBFAC,,,

## 2024-09-12 NOTE — PROGRESS NOTES
Time: 45 mins   # of DX: 7    Subjective:     Patient ID:  Juanis Mclaughlin    Chief Complaint: Osteoporosis Management     Rheumatology Provider: Dr. Cruz    History of Present Illness:  Pt is a 84 y.o. female who presents to the clinic for prolia injection. Patient is presenting with RA, osteoporosis, GERD, CKD, HTN, and HLD.      Prior Osteoporosis Therapies:   Atelvia  Actonel     Osteoporosis:  Current treatment: Prolia via [employer]  Last Prolia on 3/8/2024  Diet with adequate calcium: no, doesn't drink milk. Feels that it goes bad too quick  Eats fish, meat, ice cream, cheese, fortified foods  Diet with adequate vitamin D: no  On supplementation with calcium, vitamin D, or both: no  Any fractures since your last visit: no  Any falls since your last visit: no  Uses walker regularly  Sometimes wont use it in her kitchen because she is able to use the stove, countertops, etc when walking  Established with a dentist: no, have plates/dentures top and bottom  Physically active: no, able to walk on smooth surfaces  Smoker: no  Alcohol intake; number of drinks per week: occasionally     Vaccines:  Recommend influenza annually, prevnar 20 if not completed yet, TdaP every 10 years, shingrix x 2, COVID, Hep B (adults 19-58 yo and 60 or older if have risk factors) and RSV if 60 years old or older  Patient is due for Influenza (annually) and COVID    Influenza: Discuss and recommend annually. High dose recommended. Due now   PCV 20: Based on share clinical decision making. Either way Pneumonia vaccines are complete. Due 4/2027 if pt would like to receive  Tetanus (TdaP): Discuss and recommend booster every 10 years. 7/2018. Due 7/2028  Shingrix: 12/2021 & 4/2022  Covid: CDC recommends 1 new [id number] dose in immunocompromised patients  RSV: 12/2023    For COVID vaccine, ACR recommends holding MTX for 1-2 weeks (as disease activity allows) after each COVID vaccines dose    For Influenza vaccine, ACR recommends holding  "MTX for 2 weeks (as disease activity allows) after influenza vaccine    Patient on lower dose of MTX - 10 mg weekly; <0.4 mg/kg/week so not consider immunosuppressive      Current Medications:  Current Outpatient Medications   Medication Instructions    B-complex with vitamin C (Z-BEC OR EQUIV) tablet 1 tablet, Daily    brimonidine-timoloL (COMBIGAN) 0.2-0.5 % Drop 1 drop, Both Eyes, 2 times daily    candesartan (ATACAND) 16 mg, Oral, Daily    folic acid (FOLVITE) 2,000 mcg, Oral, Daily    hydroxychloroquine (PLAQUENIL) 200 mg, Oral, Daily    latanoprost 0.005 % ophthalmic solution 1 drop, Both Eyes, Nightly    magnesium 400 mg, Oral, Daily, Magnesium 400mg with Chelated Zinc 15mg.     methotrexate 2.5 MG Tab TAKE 4 TABLETS EVERY 7 DAYS    metoprolol succinate (TOPROL-XL) 50 mg, Oral    pantoprazole (PROTONIX) 40 mg, Oral, Daily    PROLIA 60 mg, Subcutaneous, Every 6 months    traMADoL (ULTRAM)  mg, Oral, Every 12 hours PRN    XIIDRA 5 % Dpet 1 drop, Both Eyes, 2 times daily     Objective:     Vitals:    09/12/24 1349   BP: (!) (P) 178/93   Pulse: 69   Weight: 78 kg (172 lb)   Height: 5' 1" (1.549 m)   PainSc: 0-No pain      BP Readings from Last 4 Encounters:   07/16/24 (!) 140/80   07/10/24 (!) 142/76   07/03/24 (!) 152/84   04/12/24 128/80     Body mass index is 32.5 kg/m².     Fall Risk Assessment:  Completed "Check Your Risk for Falling" Questionnaire; score 8 for use or have been advised to use a cane or walker (2), feel unsteady when walking sometimes (1), use furniture to steady themselves when walking at home (1), worried about falling (1), need to push with hands to stand up from chair (1), some trouble stepping up onto a curb (1), and lost of some feeling in feet (1)    Monitoring Lab Results:  Lab Results   Component Value Date    CALCIUM 10.1 06/10/2024    ALBUMIN 4.1 06/10/2024    [id number] 42 02/08/2024    MG 2.0 08/26/2016    PHOS 4.2 06/10/2024    CREATININE 1.11 06/10/2024    EGFRNORACEVR " 49 (A) 06/10/2024     Lab Results   Component Value Date     06/10/2024    K 4.5 06/10/2024     06/10/2024    CO2 26 06/10/2024     (H) 06/10/2024    BUN 18 06/10/2024    PROT 6.6 03/12/2024    BILITOT 0.7 03/12/2024    ALKPHOS 37 (L) 03/12/2024    AST 45 (H) 03/12/2024    ALT 15 03/12/2024     DEXA: 7/3/2018   Narrative & Impression  EXAMINATION:  DEXA BONE DENSITY SPINE HIP     CLINICAL HISTORY:  Other specified disorders of bone density and structure, unspecified site advanced age, , previous soup parenchyma, postmenopausal, rheumatoid arthritis     TECHNIQUE:  DXA scanning was performed over the both hip and lumbar spine.  Review of the images confirms satisfactory positioning and technique.     COMPARISON:  03/02/2016 performed on a different system with different standards     FINDINGS:  The L1 to L4 vertebral bone mineral density is equal to 1.039 g/cm squared with a T score of -1.2.  There has been a mild decrease relative to the prior study.     The total hip bone mineral density is equal to 0.697 g/cm squared with a T score of -2.5.  There has been a mild decrease in bone density relative to the prior study.     There is a 21.7% risk of a major osteoporotic fracture and a 7.5% risk of hip fracture in the next 10 years (FRAX).     IMPRESSION: Changes consistent with osteopenia in the spine and borderline osteoporosis in the hips.    Assessment:     Pt is a 84 y.o. female with osteoporosis. Patient was referred to the Rheumatology pharmacist for Prolia injection and Osteoporosis management. Prolia via [employer] (approved). Provided patient with education about bone health and fall risk. Seems that patient is at risk for falls per assessment questionnaire. Counseled patient about importance of calcium and vitamin D.  Plan is to continue Prolia.  Also, explained importance of vaccines considering the increased risk of infections.     Plan:      1. Age-related osteoporosis without  current pathological fracture  Prolia administered appropriately and left office in stable condition.    ND: 3/12/25 at 2p with me  Patient provided with handout of foods/drinks rich in calcium and vitamin D  Pt verbalized understanding instructions  Next DXA due now; last DEXA was 2018. Ordered previously by provider  Nurse to schedule DEXA and labs at [facility] in [place] if able  Sent Prolia Copay card info to the billing dept,     2. Stage 3 chronic kidney disease, unspecified whether stage 3a or 3b CKD  Due for calcium, albumin, and creatinine. Ordered to be completed within 14 days  Close monitoring needed d/t prolia use in a patient with CKD    3. Fracture Risk Assessment Score (FRAX) indicating greater than 20% risk for major osteoporosis-related fracture  See #1    4. Fracture Risk Assessment Score (FRAX) indicating greater than 3% risk for hip fracture  See #1     5. Immunization counseling  Vaccines needed: High dose Influenza, PCV 20 4/2027 if would like, 1 COVID new dose, and TdaP in 2028   Pt agreed to complete recommended vaccines     6. Medication monitoring encounter  See #1, 2, & 5    7. At risk for falls  See #1  Continue using walker even when in kitchen to decrease fall risk  Reviewed ways to decrease falls     Follow-up scheduled on 11/12/2024 with Dr. Anthony Conn[facility] PharmD, [facility]  Rheumatology Clinical Pharmacist  [facility]

## 2024-09-12 NOTE — PATIENT INSTRUCTIONS
It was a pleasure talking with you today. As a reminder, my name is Dr. Piper Conn. I'm the clinical pharmacist in the Rheumatology office. If you have any questions or need any assistance, please reach out to the office.     Next Prolia injection on Wednesday, 3/12/2025 at 2p with Rheumatology Clinical Pharmacist    - Vaccines:   COVID vaccine: 1 new [id number] formulated dose - Local pharmacy    Influenza (Flu): 1 dose annually; high dose - Local pharmacy or PCP's office    Recommend taking Citracal once a day

## 2024-10-03 ENCOUNTER — HOSPITAL ENCOUNTER (OUTPATIENT)
Dept: RADIOLOGY | Facility: HOSPITAL | Age: 84
Discharge: HOME | End: 2024-10-03
Attending: INTERNAL MEDICINE
Payer: COMMERCIAL

## 2024-10-03 DIAGNOSIS — N28.1 KIDNEY CYST, ACQUIRED: ICD-10-CM

## 2024-10-03 DIAGNOSIS — N18.31 STAGE 3A CHRONIC KIDNEY DISEASE: ICD-10-CM

## 2024-10-03 PROCEDURE — 76770 US EXAM ABDO BACK WALL COMP: CPT | Mod: 26,,, | Performed by: RADIOLOGY

## 2024-10-03 PROCEDURE — 76770 US EXAM ABDO BACK WALL COMP: CPT | Mod: TC,PO

## 2024-11-12 ENCOUNTER — OFFICE VISIT (OUTPATIENT)
Dept: RHEUMATOLOGY | Facility: CLINIC | Age: 84
End: 2024-11-12
Payer: COMMERCIAL

## 2024-11-12 VITALS
HEART RATE: 59 BPM | WEIGHT: 158.5 LBS | BODY MASS INDEX: 29.92 KG/M2 | HEIGHT: 61 IN | SYSTOLIC BLOOD PRESSURE: 168 MMHG | DIASTOLIC BLOOD PRESSURE: 89 MMHG

## 2024-11-12 DIAGNOSIS — Z79.899 HIGH RISK MEDICATIONS (NOT ANTICOAGULANTS) LONG-TERM USE: ICD-10-CM

## 2024-11-12 DIAGNOSIS — Z79.60 LONG-TERM USE OF IMMUNOSUPPRESSANT MEDICATION: ICD-10-CM

## 2024-11-12 DIAGNOSIS — Z45.010 PACEMAKER GENERATOR END OF LIFE: Primary | ICD-10-CM

## 2024-11-12 DIAGNOSIS — M47.812 SPONDYLOSIS OF CERVICAL REGION WITHOUT MYELOPATHY OR RADICULOPATHY: ICD-10-CM

## 2024-11-12 DIAGNOSIS — N18.30 STAGE 3 CHRONIC KIDNEY DISEASE, UNSPECIFIED WHETHER STAGE 3A OR 3B CKD: ICD-10-CM

## 2024-11-12 DIAGNOSIS — M05.79 RHEUMATOID ARTHRITIS INVOLVING MULTIPLE SITES WITH POSITIVE RHEUMATOID FACTOR: Primary | ICD-10-CM

## 2024-11-12 DIAGNOSIS — M05.79 RHEUMATOID ARTHRITIS INVOLVING MULTIPLE SITES WITH POSITIVE RHEUMATOID FACTOR: ICD-10-CM

## 2024-11-12 DIAGNOSIS — E53.8 VITAMIN B12 DEFICIENCY: ICD-10-CM

## 2024-11-12 PROCEDURE — 1160F RVW MEDS BY RX/DR IN RCRD: CPT | Mod: CPTII,S$GLB,, | Performed by: INTERNAL MEDICINE

## 2024-11-12 PROCEDURE — 3077F SYST BP >= 140 MM HG: CPT | Mod: CPTII,S$GLB,, | Performed by: INTERNAL MEDICINE

## 2024-11-12 PROCEDURE — 3288F FALL RISK ASSESSMENT DOCD: CPT | Mod: CPTII,S$GLB,, | Performed by: INTERNAL MEDICINE

## 2024-11-12 PROCEDURE — 3079F DIAST BP 80-89 MM HG: CPT | Mod: CPTII,S$GLB,, | Performed by: INTERNAL MEDICINE

## 2024-11-12 PROCEDURE — 1125F AMNT PAIN NOTED PAIN PRSNT: CPT | Mod: CPTII,S$GLB,, | Performed by: INTERNAL MEDICINE

## 2024-11-12 PROCEDURE — 99999 PR PBB SHADOW E&M-EST. PATIENT-LVL III: CPT | Mod: PBBFAC,,, | Performed by: INTERNAL MEDICINE

## 2024-11-12 PROCEDURE — 1159F MED LIST DOCD IN RCRD: CPT | Mod: CPTII,S$GLB,, | Performed by: INTERNAL MEDICINE

## 2024-11-12 PROCEDURE — 99214 OFFICE O/P EST MOD 30 MIN: CPT | Mod: S$GLB,,, | Performed by: INTERNAL MEDICINE

## 2024-11-12 PROCEDURE — 1157F ADVNC CARE PLAN IN RCRD: CPT | Mod: CPTII,S$GLB,, | Performed by: INTERNAL MEDICINE

## 2024-11-12 PROCEDURE — 1101F PT FALLS ASSESS-DOCD LE1/YR: CPT | Mod: CPTII,S$GLB,, | Performed by: INTERNAL MEDICINE

## 2024-11-12 RX ORDER — FOLIC ACID 1 MG/1
2000 TABLET ORAL DAILY
Qty: 180 TABLET | Refills: 3 | Status: SHIPPED | OUTPATIENT
Start: 2024-11-12 | End: 2025-11-12

## 2024-11-12 RX ORDER — METHOTREXATE 2.5 MG/1
12.5 TABLET ORAL
Qty: 60 TABLET | Refills: 1 | Status: SHIPPED | OUTPATIENT
Start: 2024-11-12 | End: 2025-11-12

## 2024-11-12 ASSESSMENT — ROUTINE ASSESSMENT OF PATIENT INDEX DATA (RAPID3)
TOTAL RAPID3 SCORE: 2.44
FATIGUE SCORE: 0
PATIENT GLOBAL ASSESSMENT SCORE: 0.5
PSYCHOLOGICAL DISTRESS SCORE: 1.1
MDHAQ FUNCTION SCORE: 1.3
PAIN SCORE: 2.5

## 2024-11-12 NOTE — PROGRESS NOTES
Chief Complaint: Juanis Mclaughlin is a 84 y.o. female who had concerns including Disease Management.    HPI:      Rheumatoid Arthritis    83y/o female with h/o HTN, HLD was diagnosed with sero +  Rheumatoid arthritis in 1998. Off prednisone now.  Enbrel 50mg weekly on hold since March 2020. - and no worsening.   MTX 4 tabs weekly.   Started HCQ 200mg once daily.     Pain in the DIP and PIP joints. Most painful is the left 3rd and 4th PIP with swelling this visit.   No ASE of stomatitis,  infections or injection site reaction.   She was also diagnosed with secondary Sjogren's. Currently on restasis.  PPM for bradycardia.     Past 12 months slow decline in renal function from prior year- on PO bisphos and ACEI/HCTZ  switch this to Prolia.  Started Prolia 02/29/2019   In the last 6 months continued decline in renal function -     She denies fever, weight loss, photosensitivity, rash, ulcer, raynaud's phenomenon, alopecia, dysphagia, diarrhea or blood in the stools.  + hx of GERD. On PPI. Hx of mild MR. No pedal edema.     Doing well with Dr. Madison with Breo and Gabapentin to relax cough impulse.  As of this visit 02/29/2019 patient was having upper respiratory tract infection x3 weeks seem to be feeling better within 10 days ago returned with a more productive cough postnasal drip rhinitis    REVIEW OF SYSTEMS:    Review of Systems   Constitutional:  Negative for fever, malaise/fatigue and weight loss.   HENT:  Negative for sore throat.    Eyes:  Negative for double vision, photophobia and redness.   Respiratory:  Positive for cough and sputum production. Negative for hemoptysis, shortness of breath and wheezing.    Cardiovascular:  Negative for chest pain, palpitations and orthopnea.   Gastrointestinal:  Negative for abdominal pain, constipation and diarrhea.   Genitourinary:  Negative for dysuria, hematuria and urgency.   Musculoskeletal:  Positive for joint pain. Negative for back pain and myalgias.   Skin:   Negative for rash.   Neurological:  Negative for dizziness, tingling, focal weakness and headaches.   Endo/Heme/Allergies:  Does not bruise/bleed easily.   Psychiatric/Behavioral:  Negative for depression, hallucinations and suicidal ideas.                 Objective:            Past Medical History:   Diagnosis Date    Arthritis     rheumatoid arthritis    Cardiomyopathy     CKD (chronic kidney disease) stage 3, GFR 30-59 ml/min 1/9/2019    Diverticulosis     Diverticulosis     DVT (deep venous thrombosis)     one time 5 years ago    Essential hypertension 11/24/2013    GERD (gastroesophageal reflux disease)     Glaucoma     sees Dr. Gillette    Hyperlipidemia     Hypertension     Iron deficiency     Iron deficiency anemia 10/21/2016     IMO LOAD Q4    Long-term use of immunosuppressant medication 7/27/2015    Mitral regurgitation 12/20/2013 12/13 mild     Osteoporosis 7/9/2014    Pacemaker 10/26/2016    left chest PACEMAKER [employer] [address]  [id number] Bonifacio Fuller 9/9/2016 - current   right atrium LEAD [employer] [address] [employer] [id number] Bonifacio Fuller 9/9/2016 - current   right ventricle LEAD [employer] [employer] [id number] Bonifacio Fuller 9/9/2016 - current      PONV (postoperative nausea and vomiting)     Rheumatoid arthritis involving multiple sites with positive rheumatoid factor 11/24/2013    Vitamin B12 deficiency 2/28/2015     Family History   Problem Relation Name Age of Onset    Heart disease Mother          CHF    Hypertension Mother      Heart disease Father      Stroke Father      Ovarian cancer Maternal Aunt  34    Heart disease Brother      Stroke Brother      Heart disease Brother      Scleroderma Brother      Breast cancer Neg Hx       Social History     Tobacco Use    Smoking status: Never    Smokeless tobacco: Never   Substance Use Topics    Alcohol use: Yes     Alcohol/week: 0.8 standard drinks of alcohol     Types: 1  Standard drinks or equivalent per week     Comment: occ social    Drug use: No         Current Outpatient Medications on File Prior to Visit   Medication Sig Dispense Refill    B-complex with vitamin C (Z-BEC OR EQUIV) tablet Take 1 tablet by mouth once daily.      brimonidine-timoloL (COMBIGAN) 0.2-0.5 % Drop Place 1 drop into both eyes 2 (two) times daily.      candesartan (ATACAND) 16 MG tablet Take 1 tablet (16 mg total) by mouth once daily. 90 tablet 4    denosumab (PROLIA) 60 mg/mL Syrg Inject 60 mg into the skin every 6 (six) months.      folic acid (FOLVITE) 1 MG tablet Take 2 tablets (2,000 mcg total) by mouth once daily. 180 tablet 3    hydroxychloroquine (PLAQUENIL) 200 mg tablet Take 1 tablet (200 mg total) by mouth once daily. 90 tablet 3    latanoprost 0.005 % ophthalmic solution Place 1 drop into both eyes every evening.      magnesium 200 mg Tab Take 400 mg by mouth once daily. Magnesium 400mg with Chelated Zinc 15mg.      methotrexate 2.5 MG Tab TAKE 4 TABLETS EVERY 7 DAYS 48 tablet 3    metoprolol succinate (TOPROL-XL) 50 MG 24 hr tablet Take 1 tablet (50 mg total) by mouth once daily. 90 tablet 4    pantoprazole (PROTONIX) 40 MG tablet Take 1 tablet (40 mg total) by mouth once daily. 90 tablet 3    traMADoL (ULTRAM) 50 mg tablet Take 1-2 tablets ( mg total) by mouth every 12 (twelve) hours as needed for Pain (rheumatoid arthritis). 60 tablet 3     No current facility-administered medications on file prior to visit.       Vitals:    11/12/24 1327   BP: (!) 168/89   Pulse: (!) 59         Physical Exam:    Physical Exam  Constitutional:       Appearance: Normal appearance. She is well-developed.   HENT:      Nose: No septal deviation.      Mouth/Throat:      Mouth: No oral lesions.   Eyes:      Conjunctiva/sclera:      Right eye: Right conjunctiva is not injected.      Left eye: Left conjunctiva is not injected.      Pupils: Pupils are equal, round, and reactive to light.   Neck:      Thyroid:  No thyroid mass or thyromegaly.      Vascular: No JVD.   Cardiovascular:      Rate and Rhythm: Normal rate and regular rhythm.      Pulses: Normal pulses.      Comments: No edema  Pulmonary:      Effort: Pulmonary effort is normal.      Breath sounds: No wheezing.   Abdominal:      Palpations: Abdomen is soft.   Musculoskeletal:      Right shoulder: No swelling or tenderness. Normal range of motion.      Left shoulder: No swelling or tenderness. Normal range of motion.      Right elbow: No swelling. Normal range of motion. No tenderness.      Left elbow: No swelling. Normal range of motion. No tenderness.      Right wrist: Tenderness present. No swelling. Decreased range of motion.      Left wrist: No swelling or tenderness. Normal range of motion.      Right hand: Tenderness present. Decreased range of motion.      Left hand: Tenderness present.      Right hip: Normal range of motion. Normal strength.      Left hip: No tenderness. Normal range of motion.      Right knee: No swelling. Decreased range of motion. Tenderness present over the medial joint line and lateral joint line.      Left knee: No swelling. Normal range of motion. No tenderness.      Right ankle: No swelling. No tenderness. Normal range of motion.      Left ankle: No swelling. No tenderness. Normal range of motion.      Comments: 1+ synovitis MCP and PIP with heberden nodes all DIP 2-5. Left wrist with tenderness and swelling about the TFCC. No nodules. No flexion contractures.      Cervical with left sided point tenderness and the occiput (nuchal ridge) lesser tenderness at the cervical paraspinals. Decreased ROM in cervical spine   Lymphadenopathy:      Cervical: No cervical adenopathy.   Skin:     General: Skin is dry.   Neurological:      Deep Tendon Reflexes: Reflexes are normal and symmetric.             Assessment:       Encounter Diagnoses   Name Primary?    Rheumatoid arthritis involving multiple sites with positive rheumatoid factor Yes     High risk medications (not anticoagulants) long-term use          Plan:        Rheumatoid arthritis involving multiple sites with positive rheumatoid factor  -     ALT (SGPT); Future; Expected date: 11/12/2024  -     AST (SGOT); Future; Expected date: 11/12/2024  -     CBC Auto Differential; Future; Expected date: 11/12/2024  -     Sedimentation rate; Future; Expected date: 11/12/2024    High risk medications (not anticoagulants) long-term use  -     ALT (SGPT); Future; Expected date: 11/12/2024  -     AST (SGOT); Future; Expected date: 11/12/2024  -     CBC Auto Differential; Future; Expected date: 11/12/2024  -     Sedimentation rate; Future; Expected date: 11/12/2024      RA:    -MTX we are cautiously going to put her MTX back to 5 tabs weekly hoping renal function continues to be good. She is definitely drinking more water as of recent.    HCQ 200mg once daily 90 day (already dosed for renal function)   -continue folic acid 2mg dialy   -OFF Enbrel   -voltaren gel PRN fingers/hands-using sparingly   -Labs q 3 months.    Neck Pain:    -seems to be stable and not problematic as of last 6 months. Great.    Osteoporosis   ASE  actonel/c/I for bisphosphonates now. Insurance sent EOB, about not paying.    -doing great on Prolia. Last dose 9/15/23    Reached out to Dr. Mccullough and Dr. Greene in Nephrology- he will be seeing her soon for further eval of renal changes. Discussed with her daughter (Dr. Mclaughlin-nephro as well will try to push H20 which is does not normally drink much)  Follow up in about 4 months (around 3/12/2025).    F/u in 4 months         30min consultation with greater than 50% spent in counseling, chart review and coordination of care. All questions answered.

## 2024-12-05 ENCOUNTER — TELEPHONE (OUTPATIENT)
Dept: RHEUMATOLOGY | Facility: CLINIC | Age: 84
End: 2024-12-05
Payer: COMMERCIAL

## 2024-12-05 DIAGNOSIS — Z91.89 FRACTURE RISK ASSESSMENT SCORE (FRAX) INDICATING GREATER THAN 3% RISK FOR HIP FRACTURE: ICD-10-CM

## 2024-12-05 DIAGNOSIS — M81.0 AGE-RELATED OSTEOPOROSIS WITHOUT CURRENT PATHOLOGICAL FRACTURE: Primary | ICD-10-CM

## 2024-12-05 DIAGNOSIS — Z91.89 FRACTURE RISK ASSESSMENT SCORE (FRAX) INDICATING GREATER THAN 20% RISK FOR MAJOR OSTEOPOROSIS-RELATED FRACTURE: ICD-10-CM

## 2024-12-05 NOTE — TELEPHONE ENCOUNTER
Ms Mclaughlin received a message from her insurance stating her Prolia is will need to have a new prior auth by 1/23/2025.   Will route this message to Dr Conn Pharm D to review.

## 2024-12-05 NOTE — TELEPHONE ENCOUNTER
----- Message from Amberly sent at 12/5/2024 11:18 AM CST -----  Regarding: Call back  Type:  Needs Medical Advice    Who Called: Pt    Would the patient rather a call back or a response via [facility]? Call back    Best Call Back Number: [phone]    Additional Information: Pt is requesting a call back about Prolia shot. Thank you

## 2024-12-09 ENCOUNTER — TELEPHONE (OUTPATIENT)
Dept: RHEUMATOLOGY | Facility: CLINIC | Age: 84
End: 2024-12-09
Payer: COMMERCIAL

## 2024-12-09 NOTE — TELEPHONE ENCOUNTER
----- Message from Pharmacist Piper sent at 12/9/2024  9:13 AM CST -----  Prolia not due until 3/2025 and patient already scheduled for appointment on 3/12. Cannot give prolia any sooner.    Is she having an insurance change or they're reaching out only that a new auth is needed?  ----- Message -----  From: Shyla Stoddard LPN  Sent: 12/4/2024  11:38 AM CST  To: Piper Conn, Justin    Let me know if I can help schedule anything.  Shyla KING LPN  ----- Message -----  From: Susana Barboza, Patient Care Assistant  Sent: 12/4/2024  11:12 AM CST  To: Anthony KILG[facility] Staff    Type: Needs Medical Advice  Who Called:  orlando  Phong Call Back Number: [phone]  :      Additional Information: orlando states she  need her proila shot before 1/23  [employer] [place] needs it, [phone] call for faster verify  of fax approval form : fax - [phone]

## 2024-12-09 NOTE — TELEPHONE ENCOUNTER
Ms Mclaughlin states [employer] needs Prolia prior auth before 1/23/25 or she will have to pay out of pocket. See [employer] contact information in patients message.

## 2024-12-09 NOTE — TELEPHONE ENCOUNTER
Last prolia was completed 9/12/2024 via buy and bill. Next dose not due until 3/12/2025. Pre-service aware and will work on auth closer to appointment. Ordered [id number].     Staff,  Please let patient know she can disregard the notice. Please attach the [id number] to 3/12/2025 appointment with me. Thanks!

## 2024-12-09 NOTE — TELEPHONE ENCOUNTER
Duplicate. Answered in a previous message. See below    Last prolia was completed 9/12/2024 via buy and bill. Next dose not due until 3/12/2025. Pre-service aware and will work on auth closer to appointment. Ordered [id number].      Staff,  Please let patient know she can disregard the notice. Please attach the [id number] to 3/12/2025 appointment with me. Thanks!

## 2024-12-09 NOTE — TELEPHONE ENCOUNTER
Per Clinical Pharmacist Piper Conn patient can disregard notice for Prolia  prior auth as it will be worked on by pre-service.

## 2024-12-10 PROBLEM — Z45.010 PACEMAKER GENERATOR END OF LIFE: Status: ACTIVE | Noted: 2024-12-10

## 2025-01-09 ENCOUNTER — OFFICE VISIT (OUTPATIENT)
Dept: NEPHROLOGY | Facility: CLINIC | Age: 85
End: 2025-01-09
Payer: COMMERCIAL

## 2025-01-09 VITALS — BODY MASS INDEX: 30.09 KG/M2 | HEART RATE: 62 BPM | OXYGEN SATURATION: 97 % | HEIGHT: 61 IN | WEIGHT: 159.38 LBS

## 2025-01-09 DIAGNOSIS — N18.31 STAGE 3A CHRONIC KIDNEY DISEASE: Primary | ICD-10-CM

## 2025-01-09 DIAGNOSIS — N28.1 KIDNEY CYST, ACQUIRED: ICD-10-CM

## 2025-01-09 DIAGNOSIS — I10 PRIMARY HYPERTENSION: ICD-10-CM

## 2025-01-09 PROCEDURE — 1159F MED LIST DOCD IN RCRD: CPT | Mod: CPTII,S$GLB,, | Performed by: INTERNAL MEDICINE

## 2025-01-09 PROCEDURE — 99999 PR PBB SHADOW E&M-EST. PATIENT-LVL IV: CPT | Mod: PBBFAC,,, | Performed by: INTERNAL MEDICINE

## 2025-01-09 PROCEDURE — 1157F ADVNC CARE PLAN IN RCRD: CPT | Mod: CPTII,S$GLB,, | Performed by: INTERNAL MEDICINE

## 2025-01-09 PROCEDURE — 1160F RVW MEDS BY RX/DR IN RCRD: CPT | Mod: CPTII,S$GLB,, | Performed by: INTERNAL MEDICINE

## 2025-01-09 PROCEDURE — 99214 OFFICE O/P EST MOD 30 MIN: CPT | Mod: S$GLB,,, | Performed by: INTERNAL MEDICINE

## 2025-01-09 NOTE — PROGRESS NOTES
"  Subjective:        Patient ID: Juanis Mclaughlin is a 84 y.o. White female who presents for return patient evaluation for chronic renal failure.      She has no uremic or urinary symptoms and is in her usual state of health.  There have been no recent illnesses, hospitalizations or procedures.  She is no longer using compressions stockings.  She does use a walker but has not fallen lately.  She has hand pain and arthritis but otherwise has been doing well.        Review of Systems   Constitutional:  Negative for fever.   HENT:  Negative for congestion.    Eyes:  Positive for visual disturbance (glaucoma).   Respiratory:  Positive for cough (from reflux).    Cardiovascular:  Positive for leg swelling.   Gastrointestinal:  Positive for abdominal pain (reflux).   Endocrine: Positive for cold intolerance (feet are cold).   Genitourinary:  Negative for difficulty urinating, dysuria and hematuria.   Musculoskeletal:  Positive for arthralgias (hands) and gait problem (uses walker).   Neurological:  Positive for numbness (mild in her legs).        Balance   Hematological:  Bruises/bleeds easily.       The past medical, family and social histories were reviewed for this encounter.     Pulse 62   Ht 5' 1" (1.549 m)   Wt 72.3 kg (159 lb 6.3 oz)   SpO2 97%   BMI 30.12 kg/m²     Objective:      Physical Exam  Vitals reviewed.   Constitutional:       General: She is not in acute distress.     Appearance: She is not ill-appearing.      Comments: Delightful female   HENT:      Head: Normocephalic and atraumatic.   Eyes:      General: No scleral icterus.  Cardiovascular:      Rate and Rhythm: Normal rate.      Heart sounds: No murmur heard.     No friction rub.   Pulmonary:      Effort: Pulmonary effort is normal. No respiratory distress.      Breath sounds: No wheezing or rales.   Abdominal:      General: Abdomen is flat.      Palpations: Abdomen is soft.   Musculoskeletal:         General: Deformity (MCP and PIP Heberdon nodes " B) present.      Right lower leg: Edema (trace) present.      Left lower leg: Edema (trace) present.   Skin:     General: Skin is warm and dry.   Neurological:      Mental Status: She is alert and oriented to person, place, and time.   Psychiatric:         Mood and Affect: Mood normal.         Assessment:       1. Stage 3a chronic kidney disease    2. Primary hypertension    3. Kidney cyst, acquired          Lab Results   Component Value Date    CREATININE 1.19 01/02/2025    BUN 16 01/02/2025     01/02/2025    K 5.2 (H) 01/02/2025     01/02/2025    CO2 27 01/02/2025     Lab Results   Component Value Date    .2 (H) 01/02/2025    CALCIUM 10.0 01/02/2025    PHOS 4.0 01/02/2025     Lab Results   Component Value Date    HCT 39.8 12/10/2024     Prot/Creat Ratio, Urine   Date Value Ref Range Status   01/02/2025 0.4 (H) 0.0 - 0.2 mg/g Final   10/03/2024 0.23 (H) 0.00 - 0.20 Final   06/10/2024 305.1 (H) 10.0 - 107.0 mg/g Final      Plan:   Return to clinic in 6 months.  Labs for next visit include rp pth upc q 3 months.  US in 6 months.  Baseline creatinine is 1.0-1.3 since 2013.  PTH is 133 but she is on vitamin D weekly at home which I asked her to continue.  UPC is 0.4.  Her baseline became labile in September 2022 when it increased to 1.5.  Renal US in 2021 showed R 9.7 cm L 10.3 cm with a large cyst of 6.5 cm.  Repeat US shows the cyst is unchanged and is 6.3 cm.  We will repeat a renal US later this year.  Please avoid or minimize all NSAIDS (ibuprofen, motrin, aleve, indocin, naprosyn) to minimize the risk to your kidneys.  Aspirin in a dose of 325 mg or less a day is likely the safest with regards to kidney function.  If you are able to take aspirin and do not have any bleeding problems or ulcers, this may be your best therapy.  Alternatively, acetaminophen (Tylenol) is the safer than NSAIDs with regards to kidney function.  I would ask you take this as directed on the bottle.  It is best to stay  under 2 grams a day (4-500 mg tablets a day maximum).  We discussed kidney cysts and that they are common (occur in 50% of patients over 50 years old).  We do like to monitor them with repeat ultrasound every 1-2 years to document stability.  Her potassium has been elevated only twice in the past several years thus we discussed that this was likely due to dietary consumption.    Computed KFRE 2-Year unavailable. One or more values for this score either were not found within the given timeframe or did not fit some other criterion.    Computed KFRE 5-Year unavailable. One or more values for this score either were not found within the given timeframe or did not fit some other criterion.

## 2025-03-12 ENCOUNTER — OFFICE VISIT (OUTPATIENT)
Dept: RHEUMATOLOGY | Facility: CLINIC | Age: 85
End: 2025-03-12
Payer: COMMERCIAL

## 2025-03-12 VITALS
BODY MASS INDEX: 29.35 KG/M2 | SYSTOLIC BLOOD PRESSURE: 149 MMHG | HEIGHT: 61 IN | WEIGHT: 155.44 LBS | DIASTOLIC BLOOD PRESSURE: 81 MMHG | HEART RATE: 69 BPM

## 2025-03-12 DIAGNOSIS — N18.30 STAGE 3 CHRONIC KIDNEY DISEASE, UNSPECIFIED WHETHER STAGE 3A OR 3B CKD: ICD-10-CM

## 2025-03-12 DIAGNOSIS — Z91.81 AT RISK FOR FALLS: ICD-10-CM

## 2025-03-12 DIAGNOSIS — M25.449 EFFUSION OF PROXIMAL INTERPHALANGEAL (PIP) JOINT OF FINGER: ICD-10-CM

## 2025-03-12 DIAGNOSIS — Z71.85 IMMUNIZATION COUNSELING: ICD-10-CM

## 2025-03-12 DIAGNOSIS — M47.812 CERVICAL SPONDYLOSIS: ICD-10-CM

## 2025-03-12 DIAGNOSIS — Z91.89 FRACTURE RISK ASSESSMENT SCORE (FRAX) INDICATING GREATER THAN 20% RISK FOR MAJOR OSTEOPOROSIS-RELATED FRACTURE: ICD-10-CM

## 2025-03-12 DIAGNOSIS — Z79.60 LONG-TERM USE OF IMMUNOSUPPRESSANT MEDICATION: ICD-10-CM

## 2025-03-12 DIAGNOSIS — Z79.899 IMMUNOSUPPRESSION DUE TO DRUG THERAPY: ICD-10-CM

## 2025-03-12 DIAGNOSIS — Z51.81 MEDICATION MONITORING ENCOUNTER: ICD-10-CM

## 2025-03-12 DIAGNOSIS — M47.812 SPONDYLOSIS OF CERVICAL REGION WITHOUT MYELOPATHY OR RADICULOPATHY: ICD-10-CM

## 2025-03-12 DIAGNOSIS — M05.79 RHEUMATOID ARTHRITIS INVOLVING MULTIPLE SITES WITH POSITIVE RHEUMATOID FACTOR: Primary | ICD-10-CM

## 2025-03-12 DIAGNOSIS — E53.8 VITAMIN B12 DEFICIENCY: ICD-10-CM

## 2025-03-12 DIAGNOSIS — N18.32 STAGE 3B CHRONIC KIDNEY DISEASE: ICD-10-CM

## 2025-03-12 DIAGNOSIS — Z79.899 HIGH RISK MEDICATIONS (NOT ANTICOAGULANTS) LONG-TERM USE: ICD-10-CM

## 2025-03-12 DIAGNOSIS — Z91.89 FRACTURE RISK ASSESSMENT SCORE (FRAX) INDICATING GREATER THAN 3% RISK FOR HIP FRACTURE: ICD-10-CM

## 2025-03-12 DIAGNOSIS — D84.821 IMMUNOSUPPRESSION DUE TO DRUG THERAPY: ICD-10-CM

## 2025-03-12 DIAGNOSIS — M81.0 AGE-RELATED OSTEOPOROSIS WITHOUT CURRENT PATHOLOGICAL FRACTURE: Primary | ICD-10-CM

## 2025-03-12 PROBLEM — N18.2 CKD (CHRONIC KIDNEY DISEASE) STAGE 2, GFR 60-89 ML/MIN: Status: RESOLVED | Noted: 2024-02-15 | Resolved: 2025-03-12

## 2025-03-12 PROCEDURE — 99214 OFFICE O/P EST MOD 30 MIN: CPT | Mod: S$GLB,,, | Performed by: INTERNAL MEDICINE

## 2025-03-12 PROCEDURE — 99999 PR PBB SHADOW E&M-EST. PATIENT-LVL III: CPT | Mod: PBBFAC,,,

## 2025-03-12 PROCEDURE — 3288F FALL RISK ASSESSMENT DOCD: CPT | Mod: CPTII,S$GLB,, | Performed by: INTERNAL MEDICINE

## 2025-03-12 PROCEDURE — 3079F DIAST BP 80-89 MM HG: CPT | Mod: CPTII,S$GLB,, | Performed by: INTERNAL MEDICINE

## 2025-03-12 PROCEDURE — 1159F MED LIST DOCD IN RCRD: CPT | Mod: CPTII,S$GLB,, | Performed by: INTERNAL MEDICINE

## 2025-03-12 PROCEDURE — 99999 PR PBB SHADOW E&M-EST. PATIENT-LVL III: CPT | Mod: PBBFAC,,, | Performed by: INTERNAL MEDICINE

## 2025-03-12 PROCEDURE — 1101F PT FALLS ASSESS-DOCD LE1/YR: CPT | Mod: CPTII,S$GLB,, | Performed by: INTERNAL MEDICINE

## 2025-03-12 PROCEDURE — 1125F AMNT PAIN NOTED PAIN PRSNT: CPT | Mod: CPTII,S$GLB,, | Performed by: INTERNAL MEDICINE

## 2025-03-12 PROCEDURE — 3077F SYST BP >= 140 MM HG: CPT | Mod: CPTII,S$GLB,, | Performed by: INTERNAL MEDICINE

## 2025-03-12 PROCEDURE — 1157F ADVNC CARE PLAN IN RCRD: CPT | Mod: CPTII,S$GLB,, | Performed by: INTERNAL MEDICINE

## 2025-03-12 RX ORDER — HYDROXYCHLOROQUINE SULFATE 200 MG/1
200 TABLET, FILM COATED ORAL DAILY
Qty: 90 TABLET | Refills: 3 | Status: SHIPPED | OUTPATIENT
Start: 2025-03-12 | End: 2026-03-12

## 2025-03-12 RX ORDER — FOLIC ACID 1 MG/1
2000 TABLET ORAL DAILY
Qty: 180 TABLET | Refills: 3 | Status: SHIPPED | OUTPATIENT
Start: 2025-03-12 | End: 2026-03-12

## 2025-03-12 RX ORDER — METHOTREXATE 2.5 MG/1
12.5 TABLET ORAL
Qty: 60 TABLET | Refills: 1 | Status: SHIPPED | OUTPATIENT
Start: 2025-03-12 | End: 2026-03-12

## 2025-03-12 ASSESSMENT — ROUTINE ASSESSMENT OF PATIENT INDEX DATA (RAPID3)
FATIGUE SCORE: 0
TOTAL RAPID3 SCORE: 2.39
PAIN SCORE: 3
MDHAQ FUNCTION SCORE: 1.1
PSYCHOLOGICAL DISTRESS SCORE: 0
PATIENT GLOBAL ASSESSMENT SCORE: 0.5

## 2025-03-12 NOTE — PROGRESS NOTES
Time: 33 mins  # of DX: 7    Subjective:     Chief Complaint & History of Present Illness:  Juanis Mclaughlin is a 84 y.o. female that is an established patient who is seen here today for Prolia injection and osteoporosis management. she was appropriately identified and referred by Christina Cruz DO. Presenting with RA, osteoporosis, GERD, CKD, HTN, and HLD.     Prior Osteoporosis Therapies:   Atelvia  Actonel    Osteoporosis:  Current treatment: Prolia via [employer]  Last Prolia on 9/12/2024  Any fractures within the last year: no  Any falls within the last year: no  Uses walker regularly  Sometimes wont use it in her kitchen because she is able to use the stove, countertops, etc when walking  Physically active: no, able to walk on smooth surfaces; leg lifts   Smoker: no  Alcohol intake; number of drinks per week: occasionally   Are you established with a dentist: no, have plates/dentures top and bottom   Do you see your dentist for regulary check-ups/exams (~6 months): no    Current calcium and Vit D intake:  Diet with adequate calcium: no, doesn't drink milk. Feels that it goes bad too quick  Dietary sources: fish, meat, ice cream, cheese, fortified foods  Supplements: vitamin D once a week- unsure of dose  May benefit from calcium supplementation     Vaccines:  Recommend influenza annually, prevnar 20 if not completed yet, TdaP every 10 years, shingrix x 2, COVID, Hep B (adults 19-58 yo and 60 or older if have risk factors) and RSV if 60 years old or older  Patient is due for Prevnar 20 (once)    Influenza: Discuss and recommend annually. 9/2024  PCV 20: Based on share clinical decision making. Either way Pneumonia vaccines are complete. Due now if pt would like to receive  PCV 13: 2/2016  PPSV 23: 12/2011 & 4/2022  Tetanus (TdaP): Discuss and recommend booster every 10 years. 7/2018. Due 7/2028  Shingrix: 12/2021 & 4/2022  Covid: CDC recommends 1 new [id number] dose in immunocompromised patients.  "9/2024  RSV: 12/2023     For COVID vaccine, ACR recommends holding MTX for 1-2 weeks (as disease activity allows) after each COVID vaccines dose    For Influenza vaccine, ACR recommends holding MTX for 2 weeks (as disease activity allows) after influenza vaccine    Patient on lower dose of MTX - 10 mg weekly; <0.4 mg/kg/week so not consider immunosuppressive        Current Medications:  Current Outpatient Medications   Medication Instructions    B-complex with vitamin C (Z-BEC OR EQUIV) tablet 1 tablet, Daily    brimonidine-timoloL (COMBIGAN) 0.2-0.5 % Drop 1 drop, 2 times daily    candesartan (ATACAND) 16 mg, Oral, Daily    folic acid (FOLVITE) 2,000 mcg, Oral, Daily    hydroxychloroquine (PLAQUENIL) 200 mg, Oral, Daily    latanoprost 0.005 % ophthalmic solution 1 drop, Nightly    magnesium 400 mg, Daily    methotrexate 12.5 mg, Oral, Every 7 days    metoprolol succinate (TOPROL-XL) 50 mg, Oral, Daily    pantoprazole (PROTONIX) 40 mg, Oral, Daily    PROLIA 60 mg, Every 6 months    traMADoL (ULTRAM)  mg, Oral, Every 12 hours PRN     Objective:     There were no vitals filed for this visit.   BP Readings from Last 4 Encounters:   03/10/25 118/76   12/10/24 (!) 125/58   11/19/24 (!) 149/88   11/12/24 (!) 168/89     There is no height or weight on file to calculate BMI.     Alcohol Use: Not At Risk (3/10/2025)    AUDIT-C     Frequency of Alcohol Consumption: 2-4 times a month     Average Number of Drinks: Patient does not drink     Frequency of Binge Drinking: Never      Tobacco Use: Low Risk  (3/10/2025)    Patient History     Smoking Tobacco Use: Never     Smokeless Tobacco Use: Never     Passive Exposure: Never      Physical Activity: Unknown (3/10/2025)    Exercise Vital Sign     Days of Exercise per Week: 0 days     Minutes of Exercise per Session: Not on file      Fall Risk Assessment: 9/12/2024  Completed "Check Your Risk for Falling" Questionnaire; score 8 for use or have been advised to use a cane or " walker (2), feel unsteady when walking sometimes (1), use furniture to steady themselves when walking at home (1), worried about falling (1), need to push with hands to stand up from chair (1), some trouble stepping up onto a curb (1), and lost of some feeling in feet (1)     Monitoring Lab Results:  Lab Results   Component Value Date    CALCIUM 9.7 03/11/2025    ALBUMIN 3.8 03/11/2025    PVTBAKLC01AL 42 02/08/2024    MG 2.0 08/26/2016    PHOS 4.0 01/02/2025    CREATININE 1.16 03/11/2025    EGFRNORACEVR 46 (A) 03/11/2025     Lab Results   Component Value Date     03/11/2025    K 4.8 03/11/2025     03/11/2025    CO2 29 03/11/2025     (H) 03/11/2025    BUN 21 03/11/2025    PROT 6.4 03/11/2025    BILITOT 0.5 03/11/2025    ALKPHOS 26 (L) 03/11/2025    AST 29 03/11/2025    ALT 9 (L) 03/11/2025     DEXA Results: 7/3/2018  Narrative & Impression  EXAMINATION:  DEXA BONE DENSITY SPINE HIP     CLINICAL HISTORY:  Other specified disorders of bone density and structure, unspecified site advanced age, , previous soup parenchyma, postmenopausal, rheumatoid arthritis     TECHNIQUE:  DXA scanning was performed over the both hip and lumbar spine.  Review of the images confirms satisfactory positioning and technique.     COMPARISON:  03/02/2016 performed on a different system with different standards     FINDINGS:  The L1 to L4 vertebral bone mineral density is equal to 1.039 g/cm squared with a T score of -1.2.  There has been a mild decrease relative to the prior study.     The total hip bone mineral density is equal to 0.697 g/cm squared with a T score of -2.5.  There has been a mild decrease in bone density relative to the prior study.     There is a 21.7% risk of a major osteoporotic fracture and a 7.5% risk of hip fracture in the next 10 years (FRAX).     IMPRESSION: Changes consistent with osteopenia in the spine and borderline osteoporosis in the hips.    Assessment:      Patient is a 84 y.o.  female with osteoporosis. Patient was referred to the Rheumatology pharmacist for Prolia injection and Osteoporosis management. Prolia via [employer] (approved). Provided patient with education about bone health and fall risk. Seems that patient is at risk for falls per assessment questionnaire. Counseled patient about importance of calcium and vitamin D. Plan is to continue Prolia. Also, explained importance of vaccines considering the increased risk of infections.    Plan:      1. Age-related osteoporosis without current pathological fracture (Primary)  Prolia administered appropriately and left office in stable condition.    ND: 9/12/25 with me  Patient provided with handout of foods/drinks rich in calcium previously  Initiate calcium carbonate 500 mg once daily and continue vitamin D - check dose when she gets home  Recommend weight bearing exercises (chair exercises, chair yoga, walking, stefan chi, etc) 3 times weekly for 30 mins  Discussed ways to decrease risk of falls  Patient verbalized understanding instructions  Next DXA due now; ordered and staff to schedule    2. Fracture Risk Assessment Score (FRAX) indicating greater than 20% risk for major osteoporosis-related fracture  See #1    3. Fracture Risk Assessment Score (FRAX) indicating greater than 3% risk for hip fracture  See #1    4. Stage 3 chronic kidney disease, unspecified whether stage 3a or 3b CKD  See #1    5. Immunization counseling  Vaccines needed: PCV 20 for extra protection    6. At risk for falls  See #1    7. Medication monitoring encounter  Ordered CMP and vitamin D; staff scheduled      Follow-up scheduled on 7/16/2025 with DO Piper Frye, PharmD, [facility]  Rheumatology Clinical Pharmacist  [facility]

## 2025-03-12 NOTE — PATIENT INSTRUCTIONS
Calcium carbonate 500 mg once a day with a meal    Recommend weight bearing exercises (chair exercises, chair yoga, walking, stefan chi, etc) 3 times weekly for 30 mins

## 2025-03-12 NOTE — PROGRESS NOTES
Chief Complaint: Juanis Mclaughlin is a 84 y.o. female who had concerns including Disease Management.    HPI:  Rheumatoid Arthritis    85y/o female with h/o HTN, HLD was diagnosed with sero +  Rheumatoid arthritis in 1998. Off prednisone now.  Enbrel 50mg weekly on hold since March 2020. - and no worsening.   MTX 4 tabs weekly.   Started HCQ 200mg once daily.     Pain in the DIP and PIP joints. Most painful is the left 3rd and 4th PIP with swelling this visit.   No ASE of stomatitis,  infections or injection site reaction.   She was also diagnosed with secondary Sjogren's. Currently on restasis.  PPM for bradycardia.     Past 12 months slow decline in renal function from prior year- on PO bisphos and ACEI/HCTZ  switch this to Prolia.  Started Prolia 02/29/2019 continues every 6 months, last 9/2024, next 3/2025 with Senia.      She denies fever, weight loss, photosensitivity, rash, ulcer, raynaud's phenomenon, alopecia, dysphagia, diarrhea or blood in the stools.  + hx of GERD. On PPI. Hx of mild MR. No pedal edema.     Doing well with Dr. Madison with Breo and Gabapentin to relax cough impulse.     REVIEW OF SYSTEMS:    Review of Systems   Constitutional:  Negative for fever, malaise/fatigue and weight loss.   HENT:  Negative for sore throat.    Eyes:  Negative for double vision, photophobia and redness.   Respiratory:  Positive for cough and sputum production. Negative for hemoptysis, shortness of breath and wheezing.    Cardiovascular:  Negative for chest pain, palpitations and orthopnea.   Gastrointestinal:  Negative for abdominal pain, constipation and diarrhea.   Genitourinary:  Negative for dysuria, hematuria and urgency.   Musculoskeletal:  Positive for joint pain. Negative for back pain and myalgias.   Skin:  Negative for rash.   Neurological:  Negative for dizziness, tingling, focal weakness and headaches.   Endo/Heme/Allergies:  Does not bruise/bleed easily.   Psychiatric/Behavioral:  Negative for  depression, hallucinations and suicidal ideas.                 Objective:            Past Medical History:   Diagnosis Date    Cardiomyopathy     CKD (chronic kidney disease) stage 3, GFR 30-59 ml/min 01/09/2019    Diverticulosis     DVT (deep venous thrombosis)     one time 5 years ago    Essential hypertension 11/24/2013    GERD (gastroesophageal reflux disease)     Glaucoma     sees Dr. Gillette    Hyperlipidemia     Iron deficiency anemia 10/21/2016     IMO LOAD Q4    Long-term use of immunosuppressant medication 07/27/2015    Mitral regurgitation 12/20/2013 12/13 mild     Osteoporosis 07/09/2014    Pacemaker 10/26/2016    left chest [employer]EMAKER [employer] [address] [id number] Bonifacio Fuller 9/9/2016 - current   right atrium LEAD [employer] [address] [id number] Bonifacio Fuller 9/9/2016 - current   right ventricle LEAD [employer] [address] [id number] Bonifacio Fuller 9/9/2016 - current      PONV (postoperative nausea and vomiting)     Rheumatoid arthritis involving multiple sites with positive rheumatoid factor 11/24/2013    Vitamin B12 deficiency 02/28/2015     Family History   Problem Relation Name Age of Onset    Heart disease Mother          CHF    Hypertension Mother      Heart disease Father      Stroke Father      Ovarian cancer Maternal Aunt  34    Heart disease Brother      Stroke Brother      Heart disease Brother      Scleroderma Brother      Breast cancer Neg Hx       Social History     Tobacco Use    Smoking status: Never     Passive exposure: Never    Smokeless tobacco: Never   Substance Use Topics    Alcohol use: Yes     Alcohol/week: 0.8 standard drinks of alcohol     Types: 1 Standard drinks or equivalent per week     Comment: occ social    Drug use: No         Current Outpatient Medications on File Prior to Visit   Medication Sig Dispense Refill    B-complex with vitamin C (Z-BEC OR EQUIV) tablet Take 1 tablet by mouth once  daily.      brimonidine-timoloL (COMBIGAN) 0.2-0.5 % Drop Place 1 drop into both eyes 2 (two) times daily.      candesartan (ATACAND) 16 MG tablet Take 1 tablet (16 mg total) by mouth once daily. 90 tablet 4    denosumab (PROLIA) 60 mg/mL Syrg Inject 60 mg into the skin every 6 (six) months.      folic acid (FOLVITE) 1 MG tablet Take 2 tablets (2,000 mcg total) by mouth once daily. 180 tablet 3    hydroxychloroquine (PLAQUENIL) 200 mg tablet Take 1 tablet (200 mg total) by mouth once daily. 90 tablet 3    latanoprost 0.005 % ophthalmic solution Place 1 drop into both eyes every evening.      magnesium 200 mg Tab Take 400 mg by mouth once daily. Magnesium 400mg with Chelated Zinc 15mg.      methotrexate 2.5 MG Tab Take 5 tablets (12.5 mg total) by mouth every 7 days. 60 tablet 1    metoprolol succinate (TOPROL-XL) 50 MG 24 hr tablet Take 1 tablet (50 mg total) by mouth once daily. 90 tablet 4    pantoprazole (PROTONIX) 40 MG tablet Take 1 tablet (40 mg total) by mouth once daily. 90 tablet 3    traMADoL (ULTRAM) 50 mg tablet Take 1-2 tablets ( mg total) by mouth every 12 (twelve) hours as needed for Pain (rheumatoid arthritis). 60 tablet 3     No current facility-administered medications on file prior to visit.       Vitals:    03/12/25 1104   BP: (!) 149/81   Pulse: 69         Physical Exam:    Physical Exam  Constitutional:       Appearance: Normal appearance. She is well-developed.   HENT:      Nose: No septal deviation.      Mouth/Throat:      Mouth: No oral lesions.   Eyes:      Conjunctiva/sclera:      Right eye: Right conjunctiva is not injected.      Left eye: Left conjunctiva is not injected.      Pupils: Pupils are equal, round, and reactive to light.   Neck:      Thyroid: No thyroid mass or thyromegaly.      Vascular: No JVD.   Cardiovascular:      Rate and Rhythm: Normal rate and regular rhythm.      Pulses: Normal pulses.      Comments: No edema  Pulmonary:      Effort: Pulmonary effort is normal.       Breath sounds: No wheezing.   Abdominal:      Palpations: Abdomen is soft.   Musculoskeletal:      Right shoulder: No swelling or tenderness. Normal range of motion.      Left shoulder: No swelling or tenderness. Normal range of motion.      Right elbow: No swelling. Normal range of motion. No tenderness.      Left elbow: No swelling. Normal range of motion. No tenderness.      Right wrist: Tenderness present. No swelling. Decreased range of motion.      Left wrist: No swelling or tenderness. Normal range of motion.      Right hand: Tenderness present. Decreased range of motion.      Left hand: Tenderness present.      Right hip: Normal range of motion. Normal strength.      Left hip: No tenderness. Normal range of motion.      Right knee: No swelling. Decreased range of motion. Tenderness present over the medial joint line and lateral joint line.      Left knee: No swelling. Normal range of motion. No tenderness.      Right ankle: No swelling. No tenderness. Normal range of motion.      Left ankle: No swelling. No tenderness. Normal range of motion.      Comments: 1+ synovitis MCP and PIP with heberden nodes all DIP 2-5. Left wrist with tenderness and swelling about the TFCC. No nodules. No flexion contractures.      Cervical with left sided point tenderness and the occiput (nuchal ridge) lesser tenderness at the cervical paraspinals. Decreased ROM in cervical spine   Lymphadenopathy:      Cervical: No cervical adenopathy.   Skin:     General: Skin is dry.   Neurological:      Deep Tendon Reflexes: Reflexes are normal and symmetric.           Assessment:       Encounter Diagnoses   Name Primary?    Rheumatoid arthritis involving multiple sites with positive rheumatoid factor Yes    Long-term use of immunosuppressant medication     High risk medications (not anticoagulants) long-term use     Immunosuppression due to drug therapy          Plan:        Rheumatoid arthritis involving multiple sites with positive  rheumatoid factor  -     ALT (SGPT); Future; Expected date: 03/12/2025  -     AST (SGOT); Future; Expected date: 03/12/2025  -     CBC Auto Differential; Future; Expected date: 03/12/2025  -     C-Reactive Protein; Future; Expected date: 03/12/2025  -     Creatinine, Serum; Future; Expected date: 03/12/2025  -     Sedimentation rate; Future; Expected date: 03/12/2025  -     hydroxychloroquine (PLAQUENIL) 200 mg tablet; Take 1 tablet (200 mg total) by mouth once daily.  Dispense: 90 tablet; Refill: 3  -     folic acid (FOLVITE) 1 MG tablet; Take 2 tablets (2,000 mcg total) by mouth once daily.  Dispense: 180 tablet; Refill: 3  -     methotrexate 2.5 MG Tab; Take 5 tablets (12.5 mg total) by mouth every 7 days.  Dispense: 60 tablet; Refill: 1    Long-term use of immunosuppressant medication  -     hydroxychloroquine (PLAQUENIL) 200 mg tablet; Take 1 tablet (200 mg total) by mouth once daily.  Dispense: 90 tablet; Refill: 3  -     folic acid (FOLVITE) 1 MG tablet; Take 2 tablets (2,000 mcg total) by mouth once daily.  Dispense: 180 tablet; Refill: 3  -     methotrexate 2.5 MG Tab; Take 5 tablets (12.5 mg total) by mouth every 7 days.  Dispense: 60 tablet; Refill: 1    High risk medications (not anticoagulants) long-term use  -     ALT (SGPT); Future; Expected date: 03/12/2025  -     AST (SGOT); Future; Expected date: 03/12/2025  -     CBC Auto Differential; Future; Expected date: 03/12/2025  -     C-Reactive Protein; Future; Expected date: 03/12/2025  -     Creatinine, Serum; Future; Expected date: 03/12/2025  -     Sedimentation rate; Future; Expected date: 03/12/2025  -     hydroxychloroquine (PLAQUENIL) 200 mg tablet; Take 1 tablet (200 mg total) by mouth once daily.  Dispense: 90 tablet; Refill: 3  -     folic acid (FOLVITE) 1 MG tablet; Take 2 tablets (2,000 mcg total) by mouth once daily.  Dispense: 180 tablet; Refill: 3  -     methotrexate 2.5 MG Tab; Take 5 tablets (12.5 mg total) by mouth every 7 days.   Dispense: 60 tablet; Refill: 1    Immunosuppression due to drug therapy  -     ALT (SGPT); Future; Expected date: 03/12/2025  -     AST (SGOT); Future; Expected date: 03/12/2025  -     CBC Auto Differential; Future; Expected date: 03/12/2025  -     C-Reactive Protein; Future; Expected date: 03/12/2025  -     Creatinine, Serum; Future; Expected date: 03/12/2025  -     Sedimentation rate; Future; Expected date: 03/12/2025    Stage 3b chronic kidney disease    Cervical spondylosis    Stage 3 chronic kidney disease, unspecified whether stage 3a or 3b CKD  -     hydroxychloroquine (PLAQUENIL) 200 mg tablet; Take 1 tablet (200 mg total) by mouth once daily.  Dispense: 90 tablet; Refill: 3  -     folic acid (FOLVITE) 1 MG tablet; Take 2 tablets (2,000 mcg total) by mouth once daily.  Dispense: 180 tablet; Refill: 3    Effusion of proximal interphalangeal (PIP) joint of finger  -     hydroxychloroquine (PLAQUENIL) 200 mg tablet; Take 1 tablet (200 mg total) by mouth once daily.  Dispense: 90 tablet; Refill: 3    Vitamin B12 deficiency  -     folic acid (FOLVITE) 1 MG tablet; Take 2 tablets (2,000 mcg total) by mouth once daily.  Dispense: 180 tablet; Refill: 3    Spondylosis of cervical region without myelopathy or radiculopathy  -     folic acid (FOLVITE) 1 MG tablet; Take 2 tablets (2,000 mcg total) by mouth once daily.  Dispense: 180 tablet; Refill: 3      RA:    -MTX we are cautiously going to put her MTX back to 5 tabs weekly hoping renal function continues to be good. She is definitely drinking more water as of recent.    HCQ 200mg once daily 90 day (already dosed for renal function)   -continue folic acid 2mg dialy   -OFF Enbrel   -voltaren gel PRN fingers/hands-using sparingly   -Labs q 3 months.    Neck Pain:    -seems to be stable and not problematic as of last 6 months. Great.    Osteoporosis   ASE  actonel/c/I for bisphosphonates now. Insurance sent EOB, about not paying.    -doing great on Prolia. Last dose  9/15/23    Reached out to Dr. Mccullough and Dr. Greene in Nephrology- he will be seeing her soon for further eval of renal changes. Discussed with her daughter (Dr. Mclaughlin-nephro as well will try to push H20 which is does not normally drink much)  No follow-ups on file.    F/u in 4 months         30min consultation with greater than 50% spent in counseling, chart review and coordination of care. All questions answered.

## 2025-04-09 ENCOUNTER — RESULTS FOLLOW-UP (OUTPATIENT)
Dept: NEPHROLOGY | Facility: CLINIC | Age: 85
End: 2025-04-09

## 2025-04-15 ENCOUNTER — HOSPITAL ENCOUNTER (OUTPATIENT)
Dept: RADIOLOGY | Facility: HOSPITAL | Age: 85
Discharge: HOME | End: 2025-04-15
Attending: INTERNAL MEDICINE
Payer: COMMERCIAL

## 2025-04-15 DIAGNOSIS — Z91.89 FRACTURE RISK ASSESSMENT SCORE (FRAX) INDICATING GREATER THAN 3% RISK FOR HIP FRACTURE: ICD-10-CM

## 2025-04-15 DIAGNOSIS — Z71.85 IMMUNIZATION COUNSELING: ICD-10-CM

## 2025-04-15 DIAGNOSIS — M81.0 AGE-RELATED OSTEOPOROSIS WITHOUT CURRENT PATHOLOGICAL FRACTURE: ICD-10-CM

## 2025-04-15 DIAGNOSIS — Z91.81 AT RISK FOR FALLS: ICD-10-CM

## 2025-04-15 DIAGNOSIS — N18.30 STAGE 3 CHRONIC KIDNEY DISEASE, UNSPECIFIED WHETHER STAGE 3A OR 3B CKD: ICD-10-CM

## 2025-04-15 DIAGNOSIS — Z51.81 MEDICATION MONITORING ENCOUNTER: ICD-10-CM

## 2025-04-15 DIAGNOSIS — Z91.89 FRACTURE RISK ASSESSMENT SCORE (FRAX) INDICATING GREATER THAN 20% RISK FOR MAJOR OSTEOPOROSIS-RELATED FRACTURE: ICD-10-CM

## 2025-04-15 PROCEDURE — 77091 TBS TECHL CALCULATION ONLY: CPT | Mod: PO

## 2025-04-15 PROCEDURE — 77080 DXA BONE DENSITY AXIAL: CPT | Mod: 26,,, | Performed by: RADIOLOGY

## 2025-04-15 PROCEDURE — 77092 TBS I&R FX RSK QHP: CPT | Mod: ,,, | Performed by: RADIOLOGY

## 2025-05-15 NOTE — TELEPHONE ENCOUNTER
----- Message from Xochitl Constantin sent at 1/31/2020  9:51 AM CST -----  Contact: pt  Type: Needs Medical Advice    Who Called:      Best Call Back Number:     Additional Information: Requesting a call back from Nurse, regarding A refill for Rx folic acid (FOLVITE) 1 MG tablet 180 tablet be called in TODAY per pt request she does not want to run out ,please call with advice if needed       
Pt needs refill. LOV 10/29/2019. NOV 03/04/2020.   
Attending to bill

## 2025-05-24 ENCOUNTER — RESULTS FOLLOW-UP (OUTPATIENT)
Dept: RHEUMATOLOGY | Facility: CLINIC | Age: 85
End: 2025-05-24

## 2025-07-16 ENCOUNTER — OFFICE VISIT (OUTPATIENT)
Dept: RHEUMATOLOGY | Facility: CLINIC | Age: 85
End: 2025-07-16
Payer: COMMERCIAL

## 2025-07-16 ENCOUNTER — TELEPHONE (OUTPATIENT)
Dept: RHEUMATOLOGY | Facility: CLINIC | Age: 85
End: 2025-07-16

## 2025-07-16 ENCOUNTER — HOSPITAL ENCOUNTER (OUTPATIENT)
Dept: RADIOLOGY | Facility: HOSPITAL | Age: 85
Discharge: HOME | End: 2025-07-16
Attending: INTERNAL MEDICINE
Payer: COMMERCIAL

## 2025-07-16 VITALS
DIASTOLIC BLOOD PRESSURE: 94 MMHG | HEART RATE: 64 BPM | SYSTOLIC BLOOD PRESSURE: 166 MMHG | WEIGHT: 151.88 LBS | BODY MASS INDEX: 28.7 KG/M2

## 2025-07-16 DIAGNOSIS — Z79.60 LONG-TERM USE OF IMMUNOSUPPRESSANT MEDICATION: ICD-10-CM

## 2025-07-16 DIAGNOSIS — N18.32 STAGE 3B CHRONIC KIDNEY DISEASE: ICD-10-CM

## 2025-07-16 DIAGNOSIS — M84.374A STRESS FRACTURE OF RIGHT FOOT, INITIAL ENCOUNTER: ICD-10-CM

## 2025-07-16 DIAGNOSIS — Z79.899 HIGH RISK MEDICATIONS (NOT ANTICOAGULANTS) LONG-TERM USE: ICD-10-CM

## 2025-07-16 DIAGNOSIS — M05.79 RHEUMATOID ARTHRITIS INVOLVING MULTIPLE SITES WITH POSITIVE RHEUMATOID FACTOR: Primary | ICD-10-CM

## 2025-07-16 PROCEDURE — 99999 PR PBB SHADOW E&M-EST. PATIENT-LVL IV: CPT | Mod: PBBFAC,,, | Performed by: INTERNAL MEDICINE

## 2025-07-16 PROCEDURE — 99214 OFFICE O/P EST MOD 30 MIN: CPT | Mod: S$GLB,,, | Performed by: INTERNAL MEDICINE

## 2025-07-16 PROCEDURE — 73630 X-RAY EXAM OF FOOT: CPT | Mod: TC,FY,PO,RT

## 2025-07-16 PROCEDURE — 73630 X-RAY EXAM OF FOOT: CPT | Mod: 26,RT,, | Performed by: RADIOLOGY

## 2025-07-16 PROCEDURE — 1101F PT FALLS ASSESS-DOCD LE1/YR: CPT | Mod: CPTII,S$GLB,, | Performed by: INTERNAL MEDICINE

## 2025-07-16 PROCEDURE — 3077F SYST BP >= 140 MM HG: CPT | Mod: CPTII,S$GLB,, | Performed by: INTERNAL MEDICINE

## 2025-07-16 PROCEDURE — 1157F ADVNC CARE PLAN IN RCRD: CPT | Mod: CPTII,S$GLB,, | Performed by: INTERNAL MEDICINE

## 2025-07-16 PROCEDURE — 3080F DIAST BP >= 90 MM HG: CPT | Mod: CPTII,S$GLB,, | Performed by: INTERNAL MEDICINE

## 2025-07-16 PROCEDURE — 3288F FALL RISK ASSESSMENT DOCD: CPT | Mod: CPTII,S$GLB,, | Performed by: INTERNAL MEDICINE

## 2025-07-16 PROCEDURE — 1125F AMNT PAIN NOTED PAIN PRSNT: CPT | Mod: CPTII,S$GLB,, | Performed by: INTERNAL MEDICINE

## 2025-07-16 ASSESSMENT — ROUTINE ASSESSMENT OF PATIENT INDEX DATA (RAPID3)
MDHAQ FUNCTION SCORE: 1.4
FATIGUE SCORE: 1.1
TOTAL RAPID3 SCORE: 3.05
PAIN SCORE: 3.5
PSYCHOLOGICAL DISTRESS SCORE: 0
PATIENT GLOBAL ASSESSMENT SCORE: 1

## 2025-07-16 NOTE — TELEPHONE ENCOUNTER
----- Message from Christina Cruz DO sent at 7/16/2025  2:23 PM CDT -----  Please call patient. Good news no fracture. Must have been a blood vessel that ruptured. Dr. Cruz   ----- Message -----  From: Interface, Rad Results In  Sent: 7/16/2025   1:28 PM CDT  To: Christina Cruz DO

## 2025-07-16 NOTE — PROGRESS NOTES
Time: 33 mins  # of DX: 7    Subjective:     Chief Complaint & History of Present Illness:  Juanis Mclaughlin is a 84 y.o. female that is an established patient who is seen here today for Prolia injection and osteoporosis management. she was appropriately identified and referred by No ref. provider found. Presenting with RA, osteoporosis, GERD, CKD, HTN, and HLD.     Prior Osteoporosis Therapies:   Atelvia  Actonel    Osteoporosis:  Current treatment: Prolia via [employer]  Last Prolia on 9/12/2024  Any fractures within the last year: no  Any falls within the last year: no  Uses walker regularly  Sometimes wont use it in her kitchen because she is able to use the stove, countertops, etc when walking  Physically active: no, able to walk on smooth surfaces; leg lifts   Smoker: no  Alcohol intake; number of drinks per week: occasionally   Are you established with a dentist: no, have plates/dentures top and bottom   Do you see your dentist for regulary check-ups/exams (~6 months): no    Current calcium and Vit D intake:  Diet with adequate calcium: no, doesn't drink milk. Feels that it goes bad too quick  Dietary sources: fish, meat, ice cream, cheese, fortified foods  Supplements: vitamin D once a week- unsure of dose  May benefit from calcium supplementation     Vaccines:  Recommend influenza annually, prevnar 20 if not completed yet, TdaP every 10 years, shingrix x 2, COVID, Hep B (adults 19-60 yo and 60 or older if have risk factors) and RSV if 60 years old or older  Patient is due for Prevnar 20 (once)    Influenza: Discuss and recommend annually. 9/2024  PCV 20: Based on share clinical decision making. Either way Pneumonia vaccines are complete. Due now if pt would like to receive  PCV 13: 2/2016  PPSV 23: 12/2011 & 4/2022  Tetanus (TdaP): Discuss and recommend booster every 10 years. 7/2018. Due 7/2028  Shingrix: 12/2021 & 4/2022  Covid: CDC recommends 1 new 1307-8069 dose in immunocompromised patients.  9/2024  RSV: 12/2023     For COVID vaccine, ACR recommends holding MTX for 1-2 weeks (as disease activity allows) after each COVID vaccines dose    For Influenza vaccine, ACR recommends holding MTX for 2 weeks (as disease activity allows) after influenza vaccine    Patient on lower dose of MTX - 10 mg weekly; <0.4 mg/kg/week so not consider immunosuppressive        Current Medications:  Current Outpatient Medications   Medication Instructions    B-complex with vitamin C (Z-BEC OR EQUIV) tablet 1 tablet, Daily    brimonidine-timoloL (COMBIGAN) 0.2-0.5 % Drop 1 drop, 2 times daily    candesartan (ATACAND) 16 mg, Oral, Daily    cyanocobalamin (VITAMIN B-12) 1,000 mcg, Daily    cycloSPORINE 0.09 % Dpet 2 times daily    folic acid (FOLVITE) 2,000 mcg, Oral, Daily    hydroxychloroquine (PLAQUENIL) 200 mg, Oral, Daily    latanoprost 0.005 % ophthalmic solution 1 drop, Nightly    magnesium 500 mg, Daily    methotrexate 12.5 mg, Oral, Every 7 days    metoprolol succinate (TOPROL-XL) 50 mg, Oral, Daily    pantoprazole (PROTONIX) 40 mg, Oral, Daily    PROLIA 60 mg, Every 6 months    pyridoxine (vitamin B6) (VITAMIN B-6) 50 mg, Daily    traMADoL (ULTRAM)  mg, Oral, Every 12 hours PRN     Objective:     Vitals:    07/16/25 1155   Pulse: 64   Weight: 68.9 kg (151 lb 14.4 oz)   PainSc:   4   PainLoc: Finger      BP Readings from Last 4 Encounters:   07/10/25 122/80   03/12/25 (!) 149/81   03/10/25 118/76   12/10/24 (!) 125/58     Body mass index is 28.7 kg/m².     Alcohol Use: Not At Risk (3/10/2025)    AUDIT-C     Frequency of Alcohol Consumption: 2-4 times a month     Average Number of Drinks: Patient does not drink     Frequency of Binge Drinking: Never      Tobacco Use: Low Risk  (7/10/2025)    Patient History     Smoking Tobacco Use: Never     Smokeless Tobacco Use: Never     Passive Exposure: Never      Physical Activity: Unknown (3/10/2025)    Exercise Vital Sign     Days of Exercise per Week: 0 days     Minutes of  "Exercise per Session: Not on file      Fall Risk Assessment: 9/12/2024  Completed "Check Your Risk for Falling" Questionnaire; score 8 for use or have been advised to use a cane or walker (2), feel unsteady when walking sometimes (1), use furniture to steady themselves when walking at home (1), worried about falling (1), need to push with hands to stand up from chair (1), some trouble stepping up onto a curb (1), and lost of some feeling in feet (1)     Monitoring Lab Results:  Lab Results   Component Value Date    CALCIUM 9.8 04/09/2025    ALBUMIN 3.9 04/09/2025    [id number] 42 02/08/2024    MG 2.0 08/26/2016    PHOS 4.5 04/09/2025    CREATININE 1.14 04/09/2025    EGFRNORACEVR 47 (A) 04/09/2025     Lab Results   Component Value Date     04/09/2025    K 4.7 04/09/2025     04/09/2025    CO2 27 04/09/2025     (H) 04/09/2025    BUN 19 04/09/2025    PROT 6.4 03/11/2025    BILITOT 0.5 03/11/2025    ALKPHOS 26 (L) 03/11/2025    AST 29 03/11/2025    ALT 9 (L) 03/11/2025     DEXA Results: 7/3/2018  Narrative & Impression  EXAMINATION:  DEXA BONE DENSITY SPINE HIP     CLINICAL HISTORY:  Other specified disorders of bone density and structure, unspecified site advanced age, , previous soup parenchyma, postmenopausal, rheumatoid arthritis     TECHNIQUE:  DXA scanning was performed over the both hip and lumbar spine.  Review of the images confirms satisfactory positioning and technique.     COMPARISON:  03/02/2016 performed on a different system with different standards     FINDINGS:  The L1 to L4 vertebral bone mineral density is equal to 1.039 g/cm squared with a T score of -1.2.  There has been a mild decrease relative to the prior study.     The total hip bone mineral density is equal to 0.697 g/cm squared with a T score of -2.5.  There has been a mild decrease in bone density relative to the prior study.     There is a 21.7% risk of a major osteoporotic fracture and a 7.5% risk of hip " fracture in the next 10 years (FRAX).     IMPRESSION: Changes consistent with osteopenia in the spine and borderline osteoporosis in the hips.    Assessment:      Patient is a 84 y.o. female with osteoporosis. Patient was referred to the Rheumatology pharmacist for Prolia injection and Osteoporosis management. Prolia via buy [employer] bill (approved). Provided patient with education about bone health and fall risk. Seems that patient is at risk for falls per assessment questionnaire. Counseled patient about importance of calcium and vitamin D. Plan is to continue Prolia. Also, explained importance of vaccines considering the increased risk of infections.    Plan:      1. Age-related osteoporosis without current pathological fracture (Primary)  Prolia administered appropriately and left office in stable condition.    ND: 9/12/25 with me  Patient provided with handout of foods/drinks rich in calcium previously  Initiate calcium carbonate 500 mg once daily and continue vitamin D - check dose when she gets home  Recommend weight bearing exercises (chair exercises, chair yoga, walking, stefan chi, etc) 3 times weekly for 30 mins  Discussed ways to decrease risk of falls  Patient verbalized understanding instructions  Next DXA due now; ordered and staff to schedule    2. Fracture Risk Assessment Score (FRAX) indicating greater than 20% risk for major osteoporosis-related fracture  See #1    3. Fracture Risk Assessment Score (FRAX) indicating greater than 3% risk for hip fracture  See #1    4. Stage 3 chronic kidney disease, unspecified whether stage 3a or 3b CKD  See #1    5. Immunization counseling  Vaccines needed: PCV 20 for extra protection    6. At risk for falls  See #1    7. Medication monitoring encounter  Ordered CMP and vitamin D; staff scheduled      Follow-up scheduled on 7/16/2025 with DO Piper Frye, PharmD, [facility]  Rheumatology Clinical Pharmacist  [facility]

## 2025-07-16 NOTE — TELEPHONE ENCOUNTER
Per Dr Cruz:    Please call patient. Good news no fracture. Must have been a blood vessel that ruptured. Dr. Cruz   X-Ray Foot Complete Right    Left voicemail

## 2025-07-16 NOTE — PROGRESS NOTES
Chief Complaint: Juanis Mclaughlin is a 84 y.o. female who had concerns including Disease Management.    HPI:  Rheumatoid Arthritis    83y/o female with h/o HTN, HLD was diagnosed with sero +  Rheumatoid arthritis in 1998. Off prednisone now.  Enbrel 50mg weekly on hold since March 2020. - and no worsening.   MTX 4 tabs weekly.   Started HCQ 200mg once daily.     Pain in the DIP and PIP joints. Most painful is the left 3rd and 4th PIP with swelling this visit.   No ASE of stomatitis,  infections or injection site reaction.   She was also diagnosed with secondary Sjogren's. Currently on restasis.  PPM for bradycardia.     Past 12 months slow decline in renal function from prior year- on PO bisphos and ACEI/HCTZ  switch this to Prolia.  Started Prolia 02/29/2019 continues every 6 months, last 9/2024, next 3/2025 with Senia.      She denies fever, weight loss, photosensitivity, rash, ulcer, raynaud's phenomenon, alopecia, dysphagia, diarrhea or blood in the stools.  + hx of GERD. On PPI. Hx of mild MR. No pedal edema.     Doing well with Dr. Madison with Breo and Gabapentin to relax cough impulse.     REVIEW OF SYSTEMS:    Review of Systems   Constitutional:  Negative for fever, malaise/fatigue and weight loss.   HENT:  Negative for sore throat.    Eyes:  Negative for double vision, photophobia and redness.   Respiratory:  Positive for cough and sputum production. Negative for hemoptysis, shortness of breath and wheezing.    Cardiovascular:  Negative for chest pain, palpitations and orthopnea.   Gastrointestinal:  Negative for abdominal pain, constipation and diarrhea.   Genitourinary:  Negative for dysuria, hematuria and urgency.   Musculoskeletal:  Positive for joint pain. Negative for back pain and myalgias.   Skin:  Negative for rash.   Neurological:  Negative for dizziness, tingling, focal weakness and headaches.   Endo/Heme/Allergies:  Does not bruise/bleed easily.   Psychiatric/Behavioral:  Negative for  depression, hallucinations and suicidal ideas.                 Objective:            Past Medical History:   Diagnosis Date    Cardiomyopathy     CKD (chronic kidney disease) stage 3, GFR 30-59 ml/min 01/09/2019    Diverticulosis     DVT (deep venous thrombosis)     Essential hypertension 11/24/2013    GERD (gastroesophageal reflux disease)     Glaucoma     sees Dr. Gillette    Hyperlipidemia     Iron deficiency anemia 10/21/2016    Long-term use of immunosuppressant medication 07/27/2015    Mitral regurgitation 12/20/2013    Osteoporosis 07/09/2014    Pacemaker 10/26/2016    left chest PACEMAKER [employer] [address] [id number] Bonifacio Fuller 9/9/2016 - current   right atrium LEAD [employer] [address] [id number] Bonifacio Fuller 9/9/2016 - current   right ventricle LEAD [employer] [address] [id number] Bonifacio Fuller 9/9/2016 - current      PONV (postoperative nausea and vomiting)     Rheumatoid arthritis involving multiple sites with positive rheumatoid factor 11/24/2013    Vitamin B12 deficiency 02/28/2015     Family History   Problem Relation Name Age of Onset    Heart disease Mother          CHF    Hypertension Mother      Heart disease Father      Stroke Father      Ovarian cancer Maternal Aunt  34    Heart disease Brother      Stroke Brother      Heart disease Brother      Scleroderma Brother      Breast cancer Neg Hx       Social History     Tobacco Use    Smoking status: Never     Passive exposure: Never    Smokeless tobacco: Never   Substance Use Topics    Alcohol use: Yes     Alcohol/week: 0.8 standard drinks of alcohol     Types: 1 Standard drinks or equivalent per week     Comment: occ social    Drug use: No         Current Outpatient Medications on File Prior to Visit   Medication Sig Dispense Refill    B-complex with vitamin C (Z-BEC OR EQUIV) tablet Take 1 tablet by mouth once daily.      brimonidine-timoloL (COMBIGAN) 0.2-0.5 %  Drop Place 1 drop into both eyes 2 (two) times daily.      candesartan (ATACAND) 16 MG tablet Take 1 tablet (16 mg total) by mouth once daily. 90 tablet 4    cyanocobalamin (VITAMIN B-12) 1000 MCG tablet Take 1,000 mcg by mouth once daily.      cycloSPORINE 0.09 % Dpet Apply to eye 2 (two) times a day.      denosumab (PROLIA) 60 mg/mL Syrg Inject 60 mg into the skin every 6 (six) months.      folic acid (FOLVITE) 1 MG tablet Take 2 tablets (2,000 mcg total) by mouth once daily. 180 tablet 3    hydroxychloroquine (PLAQUENIL) 200 mg tablet Take 1 tablet (200 mg total) by mouth once daily. 90 tablet 3    latanoprost 0.005 % ophthalmic solution Place 1 drop into both eyes every evening.      magnesium 200 mg Tab Take 500 mg by mouth once daily. Magnesium 400mg with Chelated Zinc 15mg.      methotrexate 2.5 MG Tab Take 5 tablets (12.5 mg total) by mouth every 7 days. 60 tablet 1    metoprolol succinate (TOPROL-XL) 50 MG 24 hr tablet Take 1 tablet (50 mg total) by mouth once daily. 90 tablet 4    pyridoxine, vitamin B6, (VITAMIN B-6) 100 MG Tab Take 50 mg by mouth once daily.      traMADoL (ULTRAM) 50 mg tablet Take 1-2 tablets ( mg total) by mouth every 12 (twelve) hours as needed for Pain (rheumatoid arthritis). 60 tablet 3    pantoprazole (PROTONIX) 40 MG tablet Take 1 tablet (40 mg total) by mouth once daily. 90 tablet 3     No current facility-administered medications on file prior to visit.       Vitals:    07/16/25 1155   Pulse: 64         Physical Exam:    Physical Exam  Constitutional:       Appearance: Normal appearance. She is well-developed.   HENT:      Nose: No septal deviation.      Mouth/Throat:      Mouth: No oral lesions.   Eyes:      Conjunctiva/sclera:      Right eye: Right conjunctiva is not injected.      Left eye: Left conjunctiva is not injected.      Pupils: Pupils are equal, round, and reactive to light.   Neck:      Thyroid: No thyroid mass or thyromegaly.      Vascular: No JVD.    Cardiovascular:      Rate and Rhythm: Normal rate and regular rhythm.      Pulses: Normal pulses.      Comments: No edema  Pulmonary:      Effort: Pulmonary effort is normal.      Breath sounds: No wheezing.   Abdominal:      Palpations: Abdomen is soft.   Musculoskeletal:      Right shoulder: No swelling or tenderness. Normal range of motion.      Left shoulder: No swelling or tenderness. Normal range of motion.      Right elbow: No swelling. Normal range of motion. No tenderness.      Left elbow: No swelling. Normal range of motion. No tenderness.      Right wrist: Tenderness present. No swelling. Decreased range of motion.      Left wrist: No swelling or tenderness. Normal range of motion.      Right hand: Tenderness present. Decreased range of motion.      Left hand: Tenderness present.      Right hip: Normal range of motion. Normal strength.      Left hip: No tenderness. Normal range of motion.      Right knee: No swelling. Decreased range of motion. Tenderness present over the medial joint line and lateral joint line.      Left knee: No swelling. Normal range of motion. No tenderness.      Right ankle: No swelling. No tenderness. Normal range of motion.      Left ankle: No swelling. No tenderness. Normal range of motion.      Comments: 1+ synovitis MCP and PIP with heberden nodes all DIP 2-5. Left wrist with tenderness and swelling about the TFCC. No nodules. No flexion contractures.      Cervical with left sided point tenderness and the occiput (nuchal ridge) lesser tenderness at the cervical paraspinals. Decreased ROM in cervical spine   Lymphadenopathy:      Cervical: No cervical adenopathy.   Skin:     General: Skin is dry.   Neurological:      Deep Tendon Reflexes: Reflexes are normal and symmetric.           Assessment:       Encounter Diagnoses   Name Primary?    Rheumatoid arthritis involving multiple sites with positive rheumatoid factor Yes    Long-term use of immunosuppressant medication     High  risk medications (not anticoagulants) long-term use     Stage 3b chronic kidney disease          Plan:        Rheumatoid arthritis involving multiple sites with positive rheumatoid factor    Long-term use of immunosuppressant medication    High risk medications (not anticoagulants) long-term use    Stage 3b chronic kidney disease    Stress fracture of right foot, initial encounter  -     X-Ray Foot Complete Right; Future; Expected date: 07/16/2025      RA:    -MTX we are cautiously going to put her MTX back to 5 tabs weekly hoping renal function continues to be good. She is definitely drinking more water as of recent.    HCQ 200mg once daily 90 day (already dosed for renal function)   -continue folic acid 2mg dialy   -OFF Enbrel   -voltaren gel PRN fingers/hands-using sparingly   -Labs q 3 months.    Neck Pain:    -seems to be stable and not problematic as of last 6 months. Great.    Osteoporosis   ASE  actonel/c/I for bisphosphonates now. Insurance sent EOB, about not paying.    -doing great on Prolia. Last dose 9/15/23    Right 3rd metatarsal fx suspects.       Reached out to Dr. Mccullough and Dr. Greene in Nephrology- he will be seeing her soon for further eval of renal changes. Discussed with her daughter (Dr. Mclaughlin-nephro as well will try to push H20 which is does not normally drink much)    No follow-ups on file.    F/u in 4 months         30min consultation with greater than 50% spent in counseling, chart review and coordination of care. All questions answered.

## 2025-07-18 ENCOUNTER — OFFICE VISIT (OUTPATIENT)
Dept: NEPHROLOGY | Facility: CLINIC | Age: 85
End: 2025-07-18
Payer: COMMERCIAL

## 2025-07-18 VITALS
BODY MASS INDEX: 28.7 KG/M2 | OXYGEN SATURATION: 99 % | SYSTOLIC BLOOD PRESSURE: 162 MMHG | DIASTOLIC BLOOD PRESSURE: 92 MMHG | HEIGHT: 61 IN | HEART RATE: 71 BPM

## 2025-07-18 DIAGNOSIS — N18.31 STAGE 3A CHRONIC KIDNEY DISEASE: Primary | ICD-10-CM

## 2025-07-18 DIAGNOSIS — I10 PRIMARY HYPERTENSION: ICD-10-CM

## 2025-07-18 DIAGNOSIS — N28.1 KIDNEY CYST, ACQUIRED: ICD-10-CM

## 2025-07-18 PROCEDURE — 99999 PR PBB SHADOW E&M-EST. PATIENT-LVL III: CPT | Mod: PBBFAC,,, | Performed by: INTERNAL MEDICINE

## 2025-07-18 NOTE — PROGRESS NOTES
"  Subjective:        Patient ID: Juanis Mclaughlin is a 84 y.o. White female who presents for return patient evaluation for chronic renal failure.      She has no uremic or urinary symptoms and is in her usual state of health.  There have been no recent illnesses, hospitalizations or procedures.  She is no longer using compressions stockings.  She does use a walker but has not fallen lately.  She has hand pain and arthritis but otherwise has been doing well.        Review of Systems   Constitutional:  Negative for fever.   HENT:  Negative for congestion.    Eyes:  Positive for visual disturbance (glaucoma).   Respiratory:  Positive for cough (from reflux).    Cardiovascular:  Positive for leg swelling.   Gastrointestinal:  Positive for abdominal pain (reflux).   Endocrine: Positive for cold intolerance (feet are cold).   Genitourinary:  Negative for difficulty urinating, dysuria and hematuria.   Musculoskeletal:  Positive for arthralgias (hands) and gait problem (uses walker).   Neurological:  Positive for numbness (mild in her legs).        Balance   Hematological:  Bruises/bleeds easily.       The past medical, family and social histories were reviewed for this encounter.     BP (!) 162/92 (BP Location: Right arm, Patient Position: Sitting)   Pulse 71   Ht 5' 1" (1.549 m)   SpO2 99%   BMI 28.70 kg/m²     Objective:      Physical Exam  Vitals reviewed.   Constitutional:       General: She is not in acute distress.     Appearance: She is not ill-appearing.      Comments: Delightful female   HENT:      Head: Normocephalic and atraumatic.   Eyes:      General: No scleral icterus.  Cardiovascular:      Rate and Rhythm: Normal rate.      Heart sounds: No murmur heard.     No friction rub.   Pulmonary:      Effort: Pulmonary effort is normal. No respiratory distress.      Breath sounds: No wheezing or rales.   Abdominal:      General: Abdomen is flat.      Palpations: Abdomen is soft.   Musculoskeletal:         " General: Deformity (MCP and PIP Heberdon nodes B) present.      Right lower leg: Edema (trace) present.      Left lower leg: Edema (trace) present.   Skin:     General: Skin is warm and dry.   Neurological:      Mental Status: She is alert and oriented to person, place, and time.   Psychiatric:         Mood and Affect: Mood normal.         Assessment:       1. Stage 3a chronic kidney disease    2. Primary hypertension    3. Kidney cyst, acquired        Lab Results   Component Value Date    CREATININE 1.07 07/17/2025    CREATININE 1.07 07/17/2025    CREATININE 1.06 07/17/2025    BUN 20 07/17/2025    BUN 21 07/17/2025     07/17/2025     07/17/2025    K 5.0 07/17/2025    K 4.8 07/17/2025     07/17/2025     07/17/2025    CO2 28 07/17/2025    CO2 29 07/17/2025     Lab Results   Component Value Date    .9 (H) 07/17/2025    .3 (H) 07/17/2025    CALCIUM 9.6 07/17/2025    CALCIUM 9.7 07/17/2025    PHOS 4.3 07/17/2025     Lab Results   Component Value Date    HCT 40.0 07/17/2025    HCT 39.9 07/17/2025     Prot/Creat Ratio, Urine   Date Value Ref Range Status   07/17/2025 0.2 0.0 - 0.2 Final   04/09/2025 0.1 0.0 - 0.2 Final   01/02/2025 0.4 (H) 0.0 - 0.2 mg/g Final      Plan:   Return to clinic in 6 months.  Labs for next visit include rp pth upc q 3 months.    Baseline creatinine is 1.0-1.3 since 2013.  PTH is 119 but she is on vitamin D weekly at home which I asked her to continue.  UPC is 0.2.  Her baseline became labile in September 2022 when it increased to 1.5.  Renal US in 2021 showed R 9.7 cm L 10.3 cm with a large cyst of 6.5 cm.  Repeat US shows the cyst is unchanged and is 6.3 cm.  We will repeat a renal US later this year.  Please avoid or minimize all NSAIDS (ibuprofen, motrin, aleve, indocin, naprosyn) to minimize the risk to your kidneys.  Aspirin in a dose of 325 mg or less a day is likely the safest with regards to kidney function.  If you are able to take aspirin and do  not have any bleeding problems or ulcers, this may be your best therapy.  Alternatively, acetaminophen (Tylenol) is the safer than NSAIDs with regards to kidney function.  I would ask you take this as directed on the bottle.  It is best to stay under 2 grams a day (4-500 mg tablets a day maximum).  We discussed kidney cysts and that they are common (occur in 50% of patients over 50 years old).  We do like to monitor them with repeat ultrasound every 1-2 years to document stability.  Her potassium has been elevated only twice in the past several years thus we discussed that this was likely due to dietary consumption.    Computed KFRE 2-Year unavailable. One or more values for this score either were not found within the given timeframe or did not fit some other criterion.    Computed KFRE 5-Year unavailable. One or more values for this score either were not found within the given timeframe or did not fit some other criterion.

## 2025-07-24 NOTE — PROGRESS NOTES
Entered therapy plan for Prolia B&B. Patient has appointment with me on 9/12/2025 so will need appointment with infusion same day after appointment with me once therapy plan is approved. Routing [facility] infusion manager and

## (undated) DEVICE — GLOVE SURGICAL LATEX SZ 7

## (undated) DEVICE — TRAY NERVE BLOCK

## (undated) DEVICE — NDL TUOHY EPIDURAL 20G X 3.5

## (undated) DEVICE — MARKER SKIN STND TIP BLUE BARR

## (undated) DEVICE — SYR GLASS 5CC LUER LOK

## (undated) DEVICE — SEE MEDLINE ITEM 152622

## (undated) DEVICE — APPLICATOR CHLORAPREP CLR 10.5

## (undated) DEVICE — NDL HYPO REG 25G X 1 1/2